# Patient Record
Sex: MALE | Race: WHITE | NOT HISPANIC OR LATINO | Employment: OTHER | URBAN - METROPOLITAN AREA
[De-identification: names, ages, dates, MRNs, and addresses within clinical notes are randomized per-mention and may not be internally consistent; named-entity substitution may affect disease eponyms.]

---

## 2017-02-16 ENCOUNTER — ALLSCRIPTS OFFICE VISIT (OUTPATIENT)
Dept: OTHER | Facility: OTHER | Age: 82
End: 2017-02-16

## 2017-03-01 ENCOUNTER — GENERIC CONVERSION - ENCOUNTER (OUTPATIENT)
Dept: OTHER | Facility: OTHER | Age: 82
End: 2017-03-01

## 2017-03-09 ENCOUNTER — GENERIC CONVERSION - ENCOUNTER (OUTPATIENT)
Dept: OTHER | Facility: OTHER | Age: 82
End: 2017-03-09

## 2017-03-13 ENCOUNTER — ALLSCRIPTS OFFICE VISIT (OUTPATIENT)
Dept: OTHER | Facility: OTHER | Age: 82
End: 2017-03-13

## 2017-03-29 ENCOUNTER — TRANSCRIBE ORDERS (OUTPATIENT)
Dept: LAB | Facility: OTHER | Age: 82
End: 2017-03-29

## 2017-03-29 ENCOUNTER — APPOINTMENT (OUTPATIENT)
Dept: LAB | Facility: OTHER | Age: 82
End: 2017-03-29
Payer: MEDICARE

## 2017-03-29 ENCOUNTER — ALLSCRIPTS OFFICE VISIT (OUTPATIENT)
Dept: OTHER | Facility: OTHER | Age: 82
End: 2017-03-29

## 2017-03-29 ENCOUNTER — GENERIC CONVERSION - ENCOUNTER (OUTPATIENT)
Dept: OTHER | Facility: OTHER | Age: 82
End: 2017-03-29

## 2017-03-29 DIAGNOSIS — M79.672 PAIN OF LEFT FOOT: ICD-10-CM

## 2017-03-29 DIAGNOSIS — R60.0 LOCALIZED EDEMA: ICD-10-CM

## 2017-03-29 DIAGNOSIS — I72.3 ANEURYSM OF ILIAC ARTERY (HCC): ICD-10-CM

## 2017-03-29 DIAGNOSIS — I77.9 DISORDER OF ARTERY OR ARTERIOLE (HCC): ICD-10-CM

## 2017-03-29 LAB — URATE SERPL-MCNC: 10.5 MG/DL (ref 4.2–8)

## 2017-03-29 PROCEDURE — 84550 ASSAY OF BLOOD/URIC ACID: CPT

## 2017-03-29 PROCEDURE — 36415 COLL VENOUS BLD VENIPUNCTURE: CPT

## 2017-04-12 ENCOUNTER — HOSPITAL ENCOUNTER (OUTPATIENT)
Dept: RADIOLOGY | Facility: MEDICAL CENTER | Age: 82
Discharge: HOME | End: 2017-04-12
Payer: MEDICARE

## 2017-04-12 DIAGNOSIS — R60.0 LOCALIZED EDEMA: ICD-10-CM

## 2017-04-12 PROCEDURE — 93970 EXTREMITY STUDY: CPT

## 2017-04-13 ENCOUNTER — GENERIC CONVERSION - ENCOUNTER (OUTPATIENT)
Dept: OTHER | Facility: OTHER | Age: 82
End: 2017-04-13

## 2017-04-18 ENCOUNTER — ALLSCRIPTS OFFICE VISIT (OUTPATIENT)
Dept: OTHER | Facility: OTHER | Age: 82
End: 2017-04-18

## 2017-04-24 ENCOUNTER — APPOINTMENT (EMERGENCY)
Dept: RADIOLOGY | Facility: HOSPITAL | Age: 82
DRG: 291 | End: 2017-04-24
Payer: MEDICARE

## 2017-04-24 ENCOUNTER — HOSPITAL ENCOUNTER (INPATIENT)
Facility: HOSPITAL | Age: 82
LOS: 8 days | Discharge: HOME | DRG: 291 | End: 2017-05-02
Attending: EMERGENCY MEDICINE | Admitting: INTERNAL MEDICINE
Payer: MEDICARE

## 2017-04-24 DIAGNOSIS — L03.116 CELLULITIS OF LEFT LOWER EXTREMITY: ICD-10-CM

## 2017-04-24 DIAGNOSIS — E87.70 VOLUME OVERLOAD: ICD-10-CM

## 2017-04-24 DIAGNOSIS — R06.00 DYSPNEA: ICD-10-CM

## 2017-04-24 DIAGNOSIS — N17.9 ACUTE KIDNEY INJURY (HCC): Primary | ICD-10-CM

## 2017-04-24 PROBLEM — R06.03 RESPIRATORY DISTRESS: Status: ACTIVE | Noted: 2017-04-24

## 2017-04-24 PROBLEM — R60.9 EDEMA: Status: ACTIVE | Noted: 2017-04-24

## 2017-04-24 PROBLEM — I50.9 CHF (CONGESTIVE HEART FAILURE) (HCC): Status: ACTIVE | Noted: 2017-04-24

## 2017-04-24 PROBLEM — N18.4 CKD (CHRONIC KIDNEY DISEASE), STAGE IV (HCC): Status: ACTIVE | Noted: 2017-04-24

## 2017-04-24 LAB
ALBUMIN SERPL BCP-MCNC: 2.6 G/DL (ref 3.5–5)
ALP SERPL-CCNC: 106 U/L (ref 46–116)
ALT SERPL W P-5'-P-CCNC: 18 U/L (ref 12–78)
ANION GAP SERPL CALCULATED.3IONS-SCNC: 14 MMOL/L (ref 4–13)
APTT PPP: 79 SECONDS (ref 24–36)
AST SERPL W P-5'-P-CCNC: 16 U/L (ref 5–45)
ATRIAL RATE: 111 BPM
BACTERIA UR QL AUTO: ABNORMAL /HPF
BASOPHILS # BLD AUTO: 0.05 THOUSANDS/ΜL (ref 0–0.1)
BASOPHILS NFR BLD AUTO: 1 % (ref 0–1)
BILIRUB SERPL-MCNC: 1.4 MG/DL (ref 0.2–1)
BILIRUB UR QL STRIP: NEGATIVE
BUN SERPL-MCNC: 62 MG/DL (ref 5–25)
CALCIUM SERPL-MCNC: 7.2 MG/DL (ref 8.3–10.1)
CHLORIDE SERPL-SCNC: 107 MMOL/L (ref 100–108)
CLARITY UR: CLEAR
CO2 SERPL-SCNC: 21 MMOL/L (ref 21–32)
COLOR UR: YELLOW
CREAT SERPL-MCNC: 3.87 MG/DL (ref 0.6–1.3)
EOSINOPHIL # BLD AUTO: 0.05 THOUSAND/ΜL (ref 0–0.61)
EOSINOPHIL NFR BLD AUTO: 1 % (ref 0–6)
ERYTHROCYTE [DISTWIDTH] IN BLOOD BY AUTOMATED COUNT: 18.7 % (ref 11.6–15.1)
FINE GRAN CASTS URNS QL MICRO: ABNORMAL /LPF
GFR SERPL CREATININE-BSD FRML MDRD: 15 ML/MIN/1.73SQ M
GLUCOSE SERPL-MCNC: 180 MG/DL (ref 65–140)
GLUCOSE UR STRIP-MCNC: NEGATIVE MG/DL
HCT VFR BLD AUTO: 27.5 % (ref 36.5–49.3)
HGB BLD-MCNC: 9 G/DL (ref 12–17)
HGB UR QL STRIP.AUTO: NEGATIVE
HYALINE CASTS #/AREA URNS LPF: ABNORMAL /LPF
INR PPP: 5.22 (ref 0.86–1.16)
KETONES UR STRIP-MCNC: NEGATIVE MG/DL
LEUKOCYTE ESTERASE UR QL STRIP: NEGATIVE
LIPASE SERPL-CCNC: 56 U/L (ref 73–393)
LYMPHOCYTES # BLD AUTO: 0.85 THOUSANDS/ΜL (ref 0.6–4.47)
LYMPHOCYTES NFR BLD AUTO: 8 % (ref 14–44)
MCH RBC QN AUTO: 28.7 PG (ref 26.8–34.3)
MCHC RBC AUTO-ENTMCNC: 32.7 G/DL (ref 31.4–37.4)
MCV RBC AUTO: 88 FL (ref 82–98)
MONOCYTES # BLD AUTO: 0.91 THOUSAND/ΜL (ref 0.17–1.22)
MONOCYTES NFR BLD AUTO: 9 % (ref 4–12)
NEUTROPHILS # BLD AUTO: 8.68 THOUSANDS/ΜL (ref 1.85–7.62)
NEUTS SEG NFR BLD AUTO: 81 % (ref 43–75)
NITRITE UR QL STRIP: NEGATIVE
NON-SQ EPI CELLS URNS QL MICRO: ABNORMAL /HPF
NRBC BLD AUTO-RTO: 0 /100 WBCS
NT-PROBNP SERPL-MCNC: ABNORMAL PG/ML
PH UR STRIP.AUTO: 5 [PH] (ref 4.5–8)
PLATELET # BLD AUTO: 105 THOUSANDS/UL (ref 149–390)
PLATELET # BLD AUTO: 118 THOUSANDS/UL (ref 149–390)
PMV BLD AUTO: 12.2 FL (ref 8.9–12.7)
PMV BLD AUTO: 12.5 FL (ref 8.9–12.7)
POTASSIUM SERPL-SCNC: 3.8 MMOL/L (ref 3.5–5.3)
PROT SERPL-MCNC: 6.4 G/DL (ref 6.4–8.2)
PROT UR STRIP-MCNC: ABNORMAL MG/DL
PROTHROMBIN TIME: 45.8 SECONDS (ref 12–14.3)
QRS AXIS: -68 DEGREES
QRSD INTERVAL: 136 MS
QT INTERVAL: 470 MS
QTC INTERVAL: 477 MS
RBC # BLD AUTO: 3.14 MILLION/UL (ref 3.88–5.62)
RBC #/AREA URNS AUTO: ABNORMAL /HPF
SODIUM SERPL-SCNC: 142 MMOL/L (ref 136–145)
SP GR UR STRIP.AUTO: 1.02 (ref 1–1.03)
T WAVE AXIS: 96 DEGREES
TROPONIN I SERPL-MCNC: 0.02 NG/ML
TROPONIN I SERPL-MCNC: 0.03 NG/ML
UROBILINOGEN UR QL STRIP.AUTO: 1 E.U./DL
VENTRICULAR RATE: 62 BPM
WBC # BLD AUTO: 10.7 THOUSAND/UL (ref 4.31–10.16)
WBC #/AREA URNS AUTO: ABNORMAL /HPF

## 2017-04-24 PROCEDURE — 80053 COMPREHEN METABOLIC PANEL: CPT | Performed by: EMERGENCY MEDICINE

## 2017-04-24 PROCEDURE — 84484 ASSAY OF TROPONIN QUANT: CPT | Performed by: INTERNAL MEDICINE

## 2017-04-24 PROCEDURE — 93005 ELECTROCARDIOGRAM TRACING: CPT | Performed by: EMERGENCY MEDICINE

## 2017-04-24 PROCEDURE — 83880 ASSAY OF NATRIURETIC PEPTIDE: CPT | Performed by: EMERGENCY MEDICINE

## 2017-04-24 PROCEDURE — 83690 ASSAY OF LIPASE: CPT | Performed by: EMERGENCY MEDICINE

## 2017-04-24 PROCEDURE — 71020 HB CHEST X-RAY 2VW FRONTAL&LATL: CPT

## 2017-04-24 PROCEDURE — 85730 THROMBOPLASTIN TIME PARTIAL: CPT | Performed by: EMERGENCY MEDICINE

## 2017-04-24 PROCEDURE — 85610 PROTHROMBIN TIME: CPT | Performed by: EMERGENCY MEDICINE

## 2017-04-24 PROCEDURE — 84484 ASSAY OF TROPONIN QUANT: CPT | Performed by: EMERGENCY MEDICINE

## 2017-04-24 PROCEDURE — 85025 COMPLETE CBC W/AUTO DIFF WBC: CPT | Performed by: EMERGENCY MEDICINE

## 2017-04-24 PROCEDURE — 81001 URINALYSIS AUTO W/SCOPE: CPT | Performed by: EMERGENCY MEDICINE

## 2017-04-24 PROCEDURE — 84145 PROCALCITONIN (PCT): CPT | Performed by: INTERNAL MEDICINE

## 2017-04-24 PROCEDURE — 36415 COLL VENOUS BLD VENIPUNCTURE: CPT | Performed by: EMERGENCY MEDICINE

## 2017-04-24 PROCEDURE — 85049 AUTOMATED PLATELET COUNT: CPT | Performed by: INTERNAL MEDICINE

## 2017-04-24 PROCEDURE — 99285 EMERGENCY DEPT VISIT HI MDM: CPT

## 2017-04-24 RX ORDER — FUROSEMIDE 10 MG/ML
80 INJECTION INTRAMUSCULAR; INTRAVENOUS ONCE
Status: COMPLETED | OUTPATIENT
Start: 2017-04-24 | End: 2017-04-24

## 2017-04-24 RX ORDER — AMLODIPINE BESYLATE 10 MG/1
5 TABLET ORAL DAILY
COMMUNITY
End: 2017-11-07 | Stop reason: HOSPADM

## 2017-04-24 RX ORDER — LEVALBUTEROL 1.25 MG/.5ML
1.25 SOLUTION, CONCENTRATE RESPIRATORY (INHALATION) EVERY 8 HOURS PRN
Status: DISCONTINUED | OUTPATIENT
Start: 2017-04-24 | End: 2017-04-25

## 2017-04-24 RX ORDER — LEVOFLOXACIN 5 MG/ML
250 INJECTION, SOLUTION INTRAVENOUS
Status: DISCONTINUED | OUTPATIENT
Start: 2017-04-26 | End: 2017-04-27

## 2017-04-24 RX ORDER — LEVOFLOXACIN 5 MG/ML
INJECTION, SOLUTION INTRAVENOUS
Status: COMPLETED
Start: 2017-04-24 | End: 2017-04-24

## 2017-04-24 RX ORDER — TERAZOSIN 1 MG/1
1 CAPSULE ORAL
Status: DISCONTINUED | OUTPATIENT
Start: 2017-04-24 | End: 2017-05-02 | Stop reason: HOSPADM

## 2017-04-24 RX ORDER — LEVOFLOXACIN 5 MG/ML
500 INJECTION, SOLUTION INTRAVENOUS EVERY 24 HOURS
Status: DISCONTINUED | OUTPATIENT
Start: 2017-04-24 | End: 2017-04-24

## 2017-04-24 RX ORDER — MELATONIN
2000 DAILY
COMMUNITY

## 2017-04-24 RX ORDER — HEPARIN SODIUM 5000 [USP'U]/ML
5000 INJECTION, SOLUTION INTRAVENOUS; SUBCUTANEOUS EVERY 8 HOURS SCHEDULED
Status: DISCONTINUED | OUTPATIENT
Start: 2017-04-24 | End: 2017-04-25

## 2017-04-24 RX ORDER — TORSEMIDE 20 MG/1
20 TABLET ORAL DAILY
COMMUNITY
End: 2017-05-02 | Stop reason: HOSPADM

## 2017-04-24 RX ORDER — OMEPRAZOLE 20 MG/1
20 CAPSULE, DELAYED RELEASE ORAL DAILY
COMMUNITY

## 2017-04-24 RX ORDER — ASPIRIN 81 MG/1
81 TABLET ORAL DAILY
Status: DISCONTINUED | OUTPATIENT
Start: 2017-04-25 | End: 2017-05-02 | Stop reason: HOSPADM

## 2017-04-24 RX ORDER — ASPIRIN 81 MG/1
81 TABLET ORAL DAILY
COMMUNITY

## 2017-04-24 RX ORDER — ATORVASTATIN CALCIUM 10 MG/1
10 TABLET, FILM COATED ORAL DAILY
Status: DISCONTINUED | OUTPATIENT
Start: 2017-04-25 | End: 2017-05-02 | Stop reason: HOSPADM

## 2017-04-24 RX ORDER — WARFARIN SODIUM 5 MG/1
5 TABLET ORAL DAILY
COMMUNITY
End: 2017-10-07 | Stop reason: HOSPADM

## 2017-04-24 RX ORDER — FUROSEMIDE 10 MG/ML
80 INJECTION INTRAMUSCULAR; INTRAVENOUS EVERY 12 HOURS
Status: DISCONTINUED | OUTPATIENT
Start: 2017-04-25 | End: 2017-04-26

## 2017-04-24 RX ORDER — WARFARIN SODIUM 2.5 MG/1
2.5 TABLET ORAL EVERY OTHER DAY
COMMUNITY
End: 2017-10-07 | Stop reason: HOSPADM

## 2017-04-24 RX ORDER — PANTOPRAZOLE SODIUM 20 MG/1
20 TABLET, DELAYED RELEASE ORAL
Status: DISCONTINUED | OUTPATIENT
Start: 2017-04-25 | End: 2017-05-02 | Stop reason: HOSPADM

## 2017-04-24 RX ORDER — ATORVASTATIN CALCIUM 10 MG/1
10 TABLET, FILM COATED ORAL DAILY
COMMUNITY

## 2017-04-24 RX ORDER — TERAZOSIN 1 MG/1
1 CAPSULE ORAL
COMMUNITY

## 2017-04-24 RX ADMIN — FUROSEMIDE 80 MG: 10 INJECTION, SOLUTION INTRAMUSCULAR; INTRAVENOUS at 17:51

## 2017-04-24 RX ADMIN — HEPARIN SODIUM 5000 UNITS: 5000 INJECTION, SOLUTION INTRAVENOUS; SUBCUTANEOUS at 17:51

## 2017-04-24 RX ADMIN — LEVOFLOXACIN 500 MG: 5 INJECTION, SOLUTION INTRAVENOUS at 18:40

## 2017-04-24 RX ADMIN — METOPROLOL TARTRATE 12.5 MG: 25 TABLET ORAL at 21:12

## 2017-04-24 RX ADMIN — HEPARIN SODIUM 5000 UNITS: 5000 INJECTION, SOLUTION INTRAVENOUS; SUBCUTANEOUS at 23:33

## 2017-04-24 RX ADMIN — TERAZOSIN HYDROCHLORIDE 1 MG: 1 CAPSULE ORAL at 23:33

## 2017-04-25 ENCOUNTER — APPOINTMENT (INPATIENT)
Dept: NON INVASIVE DIAGNOSTICS | Facility: HOSPITAL | Age: 82
DRG: 291 | End: 2017-04-25
Payer: MEDICARE

## 2017-04-25 LAB
ANION GAP SERPL CALCULATED.3IONS-SCNC: 13 MMOL/L (ref 4–13)
BUN SERPL-MCNC: 66 MG/DL (ref 5–25)
CALCIUM SERPL-MCNC: 7.2 MG/DL (ref 8.3–10.1)
CHLORIDE SERPL-SCNC: 108 MMOL/L (ref 100–108)
CO2 SERPL-SCNC: 21 MMOL/L (ref 21–32)
CREAT SERPL-MCNC: 3.68 MG/DL (ref 0.6–1.3)
ERYTHROCYTE [DISTWIDTH] IN BLOOD BY AUTOMATED COUNT: 18.3 % (ref 11.6–15.1)
FERRITIN SERPL-MCNC: 376 NG/ML (ref 8–388)
GFR SERPL CREATININE-BSD FRML MDRD: 15.9 ML/MIN/1.73SQ M
GLUCOSE SERPL-MCNC: 99 MG/DL (ref 65–140)
HCT VFR BLD AUTO: 24.5 % (ref 36.5–49.3)
HEMOCCULT STL QL: NEGATIVE
HGB BLD-MCNC: 8.1 G/DL (ref 12–17)
INR PPP: 5.3 (ref 0.86–1.16)
IRON SATN MFR SERPL: 25 %
IRON SERPL-MCNC: 35 UG/DL (ref 65–175)
MCH RBC QN AUTO: 28.7 PG (ref 26.8–34.3)
MCHC RBC AUTO-ENTMCNC: 33.1 G/DL (ref 31.4–37.4)
MCV RBC AUTO: 87 FL (ref 82–98)
PHOSPHATE SERPL-MCNC: 4.5 MG/DL (ref 2.3–4.1)
PLATELET # BLD AUTO: 104 THOUSANDS/UL (ref 149–390)
PMV BLD AUTO: 12.5 FL (ref 8.9–12.7)
POTASSIUM SERPL-SCNC: 3.7 MMOL/L (ref 3.5–5.3)
PROTHROMBIN TIME: 46.3 SECONDS (ref 12–14.3)
PTH-INTACT SERPL-MCNC: 83.2 PG/ML (ref 14–72)
RBC # BLD AUTO: 2.82 MILLION/UL (ref 3.88–5.62)
SODIUM SERPL-SCNC: 142 MMOL/L (ref 136–145)
TIBC SERPL-MCNC: 142 UG/DL (ref 250–450)
TROPONIN I SERPL-MCNC: 0.03 NG/ML
URATE SERPL-MCNC: 11.3 MG/DL (ref 4.2–8)
WBC # BLD AUTO: 6.63 THOUSAND/UL (ref 4.31–10.16)

## 2017-04-25 PROCEDURE — 84550 ASSAY OF BLOOD/URIC ACID: CPT | Performed by: INTERNAL MEDICINE

## 2017-04-25 PROCEDURE — 84100 ASSAY OF PHOSPHORUS: CPT | Performed by: INTERNAL MEDICINE

## 2017-04-25 PROCEDURE — 85610 PROTHROMBIN TIME: CPT | Performed by: INTERNAL MEDICINE

## 2017-04-25 PROCEDURE — 82272 OCCULT BLD FECES 1-3 TESTS: CPT | Performed by: INTERNAL MEDICINE

## 2017-04-25 PROCEDURE — 85027 COMPLETE CBC AUTOMATED: CPT | Performed by: INTERNAL MEDICINE

## 2017-04-25 PROCEDURE — 93306 TTE W/DOPPLER COMPLETE: CPT

## 2017-04-25 PROCEDURE — 83970 ASSAY OF PARATHORMONE: CPT | Performed by: INTERNAL MEDICINE

## 2017-04-25 PROCEDURE — 84484 ASSAY OF TROPONIN QUANT: CPT | Performed by: INTERNAL MEDICINE

## 2017-04-25 PROCEDURE — 82728 ASSAY OF FERRITIN: CPT | Performed by: INTERNAL MEDICINE

## 2017-04-25 PROCEDURE — 83540 ASSAY OF IRON: CPT | Performed by: INTERNAL MEDICINE

## 2017-04-25 PROCEDURE — 80048 BASIC METABOLIC PNL TOTAL CA: CPT | Performed by: INTERNAL MEDICINE

## 2017-04-25 PROCEDURE — 83550 IRON BINDING TEST: CPT | Performed by: INTERNAL MEDICINE

## 2017-04-25 RX ORDER — ALBUTEROL SULFATE 2.5 MG/3ML
2.5 SOLUTION RESPIRATORY (INHALATION) EVERY 6 HOURS PRN
Status: DISCONTINUED | OUTPATIENT
Start: 2017-04-25 | End: 2017-05-02 | Stop reason: HOSPADM

## 2017-04-25 RX ADMIN — IRON SUCROSE 200 MG: 20 INJECTION, SOLUTION INTRAVENOUS at 10:45

## 2017-04-25 RX ADMIN — FUROSEMIDE 80 MG: 10 INJECTION, SOLUTION INTRAMUSCULAR; INTRAVENOUS at 17:35

## 2017-04-25 RX ADMIN — PANTOPRAZOLE SODIUM 20 MG: 20 TABLET, DELAYED RELEASE ORAL at 05:12

## 2017-04-25 RX ADMIN — ATORVASTATIN CALCIUM 10 MG: 10 TABLET, FILM COATED ORAL at 08:10

## 2017-04-25 RX ADMIN — TERAZOSIN HYDROCHLORIDE 1 MG: 1 CAPSULE ORAL at 23:02

## 2017-04-25 RX ADMIN — FUROSEMIDE 80 MG: 10 INJECTION, SOLUTION INTRAMUSCULAR; INTRAVENOUS at 05:14

## 2017-04-25 RX ADMIN — METOPROLOL TARTRATE 12.5 MG: 25 TABLET ORAL at 08:08

## 2017-04-25 RX ADMIN — ASPIRIN 81 MG: 81 TABLET, COATED ORAL at 08:10

## 2017-04-25 RX ADMIN — EPOETIN ALFA 10000 UNITS: 10000 SOLUTION INTRAVENOUS; SUBCUTANEOUS at 14:02

## 2017-04-25 RX ADMIN — METOPROLOL TARTRATE 12.5 MG: 25 TABLET ORAL at 17:35

## 2017-04-26 ENCOUNTER — APPOINTMENT (INPATIENT)
Dept: ULTRASOUND IMAGING | Facility: HOSPITAL | Age: 82
DRG: 291 | End: 2017-04-26
Payer: MEDICARE

## 2017-04-26 LAB
ANION GAP SERPL CALCULATED.3IONS-SCNC: 12 MMOL/L (ref 4–13)
BUN SERPL-MCNC: 69 MG/DL (ref 5–25)
CALCIUM SERPL-MCNC: 7.6 MG/DL (ref 8.3–10.1)
CHLORIDE SERPL-SCNC: 109 MMOL/L (ref 100–108)
CO2 SERPL-SCNC: 23 MMOL/L (ref 21–32)
CREAT SERPL-MCNC: 3.89 MG/DL (ref 0.6–1.3)
ERYTHROCYTE [DISTWIDTH] IN BLOOD BY AUTOMATED COUNT: 18.3 % (ref 11.6–15.1)
GFR SERPL CREATININE-BSD FRML MDRD: 15 ML/MIN/1.73SQ M
GLUCOSE SERPL-MCNC: 107 MG/DL (ref 65–140)
HCT VFR BLD AUTO: 26.2 % (ref 36.5–49.3)
HEMOCCULT STL QL: NEGATIVE
HGB BLD-MCNC: 8.7 G/DL (ref 12–17)
INR PPP: 4.14 (ref 0.86–1.16)
MCH RBC QN AUTO: 28.5 PG (ref 26.8–34.3)
MCHC RBC AUTO-ENTMCNC: 33.2 G/DL (ref 31.4–37.4)
MCV RBC AUTO: 86 FL (ref 82–98)
PLATELET # BLD AUTO: 100 THOUSANDS/UL (ref 149–390)
PMV BLD AUTO: 11 FL (ref 8.9–12.7)
POTASSIUM SERPL-SCNC: 3.9 MMOL/L (ref 3.5–5.3)
PROCALCITONIN: 0.4 NG/ML (ref 0–0.08)
PROTHROMBIN TIME: 38.5 SECONDS (ref 12–14.3)
RBC # BLD AUTO: 3.05 MILLION/UL (ref 3.88–5.62)
SODIUM SERPL-SCNC: 144 MMOL/L (ref 136–145)
WBC # BLD AUTO: 5.99 THOUSAND/UL (ref 4.31–10.16)

## 2017-04-26 PROCEDURE — 80048 BASIC METABOLIC PNL TOTAL CA: CPT | Performed by: INTERNAL MEDICINE

## 2017-04-26 PROCEDURE — 85610 PROTHROMBIN TIME: CPT | Performed by: INTERNAL MEDICINE

## 2017-04-26 PROCEDURE — 85027 COMPLETE CBC AUTOMATED: CPT | Performed by: INTERNAL MEDICINE

## 2017-04-26 PROCEDURE — 82272 OCCULT BLD FECES 1-3 TESTS: CPT | Performed by: INTERNAL MEDICINE

## 2017-04-26 PROCEDURE — 93970 EXTREMITY STUDY: CPT

## 2017-04-26 RX ORDER — FUROSEMIDE 10 MG/ML
40 INJECTION INTRAMUSCULAR; INTRAVENOUS EVERY 12 HOURS
Status: DISCONTINUED | OUTPATIENT
Start: 2017-04-26 | End: 2017-04-27

## 2017-04-26 RX ADMIN — PANTOPRAZOLE SODIUM 20 MG: 20 TABLET, DELAYED RELEASE ORAL at 05:32

## 2017-04-26 RX ADMIN — ATORVASTATIN CALCIUM 10 MG: 10 TABLET, FILM COATED ORAL at 08:44

## 2017-04-26 RX ADMIN — METOPROLOL TARTRATE 12.5 MG: 25 TABLET ORAL at 08:44

## 2017-04-26 RX ADMIN — TERAZOSIN HYDROCHLORIDE 1 MG: 1 CAPSULE ORAL at 21:36

## 2017-04-26 RX ADMIN — METOPROLOL TARTRATE 12.5 MG: 25 TABLET ORAL at 18:59

## 2017-04-26 RX ADMIN — IRON SUCROSE 200 MG: 20 INJECTION, SOLUTION INTRAVENOUS at 12:40

## 2017-04-26 RX ADMIN — FUROSEMIDE 40 MG: 10 INJECTION, SOLUTION INTRAMUSCULAR; INTRAVENOUS at 18:58

## 2017-04-26 RX ADMIN — ASPIRIN 81 MG: 81 TABLET, COATED ORAL at 08:44

## 2017-04-26 RX ADMIN — FUROSEMIDE 80 MG: 10 INJECTION, SOLUTION INTRAMUSCULAR; INTRAVENOUS at 05:32

## 2017-04-26 RX ADMIN — LEVOFLOXACIN 250 MG: 5 INJECTION, SOLUTION INTRAVENOUS at 18:59

## 2017-04-27 LAB
ANION GAP SERPL CALCULATED.3IONS-SCNC: 14 MMOL/L (ref 4–13)
BUN SERPL-MCNC: 66 MG/DL (ref 5–25)
CALCIUM SERPL-MCNC: 8 MG/DL (ref 8.3–10.1)
CHLORIDE SERPL-SCNC: 109 MMOL/L (ref 100–108)
CO2 SERPL-SCNC: 23 MMOL/L (ref 21–32)
CREAT SERPL-MCNC: 3.83 MG/DL (ref 0.6–1.3)
GFR SERPL CREATININE-BSD FRML MDRD: 15.2 ML/MIN/1.73SQ M
GLUCOSE SERPL-MCNC: 150 MG/DL (ref 65–140)
INR PPP: 3.2 (ref 0.86–1.16)
POTASSIUM SERPL-SCNC: 3.4 MMOL/L (ref 3.5–5.3)
PROTHROMBIN TIME: 31.6 SECONDS (ref 12–14.3)
SODIUM SERPL-SCNC: 146 MMOL/L (ref 136–145)

## 2017-04-27 PROCEDURE — 80048 BASIC METABOLIC PNL TOTAL CA: CPT | Performed by: INTERNAL MEDICINE

## 2017-04-27 PROCEDURE — 85610 PROTHROMBIN TIME: CPT | Performed by: INTERNAL MEDICINE

## 2017-04-27 RX ORDER — LEVOFLOXACIN 5 MG/ML
250 INJECTION, SOLUTION INTRAVENOUS ONCE
Status: COMPLETED | OUTPATIENT
Start: 2017-04-28 | End: 2017-04-28

## 2017-04-27 RX ORDER — LEVOFLOXACIN 5 MG/ML
250 INJECTION, SOLUTION INTRAVENOUS EVERY 24 HOURS
Status: DISCONTINUED | OUTPATIENT
Start: 2017-04-28 | End: 2017-04-27

## 2017-04-27 RX ORDER — FUROSEMIDE 10 MG/ML
80 INJECTION INTRAMUSCULAR; INTRAVENOUS EVERY 12 HOURS
Status: DISCONTINUED | OUTPATIENT
Start: 2017-04-27 | End: 2017-04-28

## 2017-04-27 RX ADMIN — TERAZOSIN HYDROCHLORIDE 1 MG: 1 CAPSULE ORAL at 22:28

## 2017-04-27 RX ADMIN — METOPROLOL TARTRATE 12.5 MG: 25 TABLET ORAL at 08:08

## 2017-04-27 RX ADMIN — IRON SUCROSE 100 MG: 20 INJECTION, SOLUTION INTRAVENOUS at 14:09

## 2017-04-27 RX ADMIN — ASPIRIN 81 MG: 81 TABLET, COATED ORAL at 08:09

## 2017-04-27 RX ADMIN — FUROSEMIDE 40 MG: 10 INJECTION, SOLUTION INTRAMUSCULAR; INTRAVENOUS at 06:02

## 2017-04-27 RX ADMIN — PANTOPRAZOLE SODIUM 20 MG: 20 TABLET, DELAYED RELEASE ORAL at 06:02

## 2017-04-27 RX ADMIN — ATORVASTATIN CALCIUM 10 MG: 10 TABLET, FILM COATED ORAL at 08:08

## 2017-04-27 RX ADMIN — FUROSEMIDE 80 MG: 10 INJECTION, SOLUTION INTRAMUSCULAR; INTRAVENOUS at 19:07

## 2017-04-27 RX ADMIN — METOPROLOL TARTRATE 12.5 MG: 25 TABLET ORAL at 19:07

## 2017-04-28 PROBLEM — L03.119 CELLULITIS OF EXTREMITY: Status: ACTIVE | Noted: 2017-04-28

## 2017-04-28 PROBLEM — I48.0 PAROXYSMAL ATRIAL FIBRILLATION (HCC): Status: ACTIVE | Noted: 2017-04-28

## 2017-04-28 PROBLEM — D64.9 ANEMIA: Status: ACTIVE | Noted: 2017-04-28

## 2017-04-28 LAB
ANION GAP SERPL CALCULATED.3IONS-SCNC: 14 MMOL/L (ref 4–13)
BUN SERPL-MCNC: 65 MG/DL (ref 5–25)
CALCIUM SERPL-MCNC: 7.7 MG/DL (ref 8.3–10.1)
CHLORIDE SERPL-SCNC: 108 MMOL/L (ref 100–108)
CO2 SERPL-SCNC: 23 MMOL/L (ref 21–32)
CREAT SERPL-MCNC: 3.46 MG/DL (ref 0.6–1.3)
ERYTHROCYTE [DISTWIDTH] IN BLOOD BY AUTOMATED COUNT: 17.9 % (ref 11.6–15.1)
GFR SERPL CREATININE-BSD FRML MDRD: 17.1 ML/MIN/1.73SQ M
GLUCOSE SERPL-MCNC: 95 MG/DL (ref 65–140)
HCT VFR BLD AUTO: 27.4 % (ref 36.5–49.3)
HGB BLD-MCNC: 9 G/DL (ref 12–17)
INR PPP: 2.47 (ref 0.86–1.16)
MCH RBC QN AUTO: 28.1 PG (ref 26.8–34.3)
MCHC RBC AUTO-ENTMCNC: 32.8 G/DL (ref 31.4–37.4)
MCV RBC AUTO: 86 FL (ref 82–98)
PLATELET # BLD AUTO: 117 THOUSANDS/UL (ref 149–390)
PMV BLD AUTO: 12.5 FL (ref 8.9–12.7)
POTASSIUM SERPL-SCNC: 3.2 MMOL/L (ref 3.5–5.3)
PROTHROMBIN TIME: 27.5 SECONDS (ref 12.1–14.4)
RBC # BLD AUTO: 3.2 MILLION/UL (ref 3.88–5.62)
SODIUM SERPL-SCNC: 145 MMOL/L (ref 136–145)
WBC # BLD AUTO: 6.17 THOUSAND/UL (ref 4.31–10.16)

## 2017-04-28 PROCEDURE — 85027 COMPLETE CBC AUTOMATED: CPT | Performed by: INTERNAL MEDICINE

## 2017-04-28 PROCEDURE — G8988 SELF CARE GOAL STATUS: HCPCS

## 2017-04-28 PROCEDURE — G8978 MOBILITY CURRENT STATUS: HCPCS

## 2017-04-28 PROCEDURE — 97163 PT EVAL HIGH COMPLEX 45 MIN: CPT

## 2017-04-28 PROCEDURE — G8987 SELF CARE CURRENT STATUS: HCPCS

## 2017-04-28 PROCEDURE — 80048 BASIC METABOLIC PNL TOTAL CA: CPT | Performed by: INTERNAL MEDICINE

## 2017-04-28 PROCEDURE — 85610 PROTHROMBIN TIME: CPT | Performed by: INTERNAL MEDICINE

## 2017-04-28 PROCEDURE — G8979 MOBILITY GOAL STATUS: HCPCS

## 2017-04-28 PROCEDURE — 97110 THERAPEUTIC EXERCISES: CPT

## 2017-04-28 PROCEDURE — 97165 OT EVAL LOW COMPLEX 30 MIN: CPT

## 2017-04-28 RX ORDER — FUROSEMIDE 80 MG
80 TABLET ORAL
Status: DISCONTINUED | OUTPATIENT
Start: 2017-04-28 | End: 2017-05-02 | Stop reason: HOSPADM

## 2017-04-28 RX ORDER — WARFARIN SODIUM 3 MG/1
3 TABLET ORAL
Status: DISCONTINUED | OUTPATIENT
Start: 2017-04-28 | End: 2017-04-30

## 2017-04-28 RX ORDER — POTASSIUM CHLORIDE 20 MEQ/1
20 TABLET, EXTENDED RELEASE ORAL 2 TIMES DAILY
Status: DISCONTINUED | OUTPATIENT
Start: 2017-04-28 | End: 2017-05-02 | Stop reason: HOSPADM

## 2017-04-28 RX ADMIN — METOPROLOL TARTRATE 12.5 MG: 25 TABLET ORAL at 09:04

## 2017-04-28 RX ADMIN — TERAZOSIN HYDROCHLORIDE 1 MG: 1 CAPSULE ORAL at 21:30

## 2017-04-28 RX ADMIN — CEFAZOLIN SODIUM 1000 MG: 1 SOLUTION INTRAVENOUS at 21:30

## 2017-04-28 RX ADMIN — METOPROLOL TARTRATE 12.5 MG: 25 TABLET ORAL at 17:07

## 2017-04-28 RX ADMIN — ASPIRIN 81 MG: 81 TABLET, COATED ORAL at 09:04

## 2017-04-28 RX ADMIN — FUROSEMIDE 80 MG: 10 INJECTION, SOLUTION INTRAMUSCULAR; INTRAVENOUS at 06:06

## 2017-04-28 RX ADMIN — FUROSEMIDE 80 MG: 80 TABLET ORAL at 16:28

## 2017-04-28 RX ADMIN — ATORVASTATIN CALCIUM 10 MG: 10 TABLET, FILM COATED ORAL at 09:04

## 2017-04-28 RX ADMIN — PANTOPRAZOLE SODIUM 20 MG: 20 TABLET, DELAYED RELEASE ORAL at 06:06

## 2017-04-28 RX ADMIN — LEVOFLOXACIN 250 MG: 5 INJECTION, SOLUTION INTRAVENOUS at 09:04

## 2017-04-28 RX ADMIN — CEFAZOLIN SODIUM 1000 MG: 1 SOLUTION INTRAVENOUS at 10:38

## 2017-04-28 RX ADMIN — POTASSIUM CHLORIDE 20 MEQ: 20 TABLET, EXTENDED RELEASE ORAL at 20:27

## 2017-04-28 RX ADMIN — WARFARIN SODIUM 3 MG: 3 TABLET ORAL at 17:07

## 2017-04-29 LAB
ANION GAP SERPL CALCULATED.3IONS-SCNC: 13 MMOL/L (ref 4–13)
BUN SERPL-MCNC: 66 MG/DL (ref 5–25)
CALCIUM SERPL-MCNC: 7.7 MG/DL (ref 8.3–10.1)
CHLORIDE SERPL-SCNC: 107 MMOL/L (ref 100–108)
CO2 SERPL-SCNC: 23 MMOL/L (ref 21–32)
CREAT SERPL-MCNC: 3.43 MG/DL (ref 0.6–1.3)
ERYTHROCYTE [DISTWIDTH] IN BLOOD BY AUTOMATED COUNT: 18 % (ref 11.6–15.1)
GFR SERPL CREATININE-BSD FRML MDRD: 17.3 ML/MIN/1.73SQ M
GLUCOSE SERPL-MCNC: 101 MG/DL (ref 65–140)
HCT VFR BLD AUTO: 24.8 % (ref 36.5–49.3)
HGB BLD-MCNC: 8.3 G/DL (ref 12–17)
INR PPP: 2.49 (ref 0.86–1.16)
MCH RBC QN AUTO: 28.4 PG (ref 26.8–34.3)
MCHC RBC AUTO-ENTMCNC: 33.5 G/DL (ref 31.4–37.4)
MCV RBC AUTO: 85 FL (ref 82–98)
PLATELET # BLD AUTO: 89 THOUSANDS/UL (ref 149–390)
POTASSIUM SERPL-SCNC: 3.4 MMOL/L (ref 3.5–5.3)
PROTHROMBIN TIME: 27.6 SECONDS (ref 12.1–14.4)
RBC # BLD AUTO: 2.92 MILLION/UL (ref 3.88–5.62)
SODIUM SERPL-SCNC: 143 MMOL/L (ref 136–145)
WBC # BLD AUTO: 6.94 THOUSAND/UL (ref 4.31–10.16)

## 2017-04-29 PROCEDURE — 85610 PROTHROMBIN TIME: CPT | Performed by: INTERNAL MEDICINE

## 2017-04-29 PROCEDURE — 85027 COMPLETE CBC AUTOMATED: CPT | Performed by: INTERNAL MEDICINE

## 2017-04-29 PROCEDURE — 80048 BASIC METABOLIC PNL TOTAL CA: CPT | Performed by: INTERNAL MEDICINE

## 2017-04-29 RX ADMIN — WARFARIN SODIUM 3 MG: 3 TABLET ORAL at 17:34

## 2017-04-29 RX ADMIN — TERAZOSIN HYDROCHLORIDE 1 MG: 1 CAPSULE ORAL at 21:00

## 2017-04-29 RX ADMIN — POTASSIUM CHLORIDE 20 MEQ: 20 TABLET, EXTENDED RELEASE ORAL at 09:00

## 2017-04-29 RX ADMIN — CEFAZOLIN SODIUM 1000 MG: 1 SOLUTION INTRAVENOUS at 09:00

## 2017-04-29 RX ADMIN — FUROSEMIDE 80 MG: 80 TABLET ORAL at 15:40

## 2017-04-29 RX ADMIN — CEFAZOLIN SODIUM 1000 MG: 1 SOLUTION INTRAVENOUS at 21:00

## 2017-04-29 RX ADMIN — ASPIRIN 81 MG: 81 TABLET, COATED ORAL at 08:59

## 2017-04-29 RX ADMIN — FUROSEMIDE 80 MG: 80 TABLET ORAL at 08:59

## 2017-04-29 RX ADMIN — METOPROLOL TARTRATE 12.5 MG: 25 TABLET ORAL at 17:33

## 2017-04-29 RX ADMIN — METOPROLOL TARTRATE 12.5 MG: 25 TABLET ORAL at 08:59

## 2017-04-29 RX ADMIN — ATORVASTATIN CALCIUM 10 MG: 10 TABLET, FILM COATED ORAL at 08:59

## 2017-04-29 RX ADMIN — PANTOPRAZOLE SODIUM 20 MG: 20 TABLET, DELAYED RELEASE ORAL at 05:05

## 2017-04-29 RX ADMIN — POTASSIUM CHLORIDE 20 MEQ: 20 TABLET, EXTENDED RELEASE ORAL at 17:33

## 2017-04-30 PROBLEM — R06.03 RESPIRATORY DISTRESS: Status: RESOLVED | Noted: 2017-04-24 | Resolved: 2017-04-30

## 2017-04-30 LAB
INR PPP: 2.12 (ref 0.86–1.16)
PROTHROMBIN TIME: 24.3 SECONDS (ref 12.1–14.4)

## 2017-04-30 PROCEDURE — 85610 PROTHROMBIN TIME: CPT | Performed by: INTERNAL MEDICINE

## 2017-04-30 RX ORDER — WARFARIN SODIUM 5 MG/1
5 TABLET ORAL
Status: DISCONTINUED | OUTPATIENT
Start: 2017-04-30 | End: 2017-05-02 | Stop reason: HOSPADM

## 2017-04-30 RX ADMIN — ASPIRIN 81 MG: 81 TABLET, COATED ORAL at 08:33

## 2017-04-30 RX ADMIN — CEFAZOLIN SODIUM 1000 MG: 1 SOLUTION INTRAVENOUS at 22:35

## 2017-04-30 RX ADMIN — CEFAZOLIN SODIUM 1000 MG: 1 SOLUTION INTRAVENOUS at 09:24

## 2017-04-30 RX ADMIN — FUROSEMIDE 80 MG: 80 TABLET ORAL at 16:06

## 2017-04-30 RX ADMIN — POTASSIUM CHLORIDE 20 MEQ: 20 TABLET, EXTENDED RELEASE ORAL at 08:33

## 2017-04-30 RX ADMIN — FUROSEMIDE 80 MG: 80 TABLET ORAL at 08:33

## 2017-04-30 RX ADMIN — WARFARIN SODIUM 5 MG: 5 TABLET ORAL at 17:15

## 2017-04-30 RX ADMIN — TERAZOSIN HYDROCHLORIDE 1 MG: 1 CAPSULE ORAL at 22:35

## 2017-04-30 RX ADMIN — PANTOPRAZOLE SODIUM 20 MG: 20 TABLET, DELAYED RELEASE ORAL at 06:07

## 2017-04-30 RX ADMIN — POTASSIUM CHLORIDE 20 MEQ: 20 TABLET, EXTENDED RELEASE ORAL at 17:15

## 2017-04-30 RX ADMIN — METOPROLOL TARTRATE 12.5 MG: 25 TABLET ORAL at 08:33

## 2017-04-30 RX ADMIN — METOPROLOL TARTRATE 12.5 MG: 25 TABLET ORAL at 17:15

## 2017-04-30 RX ADMIN — ATORVASTATIN CALCIUM 10 MG: 10 TABLET, FILM COATED ORAL at 08:33

## 2017-05-01 ENCOUNTER — APPOINTMENT (INPATIENT)
Dept: MRI IMAGING | Facility: HOSPITAL | Age: 82
DRG: 291 | End: 2017-05-01
Payer: MEDICARE

## 2017-05-01 PROBLEM — I12.9 HYPERTENSIVE CKD (CHRONIC KIDNEY DISEASE): Status: ACTIVE | Noted: 2017-05-01

## 2017-05-01 PROBLEM — N17.9 AKI (ACUTE KIDNEY INJURY) (HCC): Status: ACTIVE | Noted: 2017-05-01

## 2017-05-01 PROBLEM — R79.89 AZOTEMIA: Status: ACTIVE | Noted: 2017-05-01

## 2017-05-01 LAB
ANION GAP SERPL CALCULATED.3IONS-SCNC: 11 MMOL/L (ref 4–13)
BUN SERPL-MCNC: 70 MG/DL (ref 5–25)
CALCIUM SERPL-MCNC: 7.6 MG/DL (ref 8.3–10.1)
CHLORIDE SERPL-SCNC: 108 MMOL/L (ref 100–108)
CO2 SERPL-SCNC: 25 MMOL/L (ref 21–32)
CREAT SERPL-MCNC: 3.5 MG/DL (ref 0.6–1.3)
GFR SERPL CREATININE-BSD FRML MDRD: 16.9 ML/MIN/1.73SQ M
GLUCOSE SERPL-MCNC: 101 MG/DL (ref 65–140)
INR PPP: 2.29 (ref 0.86–1.16)
POTASSIUM SERPL-SCNC: 3.8 MMOL/L (ref 3.5–5.3)
PROTHROMBIN TIME: 25.9 SECONDS (ref 12.1–14.4)
SODIUM SERPL-SCNC: 144 MMOL/L (ref 136–145)

## 2017-05-01 PROCEDURE — 85610 PROTHROMBIN TIME: CPT | Performed by: NURSE PRACTITIONER

## 2017-05-01 PROCEDURE — 97110 THERAPEUTIC EXERCISES: CPT

## 2017-05-01 PROCEDURE — 80048 BASIC METABOLIC PNL TOTAL CA: CPT | Performed by: INTERNAL MEDICINE

## 2017-05-01 PROCEDURE — 73718 MRI LOWER EXTREMITY W/O DYE: CPT

## 2017-05-01 RX ADMIN — POTASSIUM CHLORIDE 20 MEQ: 20 TABLET, EXTENDED RELEASE ORAL at 17:00

## 2017-05-01 RX ADMIN — FUROSEMIDE 80 MG: 80 TABLET ORAL at 09:37

## 2017-05-01 RX ADMIN — CEFAZOLIN SODIUM 1000 MG: 1 SOLUTION INTRAVENOUS at 22:50

## 2017-05-01 RX ADMIN — ATORVASTATIN CALCIUM 10 MG: 10 TABLET, FILM COATED ORAL at 09:38

## 2017-05-01 RX ADMIN — METOPROLOL TARTRATE 12.5 MG: 25 TABLET ORAL at 17:00

## 2017-05-01 RX ADMIN — PANTOPRAZOLE SODIUM 20 MG: 20 TABLET, DELAYED RELEASE ORAL at 06:17

## 2017-05-01 RX ADMIN — TERAZOSIN HYDROCHLORIDE 1 MG: 1 CAPSULE ORAL at 22:50

## 2017-05-01 RX ADMIN — ASPIRIN 81 MG: 81 TABLET, COATED ORAL at 09:38

## 2017-05-01 RX ADMIN — CEFAZOLIN SODIUM 1000 MG: 1 SOLUTION INTRAVENOUS at 09:38

## 2017-05-01 RX ADMIN — FUROSEMIDE 80 MG: 80 TABLET ORAL at 17:00

## 2017-05-01 RX ADMIN — WARFARIN SODIUM 5 MG: 5 TABLET ORAL at 17:00

## 2017-05-01 RX ADMIN — METOPROLOL TARTRATE 12.5 MG: 25 TABLET ORAL at 09:37

## 2017-05-01 RX ADMIN — POTASSIUM CHLORIDE 20 MEQ: 20 TABLET, EXTENDED RELEASE ORAL at 09:37

## 2017-05-02 VITALS
TEMPERATURE: 97.9 F | HEART RATE: 70 BPM | RESPIRATION RATE: 19 BRPM | HEIGHT: 72 IN | WEIGHT: 182.98 LBS | BODY MASS INDEX: 24.78 KG/M2 | OXYGEN SATURATION: 98 % | DIASTOLIC BLOOD PRESSURE: 59 MMHG | SYSTOLIC BLOOD PRESSURE: 134 MMHG

## 2017-05-02 LAB
ANION GAP SERPL CALCULATED.3IONS-SCNC: 13 MMOL/L (ref 4–13)
BUN SERPL-MCNC: 65 MG/DL (ref 5–25)
CALCIUM SERPL-MCNC: 7.9 MG/DL (ref 8.3–10.1)
CHLORIDE SERPL-SCNC: 105 MMOL/L (ref 100–108)
CO2 SERPL-SCNC: 25 MMOL/L (ref 21–32)
CREAT SERPL-MCNC: 3.58 MG/DL (ref 0.6–1.3)
ERYTHROCYTE [DISTWIDTH] IN BLOOD BY AUTOMATED COUNT: 18.2 % (ref 11.6–15.1)
GFR SERPL CREATININE-BSD FRML MDRD: 16.5 ML/MIN/1.73SQ M
GLUCOSE SERPL-MCNC: 104 MG/DL (ref 65–140)
HCT VFR BLD AUTO: 31.1 % (ref 36.5–49.3)
HGB BLD-MCNC: 9.9 G/DL (ref 12–17)
INR PPP: 2.14 (ref 0.86–1.16)
MCH RBC QN AUTO: 27.7 PG (ref 26.8–34.3)
MCHC RBC AUTO-ENTMCNC: 31.8 G/DL (ref 31.4–37.4)
MCV RBC AUTO: 87 FL (ref 82–98)
PLATELET # BLD AUTO: 127 THOUSANDS/UL (ref 149–390)
PMV BLD AUTO: 12.1 FL (ref 8.9–12.7)
POTASSIUM SERPL-SCNC: 3.9 MMOL/L (ref 3.5–5.3)
PROTHROMBIN TIME: 24.5 SECONDS (ref 12.1–14.4)
RBC # BLD AUTO: 3.57 MILLION/UL (ref 3.88–5.62)
SODIUM SERPL-SCNC: 143 MMOL/L (ref 136–145)
WBC # BLD AUTO: 7.41 THOUSAND/UL (ref 4.31–10.16)

## 2017-05-02 PROCEDURE — 85027 COMPLETE CBC AUTOMATED: CPT | Performed by: PHYSICIAN ASSISTANT

## 2017-05-02 PROCEDURE — 85610 PROTHROMBIN TIME: CPT | Performed by: NURSE PRACTITIONER

## 2017-05-02 PROCEDURE — 80048 BASIC METABOLIC PNL TOTAL CA: CPT | Performed by: INTERNAL MEDICINE

## 2017-05-02 RX ORDER — CEPHALEXIN 250 MG/1
250 CAPSULE ORAL 4 TIMES DAILY
Qty: 16 CAPSULE | Refills: 0 | Status: SHIPPED | OUTPATIENT
Start: 2017-05-02 | End: 2017-05-06

## 2017-05-02 RX ORDER — POTASSIUM CHLORIDE 20 MEQ/1
20 TABLET, EXTENDED RELEASE ORAL 2 TIMES DAILY
Qty: 60 TABLET | Refills: 0 | Status: SHIPPED | OUTPATIENT
Start: 2017-05-02 | End: 2017-07-06 | Stop reason: HOSPADM

## 2017-05-02 RX ORDER — FUROSEMIDE 80 MG
80 TABLET ORAL
Qty: 60 TABLET | Refills: 0 | Status: SHIPPED | OUTPATIENT
Start: 2017-05-02 | End: 2017-07-06 | Stop reason: HOSPADM

## 2017-05-02 RX ADMIN — ASPIRIN 81 MG: 81 TABLET, COATED ORAL at 08:30

## 2017-05-02 RX ADMIN — CEFAZOLIN SODIUM 1000 MG: 1 SOLUTION INTRAVENOUS at 10:54

## 2017-05-02 RX ADMIN — FUROSEMIDE 80 MG: 80 TABLET ORAL at 08:30

## 2017-05-02 RX ADMIN — IRON SUCROSE 300 MG: 20 INJECTION, SOLUTION INTRAVENOUS at 10:54

## 2017-05-02 RX ADMIN — POTASSIUM CHLORIDE 20 MEQ: 20 TABLET, EXTENDED RELEASE ORAL at 08:29

## 2017-05-02 RX ADMIN — PANTOPRAZOLE SODIUM 20 MG: 20 TABLET, DELAYED RELEASE ORAL at 06:14

## 2017-05-02 RX ADMIN — METOPROLOL TARTRATE 12.5 MG: 25 TABLET ORAL at 08:29

## 2017-05-02 RX ADMIN — ATORVASTATIN CALCIUM 10 MG: 10 TABLET, FILM COATED ORAL at 08:29

## 2017-05-08 ENCOUNTER — ALLSCRIPTS OFFICE VISIT (OUTPATIENT)
Dept: OTHER | Facility: OTHER | Age: 82
End: 2017-05-08

## 2017-05-10 ENCOUNTER — HOSPITAL ENCOUNTER (OUTPATIENT)
Dept: RADIOLOGY | Facility: MEDICAL CENTER | Age: 82
Discharge: HOME | End: 2017-05-10
Payer: MEDICARE

## 2017-05-10 DIAGNOSIS — I72.3 ANEURYSM OF ILIAC ARTERY (HCC): ICD-10-CM

## 2017-05-10 PROCEDURE — 93978 VASCULAR STUDY: CPT

## 2017-05-11 ENCOUNTER — GENERIC CONVERSION - ENCOUNTER (OUTPATIENT)
Dept: OTHER | Facility: OTHER | Age: 82
End: 2017-05-11

## 2017-05-11 ENCOUNTER — ALLSCRIPTS OFFICE VISIT (OUTPATIENT)
Dept: OTHER | Facility: OTHER | Age: 82
End: 2017-05-11

## 2017-05-25 ENCOUNTER — ALLSCRIPTS OFFICE VISIT (OUTPATIENT)
Dept: OTHER | Facility: OTHER | Age: 82
End: 2017-05-25

## 2017-05-25 DIAGNOSIS — E83.51 HYPOCALCEMIA: ICD-10-CM

## 2017-05-25 DIAGNOSIS — N18.4 CHRONIC KIDNEY DISEASE, STAGE IV (SEVERE) (HCC): ICD-10-CM

## 2017-05-25 DIAGNOSIS — R00.1 BRADYCARDIA: ICD-10-CM

## 2017-05-25 DIAGNOSIS — I48.0 PAROXYSMAL ATRIAL FIBRILLATION (HCC): ICD-10-CM

## 2017-06-12 ENCOUNTER — ALLSCRIPTS OFFICE VISIT (OUTPATIENT)
Dept: OTHER | Facility: OTHER | Age: 82
End: 2017-06-12

## 2017-06-16 ENCOUNTER — HOSPITAL ENCOUNTER (OUTPATIENT)
Dept: NON INVASIVE DIAGNOSTICS | Facility: CLINIC | Age: 82
Discharge: HOME | End: 2017-06-16
Payer: MEDICARE

## 2017-06-16 DIAGNOSIS — I48.0 PAROXYSMAL ATRIAL FIBRILLATION (HCC): ICD-10-CM

## 2017-06-16 DIAGNOSIS — R00.1 BRADYCARDIA: ICD-10-CM

## 2017-06-22 ENCOUNTER — GENERIC CONVERSION - ENCOUNTER (OUTPATIENT)
Dept: OTHER | Facility: OTHER | Age: 82
End: 2017-06-22

## 2017-06-26 ENCOUNTER — GENERIC CONVERSION - ENCOUNTER (OUTPATIENT)
Dept: OTHER | Facility: OTHER | Age: 82
End: 2017-06-26

## 2017-07-02 ENCOUNTER — HOSPITAL ENCOUNTER (INPATIENT)
Facility: HOSPITAL | Age: 82
LOS: 4 days | Discharge: HOME | DRG: 683 | End: 2017-07-06
Attending: GENERAL PRACTICE | Admitting: GENERAL PRACTICE
Payer: MEDICARE

## 2017-07-02 ENCOUNTER — APPOINTMENT (EMERGENCY)
Dept: RADIOLOGY | Facility: HOSPITAL | Age: 82
DRG: 683 | End: 2017-07-02
Payer: MEDICARE

## 2017-07-02 DIAGNOSIS — N18.4 CKD (CHRONIC KIDNEY DISEASE), STAGE IV (HCC): Primary | ICD-10-CM

## 2017-07-02 DIAGNOSIS — I50.9 CHF (CONGESTIVE HEART FAILURE) (HCC): ICD-10-CM

## 2017-07-02 DIAGNOSIS — N18.9 ACUTE ON CHRONIC RENAL FAILURE (HCC): ICD-10-CM

## 2017-07-02 DIAGNOSIS — N17.9 ACUTE ON CHRONIC RENAL FAILURE (HCC): ICD-10-CM

## 2017-07-02 DIAGNOSIS — M10.9 GOUT: ICD-10-CM

## 2017-07-02 PROBLEM — R53.1 WEAKNESS GENERALIZED: Status: ACTIVE | Noted: 2017-07-02

## 2017-07-02 PROBLEM — Z79.01 WARFARIN ANTICOAGULATION: Status: ACTIVE | Noted: 2017-07-02

## 2017-07-02 PROBLEM — E83.51 HYPOCALCEMIA: Status: ACTIVE | Noted: 2017-07-02

## 2017-07-02 PROBLEM — I25.10 CAD (CORONARY ARTERY DISEASE): Status: ACTIVE | Noted: 2017-07-02

## 2017-07-02 LAB
25(OH)D3 SERPL-MCNC: 38.3 NG/ML (ref 30–100)
ACETONE SERPL-MCNC: NEGATIVE MG/DL
ALBUMIN SERPL BCP-MCNC: 3.1 G/DL (ref 3.5–5)
ALP SERPL-CCNC: 80 U/L (ref 46–116)
ALT SERPL W P-5'-P-CCNC: 33 U/L (ref 12–78)
ANION GAP SERPL CALCULATED.3IONS-SCNC: 18 MMOL/L (ref 4–13)
ANION GAP SERPL CALCULATED.3IONS-SCNC: 19 MMOL/L (ref 4–13)
APTT PPP: 90 SECONDS (ref 23–35)
AST SERPL W P-5'-P-CCNC: 29 U/L (ref 5–45)
BACTERIA UR QL AUTO: ABNORMAL /HPF
BACTERIA UR QL AUTO: ABNORMAL /HPF
BASOPHILS # BLD AUTO: 0.07 THOUSANDS/ΜL (ref 0–0.1)
BASOPHILS NFR BLD AUTO: 1 % (ref 0–1)
BILIRUB SERPL-MCNC: 0.8 MG/DL (ref 0.2–1)
BILIRUB UR QL STRIP: NEGATIVE
BILIRUB UR QL STRIP: NEGATIVE
BUN SERPL-MCNC: 113 MG/DL (ref 5–25)
BUN SERPL-MCNC: 113 MG/DL (ref 5–25)
CA-I BLD-SCNC: 0.78 MMOL/L (ref 1.12–1.32)
CALCIUM PRE 500 MG CA PO UR-SCNC: <5 MG/DL
CALCIUM SERPL-MCNC: 5.7 MG/DL (ref 8.3–10.1)
CALCIUM SERPL-MCNC: 6.2 MG/DL (ref 8.3–10.1)
CHLORIDE SERPL-SCNC: 93 MMOL/L (ref 100–108)
CHLORIDE SERPL-SCNC: 95 MMOL/L (ref 100–108)
CLARITY UR: CLEAR
CLARITY UR: CLEAR
CO2 SERPL-SCNC: 22 MMOL/L (ref 21–32)
CO2 SERPL-SCNC: 23 MMOL/L (ref 21–32)
COLOR UR: YELLOW
COLOR UR: YELLOW
CREAT SERPL-MCNC: 4.89 MG/DL (ref 0.6–1.3)
CREAT SERPL-MCNC: 5.14 MG/DL (ref 0.6–1.3)
EOSINOPHIL # BLD AUTO: 0.08 THOUSAND/ΜL (ref 0–0.61)
EOSINOPHIL NFR BLD AUTO: 1 % (ref 0–6)
ERYTHROCYTE [DISTWIDTH] IN BLOOD BY AUTOMATED COUNT: 17.7 % (ref 11.6–15.1)
GFR SERPL CREATININE-BSD FRML MDRD: 10.8 ML/MIN/1.73SQ M
GFR SERPL CREATININE-BSD FRML MDRD: 11.5 ML/MIN/1.73SQ M
GLUCOSE SERPL-MCNC: 102 MG/DL (ref 65–140)
GLUCOSE SERPL-MCNC: 107 MG/DL (ref 65–140)
GLUCOSE UR STRIP-MCNC: NEGATIVE MG/DL
GLUCOSE UR STRIP-MCNC: NEGATIVE MG/DL
HCT VFR BLD AUTO: 28.1 % (ref 36.5–49.3)
HGB BLD-MCNC: 9.7 G/DL (ref 12–17)
HGB UR QL STRIP.AUTO: ABNORMAL
HGB UR QL STRIP.AUTO: ABNORMAL
INR PPP: 3.84 (ref 0.86–1.16)
KETONES UR STRIP-MCNC: NEGATIVE MG/DL
KETONES UR STRIP-MCNC: NEGATIVE MG/DL
LACTATE SERPL-SCNC: 1 MMOL/L (ref 0.5–2)
LEUKOCYTE ESTERASE UR QL STRIP: NEGATIVE
LEUKOCYTE ESTERASE UR QL STRIP: NEGATIVE
LYMPHOCYTES # BLD AUTO: 0.93 THOUSANDS/ΜL (ref 0.6–4.47)
LYMPHOCYTES NFR BLD AUTO: 10 % (ref 14–44)
MAGNESIUM SERPL-MCNC: 0.5 MG/DL (ref 1.6–2.6)
MCH RBC QN AUTO: 28.1 PG (ref 26.8–34.3)
MCHC RBC AUTO-ENTMCNC: 34.5 G/DL (ref 31.4–37.4)
MCV RBC AUTO: 81 FL (ref 82–98)
MONOCYTES # BLD AUTO: 0.75 THOUSAND/ΜL (ref 0.17–1.22)
MONOCYTES NFR BLD AUTO: 8 % (ref 4–12)
NEUTROPHILS # BLD AUTO: 6.96 THOUSANDS/ΜL (ref 1.85–7.62)
NEUTS SEG NFR BLD AUTO: 78 % (ref 43–75)
NITRITE UR QL STRIP: NEGATIVE
NITRITE UR QL STRIP: NEGATIVE
NON-SQ EPI CELLS URNS QL MICRO: ABNORMAL /HPF
NON-SQ EPI CELLS URNS QL MICRO: ABNORMAL /HPF
NRBC BLD AUTO-RTO: 0 /100 WBCS
NT-PROBNP SERPL-MCNC: ABNORMAL PG/ML
PH UR STRIP.AUTO: 5 [PH] (ref 4.5–8)
PH UR STRIP.AUTO: 5 [PH] (ref 4.5–8)
PHOSPHATE UR-MCNC: 14.5 MG/DL
PLATELET # BLD AUTO: 131 THOUSANDS/UL (ref 149–390)
PLATELET # BLD AUTO: 137 THOUSANDS/UL (ref 149–390)
PMV BLD AUTO: 10.6 FL (ref 8.9–12.7)
PMV BLD AUTO: 10.7 FL (ref 8.9–12.7)
POTASSIUM SERPL-SCNC: 3.3 MMOL/L (ref 3.5–5.3)
POTASSIUM SERPL-SCNC: 3.4 MMOL/L (ref 3.5–5.3)
PROT SERPL-MCNC: 7.3 G/DL (ref 6.4–8.2)
PROT UR STRIP-MCNC: ABNORMAL MG/DL
PROT UR STRIP-MCNC: NEGATIVE MG/DL
PROTHROMBIN TIME: 39 SECONDS (ref 12.1–14.4)
PTH-INTACT SERPL-MCNC: 57.8 PG/ML (ref 14–72)
RBC # BLD AUTO: 3.45 MILLION/UL (ref 3.88–5.62)
RBC #/AREA URNS AUTO: ABNORMAL /HPF
RBC #/AREA URNS AUTO: ABNORMAL /HPF
SODIUM SERPL-SCNC: 134 MMOL/L (ref 136–145)
SODIUM SERPL-SCNC: 136 MMOL/L (ref 136–145)
SP GR UR STRIP.AUTO: 1.01 (ref 1–1.03)
SP GR UR STRIP.AUTO: 1.01 (ref 1–1.03)
TROPONIN I SERPL-MCNC: 0.03 NG/ML
TSH SERPL DL<=0.05 MIU/L-ACNC: 2.32 UIU/ML (ref 0.36–3.74)
UROBILINOGEN UR QL STRIP.AUTO: 0.2 E.U./DL
UROBILINOGEN UR QL STRIP.AUTO: 0.2 E.U./DL
WBC # BLD AUTO: 8.95 THOUSAND/UL (ref 4.31–10.16)
WBC #/AREA URNS AUTO: ABNORMAL /HPF
WBC #/AREA URNS AUTO: ABNORMAL /HPF

## 2017-07-02 PROCEDURE — 36415 COLL VENOUS BLD VENIPUNCTURE: CPT | Performed by: NURSE PRACTITIONER

## 2017-07-02 PROCEDURE — 71020 HB CHEST X-RAY 2VW FRONTAL&LATL: CPT

## 2017-07-02 PROCEDURE — 80048 BASIC METABOLIC PNL TOTAL CA: CPT | Performed by: GENERAL PRACTICE

## 2017-07-02 PROCEDURE — 83735 ASSAY OF MAGNESIUM: CPT | Performed by: GENERAL PRACTICE

## 2017-07-02 PROCEDURE — 82340 ASSAY OF CALCIUM IN URINE: CPT | Performed by: NURSE PRACTITIONER

## 2017-07-02 PROCEDURE — 85610 PROTHROMBIN TIME: CPT | Performed by: NURSE PRACTITIONER

## 2017-07-02 PROCEDURE — 82330 ASSAY OF CALCIUM: CPT | Performed by: NURSE PRACTITIONER

## 2017-07-02 PROCEDURE — 82009 KETONE BODYS QUAL: CPT | Performed by: NURSE PRACTITIONER

## 2017-07-02 PROCEDURE — 84443 ASSAY THYROID STIM HORMONE: CPT | Performed by: NURSE PRACTITIONER

## 2017-07-02 PROCEDURE — 85049 AUTOMATED PLATELET COUNT: CPT | Performed by: NURSE PRACTITIONER

## 2017-07-02 PROCEDURE — 96360 HYDRATION IV INFUSION INIT: CPT

## 2017-07-02 PROCEDURE — 84105 ASSAY OF URINE PHOSPHORUS: CPT | Performed by: NURSE PRACTITIONER

## 2017-07-02 PROCEDURE — 82306 VITAMIN D 25 HYDROXY: CPT | Performed by: NURSE PRACTITIONER

## 2017-07-02 PROCEDURE — 83605 ASSAY OF LACTIC ACID: CPT | Performed by: NURSE PRACTITIONER

## 2017-07-02 PROCEDURE — 81001 URINALYSIS AUTO W/SCOPE: CPT | Performed by: NURSE PRACTITIONER

## 2017-07-02 PROCEDURE — 85025 COMPLETE CBC W/AUTO DIFF WBC: CPT | Performed by: NURSE PRACTITIONER

## 2017-07-02 PROCEDURE — 83970 ASSAY OF PARATHORMONE: CPT | Performed by: NURSE PRACTITIONER

## 2017-07-02 PROCEDURE — 84484 ASSAY OF TROPONIN QUANT: CPT | Performed by: NURSE PRACTITIONER

## 2017-07-02 PROCEDURE — 85730 THROMBOPLASTIN TIME PARTIAL: CPT | Performed by: NURSE PRACTITIONER

## 2017-07-02 PROCEDURE — 93005 ELECTROCARDIOGRAM TRACING: CPT | Performed by: NURSE PRACTITIONER

## 2017-07-02 PROCEDURE — 99285 EMERGENCY DEPT VISIT HI MDM: CPT

## 2017-07-02 PROCEDURE — 80053 COMPREHEN METABOLIC PANEL: CPT | Performed by: NURSE PRACTITIONER

## 2017-07-02 PROCEDURE — 83880 ASSAY OF NATRIURETIC PEPTIDE: CPT | Performed by: NURSE PRACTITIONER

## 2017-07-02 RX ORDER — WARFARIN SODIUM 5 MG/1
5 TABLET ORAL EVERY OTHER DAY
Status: DISCONTINUED | OUTPATIENT
Start: 2017-07-02 | End: 2017-07-02

## 2017-07-02 RX ORDER — MELATONIN
2000 DAILY
Status: DISCONTINUED | OUTPATIENT
Start: 2017-07-03 | End: 2017-07-06 | Stop reason: HOSPADM

## 2017-07-02 RX ORDER — WARFARIN SODIUM 2.5 MG/1
2.5 TABLET ORAL EVERY OTHER DAY
Status: DISCONTINUED | OUTPATIENT
Start: 2017-07-02 | End: 2017-07-02

## 2017-07-02 RX ORDER — MULTIVITAMIN
1 CAPSULE ORAL DAILY
COMMUNITY

## 2017-07-02 RX ORDER — TERAZOSIN 1 MG/1
1 CAPSULE ORAL
Status: DISCONTINUED | OUTPATIENT
Start: 2017-07-02 | End: 2017-07-06 | Stop reason: HOSPADM

## 2017-07-02 RX ORDER — TORSEMIDE 20 MG/1
20 TABLET ORAL DAILY
COMMUNITY

## 2017-07-02 RX ORDER — CALCIUM CARBONATE 200(500)MG
1000 TABLET,CHEWABLE ORAL DAILY PRN
Status: DISCONTINUED | OUTPATIENT
Start: 2017-07-02 | End: 2017-07-06 | Stop reason: HOSPADM

## 2017-07-02 RX ORDER — SODIUM CHLORIDE 9 MG/ML
125 INJECTION, SOLUTION INTRAVENOUS CONTINUOUS
Status: DISCONTINUED | OUTPATIENT
Start: 2017-07-02 | End: 2017-07-02

## 2017-07-02 RX ORDER — TORSEMIDE 20 MG/1
20 TABLET ORAL DAILY
Status: DISCONTINUED | OUTPATIENT
Start: 2017-07-03 | End: 2017-07-04

## 2017-07-02 RX ORDER — FERROUS GLUCONATE 256(28)MG
65 TABLET ORAL DAILY
Status: DISCONTINUED | OUTPATIENT
Start: 2017-07-03 | End: 2017-07-02

## 2017-07-02 RX ORDER — ATORVASTATIN CALCIUM 10 MG/1
10 TABLET, FILM COATED ORAL DAILY
Status: DISCONTINUED | OUTPATIENT
Start: 2017-07-03 | End: 2017-07-06 | Stop reason: HOSPADM

## 2017-07-02 RX ORDER — FERROUS SULFATE 325(65) MG
325 TABLET ORAL
Status: DISCONTINUED | OUTPATIENT
Start: 2017-07-03 | End: 2017-07-06 | Stop reason: HOSPADM

## 2017-07-02 RX ORDER — AMLODIPINE BESYLATE 5 MG/1
5 TABLET ORAL DAILY
Status: DISCONTINUED | OUTPATIENT
Start: 2017-07-03 | End: 2017-07-06 | Stop reason: HOSPADM

## 2017-07-02 RX ORDER — ACETAMINOPHEN 325 MG/1
650 TABLET ORAL EVERY 6 HOURS PRN
Status: DISCONTINUED | OUTPATIENT
Start: 2017-07-02 | End: 2017-07-06 | Stop reason: HOSPADM

## 2017-07-02 RX ORDER — CALCITRIOL 0.25 UG/1
0.25 CAPSULE, LIQUID FILLED ORAL DAILY
Status: DISCONTINUED | OUTPATIENT
Start: 2017-07-03 | End: 2017-07-06 | Stop reason: HOSPADM

## 2017-07-02 RX ORDER — ASPIRIN 81 MG/1
81 TABLET ORAL DAILY
Status: DISCONTINUED | OUTPATIENT
Start: 2017-07-03 | End: 2017-07-06 | Stop reason: HOSPADM

## 2017-07-02 RX ORDER — CALCITRIOL 0.25 UG/1
0.25 CAPSULE, LIQUID FILLED ORAL DAILY
COMMUNITY
End: 2017-10-25 | Stop reason: ALTCHOICE

## 2017-07-02 RX ORDER — PANTOPRAZOLE SODIUM 40 MG/1
40 TABLET, DELAYED RELEASE ORAL
Status: DISCONTINUED | OUTPATIENT
Start: 2017-07-03 | End: 2017-07-06 | Stop reason: HOSPADM

## 2017-07-02 RX ORDER — POTASSIUM CHLORIDE 20 MEQ/1
40 TABLET, EXTENDED RELEASE ORAL ONCE
Status: COMPLETED | OUTPATIENT
Start: 2017-07-02 | End: 2017-07-02

## 2017-07-02 RX ORDER — ONDANSETRON 2 MG/ML
4 INJECTION INTRAMUSCULAR; INTRAVENOUS EVERY 6 HOURS PRN
Status: DISCONTINUED | OUTPATIENT
Start: 2017-07-02 | End: 2017-07-06 | Stop reason: HOSPADM

## 2017-07-02 RX ORDER — HEPARIN SODIUM 5000 [USP'U]/ML
5000 INJECTION, SOLUTION INTRAVENOUS; SUBCUTANEOUS EVERY 8 HOURS SCHEDULED
Status: DISCONTINUED | OUTPATIENT
Start: 2017-07-02 | End: 2017-07-06 | Stop reason: HOSPADM

## 2017-07-02 RX ORDER — METOLAZONE 5 MG/1
5 TABLET ORAL DAILY
COMMUNITY

## 2017-07-02 RX ADMIN — SODIUM CHLORIDE 500 ML: 0.9 INJECTION, SOLUTION INTRAVENOUS at 15:19

## 2017-07-02 RX ADMIN — Medication 400 MG: at 21:01

## 2017-07-02 RX ADMIN — TERAZOSIN HYDROCHLORIDE 1 MG: 1 CAPSULE ORAL at 21:02

## 2017-07-02 RX ADMIN — POTASSIUM CHLORIDE 40 MEQ: 1500 TABLET, EXTENDED RELEASE ORAL at 19:28

## 2017-07-02 RX ADMIN — CALCIUM GLUCONATE 3 G: 94 INJECTION, SOLUTION INTRAVENOUS at 17:28

## 2017-07-02 RX ADMIN — SODIUM CHLORIDE 125 ML/HR: 0.9 INJECTION, SOLUTION INTRAVENOUS at 17:27

## 2017-07-02 RX ADMIN — HEPARIN SODIUM 5000 UNITS: 5000 INJECTION, SOLUTION INTRAVENOUS; SUBCUTANEOUS at 21:03

## 2017-07-03 LAB
ANION GAP SERPL CALCULATED.3IONS-SCNC: 18 MMOL/L (ref 4–13)
ATRIAL RATE: 125 BPM
BACTERIA UR QL AUTO: ABNORMAL /HPF
BASOPHILS # BLD AUTO: 0.05 THOUSANDS/ΜL (ref 0–0.1)
BASOPHILS NFR BLD AUTO: 1 % (ref 0–1)
BILIRUB UR QL STRIP: NEGATIVE
BUN SERPL-MCNC: 115 MG/DL (ref 5–25)
CALCIUM SERPL-MCNC: 5.8 MG/DL (ref 8.3–10.1)
CHLORIDE SERPL-SCNC: 98 MMOL/L (ref 100–108)
CHLORIDE UR-SCNC: 78 MMOL/L (ref 10–330)
CLARITY UR: CLEAR
CO2 SERPL-SCNC: 22 MMOL/L (ref 21–32)
COLOR UR: YELLOW
CREAT SERPL-MCNC: 4.85 MG/DL (ref 0.6–1.3)
CREAT UR-MCNC: 48.9 MG/DL
CREAT UR-MCNC: 49.2 MG/DL
EOSINOPHIL # BLD AUTO: 0.06 THOUSAND/ΜL (ref 0–0.61)
EOSINOPHIL NFR BLD AUTO: 1 % (ref 0–6)
ERYTHROCYTE [DISTWIDTH] IN BLOOD BY AUTOMATED COUNT: 17.8 % (ref 11.6–15.1)
EST. AVERAGE GLUCOSE BLD GHB EST-MCNC: 91 MG/DL
GFR SERPL CREATININE-BSD FRML MDRD: 11.6 ML/MIN/1.73SQ M
GLUCOSE SERPL-MCNC: 97 MG/DL (ref 65–140)
GLUCOSE UR STRIP-MCNC: NEGATIVE MG/DL
HBA1C MFR BLD: 4.8 % (ref 4.2–6.3)
HCT VFR BLD AUTO: 25.7 % (ref 36.5–49.3)
HGB BLD-MCNC: 8.8 G/DL (ref 12–17)
HGB UR QL STRIP.AUTO: ABNORMAL
INR PPP: 3.46 (ref 0.86–1.16)
KETONES UR STRIP-MCNC: NEGATIVE MG/DL
LEUKOCYTE ESTERASE UR QL STRIP: NEGATIVE
LYMPHOCYTES # BLD AUTO: 1 THOUSANDS/ΜL (ref 0.6–4.47)
LYMPHOCYTES NFR BLD AUTO: 14 % (ref 14–44)
MAGNESIUM SERPL-MCNC: 0.5 MG/DL (ref 1.6–2.6)
MAGNESIUM SERPL-MCNC: 1.1 MG/DL (ref 1.6–2.6)
MCH RBC QN AUTO: 27.8 PG (ref 26.8–34.3)
MCHC RBC AUTO-ENTMCNC: 34.2 G/DL (ref 31.4–37.4)
MCV RBC AUTO: 81 FL (ref 82–98)
MICROALBUMIN UR-MCNC: 123 MG/L (ref 0–20)
MICROALBUMIN/CREAT 24H UR: 250 MG/G CREATININE (ref 0–30)
MONOCYTES # BLD AUTO: 0.74 THOUSAND/ΜL (ref 0.17–1.22)
MONOCYTES NFR BLD AUTO: 10 % (ref 4–12)
NEUTROPHILS # BLD AUTO: 5.33 THOUSANDS/ΜL (ref 1.85–7.62)
NEUTS SEG NFR BLD AUTO: 73 % (ref 43–75)
NITRITE UR QL STRIP: NEGATIVE
NON-SQ EPI CELLS URNS QL MICRO: ABNORMAL /HPF
NRBC BLD AUTO-RTO: 0 /100 WBCS
OSMOLALITY UR: 359 MMOL/KG
PH UR STRIP.AUTO: 5.5 [PH] (ref 4.5–8)
PLATELET # BLD AUTO: 124 THOUSANDS/UL (ref 149–390)
POTASSIUM SERPL-SCNC: 3.7 MMOL/L (ref 3.5–5.3)
POTASSIUM UR-SCNC: 32.2 MMOL/L (ref 1–300)
PROT UR STRIP-MCNC: NEGATIVE MG/DL
PROTHROMBIN TIME: 35.9 SECONDS (ref 12.1–14.4)
QRS AXIS: -61 DEGREES
QRSD INTERVAL: 142 MS
QT INTERVAL: 468 MS
QTC INTERVAL: 529 MS
RBC # BLD AUTO: 3.16 MILLION/UL (ref 3.88–5.62)
RBC #/AREA URNS AUTO: ABNORMAL /HPF
SODIUM 24H UR-SCNC: 59 MOL/L
SODIUM SERPL-SCNC: 138 MMOL/L (ref 136–145)
SP GR UR STRIP.AUTO: 1.01 (ref 1–1.03)
T WAVE AXIS: 97 DEGREES
UROBILINOGEN UR QL STRIP.AUTO: 0.2 E.U./DL
VENTRICULAR RATE: 77 BPM
WBC # BLD AUTO: 7.3 THOUSAND/UL (ref 4.31–10.16)
WBC #/AREA URNS AUTO: ABNORMAL /HPF

## 2017-07-03 PROCEDURE — 82570 ASSAY OF URINE CREATININE: CPT | Performed by: INTERNAL MEDICINE

## 2017-07-03 PROCEDURE — 84133 ASSAY OF URINE POTASSIUM: CPT | Performed by: INTERNAL MEDICINE

## 2017-07-03 PROCEDURE — 83935 ASSAY OF URINE OSMOLALITY: CPT | Performed by: INTERNAL MEDICINE

## 2017-07-03 PROCEDURE — 85610 PROTHROMBIN TIME: CPT | Performed by: GENERAL PRACTICE

## 2017-07-03 PROCEDURE — 83036 HEMOGLOBIN GLYCOSYLATED A1C: CPT | Performed by: NURSE PRACTITIONER

## 2017-07-03 PROCEDURE — 81001 URINALYSIS AUTO W/SCOPE: CPT | Performed by: INTERNAL MEDICINE

## 2017-07-03 PROCEDURE — 85025 COMPLETE CBC W/AUTO DIFF WBC: CPT | Performed by: GENERAL PRACTICE

## 2017-07-03 PROCEDURE — 82043 UR ALBUMIN QUANTITATIVE: CPT | Performed by: INTERNAL MEDICINE

## 2017-07-03 PROCEDURE — 80048 BASIC METABOLIC PNL TOTAL CA: CPT | Performed by: GENERAL PRACTICE

## 2017-07-03 PROCEDURE — 82436 ASSAY OF URINE CHLORIDE: CPT | Performed by: INTERNAL MEDICINE

## 2017-07-03 PROCEDURE — 84300 ASSAY OF URINE SODIUM: CPT | Performed by: INTERNAL MEDICINE

## 2017-07-03 PROCEDURE — 83735 ASSAY OF MAGNESIUM: CPT | Performed by: PHYSICIAN ASSISTANT

## 2017-07-03 PROCEDURE — 83735 ASSAY OF MAGNESIUM: CPT | Performed by: NURSE PRACTITIONER

## 2017-07-03 RX ORDER — SODIUM CHLORIDE 9 MG/ML
75 INJECTION, SOLUTION INTRAVENOUS CONTINUOUS
Status: DISCONTINUED | OUTPATIENT
Start: 2017-07-03 | End: 2017-07-06 | Stop reason: HOSPADM

## 2017-07-03 RX ORDER — CALCIUM CARBONATE 200(500)MG
1000 TABLET,CHEWABLE ORAL 3 TIMES DAILY
Status: DISCONTINUED | OUTPATIENT
Start: 2017-07-03 | End: 2017-07-06 | Stop reason: HOSPADM

## 2017-07-03 RX ORDER — MAGNESIUM SULFATE HEPTAHYDRATE 40 MG/ML
4 INJECTION, SOLUTION INTRAVENOUS ONCE
Status: DISCONTINUED | OUTPATIENT
Start: 2017-07-03 | End: 2017-07-03

## 2017-07-03 RX ORDER — NYSTATIN 100000 [USP'U]/G
1 POWDER TOPICAL 3 TIMES DAILY
Status: DISCONTINUED | OUTPATIENT
Start: 2017-07-03 | End: 2017-07-06 | Stop reason: HOSPADM

## 2017-07-03 RX ORDER — NYSTATIN 100000 [USP'U]/G
1 POWDER TOPICAL 3 TIMES DAILY
Status: DISCONTINUED | OUTPATIENT
Start: 2017-07-03 | End: 2017-07-03

## 2017-07-03 RX ORDER — MAGNESIUM SULFATE HEPTAHYDRATE 40 MG/ML
2 INJECTION, SOLUTION INTRAVENOUS ONCE
Status: COMPLETED | OUTPATIENT
Start: 2017-07-03 | End: 2017-07-04

## 2017-07-03 RX ADMIN — ANTACID TABLETS 1000 MG: 500 TABLET, CHEWABLE ORAL at 22:04

## 2017-07-03 RX ADMIN — SODIUM CHLORIDE 75 ML/HR: 0.9 INJECTION, SOLUTION INTRAVENOUS at 14:39

## 2017-07-03 RX ADMIN — NYSTATIN 1 APPLICATION: 100000 POWDER TOPICAL at 22:03

## 2017-07-03 RX ADMIN — NYSTATIN 1 APPLICATION: 100000 POWDER TOPICAL at 15:46

## 2017-07-03 RX ADMIN — VITAMIN D, TAB 1000IU (100/BT) 2000 UNITS: 25 TAB at 08:53

## 2017-07-03 RX ADMIN — CALCITRIOL 0.25 MCG: 0.25 CAPSULE ORAL at 08:53

## 2017-07-03 RX ADMIN — TORSEMIDE 20 MG: 20 TABLET ORAL at 08:53

## 2017-07-03 RX ADMIN — HEPARIN SODIUM 5000 UNITS: 5000 INJECTION, SOLUTION INTRAVENOUS; SUBCUTANEOUS at 22:04

## 2017-07-03 RX ADMIN — ATORVASTATIN CALCIUM 10 MG: 10 TABLET, FILM COATED ORAL at 08:53

## 2017-07-03 RX ADMIN — Medication 325 MG: at 07:12

## 2017-07-03 RX ADMIN — Medication 1 TABLET: at 08:53

## 2017-07-03 RX ADMIN — PANTOPRAZOLE SODIUM 40 MG: 40 TABLET, DELAYED RELEASE ORAL at 06:25

## 2017-07-03 RX ADMIN — MAGNESIUM SULFATE HEPTAHYDRATE 2 G: 40 INJECTION, SOLUTION INTRAVENOUS at 14:32

## 2017-07-03 RX ADMIN — HEPARIN SODIUM 5000 UNITS: 5000 INJECTION, SOLUTION INTRAVENOUS; SUBCUTANEOUS at 06:25

## 2017-07-03 RX ADMIN — AMLODIPINE BESYLATE 5 MG: 5 TABLET ORAL at 08:53

## 2017-07-03 RX ADMIN — ASPIRIN 81 MG: 81 TABLET, COATED ORAL at 08:53

## 2017-07-03 RX ADMIN — HEPARIN SODIUM 5000 UNITS: 5000 INJECTION, SOLUTION INTRAVENOUS; SUBCUTANEOUS at 13:46

## 2017-07-03 RX ADMIN — ANTACID TABLETS 1000 MG: 500 TABLET, CHEWABLE ORAL at 15:50

## 2017-07-03 RX ADMIN — TERAZOSIN HYDROCHLORIDE 1 MG: 1 CAPSULE ORAL at 22:04

## 2017-07-04 LAB
ANION GAP SERPL CALCULATED.3IONS-SCNC: 16 MMOL/L (ref 4–13)
BASOPHILS # BLD AUTO: 0.06 THOUSANDS/ΜL (ref 0–0.1)
BASOPHILS NFR BLD AUTO: 1 % (ref 0–1)
BUN SERPL-MCNC: 104 MG/DL (ref 5–25)
CALCIUM SERPL-MCNC: 6.1 MG/DL (ref 8.3–10.1)
CHLORIDE SERPL-SCNC: 99 MMOL/L (ref 100–108)
CO2 SERPL-SCNC: 22 MMOL/L (ref 21–32)
CREAT SERPL-MCNC: 4.46 MG/DL (ref 0.6–1.3)
EOSINOPHIL # BLD AUTO: 0.07 THOUSAND/ΜL (ref 0–0.61)
EOSINOPHIL NFR BLD AUTO: 1 % (ref 0–6)
ERYTHROCYTE [DISTWIDTH] IN BLOOD BY AUTOMATED COUNT: 17.8 % (ref 11.6–15.1)
GFR SERPL CREATININE-BSD FRML MDRD: 12.8 ML/MIN/1.73SQ M
GLUCOSE SERPL-MCNC: 110 MG/DL (ref 65–140)
HCT VFR BLD AUTO: 25 % (ref 36.5–49.3)
HGB BLD-MCNC: 8.5 G/DL (ref 12–17)
INR PPP: 2.95 (ref 0.86–1.16)
LYMPHOCYTES # BLD AUTO: 0.9 THOUSANDS/ΜL (ref 0.6–4.47)
LYMPHOCYTES NFR BLD AUTO: 9 % (ref 14–44)
MAGNESIUM SERPL-MCNC: 0.9 MG/DL (ref 1.6–2.6)
MCH RBC QN AUTO: 28.1 PG (ref 26.8–34.3)
MCHC RBC AUTO-ENTMCNC: 34 G/DL (ref 31.4–37.4)
MCV RBC AUTO: 83 FL (ref 82–98)
MONOCYTES # BLD AUTO: 0.82 THOUSAND/ΜL (ref 0.17–1.22)
MONOCYTES NFR BLD AUTO: 8 % (ref 4–12)
NEUTROPHILS # BLD AUTO: 8.09 THOUSANDS/ΜL (ref 1.85–7.62)
NEUTS SEG NFR BLD AUTO: 80 % (ref 43–75)
NRBC BLD AUTO-RTO: 0 /100 WBCS
PHOSPHATE SERPL-MCNC: 4.1 MG/DL (ref 2.3–4.1)
PLATELET # BLD AUTO: 142 THOUSANDS/UL (ref 149–390)
PMV BLD AUTO: 12.2 FL (ref 8.9–12.7)
POTASSIUM SERPL-SCNC: 3.6 MMOL/L (ref 3.5–5.3)
PROTHROMBIN TIME: 31.7 SECONDS (ref 12.1–14.4)
RBC # BLD AUTO: 3.03 MILLION/UL (ref 3.88–5.62)
SODIUM SERPL-SCNC: 137 MMOL/L (ref 136–145)
URATE SERPL-MCNC: 9.9 MG/DL (ref 4.2–8)
WBC # BLD AUTO: 10.17 THOUSAND/UL (ref 4.31–10.16)

## 2017-07-04 PROCEDURE — 85610 PROTHROMBIN TIME: CPT | Performed by: NURSE PRACTITIONER

## 2017-07-04 PROCEDURE — 83735 ASSAY OF MAGNESIUM: CPT | Performed by: NURSE PRACTITIONER

## 2017-07-04 PROCEDURE — 84550 ASSAY OF BLOOD/URIC ACID: CPT | Performed by: INTERNAL MEDICINE

## 2017-07-04 PROCEDURE — 80048 BASIC METABOLIC PNL TOTAL CA: CPT | Performed by: INTERNAL MEDICINE

## 2017-07-04 PROCEDURE — 84100 ASSAY OF PHOSPHORUS: CPT | Performed by: INTERNAL MEDICINE

## 2017-07-04 PROCEDURE — 85025 COMPLETE CBC W/AUTO DIFF WBC: CPT | Performed by: INTERNAL MEDICINE

## 2017-07-04 RX ORDER — MAGNESIUM SULFATE HEPTAHYDRATE 40 MG/ML
2 INJECTION, SOLUTION INTRAVENOUS ONCE
Status: COMPLETED | OUTPATIENT
Start: 2017-07-04 | End: 2017-07-05

## 2017-07-04 RX ADMIN — AMLODIPINE BESYLATE 5 MG: 5 TABLET ORAL at 08:36

## 2017-07-04 RX ADMIN — EPOETIN ALFA 10000 UNITS: 10000 SOLUTION INTRAVENOUS; SUBCUTANEOUS at 11:47

## 2017-07-04 RX ADMIN — ANTACID TABLETS 1000 MG: 500 TABLET, CHEWABLE ORAL at 20:28

## 2017-07-04 RX ADMIN — HEPARIN SODIUM 5000 UNITS: 5000 INJECTION, SOLUTION INTRAVENOUS; SUBCUTANEOUS at 06:52

## 2017-07-04 RX ADMIN — VITAMIN D, TAB 1000IU (100/BT) 2000 UNITS: 25 TAB at 09:54

## 2017-07-04 RX ADMIN — Medication 325 MG: at 06:52

## 2017-07-04 RX ADMIN — NYSTATIN 1 APPLICATION: 100000 POWDER TOPICAL at 20:28

## 2017-07-04 RX ADMIN — TERAZOSIN HYDROCHLORIDE 1 MG: 1 CAPSULE ORAL at 22:29

## 2017-07-04 RX ADMIN — CALCITRIOL 0.25 MCG: 0.25 CAPSULE ORAL at 09:50

## 2017-07-04 RX ADMIN — ANTACID TABLETS 1000 MG: 500 TABLET, CHEWABLE ORAL at 08:35

## 2017-07-04 RX ADMIN — TORSEMIDE 20 MG: 20 TABLET ORAL at 08:35

## 2017-07-04 RX ADMIN — ASPIRIN 81 MG: 81 TABLET, COATED ORAL at 08:36

## 2017-07-04 RX ADMIN — ATORVASTATIN CALCIUM 10 MG: 10 TABLET, FILM COATED ORAL at 08:35

## 2017-07-04 RX ADMIN — HEPARIN SODIUM 5000 UNITS: 5000 INJECTION, SOLUTION INTRAVENOUS; SUBCUTANEOUS at 22:29

## 2017-07-04 RX ADMIN — SODIUM CHLORIDE 75 ML/HR: 0.9 INJECTION, SOLUTION INTRAVENOUS at 03:55

## 2017-07-04 RX ADMIN — Medication 1 TABLET: at 08:36

## 2017-07-04 RX ADMIN — ANTACID TABLETS 1000 MG: 500 TABLET, CHEWABLE ORAL at 15:46

## 2017-07-04 RX ADMIN — NYSTATIN 1 APPLICATION: 100000 POWDER TOPICAL at 08:43

## 2017-07-04 RX ADMIN — MAGNESIUM SULFATE HEPTAHYDRATE 2 G: 40 INJECTION, SOLUTION INTRAVENOUS at 14:22

## 2017-07-04 RX ADMIN — PANTOPRAZOLE SODIUM 40 MG: 40 TABLET, DELAYED RELEASE ORAL at 06:51

## 2017-07-05 LAB
ANION GAP SERPL CALCULATED.3IONS-SCNC: 13 MMOL/L (ref 4–13)
BASOPHILS # BLD AUTO: 0.05 THOUSANDS/ΜL (ref 0–0.1)
BASOPHILS NFR BLD AUTO: 1 % (ref 0–1)
BUN SERPL-MCNC: 101 MG/DL (ref 5–25)
CALCIUM SERPL-MCNC: 6.9 MG/DL (ref 8.3–10.1)
CHLORIDE SERPL-SCNC: 101 MMOL/L (ref 100–108)
CO2 SERPL-SCNC: 24 MMOL/L (ref 21–32)
CREAT SERPL-MCNC: 4.03 MG/DL (ref 0.6–1.3)
EOSINOPHIL # BLD AUTO: 0.07 THOUSAND/ΜL (ref 0–0.61)
EOSINOPHIL NFR BLD AUTO: 1 % (ref 0–6)
ERYTHROCYTE [DISTWIDTH] IN BLOOD BY AUTOMATED COUNT: 17.9 % (ref 11.6–15.1)
GFR SERPL CREATININE-BSD FRML MDRD: 14.4 ML/MIN/1.73SQ M
GLUCOSE SERPL-MCNC: 103 MG/DL (ref 65–140)
HCT VFR BLD AUTO: 29.9 % (ref 36.5–49.3)
HGB BLD-MCNC: 10.1 G/DL (ref 12–17)
INR PPP: 2.41 (ref 0.86–1.16)
LYMPHOCYTES # BLD AUTO: 0.83 THOUSANDS/ΜL (ref 0.6–4.47)
LYMPHOCYTES NFR BLD AUTO: 9 % (ref 14–44)
MAGNESIUM SERPL-MCNC: 1.2 MG/DL (ref 1.6–2.6)
MCH RBC QN AUTO: 28 PG (ref 26.8–34.3)
MCHC RBC AUTO-ENTMCNC: 33.8 G/DL (ref 31.4–37.4)
MCV RBC AUTO: 83 FL (ref 82–98)
MONOCYTES # BLD AUTO: 0.63 THOUSAND/ΜL (ref 0.17–1.22)
MONOCYTES NFR BLD AUTO: 7 % (ref 4–12)
NEUTROPHILS # BLD AUTO: 7.34 THOUSANDS/ΜL (ref 1.85–7.62)
NEUTS SEG NFR BLD AUTO: 81 % (ref 43–75)
NRBC BLD AUTO-RTO: 0 /100 WBCS
PLATELET # BLD AUTO: 133 THOUSANDS/UL (ref 149–390)
PMV BLD AUTO: 11.5 FL (ref 8.9–12.7)
POTASSIUM SERPL-SCNC: 3.9 MMOL/L (ref 3.5–5.3)
PROTHROMBIN TIME: 26.9 SECONDS (ref 12.1–14.4)
RBC # BLD AUTO: 3.61 MILLION/UL (ref 3.88–5.62)
SODIUM SERPL-SCNC: 138 MMOL/L (ref 136–145)
WBC # BLD AUTO: 9.04 THOUSAND/UL (ref 4.31–10.16)

## 2017-07-05 PROCEDURE — G8979 MOBILITY GOAL STATUS: HCPCS

## 2017-07-05 PROCEDURE — G8978 MOBILITY CURRENT STATUS: HCPCS

## 2017-07-05 PROCEDURE — 97166 OT EVAL MOD COMPLEX 45 MIN: CPT

## 2017-07-05 PROCEDURE — 80048 BASIC METABOLIC PNL TOTAL CA: CPT | Performed by: INTERNAL MEDICINE

## 2017-07-05 PROCEDURE — 97162 PT EVAL MOD COMPLEX 30 MIN: CPT

## 2017-07-05 PROCEDURE — G8987 SELF CARE CURRENT STATUS: HCPCS

## 2017-07-05 PROCEDURE — 85025 COMPLETE CBC W/AUTO DIFF WBC: CPT | Performed by: INTERNAL MEDICINE

## 2017-07-05 PROCEDURE — G8988 SELF CARE GOAL STATUS: HCPCS

## 2017-07-05 PROCEDURE — 83735 ASSAY OF MAGNESIUM: CPT | Performed by: NURSE PRACTITIONER

## 2017-07-05 PROCEDURE — 85610 PROTHROMBIN TIME: CPT | Performed by: NURSE PRACTITIONER

## 2017-07-05 RX ORDER — MAGNESIUM SULFATE HEPTAHYDRATE 40 MG/ML
2 INJECTION, SOLUTION INTRAVENOUS ONCE
Status: COMPLETED | OUTPATIENT
Start: 2017-07-05 | End: 2017-07-05

## 2017-07-05 RX ORDER — WARFARIN SODIUM 5 MG/1
5 TABLET ORAL
Status: DISCONTINUED | OUTPATIENT
Start: 2017-07-05 | End: 2017-07-05

## 2017-07-05 RX ORDER — WARFARIN SODIUM 2.5 MG/1
2.5 TABLET ORAL
Status: DISCONTINUED | OUTPATIENT
Start: 2017-07-05 | End: 2017-07-06 | Stop reason: HOSPADM

## 2017-07-05 RX ADMIN — PANTOPRAZOLE SODIUM 40 MG: 40 TABLET, DELAYED RELEASE ORAL at 05:55

## 2017-07-05 RX ADMIN — ANTACID TABLETS 1000 MG: 500 TABLET, CHEWABLE ORAL at 16:57

## 2017-07-05 RX ADMIN — ANTACID TABLETS 1000 MG: 500 TABLET, CHEWABLE ORAL at 09:13

## 2017-07-05 RX ADMIN — HEPARIN SODIUM 5000 UNITS: 5000 INJECTION, SOLUTION INTRAVENOUS; SUBCUTANEOUS at 05:55

## 2017-07-05 RX ADMIN — ANTACID TABLETS 1000 MG: 500 TABLET, CHEWABLE ORAL at 21:03

## 2017-07-05 RX ADMIN — AMLODIPINE BESYLATE 5 MG: 5 TABLET ORAL at 09:13

## 2017-07-05 RX ADMIN — SODIUM CHLORIDE 75 ML/HR: 0.9 INJECTION, SOLUTION INTRAVENOUS at 09:23

## 2017-07-05 RX ADMIN — SODIUM CHLORIDE 75 ML/HR: 0.9 INJECTION, SOLUTION INTRAVENOUS at 22:26

## 2017-07-05 RX ADMIN — MAGNESIUM SULFATE HEPTAHYDRATE 2 G: 40 INJECTION, SOLUTION INTRAVENOUS at 16:57

## 2017-07-05 RX ADMIN — WARFARIN SODIUM 2.5 MG: 2.5 TABLET ORAL at 18:15

## 2017-07-05 RX ADMIN — CALCITRIOL 0.25 MCG: 0.25 CAPSULE ORAL at 09:14

## 2017-07-05 RX ADMIN — Medication 325 MG: at 09:13

## 2017-07-05 RX ADMIN — ASPIRIN 81 MG: 81 TABLET, COATED ORAL at 09:13

## 2017-07-05 RX ADMIN — ATORVASTATIN CALCIUM 10 MG: 10 TABLET, FILM COATED ORAL at 09:13

## 2017-07-05 RX ADMIN — HEPARIN SODIUM 5000 UNITS: 5000 INJECTION, SOLUTION INTRAVENOUS; SUBCUTANEOUS at 21:03

## 2017-07-05 RX ADMIN — MAGNESIUM SULFATE HEPTAHYDRATE 2 G: 40 INJECTION, SOLUTION INTRAVENOUS at 13:15

## 2017-07-05 RX ADMIN — HEPARIN SODIUM 5000 UNITS: 5000 INJECTION, SOLUTION INTRAVENOUS; SUBCUTANEOUS at 13:15

## 2017-07-05 RX ADMIN — TERAZOSIN HYDROCHLORIDE 1 MG: 1 CAPSULE ORAL at 21:03

## 2017-07-05 RX ADMIN — Medication 1 TABLET: at 09:13

## 2017-07-05 RX ADMIN — VITAMIN D, TAB 1000IU (100/BT) 2000 UNITS: 25 TAB at 09:13

## 2017-07-06 VITALS
BODY MASS INDEX: 22.69 KG/M2 | SYSTOLIC BLOOD PRESSURE: 163 MMHG | TEMPERATURE: 97.5 F | WEIGHT: 167.55 LBS | DIASTOLIC BLOOD PRESSURE: 70 MMHG | OXYGEN SATURATION: 95 % | HEIGHT: 72 IN | HEART RATE: 70 BPM | RESPIRATION RATE: 19 BRPM

## 2017-07-06 LAB
ANION GAP SERPL CALCULATED.3IONS-SCNC: 14 MMOL/L (ref 4–13)
BASOPHILS # BLD AUTO: 0.06 THOUSANDS/ΜL (ref 0–0.1)
BASOPHILS NFR BLD AUTO: 1 % (ref 0–1)
BUN SERPL-MCNC: 88 MG/DL (ref 5–25)
CALCIUM SERPL-MCNC: 7.4 MG/DL (ref 8.3–10.1)
CHLORIDE SERPL-SCNC: 103 MMOL/L (ref 100–108)
CO2 SERPL-SCNC: 24 MMOL/L (ref 21–32)
CREAT SERPL-MCNC: 3.78 MG/DL (ref 0.6–1.3)
EOSINOPHIL # BLD AUTO: 0.06 THOUSAND/ΜL (ref 0–0.61)
EOSINOPHIL NFR BLD AUTO: 1 % (ref 0–6)
ERYTHROCYTE [DISTWIDTH] IN BLOOD BY AUTOMATED COUNT: 17.8 % (ref 11.6–15.1)
GFR SERPL CREATININE-BSD FRML MDRD: 15.5 ML/MIN/1.73SQ M
GLUCOSE SERPL-MCNC: 172 MG/DL (ref 65–140)
HCT VFR BLD AUTO: 24.5 % (ref 36.5–49.3)
HGB BLD-MCNC: 8 G/DL (ref 12–17)
INR PPP: 2.22 (ref 0.86–1.16)
LYMPHOCYTES # BLD AUTO: 0.74 THOUSANDS/ΜL (ref 0.6–4.47)
LYMPHOCYTES NFR BLD AUTO: 13 % (ref 14–44)
MAGNESIUM SERPL-MCNC: 1.3 MG/DL (ref 1.6–2.6)
MCH RBC QN AUTO: 27.7 PG (ref 26.8–34.3)
MCHC RBC AUTO-ENTMCNC: 32.7 G/DL (ref 31.4–37.4)
MCV RBC AUTO: 85 FL (ref 82–98)
MONOCYTES # BLD AUTO: 0.4 THOUSAND/ΜL (ref 0.17–1.22)
MONOCYTES NFR BLD AUTO: 7 % (ref 4–12)
NEUTROPHILS # BLD AUTO: 4.51 THOUSANDS/ΜL (ref 1.85–7.62)
NEUTS SEG NFR BLD AUTO: 78 % (ref 43–75)
NRBC BLD AUTO-RTO: 0 /100 WBCS
PLATELET # BLD AUTO: 111 THOUSANDS/UL (ref 149–390)
PMV BLD AUTO: 11.5 FL (ref 8.9–12.7)
POTASSIUM SERPL-SCNC: 3.4 MMOL/L (ref 3.5–5.3)
PROTHROMBIN TIME: 25.2 SECONDS (ref 12.1–14.4)
RBC # BLD AUTO: 2.89 MILLION/UL (ref 3.88–5.62)
SODIUM SERPL-SCNC: 141 MMOL/L (ref 136–145)
WBC # BLD AUTO: 5.82 THOUSAND/UL (ref 4.31–10.16)

## 2017-07-06 PROCEDURE — 85610 PROTHROMBIN TIME: CPT | Performed by: NURSE PRACTITIONER

## 2017-07-06 PROCEDURE — 80048 BASIC METABOLIC PNL TOTAL CA: CPT | Performed by: INTERNAL MEDICINE

## 2017-07-06 PROCEDURE — 85025 COMPLETE CBC W/AUTO DIFF WBC: CPT | Performed by: INTERNAL MEDICINE

## 2017-07-06 PROCEDURE — 83735 ASSAY OF MAGNESIUM: CPT | Performed by: NURSE PRACTITIONER

## 2017-07-06 RX ORDER — MAGNESIUM SULFATE HEPTAHYDRATE 40 MG/ML
4 INJECTION, SOLUTION INTRAVENOUS ONCE
Status: COMPLETED | OUTPATIENT
Start: 2017-07-06 | End: 2017-07-06

## 2017-07-06 RX ORDER — POTASSIUM CHLORIDE 20 MEQ/1
40 TABLET, EXTENDED RELEASE ORAL ONCE
Status: COMPLETED | OUTPATIENT
Start: 2017-07-06 | End: 2017-07-06

## 2017-07-06 RX ADMIN — HEPARIN SODIUM 5000 UNITS: 5000 INJECTION, SOLUTION INTRAVENOUS; SUBCUTANEOUS at 14:52

## 2017-07-06 RX ADMIN — AMLODIPINE BESYLATE 5 MG: 5 TABLET ORAL at 08:21

## 2017-07-06 RX ADMIN — ASPIRIN 81 MG: 81 TABLET, COATED ORAL at 08:21

## 2017-07-06 RX ADMIN — POTASSIUM CHLORIDE 40 MEQ: 1500 TABLET, EXTENDED RELEASE ORAL at 11:08

## 2017-07-06 RX ADMIN — NYSTATIN 1 APPLICATION: 100000 POWDER TOPICAL at 08:23

## 2017-07-06 RX ADMIN — Medication 1 TABLET: at 08:20

## 2017-07-06 RX ADMIN — CALCITRIOL 0.25 MCG: 0.25 CAPSULE ORAL at 08:22

## 2017-07-06 RX ADMIN — Medication 325 MG: at 08:21

## 2017-07-06 RX ADMIN — VITAMIN D, TAB 1000IU (100/BT) 2000 UNITS: 25 TAB at 08:20

## 2017-07-06 RX ADMIN — ANTACID TABLETS 1000 MG: 500 TABLET, CHEWABLE ORAL at 08:21

## 2017-07-06 RX ADMIN — PANTOPRAZOLE SODIUM 40 MG: 40 TABLET, DELAYED RELEASE ORAL at 05:19

## 2017-07-06 RX ADMIN — CALCIUM GLUCONATE 2 G: 94 INJECTION, SOLUTION INTRAVENOUS at 11:01

## 2017-07-06 RX ADMIN — HEPARIN SODIUM 5000 UNITS: 5000 INJECTION, SOLUTION INTRAVENOUS; SUBCUTANEOUS at 05:19

## 2017-07-06 RX ADMIN — SODIUM CHLORIDE 75 ML/HR: 0.9 INJECTION, SOLUTION INTRAVENOUS at 11:05

## 2017-07-06 RX ADMIN — ATORVASTATIN CALCIUM 10 MG: 10 TABLET, FILM COATED ORAL at 08:21

## 2017-07-06 RX ADMIN — MAGNESIUM SULFATE HEPTAHYDRATE 4 G: 40 INJECTION, SOLUTION INTRAVENOUS at 12:37

## 2017-07-12 ENCOUNTER — TRANSCRIBE ORDERS (OUTPATIENT)
Dept: ADMINISTRATIVE | Facility: HOSPITAL | Age: 82
End: 2017-07-12

## 2017-07-12 DIAGNOSIS — N18.4 CHRONIC KIDNEY DISEASE (CKD), ACTIVE MEDICAL MANAGEMENT WITHOUT DIALYSIS, STAGE 4 (SEVERE) (HCC): Primary | ICD-10-CM

## 2017-07-14 ENCOUNTER — APPOINTMENT (OUTPATIENT)
Dept: LAB | Facility: OTHER | Age: 82
End: 2017-07-14
Payer: MEDICARE

## 2017-07-14 ENCOUNTER — TRANSCRIBE ORDERS (OUTPATIENT)
Dept: LAB | Facility: OTHER | Age: 82
End: 2017-07-14

## 2017-07-14 ENCOUNTER — GENERIC CONVERSION - ENCOUNTER (OUTPATIENT)
Dept: OTHER | Facility: OTHER | Age: 82
End: 2017-07-14

## 2017-07-14 DIAGNOSIS — Z79.01 LONG TERM (CURRENT) USE OF ANTICOAGULANTS: Primary | ICD-10-CM

## 2017-07-14 LAB
INR PPP: 2.72 (ref 0.86–1.16)
PROTHROMBIN TIME: 29.2 SECONDS (ref 12.1–14.4)

## 2017-07-14 PROCEDURE — 36415 COLL VENOUS BLD VENIPUNCTURE: CPT

## 2017-07-14 PROCEDURE — 85610 PROTHROMBIN TIME: CPT

## 2017-07-17 ENCOUNTER — LAB CONVERSION - ENCOUNTER (OUTPATIENT)
Dept: OTHER | Facility: OTHER | Age: 82
End: 2017-07-17

## 2017-07-18 DIAGNOSIS — N18.4 CHRONIC KIDNEY DISEASE, STAGE IV (SEVERE) (HCC): ICD-10-CM

## 2017-07-18 DIAGNOSIS — Z79.01 LONG TERM CURRENT USE OF ANTICOAGULANT: ICD-10-CM

## 2017-07-24 ENCOUNTER — ALLSCRIPTS OFFICE VISIT (OUTPATIENT)
Dept: OTHER | Facility: OTHER | Age: 82
End: 2017-07-24

## 2017-07-31 ENCOUNTER — ALLSCRIPTS OFFICE VISIT (OUTPATIENT)
Dept: OTHER | Facility: OTHER | Age: 82
End: 2017-07-31

## 2017-08-10 ENCOUNTER — TRANSCRIBE ORDERS (OUTPATIENT)
Dept: LAB | Facility: OTHER | Age: 82
End: 2017-08-10

## 2017-08-10 ENCOUNTER — APPOINTMENT (OUTPATIENT)
Dept: LAB | Facility: OTHER | Age: 82
End: 2017-08-10
Payer: MEDICARE

## 2017-08-10 DIAGNOSIS — Z79.01 LONG TERM (CURRENT) USE OF ANTICOAGULANTS: Primary | ICD-10-CM

## 2017-08-10 DIAGNOSIS — I48.0 PAROXYSMAL ATRIAL FIBRILLATION (HCC): ICD-10-CM

## 2017-08-10 LAB
INR PPP: 1.75 (ref 0.86–1.16)
PROTHROMBIN TIME: 20.6 SECONDS (ref 12.1–14.4)

## 2017-08-10 PROCEDURE — 85610 PROTHROMBIN TIME: CPT

## 2017-08-10 PROCEDURE — 36415 COLL VENOUS BLD VENIPUNCTURE: CPT

## 2017-08-17 ENCOUNTER — HOSPITAL ENCOUNTER (OUTPATIENT)
Dept: NON INVASIVE DIAGNOSTICS | Facility: CLINIC | Age: 82
Discharge: HOME | End: 2017-08-17
Payer: MEDICARE

## 2017-08-17 DIAGNOSIS — N18.4 CHRONIC KIDNEY DISEASE, STAGE IV (SEVERE) (HCC): ICD-10-CM

## 2017-08-17 PROCEDURE — G0365 VESSEL MAPPING HEMO ACCESS: HCPCS

## 2017-08-21 ENCOUNTER — TRANSCRIBE ORDERS (OUTPATIENT)
Dept: LAB | Facility: OTHER | Age: 82
End: 2017-08-21

## 2017-08-21 ENCOUNTER — APPOINTMENT (OUTPATIENT)
Dept: LAB | Facility: OTHER | Age: 82
End: 2017-08-21
Payer: MEDICARE

## 2017-08-21 DIAGNOSIS — Z79.01 LONG TERM CURRENT USE OF ANTICOAGULANT: ICD-10-CM

## 2017-08-21 LAB
INR PPP: 2.29 (ref 0.86–1.16)
PROTHROMBIN TIME: 25.5 SECONDS (ref 12.1–14.4)

## 2017-08-21 PROCEDURE — 85610 PROTHROMBIN TIME: CPT

## 2017-08-21 PROCEDURE — 36415 COLL VENOUS BLD VENIPUNCTURE: CPT

## 2017-08-28 ENCOUNTER — TRANSCRIBE ORDERS (OUTPATIENT)
Dept: LAB | Facility: OTHER | Age: 82
End: 2017-08-28

## 2017-08-28 ENCOUNTER — APPOINTMENT (OUTPATIENT)
Dept: LAB | Facility: OTHER | Age: 82
End: 2017-08-28
Payer: MEDICARE

## 2017-08-28 DIAGNOSIS — I71.4 ABDOMINAL AORTIC ANEURYSM WITHOUT RUPTURE (HCC): ICD-10-CM

## 2017-08-28 DIAGNOSIS — I48.0 PAROXYSMAL ATRIAL FIBRILLATION (HCC): ICD-10-CM

## 2017-08-28 DIAGNOSIS — N18.4 CHRONIC KIDNEY DISEASE, STAGE IV (SEVERE) (HCC): ICD-10-CM

## 2017-08-28 DIAGNOSIS — N18.5 CHRONIC KIDNEY DISEASE, STAGE V (HCC): ICD-10-CM

## 2017-08-28 DIAGNOSIS — Z79.01 LONG TERM CURRENT USE OF ANTICOAGULANT: ICD-10-CM

## 2017-08-28 LAB
INR PPP: 2.94 (ref 0.86–1.16)
PROTHROMBIN TIME: 31.1 SECONDS (ref 12.1–14.4)

## 2017-08-28 PROCEDURE — 36415 COLL VENOUS BLD VENIPUNCTURE: CPT

## 2017-08-28 PROCEDURE — 85610 PROTHROMBIN TIME: CPT

## 2017-08-30 ENCOUNTER — HOSPITAL ENCOUNTER (EMERGENCY)
Facility: HOSPITAL | Age: 82
Discharge: HOME | End: 2017-08-30
Payer: MEDICARE

## 2017-08-30 VITALS
SYSTOLIC BLOOD PRESSURE: 139 MMHG | BODY MASS INDEX: 24.3 KG/M2 | HEART RATE: 71 BPM | RESPIRATION RATE: 18 BRPM | WEIGHT: 179.4 LBS | TEMPERATURE: 97.7 F | HEIGHT: 72 IN | DIASTOLIC BLOOD PRESSURE: 62 MMHG

## 2017-08-30 DIAGNOSIS — Z48.02 ATTENTION TO DRESSINGS AND SUTURES: Primary | ICD-10-CM

## 2017-08-30 DIAGNOSIS — Z48.00 ATTENTION TO DRESSINGS AND SUTURES: Primary | ICD-10-CM

## 2017-08-30 PROCEDURE — 99282 EMERGENCY DEPT VISIT SF MDM: CPT

## 2017-09-05 ENCOUNTER — ALLSCRIPTS OFFICE VISIT (OUTPATIENT)
Dept: OTHER | Facility: OTHER | Age: 82
End: 2017-09-05

## 2017-09-11 ENCOUNTER — GENERIC CONVERSION - ENCOUNTER (OUTPATIENT)
Dept: OTHER | Facility: OTHER | Age: 82
End: 2017-09-11

## 2017-09-11 ENCOUNTER — TRANSCRIBE ORDERS (OUTPATIENT)
Dept: LAB | Facility: OTHER | Age: 82
End: 2017-09-11

## 2017-09-11 ENCOUNTER — APPOINTMENT (OUTPATIENT)
Dept: LAB | Facility: OTHER | Age: 82
End: 2017-09-11
Payer: MEDICARE

## 2017-09-11 DIAGNOSIS — N18.4 CHRONIC KIDNEY DISEASE, STAGE IV (SEVERE) (HCC): ICD-10-CM

## 2017-09-11 LAB
ANION GAP SERPL CALCULATED.3IONS-SCNC: 12 MMOL/L (ref 4–13)
BACTERIA UR QL AUTO: ABNORMAL /HPF
BILIRUB UR QL STRIP: NEGATIVE
BUN SERPL-MCNC: 92 MG/DL (ref 5–25)
CALCIUM SERPL-MCNC: 6.3 MG/DL (ref 8.3–10.1)
CHLORIDE SERPL-SCNC: 105 MMOL/L (ref 100–108)
CLARITY UR: CLEAR
CO2 SERPL-SCNC: 22 MMOL/L (ref 21–32)
COLOR UR: YELLOW
CREAT SERPL-MCNC: 4.54 MG/DL (ref 0.6–1.3)
CREAT UR-MCNC: 70.9 MG/DL
ERYTHROCYTE [DISTWIDTH] IN BLOOD BY AUTOMATED COUNT: 18.8 % (ref 11.6–15.1)
GFR SERPL CREATININE-BSD FRML MDRD: 11 ML/MIN/1.73SQ M
GLUCOSE P FAST SERPL-MCNC: 80 MG/DL (ref 65–99)
GLUCOSE UR STRIP-MCNC: NEGATIVE MG/DL
HCT VFR BLD AUTO: 28.7 % (ref 36.5–49.3)
HGB BLD-MCNC: 9.5 G/DL (ref 12–17)
HGB UR QL STRIP.AUTO: NEGATIVE
HYALINE CASTS #/AREA URNS LPF: ABNORMAL /LPF
INR PPP: 3.22 (ref 0.86–1.16)
KETONES UR STRIP-MCNC: NEGATIVE MG/DL
LEUKOCYTE ESTERASE UR QL STRIP: NEGATIVE
MCH RBC QN AUTO: 28.9 PG (ref 26.8–34.3)
MCHC RBC AUTO-ENTMCNC: 33.1 G/DL (ref 31.4–37.4)
MCV RBC AUTO: 87 FL (ref 82–98)
NITRITE UR QL STRIP: NEGATIVE
NON-SQ EPI CELLS URNS QL MICRO: ABNORMAL /HPF
PH UR STRIP.AUTO: 5.5 [PH] (ref 4.5–8)
PHOSPHATE SERPL-MCNC: 3.9 MG/DL (ref 2.3–4.1)
PLATELET # BLD AUTO: 117 THOUSANDS/UL (ref 149–390)
PMV BLD AUTO: 11.9 FL (ref 8.9–12.7)
POTASSIUM SERPL-SCNC: 4.1 MMOL/L (ref 3.5–5.3)
PROT UR STRIP-MCNC: ABNORMAL MG/DL
PROT UR-MCNC: 32 MG/DL
PROT/CREAT UR: 0.45 MG/G{CREAT} (ref 0–0.1)
PROTHROMBIN TIME: 33.4 SECONDS (ref 12.1–14.4)
PTH-INTACT SERPL-MCNC: 47.2 PG/ML (ref 14–72)
RBC # BLD AUTO: 3.29 MILLION/UL (ref 3.88–5.62)
RBC #/AREA URNS AUTO: ABNORMAL /HPF
SODIUM SERPL-SCNC: 139 MMOL/L (ref 136–145)
SP GR UR STRIP.AUTO: 1.01 (ref 1–1.03)
UROBILINOGEN UR QL STRIP.AUTO: 0.2 E.U./DL
WBC # BLD AUTO: 6.66 THOUSAND/UL (ref 4.31–10.16)
WBC #/AREA URNS AUTO: ABNORMAL /HPF

## 2017-09-11 PROCEDURE — 85027 COMPLETE CBC AUTOMATED: CPT

## 2017-09-11 PROCEDURE — 36415 COLL VENOUS BLD VENIPUNCTURE: CPT

## 2017-09-11 PROCEDURE — 80048 BASIC METABOLIC PNL TOTAL CA: CPT

## 2017-09-11 PROCEDURE — 82570 ASSAY OF URINE CREATININE: CPT

## 2017-09-11 PROCEDURE — 84156 ASSAY OF PROTEIN URINE: CPT

## 2017-09-11 PROCEDURE — 84100 ASSAY OF PHOSPHORUS: CPT

## 2017-09-11 PROCEDURE — 83970 ASSAY OF PARATHORMONE: CPT

## 2017-09-11 PROCEDURE — 85610 PROTHROMBIN TIME: CPT

## 2017-09-11 PROCEDURE — 81001 URINALYSIS AUTO W/SCOPE: CPT

## 2017-09-18 ENCOUNTER — ALLSCRIPTS OFFICE VISIT (OUTPATIENT)
Dept: OTHER | Facility: OTHER | Age: 82
End: 2017-09-18

## 2017-09-21 ENCOUNTER — GENERIC CONVERSION - ENCOUNTER (OUTPATIENT)
Dept: OTHER | Facility: OTHER | Age: 82
End: 2017-09-21

## 2017-09-25 ENCOUNTER — APPOINTMENT (OUTPATIENT)
Dept: LAB | Facility: OTHER | Age: 82
End: 2017-09-25
Payer: MEDICARE

## 2017-09-25 ENCOUNTER — TRANSCRIBE ORDERS (OUTPATIENT)
Dept: LAB | Facility: OTHER | Age: 82
End: 2017-09-25

## 2017-09-25 DIAGNOSIS — I48.0 PAROXYSMAL ATRIAL FIBRILLATION (HCC): ICD-10-CM

## 2017-09-25 LAB
INR PPP: 5.85 (ref 0.86–1.16)
PROTHROMBIN TIME: 53.6 SECONDS (ref 12.1–14.4)

## 2017-09-25 PROCEDURE — 85610 PROTHROMBIN TIME: CPT

## 2017-09-26 ENCOUNTER — APPOINTMENT (OUTPATIENT)
Dept: LAB | Facility: HOSPITAL | Age: 82
End: 2017-09-26
Attending: SURGERY
Payer: MEDICARE

## 2017-09-26 ENCOUNTER — TRANSCRIBE ORDERS (OUTPATIENT)
Dept: ADMINISTRATIVE | Facility: HOSPITAL | Age: 82
End: 2017-09-26

## 2017-09-26 ENCOUNTER — OFFICE VISIT (OUTPATIENT)
Dept: LAB | Facility: HOSPITAL | Age: 82
End: 2017-09-26
Attending: SURGERY
Payer: MEDICARE

## 2017-09-26 ENCOUNTER — HOSPITAL ENCOUNTER (OUTPATIENT)
Dept: RADIOLOGY | Facility: HOSPITAL | Age: 82
Discharge: HOME | End: 2017-09-26
Attending: SURGERY
Payer: MEDICARE

## 2017-09-26 DIAGNOSIS — N18.4 CHRONIC KIDNEY DISEASE, STAGE IV (SEVERE) (HCC): ICD-10-CM

## 2017-09-26 DIAGNOSIS — N18.4 CHRONIC KIDNEY DISEASE, STAGE IV (SEVERE) (HCC): Primary | ICD-10-CM

## 2017-09-26 LAB
ANION GAP SERPL CALCULATED.3IONS-SCNC: 18 MMOL/L (ref 4–13)
ATRIAL RATE: 41 BPM
ATRIAL RATE: 65 BPM
BUN SERPL-MCNC: 79 MG/DL (ref 5–25)
CALCIUM SERPL-MCNC: 6 MG/DL (ref 8.3–10.1)
CHLORIDE SERPL-SCNC: 102 MMOL/L (ref 100–108)
CO2 SERPL-SCNC: 20 MMOL/L (ref 21–32)
CREAT SERPL-MCNC: 5.17 MG/DL (ref 0.6–1.3)
ERYTHROCYTE [DISTWIDTH] IN BLOOD BY AUTOMATED COUNT: 18.6 % (ref 11.6–15.1)
GFR SERPL CREATININE-BSD FRML MDRD: 10 ML/MIN/1.73SQ M
GLUCOSE P FAST SERPL-MCNC: 145 MG/DL (ref 65–99)
HCT VFR BLD AUTO: 29.6 % (ref 36.5–49.3)
HGB BLD-MCNC: 10 G/DL (ref 12–17)
MCH RBC QN AUTO: 28.9 PG (ref 26.8–34.3)
MCHC RBC AUTO-ENTMCNC: 33.8 G/DL (ref 31.4–37.4)
MCV RBC AUTO: 86 FL (ref 82–98)
PLATELET # BLD AUTO: 117 THOUSANDS/UL (ref 149–390)
PMV BLD AUTO: 10.6 FL (ref 8.9–12.7)
POTASSIUM SERPL-SCNC: 3.1 MMOL/L (ref 3.5–5.3)
QRS AXIS: -51 DEGREES
QRS AXIS: -52 DEGREES
QRSD INTERVAL: 140 MS
QRSD INTERVAL: 146 MS
QT INTERVAL: 456 MS
QT INTERVAL: 476 MS
QTC INTERVAL: 495 MS
QTC INTERVAL: 495 MS
RBC # BLD AUTO: 3.46 MILLION/UL (ref 3.88–5.62)
SODIUM SERPL-SCNC: 140 MMOL/L (ref 136–145)
T WAVE AXIS: 106 DEGREES
T WAVE AXIS: 108 DEGREES
VENTRICULAR RATE: 65 BPM
VENTRICULAR RATE: 71 BPM
WBC # BLD AUTO: 6.68 THOUSAND/UL (ref 4.31–10.16)

## 2017-09-26 PROCEDURE — 80048 BASIC METABOLIC PNL TOTAL CA: CPT

## 2017-09-26 PROCEDURE — 36415 COLL VENOUS BLD VENIPUNCTURE: CPT

## 2017-09-26 PROCEDURE — 93005 ELECTROCARDIOGRAM TRACING: CPT

## 2017-09-26 PROCEDURE — 71020 HB CHEST X-RAY 2VW FRONTAL&LATL: CPT

## 2017-09-26 PROCEDURE — 85027 COMPLETE CBC AUTOMATED: CPT

## 2017-09-26 NOTE — PRE-PROCEDURE INSTRUCTIONS
2Pre-Surgery Instructions:   Medication Instructions    amLODIPine (NORVASC) 10 mg tablet Patient was instructed by Physician and understands   aspirin (ECOTRIN LOW STRENGTH) 81 mg EC tablet Patient was instructed by Physician and understands   atorvastatin (LIPITOR) 10 mg tablet Patient was instructed by Physician and understands   cholecalciferol (VITAMIN D3) 1,000 units tablet Patient was instructed by Physician and understands   IRON, FERROUS GLUCONATE, PO Patient was instructed by Physician and understands   metolazone (ZAROXOLYN) 5 mg tablet Patient was instructed by Physician and understands   omeprazole (PriLOSEC) 20 mg delayed release capsule Patient was instructed by Physician and understands   terazosin (HYTRIN) 1 mg capsule Patient was instructed by Physician and understands   torsemide (DEMADEX) 20 mg tablet Patient was instructed by Physician and understands   warfarin (COUMADIN) 5 mg tablet Patient was instructed by Physician and understands

## 2017-09-27 ENCOUNTER — LAB CONVERSION - ENCOUNTER (OUTPATIENT)
Dept: OTHER | Facility: OTHER | Age: 82
End: 2017-09-27

## 2017-09-27 ENCOUNTER — ALLSCRIPTS OFFICE VISIT (OUTPATIENT)
Dept: OTHER | Facility: OTHER | Age: 82
End: 2017-09-27

## 2017-09-29 ENCOUNTER — APPOINTMENT (OUTPATIENT)
Dept: LAB | Facility: OTHER | Age: 82
End: 2017-09-29
Payer: MEDICARE

## 2017-09-29 ENCOUNTER — HOSPITAL ENCOUNTER (EMERGENCY)
Facility: HOSPITAL | Age: 82
Discharge: HOME | End: 2017-09-29
Admitting: EMERGENCY MEDICINE
Payer: MEDICARE

## 2017-09-29 ENCOUNTER — TRANSCRIBE ORDERS (OUTPATIENT)
Dept: LAB | Facility: OTHER | Age: 82
End: 2017-09-29

## 2017-09-29 ENCOUNTER — GENERIC CONVERSION - ENCOUNTER (OUTPATIENT)
Dept: OTHER | Facility: OTHER | Age: 82
End: 2017-09-29

## 2017-09-29 ENCOUNTER — ANESTHESIA EVENT (OUTPATIENT)
Dept: PERIOP | Facility: HOSPITAL | Age: 82
End: 2017-09-29
Payer: MEDICARE

## 2017-09-29 VITALS
HEIGHT: 72 IN | OXYGEN SATURATION: 100 % | WEIGHT: 176.37 LBS | TEMPERATURE: 98.2 F | RESPIRATION RATE: 16 BRPM | HEART RATE: 75 BPM | SYSTOLIC BLOOD PRESSURE: 137 MMHG | BODY MASS INDEX: 23.89 KG/M2 | DIASTOLIC BLOOD PRESSURE: 62 MMHG

## 2017-09-29 DIAGNOSIS — N18.5 CHRONIC KIDNEY DISEASE, STAGE V (HCC): ICD-10-CM

## 2017-09-29 DIAGNOSIS — T14.8XXA PUNCTURE WOUND: Primary | ICD-10-CM

## 2017-09-29 LAB
ANION GAP SERPL CALCULATED.3IONS-SCNC: 13 MMOL/L (ref 4–13)
BUN SERPL-MCNC: 81 MG/DL (ref 5–25)
CALCIUM SERPL-MCNC: 6.2 MG/DL (ref 8.3–10.1)
CHLORIDE SERPL-SCNC: 103 MMOL/L (ref 100–108)
CO2 SERPL-SCNC: 20 MMOL/L (ref 21–32)
CREAT SERPL-MCNC: 4.7 MG/DL (ref 0.6–1.3)
GFR SERPL CREATININE-BSD FRML MDRD: 11 ML/MIN/1.73SQ M
GLUCOSE P FAST SERPL-MCNC: 87 MG/DL (ref 65–99)
POTASSIUM SERPL-SCNC: 3.5 MMOL/L (ref 3.5–5.3)
SODIUM SERPL-SCNC: 136 MMOL/L (ref 136–145)

## 2017-09-29 PROCEDURE — 80048 BASIC METABOLIC PNL TOTAL CA: CPT

## 2017-09-29 PROCEDURE — 99282 EMERGENCY DEPT VISIT SF MDM: CPT

## 2017-09-29 PROCEDURE — 36415 COLL VENOUS BLD VENIPUNCTURE: CPT

## 2017-09-29 NOTE — ED NOTES
Patient ambulated out of department, steady gait, vss, no new complaints or concerns at discharge  Patient accompanied out of department by his wife        Aga Robledo RN  09/29/17 5170

## 2017-09-29 NOTE — ED PROVIDER NOTES
History  Chief Complaint   Patient presents with    Puncture Wound     Pt was doing work outside when he got his arm hit by a nail on a 4x4  Pt states that bleeding is now controlled, but he is currently taking coumadin; although copumadin has been on hold since 9/27 due to scheduled sx on Monday  72-year-old male who presents with complaints of puncture wound to his right forearm yesterday  He states that he is on warfarin therapy and that the wound is continuing to bleed  It sounds like his family doctor tried use topical silver nitrate to cauterize the wound  He states that worked with any bumped to this morning and began to bleed again  He has a pinpoint using area of the right forearm will cover this with surgeon foam and wrap  Prior to Admission Medications   Prescriptions Last Dose Informant Patient Reported? Taking?    IRON, FERROUS GLUCONATE, PO   Yes No   Sig: Take 65 mg by mouth daily   Multiple Vitamin (MULTIVITAMIN) capsule   Yes No   Sig: Take 1 capsule by mouth daily   amLODIPine (NORVASC) 10 mg tablet   Yes No   Sig: Take 5 mg by mouth daily     aspirin (ECOTRIN LOW STRENGTH) 81 mg EC tablet   Yes No   Sig: Take 81 mg by mouth daily   atorvastatin (LIPITOR) 10 mg tablet   Yes No   Sig: Take 10 mg by mouth daily   calcitriol (ROCALTROL) 0 25 mcg capsule   Yes No   Sig: Take 0 25 mcg by mouth daily     cholecalciferol (VITAMIN D3) 1,000 units tablet   Yes No   Sig: Take 2,000 Units by mouth daily   metolazone (ZAROXOLYN) 5 mg tablet   Yes No   Sig: Take 5 mg by mouth 2 (two) times a day   omeprazole (PriLOSEC) 20 mg delayed release capsule   Yes No   Sig: Take 20 mg by mouth daily   terazosin (HYTRIN) 1 mg capsule   Yes No   Sig: Take 1 mg by mouth daily at bedtime   torsemide (DEMADEX) 20 mg tablet   Yes No   Sig: Take 20 mg by mouth daily   warfarin (COUMADIN) 2 5 mg tablet   Yes No   Sig: Take 2 5 mg by mouth every other day   warfarin (COUMADIN) 5 mg tablet   Yes No   Sig: Take 5 mg by mouth daily        Facility-Administered Medications: None       Past Medical History:   Diagnosis Date    Aortic aneurysm     GERD (gastroesophageal reflux disease)     Hyperlipidemia     Hypertension     Renal disorder        Past Surgical History:   Procedure Laterality Date    ABDOMINAL AORTIC ANEURYSM REPAIR, OPEN      CARDIAC SURGERY      HERNIA REPAIR      VALVE REPLACEMENT         Family History   Problem Relation Age of Onset    Cancer Mother     Anuerysm Father      I have reviewed and agree with the history as documented  Social History   Substance Use Topics    Smoking status: Former Smoker     Quit date: 2011    Smokeless tobacco: Never Used    Alcohol use Yes      Comment: SOCIALLY        Review of Systems   Constitutional: Negative for diaphoresis, fatigue and fever  HENT: Negative for congestion, ear pain, nosebleeds and sore throat  Eyes: Negative for photophobia, pain, discharge and visual disturbance  Respiratory: Negative for cough, choking, chest tightness, shortness of breath and wheezing  Cardiovascular: Negative for chest pain and palpitations  Gastrointestinal: Negative for abdominal distention, abdominal pain, diarrhea and vomiting  Genitourinary: Negative for dysuria, flank pain and frequency  Musculoskeletal: Negative for back pain, gait problem and joint swelling  Skin: Positive for wound  Negative for color change and rash  Neurological: Negative for dizziness, syncope and headaches  Psychiatric/Behavioral: Negative for behavioral problems and confusion  The patient is not nervous/anxious  All other systems reviewed and are negative        Physical Exam  ED Triage Vitals [09/29/17 1027]   Temperature Pulse Respirations Blood Pressure SpO2   98 2 °F (36 8 °C) 75 16 137/62 100 %      Temp Source Heart Rate Source Patient Position - Orthostatic VS BP Location FiO2 (%)   Oral Monitor Lying Right arm --      Pain Score       3 Physical Exam   Constitutional: He is oriented to person, place, and time  He appears well-developed and well-nourished  HENT:   Head: Normocephalic and atraumatic  Eyes: Pupils are equal, round, and reactive to light  Neck: Normal range of motion  Neck supple  Cardiovascular: Normal rate, regular rhythm, normal heart sounds and normal pulses  PMI is not displaced  Pulmonary/Chest: Effort normal and breath sounds normal  No respiratory distress  Abdominal: Soft  He exhibits no distension  There is no guarding  Musculoskeletal: Normal range of motion  Lymphadenopathy:     He has no cervical adenopathy  Neurological: He is alert and oriented to person, place, and time  Skin: Skin is warm and dry  No rash noted  He is not diaphoretic  No pallor  Psychiatric: He has a normal mood and affect  Vitals reviewed  ED Medications  Medications - No data to display    Diagnostic Studies  Labs Reviewed - No data to display    No orders to display       Procedures  Procedures      Phone Contacts  ED Phone Contact    ED Course  ED Course                                MDM  Number of Diagnoses or Management Options  Puncture wound: new and requires workup  Diagnosis management comments: The pinpoint wound was dressed with surgeon foam, then Telfa, then gauze wrap, and cotton wrap  And an Ace wrap  Patient Progress  Patient progress: stable    CritCare Time    Disposition  Final diagnoses:   Puncture wound     ED Disposition     ED Disposition Condition Comment    Discharge  Arta Hatchet Possinger discharge to home/self care  Condition at discharge: Good        Follow-up Information     Follow up With Specialties Details Why Mag Grande MD [facility]  As needed [address] [place] [address] [address] [phone]          Patient's Medications   Discharge Prescriptions    No medications on file     No discharge procedures on file      ED Provider  Electronically Signed by       JUVENCIO Briggs  09/29/17 1138

## 2017-09-29 NOTE — ANESTHESIA PREPROCEDURE EVALUATION
Review of Systems/Medical History  Patient summary reviewed  Chart reviewed  No history of anesthetic complications (open AAA repair 6 years ago without issues)     Cardiovascular  EKG reviewed, Exercise tolerance: good, Exercise comment: Splits wood, very active Hyperlipidemia, Hypertension , Valve replacement (bioprosthetic AVR), CAD, , History of CABG, Dysrhythmias, atrial fibrillation, CHF ,   Comment: S/p AAA repair  Echo 04/25/2017- EF 45%, mild diffuse hypokinesis, moderate concentric hypertrophy, moderately dilated LA, mildly dilated RA, moderate-severe MR, normally functioning bioprosthetic aortic valve, mild TR Cardiomyopathy     Left bundle branch block  Premature ventricular complexes  Nonspecific ST and T wave abnormality    Confirmed by MD Loretta, Lei Sutherland ([id number]) on 9/26/2017 11:29:32   ,  Pulmonary  Smoker (quit 2011) ex-smoker , No recent URI , ,        GI/Hepatic    GERD well controlled,   Comment: Confirmed NPO appropriate     Chronic kidney disease stage 4,        Endo/Other  Negative endo/other ROS      GYN       Hematology  Anemia anemia of chronic disease,  Coagulation disorder (last dose of coumadin 9/27) currently taking oral anticoagulants,    Musculoskeletal  Negative musculoskeletal ROS        Neurology  Negative neurology ROS      Psychology   Negative psychology ROS            Physical Exam    Airway    Mallampati score: II  TM Distance: >3 FB  Neck ROM: full     Dental   Comment: Upper partial,     Cardiovascular  Rhythm: irregular, Rate: normal,     Pulmonary  Breath sounds clear to auscultation,     Other Findings        Anesthesia Plan  ASA Score- 4       Anesthesia Type- general  Comment: LMA      Induction- intravenous      Informed Consent  Anesthetic plan and risks discussed with patient    Lab Results   Component Value Date    WBC 6 68 09/26/2017    HGB 10 0 (L) 09/26/2017    HCT 29 6 (L) 09/26/2017    MCV 86 09/26/2017     (L) 09/26/2017     Lab Results   Component Value Date    GLUCOSE 172 (H) 07/06/2017    CALCIUM 6 2 (L) 09/29/2017     09/29/2017    K 3 5 09/29/2017    CO2 20 (L) 09/29/2017     09/29/2017    BUN 81 (H) 09/29/2017    CREATININE 4 70 (H) 09/29/2017     Lab Results   Component Value Date    INR 5 85 (HH) 09/25/2017    INR 3 22 (H) 09/11/2017    INR 2 94 (H) 08/28/2017    PROTIME 53 6 (H) 09/25/2017    PROTIME 33 4 (H) 09/11/2017    PROTIME 31 1 (H) 08/28/2017     Lab Results   Component Value Date    HGBA1C 4 8 07/03/2017       I, José Manuel Antonio MD, have personally seen and examined the patient  I have reviewed the patient's history, medications, and recent vital signs and obtained informed consent, specifically offering to answer patient and family questions about the plan for anesthesia

## 2017-09-29 NOTE — DISCHARGE INSTRUCTIONS
Acute Wound Care   AMBULATORY CARE:   An acute wound  is an injury that causes a break in the skin  An acute wound can happen suddenly, last a short time, and may heal on its own  Common signs and symptoms of an acute wound:   · A cut, tear, or gash in your skin    · Bleeding, swelling, pain, or trouble moving the affected area    · Dirt or foreign objects inside the wound     · Milky, yellow, green, or brown pus in the wound     · Red, tender, or warm area around the pus    · Fever  Seek care immediately if:   · You have pus or a foul odor coming from the wound  · You have sudden trouble breathing or chest pain  · Blood soaks through your bandage  Contact your healthcare provider if:   · You have muscle, joint, or body aches, sweating, or a fever  · You have more swelling, redness, or bleeding in your wound  · Your skin is itchy, swollen, or you have a rash  · You have questions or concerns about your condition or care  Treatment for an acute wound  may include any of the following:  · Cleansing  is done with soap and water to wash away germs and decrease the risk of infection  Sterile water further cleans the wound  The cleaning is done under high pressure with a catheter tip and large syringe  A solution that kills germs may also be used  · Debridement  is done to clean and remove objects, dirt, or dead tissues from the open wound  Healthcare providers may also drain the wound to clean out pus  · Closure of the wound  is done with stitches, staples, skin adhesive, or other treatments  This may be done if the wound is wide or deep  Stitches may be needed if the wound is in an area that moves a lot, such as the hands, feet, and joints  Stitches may help to keep the wound from getting infected  They may also decrease the amount of scarring you have  Some wounds may heal better without stitches    Wound care:   · If your wound was closed with thin strips of medical tape, keep them clean and dry  The strips of medical tape will fall off on their own  Do not pull them off  · Keep the bandage clean and dry  Do not remove the bandage over your wound unless your healthcare provider says it is okay  · Wash your hands before and after you take care of your wound to prevent infection  · Clean the wound as directed  If you cannot reach the wound, have someone help you  · If you have packing, make sure all the gauze used to pack the wound is taken out and replaced as directed  Keep track of how many gauze dressings are placed inside the wound  Follow up with your healthcare provider as directed:  Write down your questions so you remember to ask them during your visits  © 2016 [address] is for End User's use only and may not be sold, redistributed or otherwise used for commercial purposes  All illustrations and images included in CareNotes® are the copyrighted property of [employer]  or [employer]  The above information is an  only  It is not intended as medical advice for individual conditions or treatments  Talk to your doctor, nurse or pharmacist before following any medical regimen to see if it is safe and effective for you  Acute Wounds   WHAT YOU NEED TO KNOW:   An acute wound is an injury that causes a break in the skin  As your wound begins to heal, it is normal to have some swelling, pain, and redness  Your body's immune system is working to keep your wound from getting infected  Your wound may develop a scab  The scab protects your wound as it heals  DISCHARGE INSTRUCTIONS:   Call 911 for the following:   · You suddenly have trouble breathing or have chest pain  Return to the emergency department if:   · Blood soaks through your bandage  · You have pus or a foul odor coming from the wound  · Your stitches come apart or your wound reopens    Contact your healthcare provider if:   · You continue to have pain even after you have taken pain medicine  · You have muscle, joint, or body aches, sweating, or a fever  · You have increased swelling, redness, or bleeding in your wound  · Your skin is itchy, swollen, or you have a rash  · You have questions or concerns about your condition or care  Medicines: You may need any of the following:  · Antibiotics  may be given to prevent or treat an infection  · Prescription pain medicine  may be given  Ask your how to take this medicine safely  · NSAIDs , such as ibuprofen, help decrease swelling, pain, and fever  NSAIDs can cause stomach bleeding or kidney problems in certain people  If you take blood thinner medicine, always ask if NSAIDs are safe for you  Always read the medicine label and follow directions  Do not give these medicines to children under 10months of age without direction from your child's healthcare provider  · Take your medicine as directed  Contact your healthcare provider if you think your medicine is not helping or if you have side effects  Tell him or her if you are allergic to any medicine  Keep a list of the medicines, vitamins, and herbs you take  Include the amounts, and when and why you take them  Bring the list or the pill bottles to follow-up visits  Carry your medicine list with you in case of an emergency  Care for your wound as directed:  Acute wounds can be in different locations and caused by different injuries  Follow your healthcare provider's instructions on caring for your type of wound  The following care items are for most wounds:  · Keep your wound covered with a clean and dry bandage  Change your bandage if it becomes wet or dirty  This will decrease the risk for infection in your wound  Follow your healthcare provider's instructions for changing your dressing  · Do not soak in a tub or swim  until your healthcare provider says it is okay  Your wound may open if you get it too wet   Dirt from the water can also get into your wound and cause an infection  · Keep pets away from your wound  Pets carry germs that can cause a wound infection  · Do not pick or scratch scabs  Let scabs fall off on their own  You may damage new skin that is forming under the scab  You may have a worse scar after the damage  · Eat healthy foods and drink liquids as directed  Healthy foods give your body the nutrients it needs to heal your wound  Liquids prevent dehydration that can decrease the blood supply to your wound  Healthy foods include fruits, vegetables, grains (breads and cereals), dairy, and protein foods  Protein foods include meat, fish, nuts, and soy products  Protein, calories, vitamin C, and zinc help wounds heal  Ask your healthcare provider for more information about the foods you should eat to improve healing  Follow up with your healthcare provider as directed:  Write down your questions so you remember to ask them during your visits  © 2017 [employer] Information is for End User's use only and may not be sold, redistributed or otherwise used for commercial purposes  All illustrations and images included in CareNotes® are the copyrighted property of [employer]  or [employer]  The above information is an  only  It is not intended as medical advice for individual conditions or treatments  Talk to your doctor, nurse or pharmacist before following any medical regimen to see if it is safe and effective for you

## 2017-10-02 ENCOUNTER — HOSPITAL ENCOUNTER (OUTPATIENT)
Facility: HOSPITAL | Age: 82
Setting detail: OUTPATIENT SURGERY
Discharge: HOME | End: 2017-10-02
Attending: SURGERY | Admitting: SURGERY
Payer: MEDICARE

## 2017-10-02 ENCOUNTER — GENERIC CONVERSION - ENCOUNTER (OUTPATIENT)
Dept: OTHER | Facility: OTHER | Age: 82
End: 2017-10-02

## 2017-10-02 ENCOUNTER — ANESTHESIA (OUTPATIENT)
Dept: PERIOP | Facility: HOSPITAL | Age: 82
End: 2017-10-02
Payer: MEDICARE

## 2017-10-02 VITALS
WEIGHT: 172.62 LBS | OXYGEN SATURATION: 98 % | HEIGHT: 72 IN | DIASTOLIC BLOOD PRESSURE: 58 MMHG | RESPIRATION RATE: 18 BRPM | SYSTOLIC BLOOD PRESSURE: 133 MMHG | TEMPERATURE: 97.9 F | HEART RATE: 81 BPM | BODY MASS INDEX: 23.38 KG/M2

## 2017-10-02 LAB
ANION GAP SERPL CALCULATED.3IONS-SCNC: 16 MMOL/L (ref 4–13)
BUN SERPL-MCNC: 87 MG/DL (ref 5–25)
CALCIUM SERPL-MCNC: 5.5 MG/DL (ref 8.3–10.1)
CHLORIDE SERPL-SCNC: 103 MMOL/L (ref 100–108)
CO2 SERPL-SCNC: 22 MMOL/L (ref 21–32)
CREAT SERPL-MCNC: 5.04 MG/DL (ref 0.6–1.3)
GFR SERPL CREATININE-BSD FRML MDRD: 10 ML/MIN/1.73SQ M
GLUCOSE P FAST SERPL-MCNC: 90 MG/DL (ref 65–99)
GLUCOSE SERPL-MCNC: 90 MG/DL (ref 65–140)
POTASSIUM SERPL-SCNC: 3.7 MMOL/L (ref 3.5–5.3)
SODIUM SERPL-SCNC: 141 MMOL/L (ref 136–145)

## 2017-10-02 PROCEDURE — 80048 BASIC METABOLIC PNL TOTAL CA: CPT | Performed by: STUDENT IN AN ORGANIZED HEALTH CARE EDUCATION/TRAINING PROGRAM

## 2017-10-02 RX ORDER — ONDANSETRON 2 MG/ML
INJECTION INTRAMUSCULAR; INTRAVENOUS AS NEEDED
Status: DISCONTINUED | OUTPATIENT
Start: 2017-10-02 | End: 2017-10-02 | Stop reason: SURG

## 2017-10-02 RX ORDER — CLINDAMYCIN PHOSPHATE 900 MG/50ML
900 INJECTION INTRAVENOUS ONCE
Status: COMPLETED | OUTPATIENT
Start: 2017-10-02 | End: 2017-10-02

## 2017-10-02 RX ORDER — ONDANSETRON 2 MG/ML
4 INJECTION INTRAMUSCULAR; INTRAVENOUS ONCE AS NEEDED
Status: DISCONTINUED | OUTPATIENT
Start: 2017-10-02 | End: 2017-10-02 | Stop reason: HOSPADM

## 2017-10-02 RX ORDER — MAGNESIUM HYDROXIDE 1200 MG/15ML
LIQUID ORAL AS NEEDED
Status: DISCONTINUED | OUTPATIENT
Start: 2017-10-02 | End: 2017-10-02 | Stop reason: HOSPADM

## 2017-10-02 RX ORDER — BUPIVACAINE HYDROCHLORIDE 5 MG/ML
INJECTION, SOLUTION PERINEURAL AS NEEDED
Status: DISCONTINUED | OUTPATIENT
Start: 2017-10-02 | End: 2017-10-02 | Stop reason: HOSPADM

## 2017-10-02 RX ORDER — LIDOCAINE HYDROCHLORIDE 10 MG/ML
INJECTION, SOLUTION EPIDURAL; INFILTRATION; INTRACAUDAL; PERINEURAL AS NEEDED
Status: DISCONTINUED | OUTPATIENT
Start: 2017-10-02 | End: 2017-10-02 | Stop reason: HOSPADM

## 2017-10-02 RX ORDER — HYDROCODONE BITARTRATE AND ACETAMINOPHEN 5; 325 MG/1; MG/1
1 TABLET ORAL EVERY 6 HOURS PRN
Status: DISCONTINUED | OUTPATIENT
Start: 2017-10-02 | End: 2017-10-02 | Stop reason: HOSPADM

## 2017-10-02 RX ORDER — FENTANYL CITRATE 50 UG/ML
INJECTION, SOLUTION INTRAMUSCULAR; INTRAVENOUS AS NEEDED
Status: DISCONTINUED | OUTPATIENT
Start: 2017-10-02 | End: 2017-10-02 | Stop reason: SURG

## 2017-10-02 RX ORDER — PROTAMINE SULFATE 10 MG/ML
INJECTION, SOLUTION INTRAVENOUS AS NEEDED
Status: DISCONTINUED | OUTPATIENT
Start: 2017-10-02 | End: 2017-10-02 | Stop reason: SURG

## 2017-10-02 RX ORDER — FENTANYL CITRATE/PF 50 MCG/ML
25 SYRINGE (ML) INJECTION AS NEEDED
Status: DISCONTINUED | OUTPATIENT
Start: 2017-10-02 | End: 2017-10-02 | Stop reason: HOSPADM

## 2017-10-02 RX ORDER — LIDOCAINE HYDROCHLORIDE 10 MG/ML
INJECTION, SOLUTION INFILTRATION; PERINEURAL AS NEEDED
Status: DISCONTINUED | OUTPATIENT
Start: 2017-10-02 | End: 2017-10-02 | Stop reason: SURG

## 2017-10-02 RX ORDER — HEPARIN SODIUM 1000 [USP'U]/ML
INJECTION, SOLUTION INTRAVENOUS; SUBCUTANEOUS AS NEEDED
Status: DISCONTINUED | OUTPATIENT
Start: 2017-10-02 | End: 2017-10-02 | Stop reason: SURG

## 2017-10-02 RX ORDER — SODIUM CHLORIDE 9 MG/ML
INJECTION, SOLUTION INTRAVENOUS CONTINUOUS PRN
Status: DISCONTINUED | OUTPATIENT
Start: 2017-10-02 | End: 2017-10-02 | Stop reason: SURG

## 2017-10-02 RX ORDER — PROPOFOL 10 MG/ML
INJECTION, EMULSION INTRAVENOUS AS NEEDED
Status: DISCONTINUED | OUTPATIENT
Start: 2017-10-02 | End: 2017-10-02 | Stop reason: SURG

## 2017-10-02 RX ORDER — SODIUM CHLORIDE 9 MG/ML
50 INJECTION, SOLUTION INTRAVENOUS CONTINUOUS
Status: DISCONTINUED | OUTPATIENT
Start: 2017-10-02 | End: 2017-10-02 | Stop reason: HOSPADM

## 2017-10-02 RX ORDER — HYDROCODONE BITARTRATE AND ACETAMINOPHEN 5; 325 MG/1; MG/1
1 TABLET ORAL EVERY 6 HOURS PRN
Qty: 10 TABLET | Refills: 0 | Status: SHIPPED | OUTPATIENT
Start: 2017-10-02 | End: 2017-10-12

## 2017-10-02 RX ORDER — EPHEDRINE SULFATE 50 MG/ML
INJECTION, SOLUTION INTRAVENOUS AS NEEDED
Status: DISCONTINUED | OUTPATIENT
Start: 2017-10-02 | End: 2017-10-02 | Stop reason: SURG

## 2017-10-02 RX ADMIN — PHENYLEPHRINE HYDROCHLORIDE 30 MCG/MIN: 10 INJECTION, SOLUTION INTRAMUSCULAR; INTRAVENOUS; SUBCUTANEOUS at 10:48

## 2017-10-02 RX ADMIN — PROTAMINE SULFATE 25 MG: 10 INJECTION, SOLUTION INTRAVENOUS at 11:49

## 2017-10-02 RX ADMIN — FENTANYL CITRATE 25 MCG: 50 INJECTION, SOLUTION INTRAMUSCULAR; INTRAVENOUS at 10:35

## 2017-10-02 RX ADMIN — ONDANSETRON 4 MG: 2 INJECTION INTRAMUSCULAR; INTRAVENOUS at 10:32

## 2017-10-02 RX ADMIN — FENTANYL CITRATE 25 MCG: 50 INJECTION, SOLUTION INTRAMUSCULAR; INTRAVENOUS at 10:21

## 2017-10-02 RX ADMIN — EPHEDRINE SULFATE 10 MG: 50 INJECTION, SOLUTION INTRAMUSCULAR; INTRAVENOUS; SUBCUTANEOUS at 10:45

## 2017-10-02 RX ADMIN — PROPOFOL 140 MG: 10 INJECTION, EMULSION INTRAVENOUS at 10:28

## 2017-10-02 RX ADMIN — HEPARIN SODIUM 3000 UNITS: 1000 INJECTION, SOLUTION INTRAVENOUS; SUBCUTANEOUS at 10:59

## 2017-10-02 RX ADMIN — SODIUM CHLORIDE: 0.9 INJECTION, SOLUTION INTRAVENOUS at 10:21

## 2017-10-02 RX ADMIN — LIDOCAINE HYDROCHLORIDE 50 MG: 10 INJECTION, SOLUTION INFILTRATION; PERINEURAL at 10:28

## 2017-10-02 RX ADMIN — FENTANYL CITRATE 25 MCG: 50 INJECTION, SOLUTION INTRAMUSCULAR; INTRAVENOUS at 10:55

## 2017-10-02 RX ADMIN — FENTANYL CITRATE 25 MCG: 50 INJECTION, SOLUTION INTRAMUSCULAR; INTRAVENOUS at 10:30

## 2017-10-02 RX ADMIN — CLINDAMYCIN PHOSPHATE 900 MG: 18 INJECTION, SOLUTION INTRAMUSCULAR; INTRAVENOUS at 10:32

## 2017-10-02 NOTE — PROGRESS NOTES
Assessment  1  Preop examination (V72 84) (Z01 818)   2  CKD (chronic kidney disease), stage V (585 5) (N18 5)   3  Anticoagulant long-term use (V58 61) (Z79 01)   4  Hypertension (401 9) (I10)   5  Iron deficiency anemia (280 9) (D50 9)   6  Peripheral vascular disease (443 9) (I73 9)   7  Bilateral edema of lower extremity (782 3) (R60 0)    Discussion/Summary  Surgical Clearance: He is at a MODERATE risk from a cardiovascular standpoint at this time without any additional cardiac testing  Reevaluation needed, if he should present with symptoms prior to surgery/procedure  Cleared for AV Fistula plcmnt  advised to take ONE potassium pill that he has at home and I will call Renal to reprot labs and have them update patient with further instructionschronicplts >100, stable for surgeryINR is 5 8  Pt last Coumadin dose was yesterday  Surgery is Oct 3rd  No further coumadin until after surgery  Managed by Dr Naomie Moore Renaldef anemia- monitor labs  The patient was counseled regarding instructions for management,-risks and benefits of treatment options  Self Referrals: No      Chief Complaint  Patient seen in office today for a check up - patient is having surgery on Oct 2nd with vascular to have a fistula placed  History of Present Illness  Pre-Op Visit (Brief): The patient is being seen for a preoperative visit-and-Having AV Fistula placed next week  Chronic anticoagulation on coumadin, f/b Cardiology  Surgical Risk Assessment:   Prior Anesthesia: He had prior anesthesia,-no prior adverse reaction to edidural anesthesia,-no prior adverse reaction to spinal anesthesia-and-no prior adverse reaction to general anesthesia  Pertinent Past Medical History: CKD,ASCVD  Exercise Capacity: able to walk four blocks without symptoms-and-able to walk two flights of stairs without symptoms  Lifestyle Factors: denies alcohol use, denies tobacco use and denies illegal drug use   Symptoms: easy bleeding,-easy bruising,-no frequent nosebleeds,-no chest pain,-no cough,-dyspnea,-edema,-no palpitations-and-no wheezing  Pertinent Family History: DM    Living Situation: home is secure and supportive and no post-op concerns with his living situation  HPI: Pt is having an AV fistula placed in the left arm next week Oct 3rd  His labs show he is coagulopathic  His coumadin was stopped yesterday by Cardiology  Brook imbalance-will notify Renal  stableshows sinus rhythm with PVC and BBB      Review of Systems    Constitutional: No fever or chills, feels well, no tiredness, no recent weight gain or weight loss  Eyes: No complaints of eye pain, no red eyes, no discharge from eyes, no itchy eyes  ENT: no complaints of earache, no hearing loss, no nosebleeds, no nasal discharge, no sore throat, no hoarseness  Cardiovascular: as noted in HPI  Respiratory: No complaints of shortness of breath, no wheezing, no cough, no SOB on exertion, no orthopnea or PND  Gastrointestinal: No complaints of abdominal pain, no constipation, no nausea or vomiting, no diarrhea or bloody stools  Genitourinary: No complaints of dysuria, no incontinence, no hesitancy, no nocturia, no genital lesion, no testicular pain  Musculoskeletal: No complaints of arthralgia, no myalgias, no joint swelling or stiffness, no limb pain or swelling  Integumentary: No complaints of skin rash or skin lesions, no itching, no skin wound, no dry skin  Neurological: No compliants of headache, no confusion, no convulsions, no numbness or tingling, no dizziness or fainting, no limb weakness, no difficulty walking  Psychiatric: Is not suicidal, no sleep disturbances, no anxiety or depression, no change in personality, no emotional problems  Endocrine: No complaints of proptosis, no hot flashes, no muscle weakness, no erectile dysfunction, no deepening of the voice, no feelings of weakness  Active Problems  1  Abdominal aortic aneurysm (441 4) (I71 4)   2  Acute foot pain, left (729 5) (M79 672)   3  Anemia (285 9) (D64 9)   4  Aneurysm, 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)   5  Anticoagulant long-term use (V58 61) (Z79 01)   6  Arterial ectasia (447 8) (I77 9)   7  Ascending aortic aneurysm (441 2) (I71 2)   8  ASCVD (arteriosclerotic cardiovascular disease) (429 2,440 9) (I25 10)   9  Bilateral edema of lower extremity (782 3) (R60 0)   10  Bilateral inguinal hernia without obstruction or gangrene, recurrence not specified    (550 92) (K40 20)   11  Blood in urine (599 70) (R31 9)   12  Bradycardia (427 89) (R00 1)   13  Cardiomyopathy (425 4) (I42 9)   14  Chronic renal insufficiency, stage IV (severe) (585 4) (N18 4)   15  Chronic systolic CHF (congestive heart failure) (428 22,428 0) (I50 22)   16  CKD (chronic kidney disease), stage V (585 5) (N18 5)   17  Constipation (564 00) (K59 00)   18  Dysphagia (787 20) (R13 10)   19  Fatigue (780 79) (R53 83)   20  Fecal occult blood test positive (792 1) (R19 5)   21  Gout of both feet (274 9) (M10 9)   22  Hematuria (599 70) (R31 9)   23  Hyperlipidemia (272 4) (E78 5)   24  Hypertension (401 9) (I10)   25  Hypocalcemia (275 41) (E83 51)   26  Hypomagnesemia (275 2) (E83 42)   27  Iliac artery aneurysm, 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)   28  Incisional hernia, without obstruction or gangrene (553 21) (K43 2)   29  Iron deficiency anemia (280 9) (D50 9)   30  Mitral valve regurgitation (424 0) (I34 0)   31  Monilial rash (112 3) (B37 2)   32  Need for diphtheria-tetanus-pertussis (Tdap) vaccine, adult/adolescent (V06 1) (Z23)   33  Need for influenza vaccination (V04 81) (Z23)   34  Need for pneumococcal vaccination (V03 82) (Z23)   35  Need for shingles vaccine (V04 89) (Z23)   36  Paroxysmal atrial fibrillation (427 31) (I48 0)   37  Peripheral vascular disease (443 9) (I73 9)   38  S/P aortic valve replacement with bioprosthetic valve (V42 2) (Z95 3)   39  Sinus bradycardia (427 89) (R00 1)   40   Skin rash (782 1) (R21)   41  Thrombocytopenia (287 5) (D69 6)   42  Visit for pre-operative examination (V72 84) (N52 607)    Past Medical History   · Anemia (285 9) (D64 9)   · 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)   · 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)   · ASCVD (arteriosclerotic cardiovascular disease) (429 2,440 9) (I25 10)   · Chronic renal insufficiency, stage IV (severe) (585 4) (N18 4)   · Dysphagia (787 20) (R13 10)   · Hyperlipidemia (272 4) (E78 5)   · History of Need for pneumococcal vaccination (V03 82) (Z23)   · Negative depression screening   · Visit for pre-operative examination (V72 84) (J65 136)    The active problems and past medical history were reviewed and updated today  Surgical History   · History of Aortic Aneurysm Repair   · History of Asc Aortic Aneurysm Repair W/ Graft, Aortic Root Replacement   · History of CABG   · History of Colonoscopy   · History of Endovascular Repair Of Aortic Aneurysm   · History of Thoracic Aortic Aneurysm Repair Endovasc Francie Butler    The surgical history was reviewed and updated today  Family History  Father    · Family history of Aneurysm Of Abdominal Aorta  Son    · Family history of Aneurysm Of Abdominal Aorta  Sister    · Family history of diabetes mellitus (V18 0) (Z83 3)    The family history was reviewed and updated today  Social History   · Denied: Drug use (305 90) (F19 90)   · Employed   · 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, semi retired- [employer]   · Exercises occasionally (V49 89) (Z78 9)   · Five children   · Former smoker (V15 82) (J08 995)   ·    · No advance directives (V49 89) (Z78 9)   · Occasional alcohol use   · Occasional caffeine consumption  The social history was reviewed and updated today  The social history was reviewed and is unchanged  Current Meds   1  AmLODIPine Besylate 10 MG Oral Tablet; TAKE 1 TABLET DAILY;    Therapy: 43QAS1659 to (Traci Puentes)  Requested for: 95PPC3191; Last   Rx:13Mar2017 Ordered   2  Aspirin 81 MG TABS; TAKE 1 TABLET DAILY; Therapy: (Recorded:27Uld8393) to Recorded   3  Atorvastatin Calcium 10 MG Oral Tablet; take 1 tablet every day; Therapy: 17Hdw5073 to (Evaluate:19Mar2018)  Requested for: 26OUP0244; Last   Rx:20Sep2017 Ordered   4  Calcitriol 0 25 MCG Oral Capsule; take 1 capsule daily; Therapy: 48OLK3304 to (Last Rx:08Jun2017)  Requested for: 04XDE4420 Ordered   5  [employer] Omeprazole 20 6 (20 Base) MG Oral Capsule Delayed Release; take 1 capsule   daily; Therapy: (Recorded:05May2016) to Recorded   6  Ferrous Sulfate 325 (65 Fe) MG Oral Tablet; take one tablet by mouth every day; Therapy: (Recorded:05May2016) to Recorded   7  MetOLazone 5 MG Oral Tablet; TAKE 1 TABLET BY MOUTH DAILY  TAKE 30 MINUTES   BEFORE LASIX(FUROSEMIDE); Therapy: 83UKM6072 to (Evaluate:09Sep2018)  Requested for: 51Sje7110; Last   Rx:14Sep2017 Ordered   8  Multi-Vitamin Oral Tablet; take 1 tablet daily Recorded   9  RA Vitamin D-3 2000 UNIT Oral Capsule; TAKE 1 CAPSULE BY MOUTH ONCE DAILY   Recorded   10  Terazosin HCl - 1 MG Oral Capsule; TAKE 1 CAPSULE EVERY DAY; Therapy: 69Mut5776 to (Evaluate:63Uig2828)  Requested for: 32POF0173; Last    Rx:23Mar2017 Ordered   11  Torsemide 20 MG Oral Tablet; Take 1 tablet daily Recorded   12  Tums 500 MG Oral Tablet Chewable; CHEW 2 TABLET Twice daily; Therapy: 21MSM2656 to (Evaluate:17Nov2017)  Requested for: 18Sep2017 Recorded   13  Warfarin Sodium 5 MG Oral Tablet; take 1 tablet every day; Therapy: 44GHV9138 to (Evaluate:17Jan2018)  Requested for: 68Kex9951; Last    Rx:89Eqi4079 Ordered    The medication list was reviewed and updated today  Allergies  1  Penicillins   2   Ativan TABS    Vitals   Recorded: 16OJD5616 01:31PM   Temperature 98 6 F   Heart Rate 68   Systolic 524   Diastolic 60   Height 5 ft 11 in   Weight 173 lb 2 08 oz   BMI Calculated 24 15   BSA Calculated 1 98   O2 Saturation 98     Physical Exam    Constitutional   General appearance: No acute distress, well appearing and well nourished  Head and Face   Head and face: Normal     Eyes   Conjunctiva and lids: No erythema, swelling or discharge  Pupils and irises: Equal, round, reactive to light  Ophthalmoscopic examination: Normal fundi and optic discs  Ears, Nose, Mouth, and Throat   External inspection of ears and nose: Normal     Otoscopic examination: Tympanic membranes translucent with normal light reflex  Canals patent without erythema  Hearing: Abnormal  -hard of hearing  Nasal mucosa, septum, and turbinates: Normal without edema or erythema  Lips, teeth, and gums: Normal, good dentition  Oropharynx: Normal with no erythema, edema, exudate or lesions  Neck   Neck: Supple, symmetric, trachea midline, no masses  Thyroid: Normal, no thyromegaly  Pulmonary   Respiratory effort: No increased work of breathing or signs of respiratory distress  Palpation of chest: Normal     Auscultation of lungs: Clear to auscultation  Cardiovascular   Palpation of heart: Normal PMI, no thrills  Auscultation of heart: Normal rate and rhythm, normal S1 and S2, no murmurs  -+ loud heart tones (valve is tissue, not metal, rhythm irregular  Abdominal aorta: Normal     Femoral pulses: 2+ bilaterally  Pedal pulses: 2+ bilaterally  Peripheral vascular exam: Normal     Examination of extremities for edema and/or varicosities: Abnormal  -ble edema  Abdomen   Abdomen: Non-tender, no masses  Liver and spleen: No hepatomegaly or splenomegaly  Examination for hernias: Abnormal  -+ large hernia, nontender  Lymphatic   Palpation of lymph nodes in neck: No lymphadenopathy  Palpation of lymph nodes in axillae: No lymphadenopathy  Musculoskeletal   Gait and station: Normal     Inspection/palpation of digits and nails: Normal without clubbing or cyanosis      Inspection/palpation of joints, bones, and muscles: Normal  Range of motion: Normal     Stability: Normal     Muscle strength/tone: Normal     Skin   Skin and subcutaneous tissue: Abnormal  -multiple areas of ecchymosis  Current skin tear with active bleeding  Silver nitrate sticks used to achieve hemostais  Palpation of skin and subcutaneous tissue: Normal turgor  Neurologic   Cranial nerves: Cranial nerves 2-12 intact  Sensation: No sensory loss  Coordination: Normal finger to nose and heel to shin  Psychiatric   Judgment and insight: Normal     Orientation to person, place and time: Normal     Recent and remote memory: Intact  Mood and affect: Normal        Results/Data  (1) BASIC METABOLIC PROFILE 86PUC7918 10:11AM [employer] Order Number: [id number]     Test Name Result Flag Reference   SODIUM 140 mmol/L  136-145   POTASSIUM 3 1 mmol/L L 3 5-5 3   CHLORIDE 102 mmol/L  100-108   CARBON DIOXIDE 20 mmol/L L 21-32   ANION GAP (CALC) 18 mmol/L H 4-13   BLOOD UREA NITROGEN 79 mg/dL H 5-25   CREATININE 5 17 mg/dL H 0 60-1 30   Standardized to IDMS reference method   CALCIUM 6 0 mg/dL L 8 3-10 1   eGFR 10 ml/min/1 73sq m     National Kidney Disease Education Program recommendations are as follows:  GFR calculation is accurate only with a steady state creatinine  Chronic Kidney disease less than 60 ml/min/1 73 sq  meters  Kidney failure less than 15 ml/min/1 73 sq  meters  GLUCOSE FASTING 145 mg/dL H 65-99   Specimen collection should occur prior to Sulfasalazine administration due to the potential for falsely depressed results  Specimen collection should occur prior to Sulfapyridine administration due to the potential for falsely elevated results       (1) CBC/ PLT (NO DIFF) 43Xph9453 10:11AM [employer] Order Number: [id number]     Test Name Result Flag Reference   HEMATOCRIT 29 6 % L 36 5-49 3   HEMOGLOBIN 10 0 g/dL L 12 0-17 0   MCHC 33 8 g/dL  31 4-37 4   MCH 28 9 pg  26 8-34 3   MCV 86 fL  82-98   PLATELET COUNT 854 Thousands/uL L 149-390   RBC COUNT 3 46 Million/uL L 3 88-5 62   RDW 18 6 % H 11 6-15 1   WBC COUNT 6 68 Thousand/uL  4 31-10 16   MPV 10 6 fL  8 9-12 7     ECG 12-LEAD 92Qqn8569 10:08AM Adele Kayser     Test Name Result Flag Reference   ECG 12-LEAD      Atrial fibrillation   Left bundle branch block   Premature ventricular complexes   Nonspecific ST and T wave abnormality     Confirmed by MD Loretta, Kristel Kapadia ([id number]) on 9/26/2017 11:29:32 AM     ECG 12-LEAD 28Ohd9112 09:20AM EPIC, Provider   Test ordered by:    Ordered by an unspecified provider     Test Name Result Flag Reference   ECG 12-LEAD (Report)     *** Poor data quality, interpretation may be adversely affected   Atrial fibrillation with premature ventricular or aberrantly conducted complexes   Left axis deviation   Intraventricular conduction delay   Nonspecific ST and T wave abnormality   Confirmed by Sandra Espinoza MD, Sharlene ([id number]) on 9/26/2017 11:30:07 AM     (1) PT WITH INR 06Dht4248 07:09AM [facility]: [id number]     Test Name Result Flag Reference   INR 5 85 HH 0 86-1 16   PT 53 6 seconds H 12 1-14 4     End of Encounter Meds  1  Warfarin Sodium 5 MG Oral Tablet (Coumadin); take 1 tablet every day; Therapy: 00GCS5599 to (Evaluate:17Jan2018)  Requested for: 17Fds6052; Last   Rx:68Lut2084 Ordered  2  MetOLazone 5 MG Oral Tablet; TAKE 1 TABLET BY MOUTH DAILY  TAKE 30 MINUTES   BEFORE LASIX(FUROSEMIDE); Therapy: 83GZK2466 to (Evaluate:74Zgz9696)  Requested for: 18Nvj4607; Last   Rx:07Keg2257 Ordered  3  Atorvastatin Calcium 10 MG Oral Tablet; take 1 tablet every day; Therapy: 59Tjh2520 to (Evaluate:19Mar2018)  Requested for: 83KUK8222; Last   Rx:73Foo0553 Ordered  4  Terazosin HCl - 1 MG Oral Capsule; TAKE 1 CAPSULE EVERY DAY; Therapy: 69Zlx9928 to (Evaluate:32Hza9354)  Requested for: 39BAJ3302; Last   Rx:23Mar2017 Ordered  5  Tums 500 MG Oral Tablet Chewable; CHEW 2 TABLET Twice daily;    Therapy: 26DYW0202 to (Evaluate:58Lwm6697)  Requested for: 41Lfl8479 Recorded  6  AmLODIPine Besylate 10 MG Oral Tablet; TAKE 1 TABLET DAILY; Therapy: 67FFC1373 to (Alciia Lopez)  Requested for: 82ZHN0548; Last   Rx:13Mar2017 Ordered  7  Aspirin 81 MG TABS; TAKE 1 TABLET DAILY; Therapy: (Recorded:70Gnk6182) to Recorded   8  CVS Omeprazole 20 6 (20 Base) MG Oral Capsule Delayed Release; take 1 capsule   daily; Therapy: (Recorded:05May2016) to Recorded   9  Ferrous Sulfate 325 (65 Fe) MG Oral Tablet; take one tablet by mouth every day; Therapy: (Recorded:05May2016) to Recorded   10  Multi-Vitamin Oral Tablet; take 1 tablet daily Recorded   11  RA Vitamin D-3 2000 UNIT Oral Capsule; TAKE 1 CAPSULE BY MOUTH ONCE DAILY    Recorded   12  Torsemide 20 MG Oral Tablet; Take 1 tablet daily Recorded    Future Appointments    Date/Time Provider Specialty Site   10/30/2017 09:45 AM LACEY Campbell  Nephrology [address]   10/09/2017 09:40 AM LACEY Reynoso   Cardiology [facility] CARDIOLOGY [facility]   10/17/2017 11:45 AM Marion Pickett MD Vascular Surgery [facility]  [facility]     Signatures   Electronically signed by : Samreen Helms NP; Sep 27 2017  2:25PM EST                       (Author)    Electronically signed by : Gisela Livingston MD; Oct  1 2017  6:35PM EST                       (Co-author)

## 2017-10-02 NOTE — DISCHARGE INSTRUCTIONS
Arteriovenous Fistula Creation for Hemodialysis   WHAT YOU SHOULD KNOW:   An arteriovenous fistula (AVF) is a surgical connection of an artery to a vein  This is a common procedure for hemodialysis  The fistula is usually done on the nondominant arm  For example, if you are right-handed, the AVF will be created on your left arm  Blood will go out from and come back to the AVF after it is cleaned by the hemodialysis machine  AFTER YOU LEAVE:   Medicines:   · You may be given prescription pain medicine or antibiotics  Do not wait until the pain is severe before you take your pain medicine  Antibiotics fight or prevent an infection caused by bacteria  · Take your medicine as directed  Contact your primary healthcare provider (PHP) if you think your medicine is not helping or you have side effects  Tell him if you are allergic to any medicine  Keep a list of the medicines, vitamins, and herbs you take  Include the amounts, and when and why you take them  Bring the list or the pill bottles to follow-up visits  Carry your medicine list with you in case of an emergency  Follow up with your PHP as directed:  Ask your PHP when you need to return to have your AVF checked  Write down your questions so you remember to ask them during your visits  Care for your AVF:  Your incision will be closed with either stitches or thin strips of tape  Ask when you can bathe  If you have stitches, carefully wash your stitches with soap and water  Pat them dry with a clean towel  If your incision was closed with thin strips of tape, keep it clean and dry  As the strips of tape start to peel off, let them fall off by themselves  Do not pull them off  · You may remove the bandage that covers your AVF 4 to 6 hours after dialysis  · Check your AVF every day for good blood flow by touching it with your fingertips  The buzzing sensation means that it is working  · Check for bleeding, pain, redness, or swelling   These may be signs of infection or a clogged AVF  · To prevent damage to the AVF, no one should take your blood pressure or draw blood from the arm with the AVF  Do not wear tight clothes or jewelry  Do not sleep on that arm  Contact your PHP if:   · You have a fever  · Your skin is itchy, swollen, or has a rash  · You have questions or concerns about your condition or care  Seek care immediately or call 911 if:   · You feel lightheaded, short of breath, and have chest pain  · You cough up blood  · Your AVF site has blood, pus, or a foul-smelling odor  · You have increased pain in the area where the AVF was made  © 2014 [address] is for End User's use only and may not be sold, redistributed or otherwise used for commercial purposes  All illustrations and images included in CareNotes® are the copyrighted property of A D A M , Inc  or Kavon Akins  The above information is an  only  It is not intended as medical advice for individual conditions or treatments  Talk to your doctor, nurse or pharmacist before following any medical regimen to see if it is safe and effective for you

## 2017-10-02 NOTE — ANESTHESIA POSTPROCEDURE EVALUATION
Post-Op Assessment Note      CV Status:  Stable    Mental Status:  Alert and awake    Hydration Status:  Euvolemic    PONV Controlled:  Controlled    Airway Patency:  Patent    Post Op Vitals Reviewed: Yes          Staff: CRNA           /58 (10/02/17 1230)    Temp 97 5 °F (36 4 °C) (10/02/17 1230)    Pulse 84 (10/02/17 1230)   Resp 16 (10/02/17 1230)    SpO2 100 % (10/02/17 1230)

## 2017-10-02 NOTE — OP NOTE
OPERATIVE REPORT  PATIENT NAME: Ilan Armenta    :  1935  MRN: [id number]  Pt Location: [facility] OR ROOM 04    SURGERY DATE: 10/2/2017    Surgeon(s) and Role:     * Florencio Collado MD - Primary     * Dylan Sanders PA-C - Assisting    Preop Diagnosis:  Chronic kidney disease, stage IV (severe) [N18 4]    Post-Op Diagnosis Codes:     * Chronic kidney disease, stage IV (severe) [N18 4]    Procedure(s) (LRB):  UPPER EXTREMITY AV FISTULA CREATION (Left)   Left brachio cephalic AVF    Specimen(s):  * No specimens in log *    Estimated Blood Loss:   150 mL    Drains:   none    Anesthesia Type:   LMA    Operative Indications:  Chronic kidney disease, stage IV (severe) [N18 4]      Operative Findings:  Cephalic vein 1 1YM  Brachial artery 4mm   There was considerable scar around the antecubital brachial artery, likely due to prior arterial puncture  The brachial artery was patent    Complications:   None    Procedure and Technique:  Procedure and Technique:  The patient was brought to the operating room and placed in the supine position  Full timeout was carried out with all present  Also prophylactic antibiotics were administered  Following induction of  general anesthesia,  A duplex was performed that the cephalic vein in the upper arm was of adequate caliber and easily compressible  the patients left arm was prepped and draped in the usual sterile fashion  A curvilinear  incision was made in the region of the distal brachial artery and cephalic vein in the proximal forearm centered on AC fossa crease  The cephalic vein was identified  This measured approximately 3 5mm  in diameter  It was dissected distally and the median cubital vein was dissected further down  The lateral branch of cephalic vein was ligated and divided  The vein was mobilized  and secured with silastic loops  Next, the brachial artery was identified  and dissected   The artery measured approximately 4 mm in  diameter and was without significant atherosclerotic disease but had significant chronic scar around it due to prior arterial puncture  The densely adherent brachial vein was  artery was mobilized proximally and distally for a 2-cm segment  Then loops were encircled around the brachial artery    The cephalic vein was brought adjacent to the artery and appeared to lie  comfortably without tension or kinking  Patient was given 3000 U of iv heparin  The artery was controlled proximally and distally with bulldogs   and a 7 mm arteriotomy was made using a #11 blade and Pott's scissors  The distal vein was ligated and the vein divided with proximal control  The end of the vein was then opened and spatulated   The arteriovenous anastomosis was then performed using a  running 6-0 prolene suture  Following completion of the anastomosis,  vascular control appeared excellent  Excellent flow was established through the fistula, and a triphasic radial signal was dopplered  Hemostasis was achieved with electrocautery and fibrillar  The wound was irrigated and the subcutaneous tissues were reapproximated using interrupted 3-0 Moncryl sutures  Skin was closed with 4-0 monocryl  Histoacryl was applied  At the end of the case the instrument, sponge and needle counts were correct  The patient tolerated the procedure well and was taken to the  postanesthesia care unit in stable condition     I was present for the entire procedure     A qualified resident physician was not available and A physician assistant was required during the procedure for retraction tissue handling,dissection and suturing    Patient Disposition:  PACU     SIGNATURE: Tita Zheng MD  DATE: October 2, 2017  TIME: 12:27 PM

## 2017-10-05 ENCOUNTER — HOSPITAL ENCOUNTER (INPATIENT)
Facility: HOSPITAL | Age: 82
LOS: 1 days | Discharge: HOME | DRG: 948 | End: 2017-10-07
Attending: EMERGENCY MEDICINE | Admitting: FAMILY MEDICINE
Payer: MEDICARE

## 2017-10-05 DIAGNOSIS — D72.829 LEUKOCYTOSIS: ICD-10-CM

## 2017-10-05 DIAGNOSIS — D64.9 ANEMIA: Primary | ICD-10-CM

## 2017-10-05 DIAGNOSIS — S40.022A HEMATOMA OF ARM, LEFT, INITIAL ENCOUNTER: ICD-10-CM

## 2017-10-05 DIAGNOSIS — R79.1 ELEVATED INR: ICD-10-CM

## 2017-10-05 DIAGNOSIS — M79.89 HAND SWELLING: ICD-10-CM

## 2017-10-05 DIAGNOSIS — N18.4 CKD (CHRONIC KIDNEY DISEASE), STAGE IV (HCC): ICD-10-CM

## 2017-10-05 DIAGNOSIS — Z79.01 WARFARIN ANTICOAGULATION: ICD-10-CM

## 2017-10-05 PROBLEM — N17.9 AKI (ACUTE KIDNEY INJURY) (HCC): Status: ACTIVE | Noted: 2017-10-05

## 2017-10-05 PROBLEM — N25.81 SECONDARY HYPERPARATHYROIDISM OF RENAL ORIGIN (HCC): Status: ACTIVE | Noted: 2017-10-05

## 2017-10-05 LAB
ALBUMIN SERPL BCP-MCNC: 2.6 G/DL (ref 3.5–5)
ALP SERPL-CCNC: 71 U/L (ref 46–116)
ALT SERPL W P-5'-P-CCNC: 17 U/L (ref 12–78)
ANION GAP SERPL CALCULATED.3IONS-SCNC: 16 MMOL/L (ref 4–13)
APTT PPP: 100 SECONDS (ref 23–35)
AST SERPL W P-5'-P-CCNC: 17 U/L (ref 5–45)
ATRIAL RATE: 51 BPM
BASOPHILS # BLD AUTO: 0.07 THOUSANDS/ΜL (ref 0–0.1)
BASOPHILS NFR BLD AUTO: 1 % (ref 0–1)
BILIRUB SERPL-MCNC: 0.5 MG/DL (ref 0.2–1)
BILIRUB UR QL STRIP: NEGATIVE
BUN SERPL-MCNC: 102 MG/DL (ref 5–25)
CALCIUM SERPL-MCNC: <5 MG/DL (ref 8.3–10.1)
CHLORIDE SERPL-SCNC: 102 MMOL/L (ref 100–108)
CLARITY UR: CLEAR
CO2 SERPL-SCNC: 18 MMOL/L (ref 21–32)
COLOR UR: YELLOW
CREAT SERPL-MCNC: 6.02 MG/DL (ref 0.6–1.3)
CREAT UR-MCNC: 97.6 MG/DL
EOSINOPHIL # BLD AUTO: 0.04 THOUSAND/ΜL (ref 0–0.61)
EOSINOPHIL NFR BLD AUTO: 0 % (ref 0–6)
ERYTHROCYTE [DISTWIDTH] IN BLOOD BY AUTOMATED COUNT: 18.7 % (ref 11.6–15.1)
GFR SERPL CREATININE-BSD FRML MDRD: 8 ML/MIN/1.73SQ M
GLUCOSE SERPL-MCNC: 122 MG/DL (ref 65–140)
GLUCOSE UR STRIP-MCNC: NEGATIVE MG/DL
HCT VFR BLD AUTO: 23.3 % (ref 36.5–49.3)
HGB BLD-MCNC: 7.7 G/DL (ref 12–17)
HGB UR QL STRIP.AUTO: NEGATIVE
INR PPP: 4.86 (ref 0.86–1.16)
KETONES UR STRIP-MCNC: NEGATIVE MG/DL
LEUKOCYTE ESTERASE UR QL STRIP: NEGATIVE
LYMPHOCYTES # BLD AUTO: 0.98 THOUSANDS/ΜL (ref 0.6–4.47)
LYMPHOCYTES NFR BLD AUTO: 7 % (ref 14–44)
MCH RBC QN AUTO: 28.5 PG (ref 26.8–34.3)
MCHC RBC AUTO-ENTMCNC: 33 G/DL (ref 31.4–37.4)
MCV RBC AUTO: 86 FL (ref 82–98)
MICROALBUMIN UR-MCNC: 49.8 MG/L (ref 0–20)
MICROALBUMIN/CREAT 24H UR: 51 MG/G CREATININE (ref 0–30)
MONOCYTES # BLD AUTO: 0.96 THOUSAND/ΜL (ref 0.17–1.22)
MONOCYTES NFR BLD AUTO: 7 % (ref 4–12)
NEUTROPHILS # BLD AUTO: 12.03 THOUSANDS/ΜL (ref 1.85–7.62)
NEUTS SEG NFR BLD AUTO: 83 % (ref 43–75)
NITRITE UR QL STRIP: NEGATIVE
NRBC BLD AUTO-RTO: 0 /100 WBCS
PH UR STRIP.AUTO: 5 [PH] (ref 4.5–8)
PLATELET # BLD AUTO: 143 THOUSANDS/UL (ref 149–390)
PMV BLD AUTO: 12.7 FL (ref 8.9–12.7)
POTASSIUM SERPL-SCNC: 4.1 MMOL/L (ref 3.5–5.3)
PROT SERPL-MCNC: 6.8 G/DL (ref 6.4–8.2)
PROT UR STRIP-MCNC: NEGATIVE MG/DL
PROTHROMBIN TIME: 47 SECONDS (ref 12.1–14.4)
QRS AXIS: -51 DEGREES
QRSD INTERVAL: 132 MS
QT INTERVAL: 492 MS
QTC INTERVAL: 511 MS
RBC # BLD AUTO: 2.7 MILLION/UL (ref 3.88–5.62)
SODIUM 24H UR-SCNC: 36 MOL/L
SODIUM SERPL-SCNC: 136 MMOL/L (ref 136–145)
SP GR UR STRIP.AUTO: 1.01 (ref 1–1.03)
T WAVE AXIS: 97 DEGREES
UROBILINOGEN UR QL STRIP.AUTO: 0.2 E.U./DL
UUN 24H UR-MCNC: 501 MG/DL
VENTRICULAR RATE: 65 BPM
WBC # BLD AUTO: 14.53 THOUSAND/UL (ref 4.31–10.16)

## 2017-10-05 PROCEDURE — 96372 THER/PROPH/DIAG INJ SC/IM: CPT

## 2017-10-05 PROCEDURE — 82043 UR ALBUMIN QUANTITATIVE: CPT | Performed by: INTERNAL MEDICINE

## 2017-10-05 PROCEDURE — 36415 COLL VENOUS BLD VENIPUNCTURE: CPT

## 2017-10-05 PROCEDURE — 82570 ASSAY OF URINE CREATININE: CPT | Performed by: INTERNAL MEDICINE

## 2017-10-05 PROCEDURE — 80053 COMPREHEN METABOLIC PANEL: CPT | Performed by: EMERGENCY MEDICINE

## 2017-10-05 PROCEDURE — 81003 URINALYSIS AUTO W/O SCOPE: CPT | Performed by: INTERNAL MEDICINE

## 2017-10-05 PROCEDURE — 85025 COMPLETE CBC W/AUTO DIFF WBC: CPT | Performed by: EMERGENCY MEDICINE

## 2017-10-05 PROCEDURE — 93005 ELECTROCARDIOGRAM TRACING: CPT

## 2017-10-05 PROCEDURE — 85610 PROTHROMBIN TIME: CPT | Performed by: EMERGENCY MEDICINE

## 2017-10-05 PROCEDURE — 85730 THROMBOPLASTIN TIME PARTIAL: CPT | Performed by: EMERGENCY MEDICINE

## 2017-10-05 PROCEDURE — 84540 ASSAY OF URINE/UREA-N: CPT | Performed by: INTERNAL MEDICINE

## 2017-10-05 PROCEDURE — 84300 ASSAY OF URINE SODIUM: CPT | Performed by: INTERNAL MEDICINE

## 2017-10-05 PROCEDURE — 99284 EMERGENCY DEPT VISIT MOD MDM: CPT

## 2017-10-05 RX ORDER — TORSEMIDE 20 MG/1
20 TABLET ORAL DAILY
Status: DISCONTINUED | OUTPATIENT
Start: 2017-10-06 | End: 2017-10-07 | Stop reason: HOSPADM

## 2017-10-05 RX ORDER — AMLODIPINE BESYLATE 5 MG/1
5 TABLET ORAL DAILY
Status: DISCONTINUED | OUTPATIENT
Start: 2017-10-06 | End: 2017-10-07 | Stop reason: HOSPADM

## 2017-10-05 RX ORDER — ATORVASTATIN CALCIUM 10 MG/1
10 TABLET, FILM COATED ORAL DAILY
Status: DISCONTINUED | OUTPATIENT
Start: 2017-10-06 | End: 2017-10-07 | Stop reason: HOSPADM

## 2017-10-05 RX ORDER — PANTOPRAZOLE SODIUM 40 MG/1
40 TABLET, DELAYED RELEASE ORAL
Status: DISCONTINUED | OUTPATIENT
Start: 2017-10-06 | End: 2017-10-07 | Stop reason: HOSPADM

## 2017-10-05 RX ORDER — METOLAZONE 5 MG/1
5 TABLET ORAL 2 TIMES DAILY
Status: DISCONTINUED | OUTPATIENT
Start: 2017-10-05 | End: 2017-10-07 | Stop reason: HOSPADM

## 2017-10-05 RX ORDER — ASPIRIN 81 MG/1
81 TABLET ORAL DAILY
Status: DISCONTINUED | OUTPATIENT
Start: 2017-10-06 | End: 2017-10-07 | Stop reason: HOSPADM

## 2017-10-05 RX ORDER — MELATONIN
2000 DAILY
Status: DISCONTINUED | OUTPATIENT
Start: 2017-10-06 | End: 2017-10-07 | Stop reason: HOSPADM

## 2017-10-05 RX ORDER — TERAZOSIN 1 MG/1
1 CAPSULE ORAL
Status: DISCONTINUED | OUTPATIENT
Start: 2017-10-05 | End: 2017-10-07 | Stop reason: HOSPADM

## 2017-10-05 RX ORDER — ACETAMINOPHEN 325 MG/1
650 TABLET ORAL EVERY 6 HOURS PRN
Status: DISCONTINUED | OUTPATIENT
Start: 2017-10-05 | End: 2017-10-07 | Stop reason: HOSPADM

## 2017-10-05 RX ORDER — FERROUS GLUCONATE 256(28)MG
65 TABLET ORAL DAILY
Status: DISCONTINUED | OUTPATIENT
Start: 2017-10-06 | End: 2017-10-05

## 2017-10-05 RX ORDER — CALCITRIOL 0.25 UG/1
0.25 CAPSULE, LIQUID FILLED ORAL DAILY
Status: DISCONTINUED | OUTPATIENT
Start: 2017-10-06 | End: 2017-10-05

## 2017-10-05 RX ORDER — FERROUS SULFATE 325(65) MG
325 TABLET ORAL
Status: DISCONTINUED | OUTPATIENT
Start: 2017-10-06 | End: 2017-10-07 | Stop reason: HOSPADM

## 2017-10-05 RX ORDER — ONDANSETRON 2 MG/ML
4 INJECTION INTRAMUSCULAR; INTRAVENOUS EVERY 6 HOURS PRN
Status: DISCONTINUED | OUTPATIENT
Start: 2017-10-05 | End: 2017-10-07 | Stop reason: HOSPADM

## 2017-10-05 RX ORDER — PHYTONADIONE 10 MG/ML
10 INJECTION, EMULSION INTRAMUSCULAR; INTRAVENOUS; SUBCUTANEOUS ONCE
Status: COMPLETED | OUTPATIENT
Start: 2017-10-05 | End: 2017-10-05

## 2017-10-05 RX ORDER — HYDROCODONE BITARTRATE AND ACETAMINOPHEN 5; 325 MG/1; MG/1
1 TABLET ORAL EVERY 6 HOURS PRN
Status: DISCONTINUED | OUTPATIENT
Start: 2017-10-05 | End: 2017-10-07 | Stop reason: HOSPADM

## 2017-10-05 RX ADMIN — EPOETIN ALFA 10000 UNITS: 10000 SOLUTION INTRAVENOUS; SUBCUTANEOUS at 18:54

## 2017-10-05 RX ADMIN — METOLAZONE 5 MG: 5 TABLET ORAL at 18:53

## 2017-10-05 RX ADMIN — TERAZOSIN HYDROCHLORIDE 1 MG: 1 CAPSULE ORAL at 21:39

## 2017-10-05 RX ADMIN — CALCIUM GLUCONATE 4 G: 94 INJECTION, SOLUTION INTRAVENOUS at 18:53

## 2017-10-05 RX ADMIN — PHYTONADIONE 10 MG: 10 INJECTION, EMULSION INTRAMUSCULAR; INTRAVENOUS; SUBCUTANEOUS at 16:01

## 2017-10-05 NOTE — CONSULTS
REFERRING PHYSICIAN: JUVENCIO Cole      REASON FOR THE REFERRAL / CONSULTATION:  Further management of worsening renal function/ALLEN    DATE OF CONSULTATION / SERVICE:  10/5/17    ADMISSION DIAGNOSIS: Hand swelling     CHIEF COMPLAINT     I has swelling of my left upper extremities    HPI     This is a 70-year-old male with a past medical history of underlying CKD stage 5 will has recently underwent AV fistula placement on 10/2/17 and came into ER today with worsening swelling of left upper extremities after surgery  On admission patient was also found to have worsening renal function with creatinine of 6 02 and Nephrology were consulted for further management of ALLEN  Upon review of old medical record patient follows Dr Frederick Mello in the office and has underlying CKD stage 5 with baseline creatinine lately runs between 4 5-5 0  Patient has AV fistula placed anticipating hemodialysis in near future  Patient was also found to have elevated PTH level in the past and was started on calcitriol  Patient also a history of anemia but currently not on Epogen or iron supplement  Patient also a history of hypertension which is currently well controlled on oral regimen  Patient currently denies nausea, vomiting, SOB, chest pain, abdominal pain, constipation, diarrhea or rash today      PAST MEDICAL HISTORY     Past Medical History:   Diagnosis Date    Aortic aneurysm (HCC)     Arthritis     GERD (gastroesophageal reflux disease)     Hyperlipidemia     Hypertension     Irregular heart beat     afib    Renal disorder        PAST SURGICAL HISTORY     Past Surgical History:   Procedure Laterality Date    ABDOMINAL AORTIC ANEURYSM REPAIR, OPEN      CARDIAC SURGERY      COLONOSCOPY      HERNIA REPAIR      [place] ANASTOMOSIS,AV,ANY SITE Left 10/2/2017    Procedure: UPPER EXTREMITY AV FISTULA CREATION;  Surgeon: Joe Connelly MD;  Location: [facility];  Service: Vascular    VALVE REPLACEMENT ALLERGIES     Allergies   Allergen Reactions    Ativan [Lorazepam] Other (See Comments)     "it makes me go crazy"    Penicillins Swelling       SOCIAL HISTORY     History   Alcohol Use No     History   Drug Use No     History   Smoking Status    Former Smoker    Quit date: 1/1/2011   Smokeless Tobacco    Never Used       FAMILY HISTORY     Family History   Problem Relation Age of Onset    Cancer Mother     Anuerysm Father        CURRENT MEDICATIONS       Current Facility-Administered Medications:     acetaminophen (TYLENOL) tablet 650 mg, 650 mg, Oral, Q6H PRN, JUVENCIO Cole    [START ON 10/6/2017] amLODIPine (NORVASC) tablet 5 mg, 5 mg, Oral, Daily, JUVENCIO Cole    [START ON 10/6/2017] aspirin (ECOTRIN LOW STRENGTH) EC tablet 81 mg, 81 mg, Oral, Daily, JUVENCIO Cole    [START ON 10/6/2017] atorvastatin (LIPITOR) tablet 10 mg, 10 mg, Oral, Daily, JUVENCIO Cole    calcium gluconate 4 g in sodium chloride 0 9 % 100 mL IVPB, 4 g, Intravenous, Once, Natacha Casanova MD    ceFAZolin (ANCEF) 1,000 mg in sodium chloride 0 9 % 500 mL irrigation bag, , Irrigation, Once, Florencio Collado MD  [facility]  [START ON 10/6/2017] cholecalciferol (VITAMIN D3) tablet 2,000 Units, 2,000 Units, Oral, Daily, JUVENCIO Cole    epoetin sunday (EPOGEN,PROCRIT) injection 10,000 Units, 10,000 Units, Subcutaneous, Once per day on Tue Thu Sat, Natacha Casanova MD    [START ON 10/6/2017] ferrous sulfate tablet 325 mg, 325 mg, Oral, Daily With Breakfast, JUVENCIO Cole    heparin (porcine) 2,000 Units, papaverine 60 mg in multi-electrolyte (ISOLYTE-S PH 7 4 equivalent) 500 mL irrigation, , Irrigation, Once, Florencio Collado MD    HYDROcodone-acetaminophen (NORCO) 5-325 mg per tablet 1 tablet, 1 tablet, Oral, Q6H PRN, JUVENCIO Cole    metolazone (ZAROXOLYN) tablet 5 mg, 5 mg, Oral, BID, JUVENCIO Cole    [START ON 10/6/2017] multivitamin-minerals (CENTRUM) tablet 1 tablet, 1 tablet, Oral, Daily, JUVENCIO Cole    ondansetron (ZOFRAN) injection 4 mg, 4 mg, Intravenous, Q6H PRN, JUVENCIO Cole    [START ON 10/6/2017] pantoprazole (PROTONIX) EC tablet 40 mg, 40 mg, Oral, Early Morning, JUVENCIO Cole    terazosin (HYTRIN) capsule 1 mg, 1 mg, Oral, HS, JUVENCIO Cole    [START ON 10/6/2017] torsemide (DEMADEX) tablet 20 mg, 20 mg, Oral, Daily, JUVENCIO Cole    REVIEW OF SYSTEMS     Complete 10 point review of systems were obtained and discussed in length with the patient  Complete review of systems were negative / unremarkable except mentioned in the HPI section  LAB RESULTS          Results from last 7 days  Lab Units 10/05/17  1343 10/02/17  0944 09/29/17  0700   WBC Thousand/uL 14 53*  --   --    HEMOGLOBIN g/dL 7 7*  --   --    HEMATOCRIT % 23 3*  --   --    PLATELETS Thousands/uL 143*  --   --    SODIUM mmol/L 136 141 136   POTASSIUM mmol/L 4 1 3 7 3 5   CHLORIDE mmol/L 102 103 103   CO2 mmol/L 18* 22 20*   BUN mg/dL 102* 87* 81*   CREATININE mg/dL 6 02* 5 04* 4 70*   EGFR ml/min/1 73sq m 8 10 11   CALCIUM mg/dL <5 0* 5 5* 6 2*   ALBUMIN g/dL 2 6*  --   --    TOTAL PROTEIN g/dL 6 8  --   --    GLUCOSE RANDOM mg/dL 122 90  --        I have personally reviewed the old medical records and patient's previously known baseline creatinine level is ~ 4 5-5 0    RADIOLOGY RESULTS     Results for orders placed during the hospital encounter of 09/26/17   XR chest pa & lateral:  I have personally reviewed the images of the chest x-ray along with radiology report    Narrative CHEST - DUAL ENERGY    INDICATION:  Presurgery ,chronic kidney disease    COMPARISON:  July 2, 2017    VIEWS:  PA (including soft tissue/bone algorithms) and lateral projections    IMAGES:  4    FINDINGS:         Cardiomegaly seen  There is a tortuosity of the aorta  There is thoracic aortic stent  There is prosthetic aortic valve    The lungs are clear    No pneumothorax or pleural effusion  Visualized osseous structures appear within normal limits for the patient's age  Impression No acute consolidation or congestion  Cardiomegaly      Workstation performed: [id number]       2D echocardiogram done on 4/25/17 showed EF of 45%  OBJECTIVE     Current Weight: Weight - Scale: 78 kg (172 lb)  Vitals:    10/05/17 1530   BP: 111/56   Pulse: 67   Resp: 16   Temp:    SpO2: 98%     No intake or output data in the 24 hours ending 10/05/17 1740    PHYSICAL EXAMINATION     GEN:  Awake and not in acute distress  RS:  Bilateral equal air entry, no wheezing  CVS:  S1-S2 heard  Abdomen:  Soft, nontender, positive bowel sounds  Extremities:  No edema, skin is warm on touch, left upper extremities is wrapped  CNS:  Awake and follows command  HEENT:  Pale conjunctiva, no JVD, head is atraumatic  Psychiatric:  Normal mood and affect, not suicidal  Lymph Node:  No palpable cervical lymphadenopathy    PLAN / RECOMMENDATIONS      1  ALLEN on top of CKD stage 5  Multifactorial and may be secondary to progression of underlying chronic kidney disease  Upon review of old medical record patient has underlying CKD stage 5 with lately creatinine runs between 4 5-5 0 and follow Dr Nidhi Mccabe in the office and has AV fistula placed on left upper extremities on 10/2/17 in anticipation of dialysis in near future  Patient was admitted with worsening left upper extremity swelling after surgery  And found to have creatinine of 6 0  Current creatinine can be secondary to progression of chronic kidney disease versus other etiology and plan to check urine lytes to further evaluate etiology of worsening renal function  Currently patient is not experiencing any uremic symptoms and no urgent need to start dialysis although a patient need to start on dialysis during the hospital stay his AV fistula is not mature to use for dialysis and patient may need tunnel dialysis catheter    Plan to avoid NSAID and ACE-I and recheck renal function again in the morning  2   Hypocalcemia  Multifactorial, current calcium is less than 5  Plan to give calcium gluconate 4 g IV x1 dose now  Currently patient is asymptomatic from hypocalcemia perspective  Plan to recheck calcium level along with ionized calcium in the morning  3   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  Last known PTH last month was 52 which is below the goal for CKD stage 5  Currently patient is on calcitriol and will plan to stop calcitriol for time being and check PTH level with morning labs  4   Azotemia  Multifactorial, current BUN level is 102 although patient is currently asymptomatic from azotemia perspective and plan to monitor BUN level for time being without initiation of dialysis  5   Anemia  Multifactorial, suspect secondary to anemia of chronic renal failure  Current hemoglobin is 7 7 which is below the goal for chronic kidney disease and plan to give Epogen 01409 units SubQ every other day while in the hospital   Plan to check iron panel with morning labs  6   Hypertension in chronic kidney disease     Currently blood pressure is controlled and plan to monitor hypertension with amlodipine 5 mg PO daily tonight  Thank you for the consultation to participate in patient's care  I have personally discussed my plan with the referring physician  Constance Shaw MD  Nephrology  10/5/2017        Portions of the record may have been created with voice recognition software  Occasional wrong word or "sound a like" substitutions may have occurred due to the inherent limitations of voice recognition software  Read the chart carefully and recognize, using context, where substitutions have occurred

## 2017-10-05 NOTE — PLAN OF CARE
Problem: Potential for Falls  Goal: Patient will remain free of falls  INTERVENTIONS:  - Assess patient frequently for physical needs  -  Identify cognitive and physical deficits and behaviors that affect risk of falls  -  [place] fall precautions as indicated by assessment   - Educate patient/family on patient safety including physical limitations  - Instruct patient to call for assistance with activity based on assessment  - Modify environment to reduce risk of injury  - Consider OT/PT consult to assist with strengthening/mobility   Outcome: Progressing      Problem: Nutrition/Hydration-ADULT  Goal: Nutrient/Hydration intake appropriate for improving, restoring or maintaining nutritional needs  Monitor and assess patient's nutrition/hydration status for malnutrition (ex- brittle hair, bruises, dry skin, pale skin and conjunctiva, muscle wasting, smooth red tongue, and disorientation)  Collaborate with interdisciplinary team and initiate plan and interventions as ordered  Monitor patient's weight and dietary intake as ordered or per policy  Utilize nutrition screening tool and intervene per policy  Determine patient's food preferences and provide high-protein, high-caloric foods as appropriate       INTERVENTIONS:  - Monitor oral intake, urinary output, labs, and treatment plans  - Assess nutrition and hydration status and recommend course of action  - Evaluate amount of meals eaten  - Assist patient with eating if necessary   - Allow adequate time for meals  - Recommend/ encourage appropriate diets, oral nutritional supplements, and vitamin/mineral supplements  - Order, calculate, and assess calorie counts as needed  - Recommend, monitor, and adjust tube feedings and TPN/PPN based on assessed needs  - Assess need for intravenous fluids  - Provide specific nutrition/hydration education as appropriate  - Include patient/family/caregiver in decisions related to nutrition   Outcome: Progressing

## 2017-10-05 NOTE — CONSULTS
Consultation - Vascular Surgery   Marita Ramosinger 80 y o  male MRN: [id number]  Unit/Bed#: -01 Encounter: [id number]      Assessment/Plan      Assessment:  Left forearm swelling and diffuse hematoma POD3 AV fistula creation secondary to elevation of INR (4 86) from coumadin   Plan:  Elevation  Mild ace wrap  Correction of INR    History of Present Illness   Physician Requesting Consult: Robert Esrtada MD  Reason for Consult / Principal Problem: left forearm swelling as above    HPI: Benito Baez is a 80y o  year old male who presents with swelling in left arm which began when he restarted his coumadin two days ago  He is POD3 left arm AV fistula creation  Consults    Review of Systems    Historical Information   Past Medical History:   Diagnosis Date    Aortic aneurysm ([address] )     GERD (gastroesophageal reflux disease)     Hyperlipidemia     Hypertension     Irregular heart beat     afib    Renal disorder      Past Surgical History:   Procedure Laterality Date    ABDOMINAL AORTIC ANEURYSM REPAIR, OPEN      CARDIAC SURGERY      COLONOSCOPY      HERNIA REPAIR      [place] ANASTOMOSIS,AV,ANY SITE Left 10/2/2017    Procedure: UPPER EXTREMITY AV FISTULA CREATION;  Surgeon: Heidi Reddy MD;  Location: [facility];  Service: Vascular    VALVE REPLACEMENT       Social History   History   Alcohol Use No     History   Drug Use No     History   Smoking Status    Former Smoker    Quit date: 1/1/2011   Smokeless Tobacco    Never Used     Family History: non-contributory}    Meds/Allergies   all current active meds have been reviewed  Allergies   Allergen Reactions    Ativan [Lorazepam] Other (See Comments)     "it makes me go crazy"    Penicillins Swelling       Objective   Vitals: Blood pressure 111/56, pulse 67, temperature 98 5 °F (36 9 °C), temperature source Oral, resp  rate 16, height 6' (1 829 m), weight 78 kg (172 lb), SpO2 98 %  ,Body mass index is 23 33 kg/m²    No intake or output data in the 24 hours ending 10/05/17 1709  Invasive Devices     Peripheral Intravenous Line            Peripheral IV 10/05/17 Right Arm less than 1 day          Line            Hemodialysis AV Fistula 10/02/17 Left 3 days                Physical Exam   Left arm is diffusely swollen and ecchymotic, mostly from elbow on to wrist  A radial pulse is detectable by doppler  There is no active bleeding via incision which is now also bandaged  Lab Results: I have personally reviewed pertinent reports  Imaging Studies: I have personally reviewed pertinent reports        Counseling / Coordination of Care  None

## 2017-10-05 NOTE — H&P
History and Physical - [facility] Internal Medicine    Patient Information: Blanca Franco 80 y o  male MRN: [id number]  Unit/Bed#: ED 16 Encounter: [id number]  Admitting Physician: JUVENCIO Faust  PCP: Bertha Ray MD  Date of Admission:  10/05/17    Assessment/Plan:    Hospital Problem List:     Principal Problem:    Hand swelling  Active Problems:    CKD (chronic kidney disease), stage IV (HCC)    Paroxysmal atrial fibrillation (HCC)    Warfarin anticoagulation    Leukocytosis    Left upper extremity swelling      Plan for the Primary Problem(s):    · Left upper extremity/left hand swelling:  Possible Hematoma, Status post dialysis fistula insertion Monday  Patient is on Coumadin there is swelling and diffuse hyper pigmentation to the Left forearm/hand  There is positive thrills or bruits a fistula  All pulses palpable, he denies paresthesia  · Vascular surgery consulted for further management  Evaluated patient while in ED  Recommendation for light compression and elevation  · Comfort measures with Jerrica therapy and pain management  Plan for Additional Problems:   · CKD chronic kidney disease stage 5: Will consult Nephrology  · Proximal atrial fibrillation:  Rate is control, Coumadin on hold at this time for elevated INR  Telemetry  · Warfarin anticoagulation:  PT INR 47 0/4 86  Hold warfarin at this time  Patient received vitamin K while in ED  Will monitor for bleeding  Will monitor PT/INR in the Am  · Leukocytosis:  Unknown source  Afebrile, Chest x-ray is clear  Will check urine  Will continue monitor    VTE Prophylaxis: Warfarin (Coumadin)  / sequential compression device   Code Status:  DNR level 3  POLST: There is no POLST form on file for this patient (pre-hospital)    Anticipated Length of Stay:  Patient will be admitted on an Observation basis with an anticipated length of stay of  less than 2 midnights     Justification for Hospital Stay:  Left arm pain, elevated INR, possible hematoma of left upper extremity  Total Time for Visit, including Counseling / Coordination of Care: 45 minutes  Greater than 50% of this total time spent on direct patient counseling and coordination of care  Chief Complaint:   Left hand swelling    History of Present Illness:    Anayeli Rizo is a 80 y o  male history of AFib, renal disease, hypertension, who presents with chief complaint of left hand swelling  Onset 2 days ago  Status post dialysis fistula insertion to left upper extremity on Monday  Patient report noted increased discoloration and swelling to the hand followed up with his PCP and was told to come to the emergency room for further evaluation  He complains of pain  He denies hemoptysis, hematuria, denies blood in stool  Denies chest pain, shortness of breath, fever chills  Review of Systems:    Review of Systems   Cardiovascular:        Irregular rhythm  Musculoskeletal:        Left upper arm fistula  Left lower arm, hand pain, swelling hyperpigmentation  Skin:        Bilateral hyperpigmentation of upper extremities, left arm greater than right  Hematological: Bruises/bleeds easily  All other systems reviewed and are negative  Past Medical and Surgical History:     Past Medical History:   Diagnosis Date    Aortic aneurysm ([address] )     GERD (gastroesophageal reflux disease)     Hyperlipidemia     Hypertension     Irregular heart beat     afib    Renal disorder        Past Surgical History:   Procedure Laterality Date    ABDOMINAL AORTIC ANEURYSM REPAIR, OPEN      CARDIAC SURGERY      COLONOSCOPY      HERNIA REPAIR      [place] ANASTOMOSIS,AV,ANY SITE Left 10/2/2017    Procedure: UPPER EXTREMITY AV FISTULA CREATION;  Surgeon: Terrance Sandhu MD;  Location: [facility];  Service: Vascular    VALVE REPLACEMENT         Meds/Allergies:    Prior to Admission medications    Medication Sig Start Date End Date Taking?  Authorizing Provider   amLODIPine (NORVASC) 10 mg tablet Take 5 mg by mouth daily      Historical Provider, MD   aspirin (ECOTRIN LOW STRENGTH) 81 mg EC tablet Take 81 mg by mouth daily    Historical Provider, MD   atorvastatin (LIPITOR) 10 mg tablet Take 10 mg by mouth daily    Historical Provider, MD   calcitriol (ROCALTROL) 0 25 mcg capsule Take 0 25 mcg by mouth daily      Historical Provider, MD   cholecalciferol (VITAMIN D3) 1,000 units tablet Take 2,000 Units by mouth daily    Historical Provider, MD   HYDROcodone-acetaminophen (NORCO) 5-325 mg per tablet Take 1 tablet by mouth every 6 (six) hours as needed for pain (take 1/2 to 1 tablet as needed for pain every 6 hourly ) for up to 10 days Max Daily Amount: 4 tablets 10/2/17 10/12/17  Verneita Boast, MD   IRON, FERROUS GLUCONATE, PO Take 65 mg by mouth daily    Historical Provider, MD   metolazone (ZAROXOLYN) 5 mg tablet Take 5 mg by mouth 2 (two) times a day    Historical Provider, MD   Multiple Vitamin (MULTIVITAMIN) capsule Take 1 capsule by mouth daily    Historical Provider, MD   omeprazole (PriLOSEC) 20 mg delayed release capsule Take 20 mg by mouth daily    Historical Provider, MD   terazosin (HYTRIN) 1 mg capsule Take 1 mg by mouth daily at bedtime    Historical Provider, MD   torsemide (DEMADEX) 20 mg tablet Take 20 mg by mouth daily    Historical Provider, MD   warfarin (COUMADIN) 2 5 mg tablet Take 2 5 mg by mouth every other day    Historical Provider, MD   warfarin (COUMADIN) 5 mg tablet Take 5 mg by mouth daily      Historical Provider, MD     I have reviewed home medications with patient family member  Allergies:    Allergies   Allergen Reactions    Ativan [Lorazepam] Other (See Comments)     "it makes me go crazy"    Penicillins Swelling       Social History:     Marital Status: /Civil Union   Occupation:  Retired  Patient Pre-hospital Living Situation:  Lives at home with his wife  Patient Pre-hospital Level of Mobility:  With walker  Patient Pre-hospital Diet Restrictions: Renal diet  Substance Use History:   History   Alcohol Use No     History   Smoking Status    Former Smoker    Quit date: 1/1/2011   Smokeless Tobacco    Never Used     History   Drug Use No       Family History:    non-contributory    Physical Exam:     Vitals:   Blood Pressure: 111/56 (10/05/17 1530)  Pulse: 67 (10/05/17 1530)  Temperature: 98 5 °F (36 9 °C) (10/05/17 1312)  Temp Source: Oral (10/05/17 1312)  Respirations: 16 (10/05/17 1530)  Height: 6' (182 9 cm) (10/05/17 1312)  Weight - Scale: 78 kg (172 lb) (10/05/17 1312)  SpO2: 98 % (10/05/17 1530)    Physical Exam   Constitutional: He is oriented to person, place, and time  He appears well-developed and well-nourished  HENT:   Head: Normocephalic and atraumatic  Eyes: Pupils are equal, round, and reactive to light  Neck: Normal range of motion  Neck supple  Cardiovascular: Normal pulses  An irregular rhythm present  No murmur heard  Pulmonary/Chest: Effort normal and breath sounds normal  He exhibits no tenderness  Abdominal: Soft  Bowel sounds are normal  There is no tenderness  Musculoskeletal: He exhibits edema and tenderness  Right shoulder: He exhibits pain  Limited range of motion of left upper extremity, secondary to pain  With swelling  Left upper arm fistula, positive bruit and thrills  All pulses palpable, denies numbness or paresthesia  Neurological: He is alert and oriented to person, place, and time  Skin: Skin is warm and dry  Bruising and ecchymosis noted  Bilateral upper extremity hyperpigmented, worsen on the left than right associated with swelling  Psychiatric: He has a normal mood and affect  Nursing note and vitals reviewed  Additional Data:     Lab Results: I have personally reviewed pertinent reports          Results from last 7 days  Lab Units 10/05/17  1343   WBC Thousand/uL 14 53*   HEMOGLOBIN g/dL 7 7*   HEMATOCRIT % 23 3*   PLATELETS Thousands/uL 143*   NEUTROS PCT % 83*   LYMPHS PCT % 7*   MONOS PCT % 7   EOS PCT % 0       Results from last 7 days  Lab Units 10/05/17  1343   SODIUM mmol/L 136   POTASSIUM mmol/L 4 1   CHLORIDE mmol/L 102   CO2 mmol/L 18*   BUN mg/dL 102*   CREATININE mg/dL 6 02*   CALCIUM mg/dL <5 0*   TOTAL PROTEIN g/dL 6 8   BILIRUBIN TOTAL mg/dL 0 50   ALK PHOS U/L 71   ALT U/L 17   AST U/L 17   GLUCOSE RANDOM mg/dL 122       Results from last 7 days  Lab Units 10/05/17  1343   INR  4 86*       Imaging: I have personally reviewed pertinent reports  Xr Chest Pa & Lateral    Result Date: 9/28/2017  Narrative: CHEST - DUAL ENERGY INDICATION:  Presurgery ,chronic kidney disease COMPARISON:  July 2, 2017 VIEWS:  PA (including soft tissue/bone algorithms) and lateral projections IMAGES:  4 FINDINGS:     Cardiomegaly seen  There is a tortuosity of the aorta  There is thoracic aortic stent  There is prosthetic aortic valve The lungs are clear  No pneumothorax or pleural effusion  Visualized osseous structures appear within normal limits for the patient's age  Impression: No acute consolidation or congestion Cardiomegaly Workstation performed: [id number]       EKG, Pathology, and Other Studies Reviewed on Admission:   · EKG:  Atrial fibrillation    Allscripts Records Reviewed: Yes     ** Please Note: Dragon 360 Dictation voice to text software may have been used in the creation of this document   **

## 2017-10-05 NOTE — ED PROVIDER NOTES
History  Chief Complaint   Patient presents with    Post-op Problem     Pt had a fistula placed on monday, now left arm is extremely swollen        History provided by:  Patient  Arm Pain   Location:  Left forearm swelling/bruising  Quality:  Swollen and firm  Severity:  Severe  Onset quality:  Gradual  Duration:  2 days  Timing:  Constant  Progression:  Worsening  Chronicity:  New  Context:  Pt had LUE AV-fistula placed On Monday by Dr Vicky Turner, is on coumadin  Relieved by:  Nothing  Worsened by:  Nothing  Ineffective treatments:  None tried  Associated symptoms: no abdominal pain, no congestion, no cough, no rash, no rhinorrhea and no wheezing        Prior to Admission Medications   Prescriptions Last Dose Informant Patient Reported? Taking?    HYDROcodone-acetaminophen (NORCO) 5-325 mg per tablet   No No   Sig: Take 1 tablet by mouth every 6 (six) hours as needed for pain (take 1/2 to 1 tablet as needed for pain every 6 hourly ) for up to 10 days Max Daily Amount: 4 tablets   IRON, FERROUS GLUCONATE, PO   Yes No   Sig: Take 65 mg by mouth daily   Multiple Vitamin (MULTIVITAMIN) capsule   Yes No   Sig: Take 1 capsule by mouth daily   amLODIPine (NORVASC) 10 mg tablet   Yes No   Sig: Take 5 mg by mouth daily     aspirin (ECOTRIN LOW STRENGTH) 81 mg EC tablet   Yes No   Sig: Take 81 mg by mouth daily   atorvastatin (LIPITOR) 10 mg tablet   Yes No   Sig: Take 10 mg by mouth daily   calcitriol (ROCALTROL) 0 25 mcg capsule   Yes No   Sig: Take 0 25 mcg by mouth daily     cholecalciferol (VITAMIN D3) 1,000 units tablet   Yes No   Sig: Take 2,000 Units by mouth daily   metolazone (ZAROXOLYN) 5 mg tablet   Yes No   Sig: Take 5 mg by mouth 2 (two) times a day   omeprazole (PriLOSEC) 20 mg delayed release capsule   Yes No   Sig: Take 20 mg by mouth daily   terazosin (HYTRIN) 1 mg capsule   Yes No   Sig: Take 1 mg by mouth daily at bedtime   torsemide (DEMADEX) 20 mg tablet   Yes No   Sig: Take 20 mg by mouth daily warfarin (COUMADIN) 2 5 mg tablet   Yes No   Sig: Take 2 5 mg by mouth every other day   warfarin (COUMADIN) 5 mg tablet   Yes No   Sig: Take 5 mg by mouth daily        Facility-Administered Medications: None       Past Medical History:   Diagnosis Date    Aortic aneurysm (HCC)     GERD (gastroesophageal reflux disease)     Hyperlipidemia     Hypertension     Irregular heart beat     afib    Renal disorder        Past Surgical History:   Procedure Laterality Date    ABDOMINAL AORTIC ANEURYSM REPAIR, OPEN      CARDIAC SURGERY      COLONOSCOPY      HERNIA REPAIR      OH ANASTOMOSIS,AV,ANY SITE Left 10/2/2017    Procedure: UPPER EXTREMITY AV FISTULA CREATION;  Surgeon: Jensen Harris MD;  Location: [facility];  Service: Vascular    VALVE REPLACEMENT         Family History   Problem Relation Age of Onset    Cancer Mother     Anuerysm Father      I have reviewed and agree with the history as documented  Social History   Substance Use Topics    Smoking status: Former Smoker     Quit date: 1/1/2011    Smokeless tobacco: Never Used    Alcohol use No        Review of Systems   HENT: Negative for congestion and rhinorrhea  Respiratory: Negative for cough and wheezing  Gastrointestinal: Negative for abdominal pain  Skin: Negative for rash  All other systems reviewed and are negative  Physical Exam  ED Triage Vitals [10/05/17 1312]   Temperature Pulse Respirations Blood Pressure SpO2   98 5 °F (36 9 °C) 65 18 112/54 100 %      Temp Source Heart Rate Source Patient Position - Orthostatic VS BP Location FiO2 (%)   Oral Monitor Sitting Right arm --      Pain Score       No Pain           Physical Exam   Constitutional: He appears well-developed and well-nourished  HENT:   Head: Normocephalic and atraumatic  Eyes: EOM are normal  Pupils are equal, round, and reactive to light  Neck: Neck supple  Cardiovascular: Normal rate and regular rhythm  No murmur heard    Pulmonary/Chest: Effort normal and breath sounds normal  No respiratory distress  Abdominal: Soft  Bowel sounds are normal  He exhibits no distension  There is no tenderness  Musculoskeletal:        Left forearm: He exhibits tenderness, swelling and edema  He exhibits no bony tenderness  Significant left forearm swelling with bruising/hematoma, below the level of the LUE fistula in place  Fistula has positive thrill and bruit and pt still has movement/sensation to distal fingers and dopplerable pulses, (picture in EPIC)   Skin: He is not diaphoretic  There is pallor  Nursing note and vitals reviewed  ED Medications  Medications   phytonadione (AQUA-MEPHYTON) 10 mg/mL SC/IM injection 10 mg (10 mg Subcutaneous Given 10/5/17 1601)       Diagnostic Studies  Labs Reviewed   PROTIME-INR - Abnormal        Result Value Ref Range Status    Protime 47 0 (*) 12 1 - 14 4 seconds Final    INR 4 86 (*) 0 86 - 1 16 Final   APTT - Abnormal      (*) 23 - 35 seconds Final    Narrative:      Therapeutic Heparin Range = 60-90 seconds   CBC AND DIFFERENTIAL - Abnormal     WBC 14 53 (*) 4 31 - 10 16 Thousand/uL Final    RBC 2 70 (*) 3 88 - 5 62 Million/uL Final    Hemoglobin 7 7 (*) 12 0 - 17 0 g/dL Final    Hematocrit 23 3 (*) 36 5 - 49 3 % Final    RDW 18 7 (*) 11 6 - 15 1 % Final    Platelets 048 (*) 096 - 390 Thousands/uL Final    Neutrophils Relative 83 (*) 43 - 75 % Final    Lymphocytes Relative 7 (*) 14 - 44 % Final    Neutrophils Absolute 12 03 (*) 1 85 - 7 62 Thousands/µL Final    MCV 86  82 - 98 fL Final    MCH 28 5  26 8 - 34 3 pg Final    MCHC 33 0  31 4 - 37 4 g/dL Final    MPV 12 7  8 9 - 12 7 fL Final    nRBC 0  /100 WBCs Final    Monocytes Relative 7  4 - 12 % Final    Eosinophils Relative 0  0 - 6 % Final    Basophils Relative 1  0 - 1 % Final    Lymphocytes Absolute 0 98  0 60 - 4 47 Thousands/µL Final    Monocytes Absolute 0 96  0 17 - 1 22 Thousand/µL Final    Eosinophils Absolute 0 04  0 00 - 0 61 Thousand/µL Final Basophils Absolute 0 07  0 00 - 0 10 Thousands/µL Final   COMPREHENSIVE METABOLIC PANEL - Abnormal     CO2 18 (*) 21 - 32 mmol/L Final    Anion Gap 16 (*) 4 - 13 mmol/L Final     (*) 5 - 25 mg/dL Final    Creatinine 6 02 (*) 0 60 - 1 30 mg/dL Final    Comment: Standardized to IDMS reference method    Calcium <5 0 (*) 8 3 - 10 1 mg/dL Final    Comment: 3    Albumin 2 6 (*) 3 5 - 5 0 g/dL Final    Sodium 136  136 - 145 mmol/L Final    Potassium 4 1  3 5 - 5 3 mmol/L Final    Chloride 102  100 - 108 mmol/L Final    Glucose 122  65 - 140 mg/dL Final    Comment:   If the patient is fasting, the ADA then defines impaired fasting glucose as > 100 mg/dL and diabetes as > or equal to 123 mg/dL  Specimen collection should occur prior to Sulfasalazine administration due to the potential for falsely depressed results  Specimen collection should occur prior to Sulfapyridine administration due to the potential for falsely elevated results  AST 17  5 - 45 U/L Final    Comment:   Specimen collection should occur prior to Sulfasalazine administration due to the potential for falsely depressed results  ALT 17  12 - 78 U/L Final    Comment:   Specimen collection should occur prior to Sulfasalazine administration due to the potential for falsely depressed results  Alkaline Phosphatase 71  46 - 116 U/L Final    Total Protein 6 8  6 4 - 8 2 g/dL Final    Total Bilirubin 0 50  0 20 - 1 00 mg/dL Final    eGFR 8  ml/min/1 73sq m Final    Narrative:     National Kidney Disease Education Program recommendations are as follows:  GFR calculation is accurate only with a steady state creatinine  Chronic Kidney disease less than 60 ml/min/1 73 sq  meters  Kidney failure less than 15 ml/min/1 73 sq  meters         No orders to display       Procedures  Procedures      Phone Contacts  ED Phone Contact    ED Course  ED Course as of Oct 05 1618   Thu Oct 05, 2017   1500 Page placed to Dr Brissa Emanuel    [phone] with Dr Brissa Emanuel and would give Vit K to reverse INR and light compressive dressing and arm elevation and admit to medicine and to d/w surgical PA for other wound eval  Would not do FFP at present b/c would worry about putting pt in CHF with his ESRD not yet on dialysis  MDM  Number of Diagnoses or Management Options  Anemia: new and requires workup  Elevated INR: new and requires workup  Hematoma of arm, left, initial encounter: new and requires workup     Amount and/or Complexity of Data Reviewed  Clinical lab tests: ordered and reviewed  Discuss the patient with other providers: yes    Risk of Complications, Morbidity, and/or Mortality  Presenting problems: high    Patient Progress  Patient progress: stable    CritCare Time    Disposition  Final diagnoses:   Anemia   Hematoma of arm, left, initial encounter   Elevated INR     ED Disposition     ED Disposition Condition Comment    Admit  Case was discussed with Magdy Gonzalez and the patient's admission status was agreed to be Admission Status: observation status to the service of Dr Magdy Gonzalez   Follow-up Information    None       Patient's Medications   Discharge Prescriptions    No medications on file     No discharge procedures on file      ED Provider  Electronically Signed by       Stephon Ochoa MD  10/05/17 8981

## 2017-10-06 PROBLEM — I47.2 NSVT (NONSUSTAINED VENTRICULAR TACHYCARDIA) (HCC): Status: ACTIVE | Noted: 2017-10-06

## 2017-10-06 PROBLEM — E83.42 HYPOMAGNESEMIA: Status: ACTIVE | Noted: 2017-10-06

## 2017-10-06 LAB
ANION GAP SERPL CALCULATED.3IONS-SCNC: 17 MMOL/L (ref 4–13)
ANION GAP SERPL CALCULATED.3IONS-SCNC: 19 MMOL/L (ref 4–13)
BASOPHILS # BLD AUTO: 0.05 THOUSANDS/ΜL (ref 0–0.1)
BASOPHILS NFR BLD AUTO: 1 % (ref 0–1)
BUN SERPL-MCNC: 103 MG/DL (ref 5–25)
BUN SERPL-MCNC: 103 MG/DL (ref 5–25)
CA-I BLD-SCNC: 0.75 MMOL/L (ref 1.12–1.32)
CALCIUM SERPL-MCNC: 5.3 MG/DL (ref 8.3–10.1)
CALCIUM SERPL-MCNC: 6.2 MG/DL (ref 8.3–10.1)
CHLORIDE SERPL-SCNC: 103 MMOL/L (ref 100–108)
CHLORIDE SERPL-SCNC: 99 MMOL/L (ref 100–108)
CO2 SERPL-SCNC: 18 MMOL/L (ref 21–32)
CO2 SERPL-SCNC: 19 MMOL/L (ref 21–32)
CREAT SERPL-MCNC: 6.12 MG/DL (ref 0.6–1.3)
CREAT SERPL-MCNC: 6.21 MG/DL (ref 0.6–1.3)
EOSINOPHIL # BLD AUTO: 0.04 THOUSAND/ΜL (ref 0–0.61)
EOSINOPHIL NFR BLD AUTO: 0 % (ref 0–6)
ERYTHROCYTE [DISTWIDTH] IN BLOOD BY AUTOMATED COUNT: 18.6 % (ref 11.6–15.1)
FERRITIN SERPL-MCNC: 402 NG/ML (ref 8–388)
GFR SERPL CREATININE-BSD FRML MDRD: 8 ML/MIN/1.73SQ M
GFR SERPL CREATININE-BSD FRML MDRD: 8 ML/MIN/1.73SQ M
GLUCOSE P FAST SERPL-MCNC: 164 MG/DL (ref 65–99)
GLUCOSE SERPL-MCNC: 164 MG/DL (ref 65–140)
GLUCOSE SERPL-MCNC: 96 MG/DL (ref 65–140)
HCT VFR BLD AUTO: 20.8 % (ref 36.5–49.3)
HGB BLD-MCNC: 7 G/DL (ref 12–17)
INR PPP: 4.32 (ref 0.86–1.16)
IRON SATN MFR SERPL: 15 %
IRON SERPL-MCNC: 21 UG/DL (ref 65–175)
LYMPHOCYTES # BLD AUTO: 0.86 THOUSANDS/ΜL (ref 0.6–4.47)
LYMPHOCYTES NFR BLD AUTO: 8 % (ref 14–44)
MAGNESIUM SERPL-MCNC: 0.2 MG/DL (ref 1.6–2.6)
MAGNESIUM SERPL-MCNC: 0.9 MG/DL (ref 1.6–2.6)
MCH RBC QN AUTO: 29.2 PG (ref 26.8–34.3)
MCHC RBC AUTO-ENTMCNC: 33.7 G/DL (ref 31.4–37.4)
MCV RBC AUTO: 87 FL (ref 82–98)
MONOCYTES # BLD AUTO: 0.88 THOUSAND/ΜL (ref 0.17–1.22)
MONOCYTES NFR BLD AUTO: 8 % (ref 4–12)
NEUTROPHILS # BLD AUTO: 8.4 THOUSANDS/ΜL (ref 1.85–7.62)
NEUTS SEG NFR BLD AUTO: 78 % (ref 43–75)
NRBC BLD AUTO-RTO: 0 /100 WBCS
PHOSPHATE SERPL-MCNC: 4.5 MG/DL (ref 2.3–4.1)
PLATELET # BLD AUTO: 136 THOUSANDS/UL (ref 149–390)
PMV BLD AUTO: 12.5 FL (ref 8.9–12.7)
POTASSIUM SERPL-SCNC: 3.8 MMOL/L (ref 3.5–5.3)
POTASSIUM SERPL-SCNC: 3.9 MMOL/L (ref 3.5–5.3)
PROTHROMBIN TIME: 42.8 SECONDS (ref 12.1–14.4)
PTH-INTACT SERPL-MCNC: 28.6 PG/ML (ref 14–72)
RBC # BLD AUTO: 2.4 MILLION/UL (ref 3.88–5.62)
SODIUM SERPL-SCNC: 136 MMOL/L (ref 136–145)
SODIUM SERPL-SCNC: 139 MMOL/L (ref 136–145)
TIBC SERPL-MCNC: 140 UG/DL (ref 250–450)
WBC # BLD AUTO: 10.8 THOUSAND/UL (ref 4.31–10.16)

## 2017-10-06 PROCEDURE — 83550 IRON BINDING TEST: CPT | Performed by: INTERNAL MEDICINE

## 2017-10-06 PROCEDURE — 83735 ASSAY OF MAGNESIUM: CPT | Performed by: NURSE PRACTITIONER

## 2017-10-06 PROCEDURE — 84100 ASSAY OF PHOSPHORUS: CPT | Performed by: NURSE PRACTITIONER

## 2017-10-06 PROCEDURE — 82728 ASSAY OF FERRITIN: CPT | Performed by: INTERNAL MEDICINE

## 2017-10-06 PROCEDURE — 85025 COMPLETE CBC W/AUTO DIFF WBC: CPT | Performed by: INTERNAL MEDICINE

## 2017-10-06 PROCEDURE — 85610 PROTHROMBIN TIME: CPT | Performed by: FAMILY MEDICINE

## 2017-10-06 PROCEDURE — 82330 ASSAY OF CALCIUM: CPT | Performed by: INTERNAL MEDICINE

## 2017-10-06 PROCEDURE — 80048 BASIC METABOLIC PNL TOTAL CA: CPT | Performed by: INTERNAL MEDICINE

## 2017-10-06 PROCEDURE — 83540 ASSAY OF IRON: CPT | Performed by: INTERNAL MEDICINE

## 2017-10-06 PROCEDURE — 83735 ASSAY OF MAGNESIUM: CPT | Performed by: INTERNAL MEDICINE

## 2017-10-06 PROCEDURE — 83970 ASSAY OF PARATHORMONE: CPT | Performed by: INTERNAL MEDICINE

## 2017-10-06 RX ORDER — PHYTONADIONE 5 MG/1
2.5 TABLET ORAL ONCE
Status: COMPLETED | OUTPATIENT
Start: 2017-10-06 | End: 2017-10-06

## 2017-10-06 RX ORDER — MAGNESIUM SULFATE HEPTAHYDRATE 40 MG/ML
4 INJECTION, SOLUTION INTRAVENOUS ONCE
Status: COMPLETED | OUTPATIENT
Start: 2017-10-06 | End: 2017-10-06

## 2017-10-06 RX ORDER — MAGNESIUM SULFATE HEPTAHYDRATE 40 MG/ML
2 INJECTION, SOLUTION INTRAVENOUS ONCE
Status: COMPLETED | OUTPATIENT
Start: 2017-10-06 | End: 2017-10-06

## 2017-10-06 RX ADMIN — AMLODIPINE BESYLATE 5 MG: 5 TABLET ORAL at 08:48

## 2017-10-06 RX ADMIN — Medication 325 MG: at 08:48

## 2017-10-06 RX ADMIN — METOLAZONE 5 MG: 5 TABLET ORAL at 17:48

## 2017-10-06 RX ADMIN — ATORVASTATIN CALCIUM 10 MG: 10 TABLET, FILM COATED ORAL at 08:49

## 2017-10-06 RX ADMIN — IRON SUCROSE 300 MG: 20 INJECTION, SOLUTION INTRAVENOUS at 12:45

## 2017-10-06 RX ADMIN — MAGNESIUM SULFATE HEPTAHYDRATE 2 G: 40 INJECTION, SOLUTION INTRAVENOUS at 08:49

## 2017-10-06 RX ADMIN — Medication 1 TABLET: at 08:49

## 2017-10-06 RX ADMIN — METOLAZONE 5 MG: 5 TABLET ORAL at 08:49

## 2017-10-06 RX ADMIN — MAGNESIUM SULFATE HEPTAHYDRATE 4 G: 40 INJECTION, SOLUTION INTRAVENOUS at 17:48

## 2017-10-06 RX ADMIN — CALCIUM GLUCONATE 4 G: 94 INJECTION, SOLUTION INTRAVENOUS at 11:19

## 2017-10-06 RX ADMIN — TORSEMIDE 20 MG: 20 TABLET ORAL at 08:48

## 2017-10-06 RX ADMIN — TERAZOSIN HYDROCHLORIDE 1 MG: 1 CAPSULE ORAL at 21:29

## 2017-10-06 RX ADMIN — ASPIRIN 81 MG: 81 TABLET, COATED ORAL at 08:48

## 2017-10-06 RX ADMIN — PHYTONADIONE 2.5 MG: 5 TABLET ORAL at 14:00

## 2017-10-06 RX ADMIN — PANTOPRAZOLE SODIUM 40 MG: 40 TABLET, DELAYED RELEASE ORAL at 06:19

## 2017-10-06 RX ADMIN — MAGNESIUM SULFATE HEPTAHYDRATE 2 G: 40 INJECTION, SOLUTION INTRAVENOUS at 06:38

## 2017-10-06 RX ADMIN — CHOLECALCIFEROL TAB 25 MCG (1000 UNIT) 2000 UNITS: 25 TAB at 08:48

## 2017-10-06 RX ADMIN — HYDROCODONE BITARTRATE AND ACETAMINOPHEN 1 TABLET: 5; 325 TABLET ORAL at 14:58

## 2017-10-06 NOTE — PLAN OF CARE
Problem: DISCHARGE PLANNING - CARE MANAGEMENT  Goal: Discharge to post-acute care or home with appropriate resources  INTERVENTIONS:  - Conduct assessment to determine patient/family and health care team treatment goals, and need for post-acute services based on payer coverage, community resources, and patient preferences, and barriers to discharge  - Address psychosocial, clinical, and financial barriers to discharge as identified in assessment in conjunction with the patient/family and health care team  - Arrange appropriate level of post-acute services according to patients   needs and preference and payer coverage in collaboration with the physician and health care team  - Communicate with and update the patient/family, physician, and health care team regarding progress on the discharge plan  - Arrange appropriate transportation to post-acute venues  Outcome: Progressing   Spoke with patient in his room and he states that he lives with his wife in a house and they live on the first floor so there are no steps  he states He is independant with ADL's and Ambulation  He does have a walker and a W/C  Has a homecare agency but does not remember the agency  [facility]  Pt does drive but has emili driven recently  His wife drives and transports him to all of his appointments and can transfer him home at DC  Pt has had a tunneled dialysis catheter just recently inserted but has not yey started dialysis  When the time comes for diaysis he wants to go to  [facility]  Observation Paper explained and signed

## 2017-10-06 NOTE — PROGRESS NOTES
Magnesium this morning was 0 2 and calcium was 5 3  Patient received IV calcium gluconate last night  I will order 4 more g at this time  I will also order 4 g of IV magnesium sulfate at this time  Discussed this with Dr Loi Blackmon who recommended those doses  Will repeat both after administration  Clarified with pharmacy at [facility] to make sure that the magnesium and calcium were compatible and doses were sufficient  Patient is asymptomatic

## 2017-10-06 NOTE — PROGRESS NOTES
Progress Note - Vascular Surgery   Lien Arthur 80 y o  male MRN: [id number]  Unit/Bed#: -01 Encounter: [id number]      ASSESSMENT/PLAN  LUE pitting edema  S/p 10/4/17 AV fistula creation  No hematoma    -ace wrap from fingers to axilla, nursing may remove and adjust for comfort     -strict elevation on 4 pillows  -INR supra therapeutic management per SLIM  -warm packs to LUE for comfort     -call for loss of motor or sensation, pain  Subjective:  My arm is swollen, numbness since the surgery in my hand  No claudication pain in hand or arm  My hands usually are cold  Vitals:  /61   Pulse 71   Temp 98 3 °F (36 8 °C) (Oral)   Resp 18   Ht 6' (1 829 m)   Wt 79 kg (174 lb 2 6 oz)   SpO2 97%   BMI 23 62 kg/m²     I/Os:  No intake/output data recorded    I/O this shift:  In: -   Out: 300 [Urine:300]    Lab Results and Cultures:   CBC with diff:   Lab Results   Component Value Date    WBC 10 80 (H) 10/06/2017    HGB 7 0 (L) 10/06/2017    HCT 20 8 (L) 10/06/2017    MCV 87 10/06/2017     (L) 10/06/2017    MCH 29 2 10/06/2017    MCHC 33 7 10/06/2017    RDW 18 6 (H) 10/06/2017    MPV 12 5 10/06/2017    NRBC 0 10/06/2017   ,   BMP/CMP:  Lab Results   Component Value Date     10/06/2017     12/28/2015    K 3 8 10/06/2017    K 3 8 12/28/2015    CL 99 (L) 10/06/2017     (H) 12/28/2015    CO2 18 (L) 10/06/2017    CO2 22 12/28/2015    ANIONGAP 19 (H) 10/06/2017    ANIONGAP 11 12/28/2015     (H) 10/06/2017    BUN 45 (H) 12/28/2015    CREATININE 6 21 (H) 10/06/2017    CREATININE 2 83 (H) 12/28/2015    GLUCOSE 164 (H) 10/06/2017    GLUCOSE 83 12/28/2015    CALCIUM 6 2 (L) 10/06/2017    CALCIUM 7 8 (L) 12/28/2015    AST 17 10/05/2017    AST 12 02/22/2014    ALT 17 10/05/2017    ALT 14 02/22/2014    ALKPHOS 71 10/05/2017    ALKPHOS 97 02/22/2014    PROT 6 8 10/05/2017    PROT 6 1 (L) 02/24/2014    PROT 6 0 (L) 02/22/2014    ALBUMIN 2 6 (L) 10/05/2017    ALBUMIN 2 8 (L) 02/22/2014    BILITOT 0 50 10/05/2017    BILITOT 0 36 02/22/2014    EGFR 8 10/06/2017   ,   Lipid Panel:   Lab Results   Component Value Date    CHOL 77 10/15/2015   ,   Coags:   Lab Results   Component Value Date     (H) 10/05/2017    PTT 69 (H) 10/15/2015    INR 4 32 (H) 10/06/2017    INR 2 12 (H) 12/31/2015   ,     Blood Culture: No results found for: BLOODCX,   Urinalysis:   Lab Results   Component Value Date    COLORU Yellow 10/05/2017    COLORU Yellow 01/06/2016    CLARITYU Clear 10/05/2017    CLARITYU Turbid 01/06/2016    SPECGRAV 1 015 10/05/2017    SPECGRAV 1 014 01/06/2016    PHUR 5 0 10/05/2017    PHUR 6 0 01/06/2016    LEUKOCYTESUR Negative 10/05/2017    LEUKOCYTESUR Large (A) 01/06/2016    NITRITE Negative 10/05/2017    NITRITE Positive (A) 01/06/2016    PROTEINUA Negative 10/05/2017    PROTEINUA 100 (2+) (A) 01/06/2016    GLUCOSEU Negative 10/05/2017    GLUCOSEU Negative 01/06/2016    KETONESU Negative 10/05/2017    KETONESU Negative 01/06/2016    BILIRUBINUR Negative 10/05/2017    BILIRUBINUR Negative 01/06/2016    BLOODU Negative 10/05/2017    BLOODU Moderate (A) 01/06/2016   ,   Urine Culture:   Lab Results   Component Value Date    URINECX 10,000-19,000 cfu/ml Morganella morganii 01/15/2016   ,   Wound Culure: No results found for: WOUNDCULT    Medications:  Current Facility-Administered Medications   Medication Dose Route Frequency    acetaminophen (TYLENOL) tablet 650 mg  650 mg Oral Q6H PRN    amLODIPine (NORVASC) tablet 5 mg  5 mg Oral Daily    aspirin (ECOTRIN LOW STRENGTH) EC tablet 81 mg  81 mg Oral Daily    atorvastatin (LIPITOR) tablet 10 mg  10 mg Oral Daily    ceFAZolin (ANCEF) 1,000 mg in sodium chloride 0 9 % 500 mL irrigation bag   Irrigation Once    cholecalciferol (VITAMIN D3) tablet 2,000 Units  2,000 Units Oral Daily    epoetin sunday (EPOGEN,PROCRIT) injection 10,000 Units  10,000 Units Subcutaneous Once per day on Tue Thu Sat    ferrous sulfate tablet 325 mg  325 mg Oral Daily With Breakfast    heparin (porcine) 2,000 Units, papaverine 60 mg in multi-electrolyte (ISOLYTE-S PH 7 4 equivalent) 500 mL irrigation   Irrigation Once    HYDROcodone-acetaminophen (NORCO) 5-325 mg per tablet 1 tablet  1 tablet Oral Q6H PRN    iron sucrose (VENOFER) 300 mg in sodium chloride 0 9 % 250 mL IVPB  300 mg Intravenous Once    metolazone (ZAROXOLYN) tablet 5 mg  5 mg Oral BID    multivitamin-minerals (CENTRUM) tablet 1 tablet  1 tablet Oral Daily    ondansetron (ZOFRAN) injection 4 mg  4 mg Intravenous Q6H PRN    pantoprazole (PROTONIX) EC tablet 40 mg  40 mg Oral Early Morning    phytonadione (MEPHYTON) tablet 2 5 mg  2 5 mg Oral Once    terazosin (HYTRIN) capsule 1 mg  1 mg Oral HS    torsemide (DEMADEX) tablet 20 mg  20 mg Oral Daily       Imaging:   No results found  Physical Exam:    General: A & O x 3, cooperative   CV:apical rate 80s Irregular  no murmur   Respiratory: loose nonproductive cough  Course bs anteriorly  Abdominal: soft nontender NBS no masses   Extremities: LUE:  S/p AVF creation POD 4, hand is cold  But he states that it is frequently cold  Motor and sensation is intact  +3 pitting edema from fingers, to above the AVF site  Ecchymosis from dorsum of hand to above the AVF  Incision is clean dry, +thrill  No palpable radial or ulnar pulses but strong doppler signals  Ace and elevation  Neurologic: CN II - XII intact, no tremor, affect appropriate    Wound/Incision:  Healing well  mepilex dressing removed       Pulse exam:  Radial: Right:  Left[de-identified] strong doppler signal  Ulnar: Right:  Left[de-identified] strong doppler signal       Juan Ramon Qureshi PA-C  10/6/2017

## 2017-10-06 NOTE — PROGRESS NOTES
[facility] Medicine Progress Note  Patient: Shelbie Avina 80 y o  male   MRN: [id number]  PCP: Moncia Hurtado MD  Unit/Bed#: -01 Encounter: [id number]  Date Of Visit: 10/06/17    Assessment:    Principal Problem:    Hand swelling  Active Problems:    CKD (chronic kidney disease), stage IV (HCC)    Paroxysmal atrial fibrillation (HCC)    Anemia    Azotemia    Hypertensive CKD (chronic kidney disease)    Hypocalcemia    Warfarin anticoagulation    Leukocytosis    Left upper extremity swelling    ALLEN (acute kidney injury) ([facility] [address] )    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 ([facility] )    Hypomagnesemia    NSVT (nonsustained ventricular tachycardia) ([facility] )      Plan:    Severe postoperative left upper extremity edema and hematoma, s/p AV fistula creation  · Appreciate vascular surgery follow-up- defer to them regarding any imaging required   · Continue elevation, ice packs, and Ace wraps and encourage compliance  Continue tylenol for pain       Acute blood loss anemia superimposed upon anemia of chronic kidney disease  · Recommend obtaining consent for blood transfusion and providing packed red blood cells should his counts drop any further; at this time we can monitor cautiously  · Venofer was ordered by Nephrology    Severe hypomagnesemia  · Continue to replete per Nephrology and monitor closely  · Patient is having evidence of nonsustained V-tach on telemetry monitoring likely triggered by severe electrolyte abnormalities    Severe hypocalcemia  · Repletion per Nephrology    Acute kidney injury superimposed on advanced chronic kidney disease stage 5  · Pending initiation of dialysis in very near future  · Avoid nephrotoxins agents    Paroxysmal AFib  · Patient with Coumadin coagulopathy in the setting of severe hematoma, Coumadin is being held and reversed    VTE Pharmacologic Prophylaxis:   Pharmacologic: INR greater than 4  Mechanical VTE Prophylaxis in Place: No    Patient Centered Rounds: I have performed bedside rounds with nursing staff today  Discussions with Specialists or Other Care Team Provider:  Spoke with Nephrology, surgery PA    Education and Discussions with Family / Patient:  Spoke with patient's wife at bedside    Time Spent for Care: 30 minutes  More than 50% of total time spent on counseling and coordination of care as described above  Current Length of Stay: 0 day(s)    Current Patient Status: Inpatient   Certification Statement: The patient, admitted on an observation basis, will now require > 2 midnight hospital stay due to Severe hypomagnesemia with nonsustained V-tach as well as severe left upper extremity edema    Discharge Plan / Estimated Discharge Date:  Not medically stable for discharge    Code Status: Level 3 - DNAR and DNI      Subjective:   Patient reports that the fingers in his left hand have been numb ever since this swelling began  His arm is also very painful  His wife at bedside is very concerned to make sure that the vascular surgeons to evaluate his arm  Nursing reports he has not been keeping it up all the time as instructed  When asked, he does admit to some heart palpitations  He is not currently reporting any dizziness or lightheadedness  Objective:     Vitals:   Temp (24hrs), Av 1 °F (36 7 °C), Min:97 6 °F (36 4 °C), Max:98 5 °F (36 9 °C)    HR:  [63-82] 71  Resp:  [16-25] 18  BP: (106-148)/(54-61) 148/61  SpO2:  [94 %-100 %] 97 %  Body mass index is 23 62 kg/m²  Input and Output Summary (last 24 hours): Intake/Output Summary (Last 24 hours) at 10/06/17 1250  Last data filed at 10/06/17 0816   Gross per 24 hour   Intake                0 ml   Output              300 ml   Net             -300 ml       Physical Exam:     Physical Exam   Constitutional: No distress  Thin elderly male sitting up in bed attempting to eat without utilizing his left hand at all   HENT:   Head: Normocephalic and atraumatic     Eyes: Conjunctivae are normal  Right eye exhibits no discharge  Left eye exhibits no discharge  No scleral icterus  Cardiovascular:   No murmur heard  frequent ectopy   Pulmonary/Chest: No stridor  No respiratory distress  He has no wheezes  He has no rales  Decreased breath sounds throughout   Abdominal: Soft  Musculoskeletal: He exhibits edema (Severe edema in the left upper extremity extending up to the mid biceps region, with diffuse ecchymosis) and tenderness (Left Arm is noted to be very tender to palpation)  Neurological: He is alert  Skin: Skin is warm and dry  He is not diaphoretic  Nursing note and vitals reviewed  Additional Data:     Labs:      Results from last 7 days  Lab Units 10/06/17  0437   WBC Thousand/uL 10 80*   HEMOGLOBIN g/dL 7 0*   HEMATOCRIT % 20 8*   PLATELETS Thousands/uL 136*   NEUTROS PCT % 78*   LYMPHS PCT % 8*   MONOS PCT % 8   EOS PCT % 0       Results from last 7 days  Lab Units 10/06/17  0437 10/05/17  1343   SODIUM mmol/L 139 136   POTASSIUM mmol/L 3 9 4 1   CHLORIDE mmol/L 103 102   CO2 mmol/L 19* 18*   BUN mg/dL 103* 102*   CREATININE mg/dL 6 12* 6 02*   CALCIUM mg/dL 5 3* <5 0*   TOTAL PROTEIN g/dL  --  6 8   BILIRUBIN TOTAL mg/dL  --  0 50   ALK PHOS U/L  --  71   ALT U/L  --  17   AST U/L  --  17   GLUCOSE RANDOM mg/dL 96 122       Results from last 7 days  Lab Units 10/06/17  0437   INR  4 32*       * I Have Reviewed All Lab Data Listed Above  * Additional Pertinent Lab Tests Reviewed:  [facility] Admission Reviewed    Imaging:    Imaging Reports Reviewed Today Include:   Imaging Personally Reviewed by Myself Includes:      Recent Cultures (last 7 days):           Last 24 Hours Medication List:     amLODIPine 5 mg Oral Daily   aspirin 81 mg Oral Daily   atorvastatin 10 mg Oral Daily   cefazolin 1 g in sodium chloride 0 9% 500 mL irrigation bag  Irrigation Once   cholecalciferol 2,000 Units Oral Daily   epoetin sunday 10,000 Units Subcutaneous Once per day on Tue Thu Sat ferrous sulfate 325 mg Oral Daily With Breakfast   heparin 2000 units and papaverine 60 mg in isolyte equivalent 500 mL irrigation bag  Irrigation Once   iron sucrose 300 mg Intravenous Once   metolazone 5 mg Oral BID   multivitamin-minerals 1 tablet Oral Daily   pantoprazole 40 mg Oral Early Morning   phytonadione 2 5 mg Oral Once   terazosin 1 mg Oral HS   torsemide 20 mg Oral Daily        Today, Patient Was Seen By: Charity Dominguez PA-C    ** Please Note: This note has been constructed using a voice recognition system   **

## 2017-10-06 NOTE — CASE MANAGEMENT
[facility] in the [facility] by Kavon Akins for 2017  Network Utilization Review Department  Phone: [phone]; Fax [phone]  ATTENTION: The Network Utilization Review Department is now centralized for our 7 Facilities  Make a note that we have a new phone and fax numbers for our Department  Please call with any questions or concerns to [phone] and carefully follow the prompts so that you are directed to the right person  All voicemails are confidential  Fax any determinations, approvals, denials, and requests for initial or continue stay review clinical to [phone]  Due to HIGH CALL volume, it would be easier if you could please send faxed requests to expedite your requests and in part, help us provide discharge notifications faster  Initial Clinical Review    Admission: Date/Time/Statement: 10/5  1604    Orders Placed This Encounter   Procedures    Place in Observation (expected length of stay for this patient is less than two midnights)     Standing Status:   Standing     Number of Occurrences:   1     Order Specific Question:   Admitting Physician     Answer:   Heather Tripp     Order Specific Question:   Level of Care     Answer:   Med Surg [16]         ED: Date/Time/Mode of Arrival:   ED Arrival Information     Expected Arrival Acuity Means of Arrival Escorted By Service Admission Type    - 10/5/2017 13:09 Emergent Walk-In Spouse General Medicine Emergency    Arrival Complaint    NUMBNESS/SWELLING OF HAND          Chief Complaint:   Chief Complaint   Patient presents with    Post-op Problem     Pt had a fistula placed on monday, now left arm is extremely swollen        History of Illness: Jaime Villalba is a 80 y o  male history of AFib, renal disease, hypertension, who presents with chief complaint of left hand swelling  Onset 2 days ago  Status post dialysis fistula insertion to left upper extremity on Monday    Patient report noted increased discoloration and swelling to the hand followed up with his PCP and was told to come to the emergency room for further evaluation  He complains of pain  He denies hemoptysis, hematuria, denies blood in stool  Denies chest pain, shortness of breath, fever chills  ED Vital Signs:   ED Triage Vitals [10/05/17 1312]   Temperature Pulse Respirations Blood Pressure SpO2   98 5 °F (36 9 °C) 65 18 112/54 100 %      Temp Source Heart Rate Source Patient Position - Orthostatic VS BP Location FiO2 (%)   Oral Monitor Sitting Right arm --      Pain Score       No Pain        Wt Readings from Last 1 Encounters:   10/06/17 79 kg (174 lb 2 6 oz)       Vital Signs (abnormal): wnl     Abnormal Labs/Diagnostic Test Results: Co2   18, an gap  16, BUN creat   102  6 02, ibeth  <5 0, alb  2 6, ptt 100, pt inr  47 0  4 86, wbc  14 53, H&H   7 7  23 3, plt ct  143  EKG- a-fib w/ PVCs and PACs     ED Treatment:   Medication Administration from 10/05/2017 1309 to 10/05/2017 1659       Date/Time Order Dose Route Action Action by Comments     10/05/2017 1601 phytonadione (AQUA-MEPHYTON) 10 mg/mL SC/IM injection 10 mg 10 mg Subcutaneous Given Parisa Clarke RN           Past Medical/Surgical History:    Active Ambulatory Problems     Diagnosis Date Noted    CKD (chronic kidney disease), stage IV ([facility] ) 04/24/2017    Edema 04/24/2017    CHF (congestive heart failure) ([facility]) 04/24/2017    Paroxysmal atrial fibrillation ([place] [address] ) 04/28/2017    Anemia 04/28/2017    Cellulitis of extremity 04/28/2017    ALLEN (acute kidney injury) ([facility] ) 05/01/2017    Azotemia 05/01/2017    Hypertensive CKD (chronic kidney disease) 05/01/2017    Weakness generalized 07/02/2017    CAD (coronary artery disease) 07/02/2017    Hypocalcemia 07/02/2017    Warfarin anticoagulation 07/02/2017     Resolved Ambulatory Problems     Diagnosis Date Noted    Respiratory distress 04/24/2017     Past Medical History:   Diagnosis Date    Aortic aneurysm ([place] [address] )  Arthritis     GERD (gastroesophageal reflux disease)     Hyperlipidemia     Hypertension     Irregular heart beat     Renal disorder        Admitting Diagnosis: Anemia [D64 9]  Hand swelling [M79 89]  Elevated INR [R79 1]  CKD (chronic kidney disease), stage IV (HCC) [N18 4]  Hematoma of arm, left, initial encounter [S40 022A]    Age/Sex: 80 y o  male    Assessment/Plan: Hospital Problem List:      Principal Problem:    Hand swelling  Active Problems:    CKD (chronic kidney disease), stage IV (HCC)    Paroxysmal atrial fibrillation (HCC)    Warfarin anticoagulation    Leukocytosis    Left upper extremity swelling   Plan for the Primary Problem(s):     · Left upper extremity/left hand swelling:  Possible Hematoma, Status post dialysis fistula insertion Monday  Patient is on Coumadin there is swelling and diffuse hyper pigmentation to the Left forearm/hand  There is positive thrills or bruits a fistula  All pulses palpable, he denies paresthesia  ? Vascular surgery consulted for further management  Evaluated patient while in ED  Recommendation for light compression and elevation  ? Comfort measures with Jerrica therapy and pain management       Plan for Additional Problems:   · CKD chronic kidney disease stage 5: Will consult Nephrology  · Proximal atrial fibrillation:  Rate is control, Coumadin on hold at this time for elevated INR  Telemetry  · Warfarin anticoagulation:  PT INR 47 0/4 86  Hold warfarin at this time  Patient received vitamin K while in ED  Will monitor for bleeding  Will monitor PT/INR in the Am  · Leukocytosis:  Unknown source  Afebrile, Chest x-ray is clear  Will check urine    Will continue monitor     VTE Prophylaxis: Warfarin (Coumadin)  / sequential compression device   Code Status:  DNR level 3  POLST: There is no POLST form on file for this patient (pre-hospital)     Anticipated Length of Stay:  Patient will be admitted on an Observation basis with an anticipated length of stay of  less than 2 midnights  Justification for Hospital Stay:  Left arm pain, elevated INR, possible hematoma of left upper extremity  Admission Orders:  Scheduled Meds:   amLODIPine 5 mg Oral Daily   aspirin 81 mg Oral Daily   atorvastatin 10 mg Oral Daily   calcium gluconate 4 g Intravenous Once   cefazolin 1 g in sodium chloride 0 9% 500 mL irrigation bag  Irrigation Once   cholecalciferol 2,000 Units Oral Daily   epoetin sunday 10,000 Units Subcutaneous Once per day on Tue Thu Sat   ferrous sulfate 325 mg Oral Daily With Breakfast   heparin 2000 units and papaverine 60 mg in isolyte equivalent 500 mL irrigation bag  Irrigation Once   magnesium sulfate 2 g Intravenous Once   magnesium sulfate 2 g Intravenous Once   metolazone 5 mg Oral BID   multivitamin-minerals 1 tablet Oral Daily   pantoprazole 40 mg Oral Early Morning   terazosin 1 mg Oral HS   torsemide 20 mg Oral Daily     Continuous Infusions:    PRN Meds:   acetaminophen    HYDROcodone-acetaminophen    ondansetron      Renal diet   Strict bedrest   Ace wrap   Daily weight   I&O   Cbc,bmp  x3 days  cmp , mg10/6  SCD  Tele   Nephrology and vascular consult    Vascular consult 10/5  Assessment:  Left forearm swelling and diffuse hematoma POD3 AV fistula creation secondary to elevation of INR (4 86) from coumadin   Plan:  Elevation  Mild ace wrap  Correction of INR     Mg 0 2, phos   4 5, Co2   19, an gap   17, BUN creat   103  6 12, wbc   10 80, H&H  7 0  20 8, plt ct   136 , ibeth   0 75, pt inr   42 8  4 32    Nephrology consult 10/5  1  ALLEN on top of CKD stage 5  Multifactorial and may be secondary to progression of underlying chronic kidney disease    Upon review of old medical record patient has underlying CKD stage 5 with lately creatinine runs between 4 5-5 0 and follow Dr Vinh Muñiz in the office and has AV fistula placed on left upper extremities on 10/2/17 in anticipation of dialysis in near future    Patient was admitted with worsening left upper extremity swelling after surgery  And found to have creatinine of 6 0  Current creatinine can be secondary to progression of chronic kidney disease versus other etiology and plan to check urine lytes to further evaluate etiology of worsening renal function  Currently patient is not experiencing any uremic symptoms and no urgent need to start dialysis although a patient need to start on dialysis during the hospital stay his AV fistula is not mature to use for dialysis and patient may need tunnel dialysis catheter  Plan to avoid NSAID and ACE-I and recheck renal function again in the morning    2   Hypocalcemia  Multifactorial, current calcium is less than 5  Plan to give calcium gluconate 4 g IV x1 dose now  Currently patient is asymptomatic from hypocalcemia perspective  Plan to recheck calcium level along with ionized calcium in the morning    3   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  Last known PTH last month was 52 which is below the goal for CKD stage 5  Currently patient is on calcitriol and will plan to stop calcitriol for time being and check PTH level with morning labs    4   Azotemia  Multifactorial, current BUN level is 102 although patient is currently asymptomatic from azotemia perspective and plan to monitor BUN level for time being without initiation of dialysis    5   Anemia  Multifactorial, suspect secondary to anemia of chronic renal failure  Current hemoglobin is 7 7 which is below the goal for chronic kidney disease and plan to give Epogen 17891 units SubQ every other day while in the hospital   Plan to check iron panel with morning labs    6   Hypertension in chronic kidney disease     Currently blood pressure is controlled and plan to monitor hypertension with amlodipine 5 mg PO daily tonight  Nephrology progress note 10/6  1  ALLEN on top of CKD stage 5    Multifactorial and suspect due to progression of chronic kidney disease    Upon review of old medical records patient's previously known baseline creatinine was between 4 5-5 0 and follows Dr Joselin Maya in the office  Patient had AV fistula placed anticipating dialysis in near future which is not mature at this point and he was admitted with worsening left upper extremity swelling after surgical procedure  Overnight patient was found to be nonoliguric although his renal function has worsened further to creatinine of 6 2  BUN level has also worsened to 103 and currently he is not experiencing any uremic symptoms and no need to start dialysis at this point  Will plan to monitor renal function today  May consider placement of tunneled dialysis catheter if patient need to start on dialysis during this hospital stay    2   Hypomagnesemia  Severe, current magnesium is 0 2 and patient is receiving 4 g of magnesium sulfate IV  Plan to recheck magnesium level after infusion and consider re-dosing of magnesium sulfate IV if magnesium level has remained low  Currently patient is asymptomatic from hypomagnesemia perspective    3   Hypocalcemia  Multifactorial, severe  Patient has received 4 g of calcium gluconate last night and calcium level has improved to 5 3 although remained very low and plan to give 4 more g of calcium gluconate IV this morning and recheck calcium level afterward and replete with IV calcium if necessary      4   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  PTH level was 28 which is below the goal for CKD stage 5  Calcitriol was stopped yesterday and no need to resume at this point    5   Anemia  Multifactorial, hemoglobin level overnight has worsened to 7 0  Patient is also found to have iron deficiency and plan to give venofer 300 mg IV x1 dose now  Patient was also started on Epogen yesterday  Please consider blood transfusion today    6   Hypertension in chronic kidney disease    Overnight patient's blood pressure has remained controlled and at goal and monitor hypertension today with amlodipine 5 mg PO daily    7   Azotemia  Multifactorial, overnight BUN level has remained stable at 103 and currently patient is not experiencing any uremic symptoms and no need to start dialysis at this point  Plan to monitor BUN level today  Disposition:  Patient is not stable for discharge from renal standpoint in the setting of severe hypomagnesemia, hypocalcemia, worsening anemia and renal function  Internal med progress note  10/6  Assessment:   Principal Problem:    Hand swelling  Active Problems:    CKD (chronic kidney disease), stage IV ([facility])    Paroxysmal atrial fibrillation (HCC)    Anemia    Azotemia    Hypertensive CKD (chronic kidney disease)    Hypocalcemia    Warfarin anticoagulation    Leukocytosis    Left upper extremity swelling    ALLEN (acute kidney injury) ([facility] )    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 ([facility] )    Hypomagnesemia    NSVT (nonsustained ventricular tachycardia) ([facility])   Plan:   Severe postoperative left upper extremity edema and hematoma, s/p AV fistula creation  · Appreciate vascular surgery follow-up- defer to them regarding any imaging required   · Continue elevation, ice packs, and Ace wraps and encourage compliance    Continue tylenol for pain     Acute blood loss anemia superimposed upon anemia of chronic kidney disease  · Recommend obtaining consent for blood transfusion and providing packed red blood cells should his counts drop any further; at this time we can monitor cautiously  · Venofer was ordered by Nephrology  Severe hypomagnesemia  · Continue to replete per Nephrology and monitor closely  · Patient is having evidence of nonsustained V-tach on telemetry monitoring likely triggered by severe electrolyte abnormalities   Severe hypocalcemia  · Repletion per Nephrology   Acute kidney injury superimposed on advanced chronic kidney disease stage 5  · Pending initiation of dialysis in very near future  · Avoid nephrotoxins agents   Paroxysmal AFib  · Patient with Coumadin coagulopathy in the setting of severe hematoma, Coumadin is being held and reversed   VTE Pharmacologic Prophylaxis:   Pharmacologic: INR greater than 4  Mechanical VTE Prophylaxis in Place: No   Patient Centered Rounds: I have performed bedside rounds with nursing staff today    Discussions with Specialists or Other Care Team Provider:  Spoke with Nephrology, surgery PA   Education and Discussions with Family / Patient:  Spoke with patient's wife at bedside   Time Spent for Care: 30 minutes  More than 50% of total time spent on counseling and coordination of care as described above    Current Length of Stay: 0 day(s)   Current Patient Status: Inpatient   Certification Statement: The patient, admitted on an observation basis, will now require > 2 midnight hospital stay due to Severe hypomagnesemia with nonsustained V-tach as well as severe left upper extremity edema   Discharge Plan / Estimated Discharge Date:  Not medically stable for discharge

## 2017-10-06 NOTE — SOCIAL WORK
Spoke with patient in his room and he states that he lives with his wife in a house and they live on the first floor so there are no steps  he states He is independant with ADL's and Ambulation  He does have a walker and a W/C  Has a homecare agency but does not remember the agency  [facility]  Pt does drive but has emili driven recently  His wife drives and transports him to all of his appointments and can transfer him home at DC  Pt has had a tunneled dialysis catheter just recently inserted but has not yey started dialysis  When the time comes for diaysis he wants to go to  in [facility]  Observation Paper explained and signed

## 2017-10-06 NOTE — PROGRESS NOTES
NEPHROLOGY PROGRESS NOTE    Patient: Dottie Villeda               Sex: male          DOA: 10/5/2017  1:18 PM   YOB: 1935        Age:  80 y o         LOS:  LOS: 0 days     REASON FOR THE CONSULTATION:  Further management of ALLEN    HPI     This is a 80-year-old male with a past medical history of CKD stage 5 who has recently had AV fistula placed on 10/2/17 and was admitted with worsening swelling of left upper extremities  SUBJECTIVE     - overnight breathing has remained stable and patient was also found to be nonoliguric  Patient denies nausea / vomiting / headache / dizziness / SOB / chest pain today    - Reviewed last 24 hrs events     CURRENT MEDICATIONS       Current Facility-Administered Medications:     acetaminophen (TYLENOL) tablet 650 mg, 650 mg, Oral, Q6H PRN, JUVENCIO Cole    amLODIPine (NORVASC) tablet 5 mg, 5 mg, Oral, Daily, JUVENCIO Cole, 5 mg at 10/06/17 0848    aspirin (ECOTRIN LOW STRENGTH) EC tablet 81 mg, 81 mg, Oral, Daily, JUVENCIO Cole, 81 mg at 10/06/17 0848    atorvastatin (LIPITOR) tablet 10 mg, 10 mg, Oral, Daily, JUVENCIO Cole, 10 mg at 10/06/17 0849    calcium gluconate 4 g in sodium chloride 0 9 % 100 mL IVPB, 4 g, Intravenous, Once, Val Adrian PA-C    ceFAZolin (ANCEF) 1,000 mg in sodium chloride 0 9 % 500 mL irrigation bag, , Irrigation, Once, Mackenzie Ba MD    cholecalciferol (VITAMIN D3) tablet 2,000 Units, 2,000 Units, Oral, Daily, JUVENCIO Cole, 2,000 Units at 10/06/17 0848    epoetin sunday (EPOGEN,PROCRIT) injection 10,000 Units, 10,000 Units, Subcutaneous, Once per day on Tue Thu Sat, Albert Freeman MD, 10,000 Units at 10/05/17 1854    ferrous sulfate tablet 325 mg, 325 mg, Oral, Daily With Breakfast, JUVENCIO Cole, 325 mg at 10/06/17 0848    heparin (porcine) 2,000 Units, papaverine 60 mg in multi-electrolyte (ISOLYTE-S PH 7 4 equivalent) 500 mL irrigation, , Irrigation, Once, [employer] Kenneth Segura MD    HYDROcodone-acetaminophen [facility]) 5-325 mg per tablet 1 tablet, 1 tablet, Oral, Q6H PRN, JUVENCIO Cole    iron sucrose (VENOFER) 300 mg in sodium chloride 0 9 % 250 mL IVPB, 300 mg, Intravenous, Once, Lei Crawford MD    metolazone (ZAROXOLYN) tablet 5 mg, 5 mg, Oral, BID, JUVENCIO Cole, 5 mg at 10/06/17 0849    multivitamin-minerals (CENTRUM) tablet 1 tablet, 1 tablet, Oral, Daily, JUVENCIO Cole, 1 tablet at 10/06/17 0849    ondansetron (ZOFRAN) injection 4 mg, 4 mg, Intravenous, Q6H PRN, JUVENCIO Cole    pantoprazole (PROTONIX) EC tablet 40 mg, 40 mg, Oral, Early Morning, JUVENCIO Cole, 40 mg at 10/06/17 0619    terazosin (HYTRIN) capsule 1 mg, 1 mg, Oral, HS, JUVENCIO Cole, 1 mg at 10/05/17 2139    torsemide (DEMADEX) tablet 20 mg, 20 mg, Oral, Daily, JUVENCIO Cole, 20 mg at 10/06/17 0848    OBJECTIVE     Current Weight: Weight - Scale: 79 kg (174 lb 2 6 oz)  Vitals:    10/06/17 0800   BP: 124/58   Pulse: 82   Resp: 18   Temp: 97 6 °F (36 4 °C)   SpO2: 97%       Intake/Output Summary (Last 24 hours) at 10/06/17 1117  Last data filed at 10/06/17 0816   Gross per 24 hour   Intake                0 ml   Output              300 ml   Net             -300 ml       PHYSICAL EXAMINATION     GEN:  Awake and not in acute distress  RS:  Bilateral equal air entry, no wheezing  CVS:  S1-S2 heard  Abdomen:  Soft, nontender, positive bowel sounds  Extremities:  No edema, skin is warm on touch  CNS:  Awake and follows commands  HEENT:  Pale conjunctiva, no JVD, head is atraumatic  Psychiatric:  Normal mood and affect, not suicidal  Lymph Node:  No palpable cervical lymphadenopathy    LAB RESULTS       Results from last 7 days  Lab Units 10/06/17  0437 10/05/17  1343 10/02/17  0944   WBC Thousand/uL 10 80* 14 53*  --    HEMOGLOBIN g/dL 7 0* 7 7*  --    HEMATOCRIT % 20 8* 23 3*  --    PLATELETS Thousands/uL 136* 143*  --    SODIUM mmol/L 139 136 141 POTASSIUM mmol/L 3 9 4 1 3 7   CHLORIDE mmol/L 103 102 103   CO2 mmol/L 19* 18* 22   BUN mg/dL 103* 102* 87*   CREATININE mg/dL 6 12* 6 02* 5 04*   EGFR ml/min/1 73sq m 8 8 10   CALCIUM mg/dL 5 3* <5 0* 5 5*   MAGNESIUM mg/dL 0 2*  --   --    PHOSPHORUS mg/dL 4 5*  --   --    ALBUMIN g/dL  --  2 6*  --    TOTAL PROTEIN g/dL  --  6 8  --    GLUCOSE RANDOM mg/dL 96 122 90       I have personally reviewed the old medical records and patient's previously known baseline creatinine level is ~ 4 5-5 0    RADIOLOGY RESULTS      Results for orders placed during the hospital encounter of 10/22/15   XR chest portable    Narrative EXAM ROOM = [facility](Induction Manager 3)   EXAM START = [id number]   EXAM STOP = [id number]   FILMS USED = 1 VIEWS      CHEST       INDICATION-  Status post cardiac surgery      COMPARISON-  10/22/2015 chest x-ray  VIEWS-   AP frontal^  1 image      FINDINGS-  Stable findings status post endovascular repair of ascending   aortic aneurysm  Prosthetic heart valve again seen  Cardiomediastinal silhouette appears unchanged  Atelectatic changes present in the left lung base  Visualized osseous structures appear within normal limits for the   patient's age  IMPRESSION-      Atelectatic changes in the left lung base        Transcribed on- [id number]            NATALYA Schulz MD        Reading Radiologist- NATALYA Diamond MD        Electronically Signed- NATALYA Diamond MD        Released Date Time- 10/23/15 1352    ------------------------------------------------------------------------------   READ BY = [id number]^I ANG LIU   RELEASED BY = [id number]^I Dimple Sheehan      Results for orders placed during the hospital encounter of 09/26/17   XR chest pa & lateral    Narrative CHEST - DUAL ENERGY    INDICATION:  Presurgery ,chronic kidney disease    COMPARISON:  July 2, 2017    VIEWS:  PA (including soft tissue/bone algorithms) and lateral projections    IMAGES: 4    FINDINGS:         Cardiomegaly seen  There is a tortuosity of the aorta  There is thoracic aortic stent  There is prosthetic aortic valve    The lungs are clear  No pneumothorax or pleural effusion  Visualized osseous structures appear within normal limits for the patient's age  Impression No acute consolidation or congestion  Cardiomegaly      Workstation performed: [id number]       Results for orders placed in visit on 02/21/14   US retroperitoneal complete    Narrative RENAL ULTRASOUND   INDICATION- Acute kidney insufficiency  Chronic kidney disease  COMPARISON- 04/18/2012   TECHNIQUE-   Ultrasound of the retroperitoneum was performed with a   curvilinear transducer utilizing volumetric sweeps and still imaging   techniques  FINDINGS-   KIDNEYS-   Right kidney-  10 1 cm  Left kidney-  10 8 cm  Symmetric and normal size  Normal echogenicity and contour  No suspicious masses detected  Small 1 1 cm cortical cyst in lower   pole of the right kidney   No hydronephrosis  0 5 cm nonobstructing intrarenal calculus in the lower pole of the   right kidney, unchanged the previous study   No perinephric fluid collections  URETERS-   Nondilated  BLADDER-    Normally distended  No focal thickening or mass lesions  IMPRESSION-    1  No hydronephrosis  2   Stable nonobstructing lower pole 5 mm right renal calculus   unchanged the previous study  Transcribed on- NATALYA Pang MD        Reading RadiologistNATALYA Lowe MD        Releasing Radiologist- NATALYA Jaramillo MD        Released Date Time- 02/24/14 1611      ------------------------------------------------------------------------------   Bhumika Hendricks   [facility]        PLAN / RECOMMENDATIONS      1  ALLEN on top of CKD stage 5  Multifactorial and suspect due to progression of chronic kidney disease      Upon review of old medical records patient's previously known baseline creatinine was between 4 5-5 0 and follows Dr Rc Lucas in the office  Patient had AV fistula placed anticipating dialysis in near future which is not mature at this point and he was admitted with worsening left upper extremity swelling after surgical procedure  Overnight patient was found to be nonoliguric although his renal function has worsened further to creatinine of 6 2  BUN level has also worsened to 103 and currently he is not experiencing any uremic symptoms and no need to start dialysis at this point  Will plan to monitor renal function today  May consider placement of tunneled dialysis catheter if patient need to start on dialysis during this hospital stay  2   Hypomagnesemia  Severe, current magnesium is 0 2 and patient is receiving 4 g of magnesium sulfate IV  Plan to recheck magnesium level after infusion and consider re-dosing of magnesium sulfate IV if magnesium level has remained low  Currently patient is asymptomatic from hypomagnesemia perspective  3   Hypocalcemia  Multifactorial, severe  Patient has received 4 g of calcium gluconate last night and calcium level has improved to 5 3 although remained very low and plan to give 4 more g of calcium gluconate IV this morning and recheck calcium level afterward and replete with IV calcium if necessary  4   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  PTH level was 28 which is below the goal for CKD stage 5  Calcitriol was stopped yesterday and no need to resume at this point  5   Anemia  Multifactorial, hemoglobin level overnight has worsened to 7 0  Patient is also found to have iron deficiency and plan to give venofer 300 mg IV x1 dose now  Patient was also started on Epogen yesterday  Please consider blood transfusion today  6   Hypertension in chronic kidney disease    Overnight patient's blood pressure has remained controlled and at goal and monitor hypertension today with amlodipine 5 mg PO daily     7   Azotemia  Multifactorial, overnight BUN level has remained stable at 103 and currently patient is not experiencing any uremic symptoms and no need to start dialysis at this point  Plan to monitor BUN level today  Disposition:  Patient is not stable for discharge from renal standpoint in the setting of severe hypomagnesemia, hypocalcemia, worsening anemia and renal function  Plan was also d/w Bonifacio Loaiza MD  Nephrology  10/6/2017        Portions of the record may have been created with voice recognition software  Occasional wrong word or "sound a like" substitutions may have occurred due to the inherent limitations of voice recognition software  Read the chart carefully and recognize, using context, where substitutions have occurred

## 2017-10-07 VITALS
HEART RATE: 75 BPM | BODY MASS INDEX: 23.59 KG/M2 | DIASTOLIC BLOOD PRESSURE: 59 MMHG | SYSTOLIC BLOOD PRESSURE: 134 MMHG | HEIGHT: 72 IN | RESPIRATION RATE: 18 BRPM | WEIGHT: 174.16 LBS | TEMPERATURE: 97.5 F | OXYGEN SATURATION: 99 %

## 2017-10-07 LAB
ALBUMIN SERPL BCP-MCNC: 2.6 G/DL (ref 3.5–5)
ALP SERPL-CCNC: 68 U/L (ref 46–116)
ALT SERPL W P-5'-P-CCNC: 15 U/L (ref 12–78)
ANION GAP SERPL CALCULATED.3IONS-SCNC: 18 MMOL/L (ref 4–13)
ANION GAP SERPL CALCULATED.3IONS-SCNC: 18 MMOL/L (ref 4–13)
AST SERPL W P-5'-P-CCNC: 19 U/L (ref 5–45)
BASOPHILS # BLD AUTO: 0.05 THOUSANDS/ΜL (ref 0–0.1)
BASOPHILS NFR BLD AUTO: 1 % (ref 0–1)
BILIRUB SERPL-MCNC: 0.9 MG/DL (ref 0.2–1)
BUN SERPL-MCNC: 104 MG/DL (ref 5–25)
BUN SERPL-MCNC: 104 MG/DL (ref 5–25)
CA-I BLD-SCNC: 0.86 MMOL/L (ref 1.12–1.32)
CALCIUM SERPL-MCNC: 6.3 MG/DL (ref 8.3–10.1)
CALCIUM SERPL-MCNC: 6.3 MG/DL (ref 8.3–10.1)
CHLORIDE SERPL-SCNC: 100 MMOL/L (ref 100–108)
CHLORIDE SERPL-SCNC: 100 MMOL/L (ref 100–108)
CO2 SERPL-SCNC: 19 MMOL/L (ref 21–32)
CO2 SERPL-SCNC: 19 MMOL/L (ref 21–32)
CREAT SERPL-MCNC: 6.01 MG/DL (ref 0.6–1.3)
CREAT SERPL-MCNC: 6.01 MG/DL (ref 0.6–1.3)
EOSINOPHIL # BLD AUTO: 0.02 THOUSAND/ΜL (ref 0–0.61)
EOSINOPHIL NFR BLD AUTO: 0 % (ref 0–6)
ERYTHROCYTE [DISTWIDTH] IN BLOOD BY AUTOMATED COUNT: 18.4 % (ref 11.6–15.1)
GFR SERPL CREATININE-BSD FRML MDRD: 8 ML/MIN/1.73SQ M
GFR SERPL CREATININE-BSD FRML MDRD: 8 ML/MIN/1.73SQ M
GLUCOSE SERPL-MCNC: 131 MG/DL (ref 65–140)
GLUCOSE SERPL-MCNC: 131 MG/DL (ref 65–140)
HCT VFR BLD AUTO: 21.8 % (ref 36.5–49.3)
HGB BLD-MCNC: 7.4 G/DL (ref 12–17)
INR PPP: 1.92 (ref 0.86–1.16)
LYMPHOCYTES # BLD AUTO: 0.6 THOUSANDS/ΜL (ref 0.6–4.47)
LYMPHOCYTES NFR BLD AUTO: 6 % (ref 14–44)
MAGNESIUM SERPL-MCNC: 1.8 MG/DL (ref 1.6–2.6)
MAGNESIUM SERPL-MCNC: 1.9 MG/DL (ref 1.6–2.6)
MCH RBC QN AUTO: 29.2 PG (ref 26.8–34.3)
MCHC RBC AUTO-ENTMCNC: 33.9 G/DL (ref 31.4–37.4)
MCV RBC AUTO: 86 FL (ref 82–98)
MONOCYTES # BLD AUTO: 1.03 THOUSAND/ΜL (ref 0.17–1.22)
MONOCYTES NFR BLD AUTO: 10 % (ref 4–12)
NEUTROPHILS # BLD AUTO: 8.7 THOUSANDS/ΜL (ref 1.85–7.62)
NEUTS SEG NFR BLD AUTO: 80 % (ref 43–75)
NRBC BLD AUTO-RTO: 0 /100 WBCS
PLATELET # BLD AUTO: 122 THOUSANDS/UL (ref 149–390)
PMV BLD AUTO: 11.3 FL (ref 8.9–12.7)
POTASSIUM SERPL-SCNC: 3.6 MMOL/L (ref 3.5–5.3)
POTASSIUM SERPL-SCNC: 3.6 MMOL/L (ref 3.5–5.3)
PROT SERPL-MCNC: 6.6 G/DL (ref 6.4–8.2)
PROTHROMBIN TIME: 22.5 SECONDS (ref 12.1–14.4)
RBC # BLD AUTO: 2.53 MILLION/UL (ref 3.88–5.62)
SODIUM SERPL-SCNC: 137 MMOL/L (ref 136–145)
SODIUM SERPL-SCNC: 137 MMOL/L (ref 136–145)
WBC # BLD AUTO: 10.81 THOUSAND/UL (ref 4.31–10.16)

## 2017-10-07 PROCEDURE — 85610 PROTHROMBIN TIME: CPT | Performed by: PHYSICIAN ASSISTANT

## 2017-10-07 PROCEDURE — 80053 COMPREHEN METABOLIC PANEL: CPT | Performed by: PHYSICIAN ASSISTANT

## 2017-10-07 PROCEDURE — 85025 COMPLETE CBC W/AUTO DIFF WBC: CPT | Performed by: INTERNAL MEDICINE

## 2017-10-07 PROCEDURE — 83735 ASSAY OF MAGNESIUM: CPT | Performed by: PHYSICIAN ASSISTANT

## 2017-10-07 PROCEDURE — 80048 BASIC METABOLIC PNL TOTAL CA: CPT | Performed by: INTERNAL MEDICINE

## 2017-10-07 PROCEDURE — 82330 ASSAY OF CALCIUM: CPT | Performed by: INTERNAL MEDICINE

## 2017-10-07 PROCEDURE — G0008 ADMIN INFLUENZA VIRUS VAC: HCPCS | Performed by: NURSE PRACTITIONER

## 2017-10-07 PROCEDURE — 90686 IIV4 VACC NO PRSV 0.5 ML IM: CPT | Performed by: NURSE PRACTITIONER

## 2017-10-07 PROCEDURE — 83735 ASSAY OF MAGNESIUM: CPT | Performed by: INTERNAL MEDICINE

## 2017-10-07 RX ORDER — FERROUS SULFATE 325(65) MG
325 TABLET ORAL
Qty: 30 TABLET | Refills: 0 | Status: SHIPPED | OUTPATIENT
Start: 2017-10-08

## 2017-10-07 RX ORDER — CALCIUM CARBONATE 200(500)MG
1000 TABLET,CHEWABLE ORAL 3 TIMES DAILY
Status: DISCONTINUED | OUTPATIENT
Start: 2017-10-07 | End: 2017-10-07 | Stop reason: HOSPADM

## 2017-10-07 RX ORDER — CALCIUM CARBONATE 200(500)MG
1000 TABLET,CHEWABLE ORAL 3 TIMES DAILY
Qty: 60 TABLET | Refills: 0 | Status: SHIPPED | OUTPATIENT
Start: 2017-10-07

## 2017-10-07 RX ORDER — WARFARIN SODIUM 2 MG/1
2 TABLET ORAL
Qty: 30 TABLET | Refills: 0 | Status: SHIPPED | OUTPATIENT
Start: 2017-10-07 | End: 2017-11-07 | Stop reason: HOSPADM

## 2017-10-07 RX ADMIN — CHOLECALCIFEROL TAB 25 MCG (1000 UNIT) 2000 UNITS: 25 TAB at 10:02

## 2017-10-07 RX ADMIN — TORSEMIDE 20 MG: 20 TABLET ORAL at 10:01

## 2017-10-07 RX ADMIN — CALCIUM GLUCONATE 4 G: 94 INJECTION, SOLUTION INTRAVENOUS at 10:03

## 2017-10-07 RX ADMIN — ANTACID TABLETS 1000 MG: 500 TABLET, CHEWABLE ORAL at 10:02

## 2017-10-07 RX ADMIN — ATORVASTATIN CALCIUM 10 MG: 10 TABLET, FILM COATED ORAL at 10:02

## 2017-10-07 RX ADMIN — INFLUENZA VIRUS VACCINE 0.5 ML: 15; 15; 15; 15 SUSPENSION INTRAMUSCULAR at 14:01

## 2017-10-07 RX ADMIN — ASPIRIN 81 MG: 81 TABLET, COATED ORAL at 10:02

## 2017-10-07 RX ADMIN — METOLAZONE 5 MG: 5 TABLET ORAL at 10:03

## 2017-10-07 RX ADMIN — Medication 325 MG: at 10:02

## 2017-10-07 RX ADMIN — EPOETIN ALFA 10000 UNITS: 10000 SOLUTION INTRAVENOUS; SUBCUTANEOUS at 11:00

## 2017-10-07 RX ADMIN — Medication 1 TABLET: at 10:03

## 2017-10-07 RX ADMIN — PANTOPRAZOLE SODIUM 40 MG: 40 TABLET, DELAYED RELEASE ORAL at 06:36

## 2017-10-07 RX ADMIN — AMLODIPINE BESYLATE 5 MG: 5 TABLET ORAL at 10:02

## 2017-10-07 NOTE — PHYSICIAN ADVISOR
Current patient class: Inpatient  The patient is currently on Hospital Day: 2      The patient was admitted to the hospital at [phone] [phone] [id number] on 10/6/17 for the following diagnosis:  Anemia [D64 9]  Hand swelling [M79 89]  Elevated INR [R79 1]  CKD (chronic kidney disease), stage IV (HCC) [N18 4]  Hematoma of arm, left, initial encounter [S40 022A]       There is documentation in the medical record of an expected length of stay of at least 2 midnights  The patient is therefore expected to satisfy the 2 midnight benchmark and given the 2 midnight presumption is appropriate for INPATIENT ADMISSION  Given this expectation of a satisfying stay, CMS instructs us that the patient is most often appropriate for inpatient admission under part A provided medical necessity is documented in the chart  After review of the relevant documentation, labs, vital signs and test results, the patient is appropriate for INPATIENT ADMISSION  Admission to the hospital as an inpatient is a complex decision making process which requires the practitioner to consider the patients presenting complaint, history and physical examination and all relevant testing  With this in mind, in this case, the patient was deemed appropriate for INPATIENT ADMISSION  After review of the documentation and testing available at the time of the admission I concur with this clinical determination of medical necessity  Rationale is as follows:     The patient is a 80 yrs old Male who presented to the ED at 10/5/2017  1:18 PM with a chief complaint of Post-op Problem (Pt had a fistula placed on monday, now left arm is extremely swollen )    The patients vitals on arrival were ED Triage Vitals [10/05/17 1312]   Temperature Pulse Respirations Blood Pressure SpO2   98 5 °F (36 9 °C) 65 18 112/54 100 %      Temp Source Heart Rate Source Patient Position - Orthostatic VS BP Location FiO2 (%)   Oral Monitor Sitting Right arm --      Pain Score       No Pain Past Medical History:   Diagnosis Date    Aortic aneurysm (HCC)     Arthritis     GERD (gastroesophageal reflux disease)     Hyperlipidemia     Hypertension     Irregular heart beat     afib    Renal disorder      Past Surgical History:   Procedure Laterality Date    ABDOMINAL AORTIC ANEURYSM REPAIR, OPEN      CARDIAC SURGERY      COLONOSCOPY      HERNIA REPAIR      ME ANASTOMOSIS,AV,ANY SITE Left 10/2/2017    Procedure: UPPER EXTREMITY AV FISTULA CREATION;  Surgeon: Irl Mortimer, MD;  Location: [facility];  Service: Vascular    VALVE REPLACEMENT             Consults have been placed to:   IP CONSULT TO NEPHROLOGY  IP CONSULT TO VASCULAR SURGERY    Vitals:    10/06/17 1100 10/06/17 1500 10/06/17 1900 10/06/17 2300   BP: 148/61 130/60 126/60 127/60   Pulse: 71 77 70 70   Resp: 18 19 19 16   Temp: 98 3 °F (36 8 °C) 98 6 °F (37 °C) 97 7 °F (36 5 °C) (!) 97 2 °F (36 2 °C)   TempSrc: Oral Oral Oral Oral   SpO2: 97% 95% 96% 96%   Weight:       Height:           Most recent labs:    Recent Labs      10/05/17   1343  10/06/17   0437  10/06/17   1251   WBC  14 53*  10 80*   --    HGB  7 7*  7 0*   --    HCT  23 3*  20 8*   --    PLT  143*  136*   --    K  4 1  3 9  3 8   NA  136  139  136   CALCIUM  <5 0*  5 3*  6 2*   BUN  102*  103*  103*   CREATININE  6 02*  6 12*  6 21*   INR  4 86*  4 32*   --    AST  17   --    --    ALT  17   --    --    ALKPHOS  71   --    --    BILITOT  0 50   --    --        Scheduled Meds:  amLODIPine 5 mg Oral Daily   aspirin 81 mg Oral Daily   atorvastatin 10 mg Oral Daily   cefazolin 1 g in sodium chloride 0 9% 500 mL irrigation bag  Irrigation Once   cholecalciferol 2,000 Units Oral Daily   epoetin sunday 10,000 Units Subcutaneous Once per day on Tue Thu Sat   ferrous sulfate 325 mg Oral Daily With Breakfast   heparin 2000 units and papaverine 60 mg in isolyte equivalent 500 mL irrigation bag  Irrigation Once   metolazone 5 mg Oral BID   multivitamin-minerals 1 tablet Oral Daily   pantoprazole 40 mg Oral Early Morning   terazosin 1 mg Oral HS   torsemide 20 mg Oral Daily     Continuous Infusions:   PRN Meds:   acetaminophen    HYDROcodone-acetaminophen    ondansetron    Surgical procedures (if appropriate):

## 2017-10-07 NOTE — DISCHARGE SUMMARY
Discharge Summary - [address] [facility]    Patient Information: Devin Lane 80 y o  male MRN: [id number]  Unit/Bed#: -01 Encounter: [id number]    Discharging Physician / Practitioner: JUVENCIO Tafoya  PCP: Steph Foley MD  Admission Date: 10/5/2017  Discharge Date: 10/07/17    Reason for Admission:  Left upper arm swelling    Discharge Diagnoses:     Principal Problem:    Hand swelling  Active Problems:    CKD (chronic kidney disease), stage IV (HCC)    Paroxysmal atrial fibrillation (HCC)    Anemia    Azotemia    Hypertensive CKD (chronic kidney disease)    Hypocalcemia    Warfarin anticoagulation    Leukocytosis    Left upper extremity swelling    ALLEN (acute kidney injury) ([facility] [address] )    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 )    Hypomagnesemia    NSVT (nonsustained ventricular tachycardia) ([facility] )  Resolved Problems:    * No resolved hospital problems  *      Consultations During Hospital Stay:  · Nephrology  · Vascular    Procedures Performed:     · Chest x-ray, no acute pulmonary findings  Significant Findings:    · Hypocalcemia: On discharge:  Calcium 6 3:  He received 4 g of calcium gluconate prior to discharge     · Supra therapeutic PT INR :  On admission INR was 4 86  He received vitamin K   · Left upper extremity swelling:  Status post AV fistula creation from 10/04/2017  On admission left upper extremity swelling associated with hyper pigmentation, and pain  There was no hematoma noted  Encourage to Ace wrap from finger to axilla, elevate on pillows, warm packs  Advised patient to call vascular surgery if experience increased swelling, numbness and tingling  · Hypo magnesium:  0 2, he was repleted  On discharge this was normalized to 1 8  · ALLEN/ Chronic kidney disease stage 5:  Nephrology following  No hemodialysis indicated during admission, however will be anticipated in the near future  Avoid nephrotoxins medication    · Anemia:  Patient received 1 dose of venofer  Incidental Findings:   · None    Test Results Pending at Discharge (will require follow up): · None     Outpatient Tests Requested:  · I gave patient prescription to repeat PT INR, BMP and CBC on Monday  Complications:  Swelling of left upper extremity  Hospital Course:     Ginny Lechuga is a 80 y o  male with above history patient who originally presented to the hospital on 10/5/2017 due to left upper arm swelling due to AV fistular creation from 10/04/2017  On admission patient noted with +2 to 3 edema, swelling of the left upper extremity associated with pain and hyper pigmentation of left upper extremity, pulses were dopplerable  Patient was evaluated by vascular status post AV fistula creation  There was no imaging to confirm hematoma  No further interventions or imaging recommended  Patient also presented with elevated PT INR, 4 86 his Coumadin was held during admission he was treated with vitamin K and monitored  At discharge his INR decrease to  1 9  Per vascular recommendation restart Coumadin at low dose  Patient was advised to stop all previous Coumadin treatments from home  A new prescription was given for a lower dose of 2 mg  He was also advised to follow up on Monday for repeat PT INR  Other abnormal labs included magnesium 0 2, calcium at about a 5  Nephrology was consulted and was repleted Will calcium gluconate and magnesium sulfate  Prescription for Tums was initiated  At discharge:  Patient's calcium was 6 3 he received 4 g of calcium gluconate x1 via IV prior to discharge  Prescription was given for Tums 1 g t i d   No acute signs of hypokalemia noted  Prescription given to recheck BMP on Monday Patient's hemoglobin was 7 4 he denies any active signs of bleeding, he received 1 dose of Venofer during admission  Prescription given to recheck CBC on Monday    Prescription given for tums, Coumadin 2 mg, ferrous sulfate, prescription given to repeat PT INR, BMP and CBC for Monday  His wife is at the bedside  All discharge instructions reviewed with patient wife included: To keep left upper extremity elevated on pillow, to maintain Ace wrap, to maintain compresses  To notify physician if experience numbness and tingling, loss of sensation, excessive swelling and pain  Do not lift with left upper extremity until cleared by vascular  Also reviewed signs and symptoms of acute reaction of hypokalemia and magnesium  They verbalized understanding  Condition at Discharge: good      Discharge Day Visit / Exam:     Subjective:  Patient is awake alert and oriented, express concern about if his arm will ever clear up  He denies chest pain, dizziness, lightheadedness, feelings of palpitations or muscle pain or tremors  Vitals: Blood Pressure: 134/59 (10/07/17 1100)  Pulse: 75 (10/07/17 1100)  Temperature: 97 5 °F (36 4 °C) (10/07/17 1100)  Temp Source: Oral (10/07/17 1100)  Respirations: 18 (10/07/17 1100)  Height: 6' (182 9 cm) (10/05/17 1709)  Weight - Scale: 79 kg (174 lb 2 6 oz) (10/06/17 0543)  SpO2: 99 % (10/07/17 1100)    Exam:   Physical Exam   Constitutional: He is oriented to person, place, and time  He appears well-developed and well-nourished  HENT:   Head: Normocephalic  Cardiovascular: Normal rate and regular rhythm  Pulmonary/Chest: Effort normal and breath sounds normal  He exhibits no tenderness  Abdominal: Soft  Bowel sounds are normal    Musculoskeletal:   Left upper arm swelling 1-2  Full range of motion  He does complain of tenderness  Ace wrap presents with compress  Neurological: He is alert and oriented to person, place, and time  Skin:   Hyperpigmentation, ecchymotic areas bilateral upper extremities worsened on the left than right  Psychiatric: He has a normal mood and affect  Nursing note and vitals reviewed        Discharge instructions/Information to patient and family:   See after visit summary for information provided to patient and family  Provisions for Follow-Up Care:  See after visit summary for information related to follow-up care and any pertinent home health orders  Disposition:     Home    For Discharges to [facility] SNF:   · Not Applicable to this Patient - Not Applicable to this Patient    Planned Readmission: Most Likely     Discharge Statement:  I spent 50 minutes discharging the patient  This time was spent on the day of discharge  I had direct contact with the patient on the day of discharge  Greater than 50% of the total time was spent examining patient, answering all patient questions, arranging and discussing plan of care with patient as well as directly providing post-discharge instructions  Additional time then spent on discharge activities  Discharge Medications:  See after visit summary for reconciled discharge medications provided to patient and family  ** Please Note: Dragon 360 Dictation voice to text software may have been used in the creation of this document   **

## 2017-10-07 NOTE — PROGRESS NOTES
Progress Note - Vascular Surgery       Assessment:  Left arm swelling and incisional hematoma  CKD5, S/p L AVF by Dr Irma Romo 10/2/17  Paroxsymal Afib on Coumadin   Supra therapeutic INR, now resolved       Plan:  -Left arm swelling and incisional hematoma improving  Incision intact  Continue ACE wrap and arm elevation  Reinforced need arm elevation to patient, nursing please continue to encourage arm elevation   -SLIM planning for discharge today   -Follow up with Dr Irma Romo 10/17  Appt placed in chart    ______________________________________________________________________    Subjective:  Patient OOB to chair this AM with ACE wrap in place to left arm  He reports arm and incisional swelling is improving since in bed overnight  He complains it is difficult to keep his arm up on pillows  He tells me numbness in hand is also improving  Diffuse ecchymosis and swelling of the left arm  Right arm and left neck is also ecchymotic after wood chopping incident 10 days ago  H/H 7 4/ 21 8  Transfusion on hold for H/H <7 0  INR 1 92 today  Vitals:  /61   Pulse 80   Temp 97 5 °F (36 4 °C) (Axillary)   Resp 18   Ht 6' (1 829 m)   Wt 79 kg (174 lb 2 6 oz)   SpO2 95%   BMI 23 62 kg/m²     I/Os:  I/O last 3 completed shifts:  In: -   Out: 400 [Urine:400]  No intake/output data recorded      Lab Results and Cultures:   CBC with diff:   Lab Results   Component Value Date    WBC 10 81 (H) 10/07/2017    HGB 7 4 (L) 10/07/2017    HCT 21 8 (L) 10/07/2017    MCV 86 10/07/2017     (L) 10/07/2017    MCH 29 2 10/07/2017    MCHC 33 9 10/07/2017    RDW 18 4 (H) 10/07/2017    MPV 11 3 10/07/2017    NRBC 0 10/07/2017   ,   BMP/CMP:  Lab Results   Component Value Date     10/07/2017     10/07/2017     12/28/2015    K 3 6 10/07/2017    K 3 6 10/07/2017    K 3 8 12/28/2015     10/07/2017     10/07/2017     (H) 12/28/2015    CO2 19 (L) 10/07/2017    CO2 19 (L) 10/07/2017    CO2 22 12/28/2015    ANIONGAP 18 (H) 10/07/2017    ANIONGAP 18 (H) 10/07/2017    ANIONGAP 11 12/28/2015     (H) 10/07/2017     (H) 10/07/2017    BUN 45 (H) 12/28/2015    CREATININE 6 01 (H) 10/07/2017    CREATININE 6 01 (H) 10/07/2017    CREATININE 2 83 (H) 12/28/2015    GLUCOSE 131 10/07/2017    GLUCOSE 131 10/07/2017    GLUCOSE 83 12/28/2015    CALCIUM 6 3 (L) 10/07/2017    CALCIUM 6 3 (L) 10/07/2017    CALCIUM 7 8 (L) 12/28/2015    AST 19 10/07/2017    AST 12 02/22/2014    ALT 15 10/07/2017    ALT 14 02/22/2014    ALKPHOS 68 10/07/2017    ALKPHOS 97 02/22/2014    PROT 6 6 10/07/2017    PROT 6 1 (L) 02/24/2014    PROT 6 0 (L) 02/22/2014    ALBUMIN 2 6 (L) 10/07/2017    ALBUMIN 2 8 (L) 02/22/2014    BILITOT 0 90 10/07/2017    BILITOT 0 36 02/22/2014    EGFR 8 10/07/2017    EGFR 8 10/07/2017   ,   Lipid Panel:   Lab Results   Component Value Date    CHOL 77 10/15/2015   ,   Coags:   Lab Results   Component Value Date     (H) 10/05/2017    PTT 69 (H) 10/15/2015    INR 1 92 (H) 10/07/2017    INR 2 12 (H) 12/31/2015   ,     Blood Culture: No results found for: BLOODCX,   Urinalysis:   Lab Results   Component Value Date    COLORU Yellow 10/05/2017    COLORU Yellow 01/06/2016    CLARITYU Clear 10/05/2017    CLARITYU Turbid 01/06/2016    SPECGRAV 1 015 10/05/2017    SPECGRAV 1 014 01/06/2016    PHUR 5 0 10/05/2017    PHUR 6 0 01/06/2016    LEUKOCYTESUR Negative 10/05/2017    LEUKOCYTESUR Large (A) 01/06/2016    NITRITE Negative 10/05/2017    NITRITE Positive (A) 01/06/2016    PROTEINUA Negative 10/05/2017    PROTEINUA 100 (2+) (A) 01/06/2016    GLUCOSEU Negative 10/05/2017    GLUCOSEU Negative 01/06/2016    KETONESU Negative 10/05/2017    KETONESU Negative 01/06/2016    BILIRUBINUR Negative 10/05/2017    BILIRUBINUR Negative 01/06/2016    BLOODU Negative 10/05/2017    BLOODU Moderate (A) 01/06/2016   ,   Urine Culture:   Lab Results   Component Value Date    URINECX 10,000-19,000 cfu/ml Morganella morganii 01/15/2016   ,   Wound Culure: No results found for: WOUNDCULT    Medications:  Current Facility-Administered Medications   Medication Dose Route Frequency    acetaminophen (TYLENOL) tablet 650 mg  650 mg Oral Q6H PRN    amLODIPine (NORVASC) tablet 5 mg  5 mg Oral Daily    aspirin (ECOTRIN LOW STRENGTH) EC tablet 81 mg  81 mg Oral Daily    atorvastatin (LIPITOR) tablet 10 mg  10 mg Oral Daily    calcium carbonate (TUMS) chewable tablet 1,000 mg  1,000 mg Oral TID    calcium gluconate 4 g in sodium chloride 0 9 % 100 mL IVPB  4 g Intravenous Once    ceFAZolin (ANCEF) 1,000 mg in sodium chloride 0 9 % 500 mL irrigation bag   Irrigation Once    cholecalciferol (VITAMIN D3) tablet 2,000 Units  2,000 Units Oral Daily    epoetin sunday (EPOGEN,PROCRIT) injection 10,000 Units  10,000 Units Subcutaneous Once per day on Tue Thu Sat    ferrous sulfate tablet 325 mg  325 mg Oral Daily With Breakfast    heparin (porcine) 2,000 Units, papaverine 60 mg in multi-electrolyte (ISOLYTE-S PH 7 4 equivalent) 500 mL irrigation   Irrigation Once    HYDROcodone-acetaminophen (NORCO) 5-325 mg per tablet 1 tablet  1 tablet Oral Q6H PRN    influenza inactivated quadrivalent vaccine (FLULAVAL) IM injection 0 5 mL  0 5 mL Intramuscular Prior to discharge    metolazone (ZAROXOLYN) tablet 5 mg  5 mg Oral BID    multivitamin-minerals (CENTRUM) tablet 1 tablet  1 tablet Oral Daily    ondansetron (ZOFRAN) injection 4 mg  4 mg Intravenous Q6H PRN    pantoprazole (PROTONIX) EC tablet 40 mg  40 mg Oral Early Morning    terazosin (HYTRIN) capsule 1 mg  1 mg Oral HS    torsemide (DEMADEX) tablet 20 mg  20 mg Oral Daily         Physical Exam:    General: A+Ox3 NAD  CV: RRR S1S2 +prosthetic valve click   Respiratory: CTA B/L, decreased at bases    Extremities : Left arm incision intact with resolving hematoma, 1-2+ left arm swelling mid upper arm to hand   Capillary refill <5secs, dopplerable radial and quinteros arch signal  + bruit and thrill to upper arm AVF   Neurologic: grossly intact     Wound/Incision:  Left arm incsion incision clean, dry, and intact                     Bud Peat, CRNP  10/7/2017  [facility]: [phone]

## 2017-10-07 NOTE — PROGRESS NOTES
NEPHROLOGY PROGRESS NOTE    Patient: Nickolas Alaniz               Sex: male          DOA: 10/5/2017  1:18 PM   YOB: 1935        Age:  80 y o         LOS:  LOS: 1 day     REASON FOR THE CONSULTATION:  Further management of worsening renal function    HPI     This is a 77-year-old male with a past medical history of CKD stage 5 who has recently had AV fistula placed on 10/2/17 and was admitted with worsening swelling of left upper extremities  SUBJECTIVE     - non sustained V-tach noticed on telemetry yesterday   Patient has received 4 g of magnesium sulfate last night and magnesium level has improved overnight  Patient's breathing has remained stable and also found to be afebrile overnight  Patient also feels that his left upper extremity swelling has been slowly improving  Patient was also evaluated by vascular surgical team and I have personally reviewed their note and the recommendations  Patient denies nausea / vomiting / headache / dizziness / SOB / chest pain today    - Reviewed last 24 hrs events     CURRENT MEDICATIONS       Current Facility-Administered Medications:     acetaminophen (TYLENOL) tablet 650 mg, 650 mg, Oral, Q6H PRN, JUVENCIO Cole    amLODIPine (NORVASC) tablet 5 mg, 5 mg, Oral, Daily, JUVENCIO Cole, 5 mg at 10/06/17 0848    aspirin (ECOTRIN LOW STRENGTH) EC tablet 81 mg, 81 mg, Oral, Daily, JUVENCIO Cole, 81 mg at 10/06/17 0848    atorvastatin (LIPITOR) tablet 10 mg, 10 mg, Oral, Daily, JUVENCIO Cole, 10 mg at 10/06/17 0849    calcium carbonate (TUMS) chewable tablet 1,000 mg, 1,000 mg, Oral, TID, Dara Berman MD    calcium gluconate 4 g in sodium chloride 0 9 % 100 mL IVPB, 4 g, Intravenous, Once, Dara Berman MD    ceFAZolin (ANCEF) 1,000 mg in sodium chloride 0 9 % 500 mL irrigation bag, , Irrigation, Once, Pawel Little MD    cholecalciferol (VITAMIN D3) tablet 2,000 Units, 2,000 Units, Oral, Daily, Jaycob Orquidea Rdz, 2,000 Units at 10/06/17 0848    epoetin sunday (EPOGEN,PROCRIT) injection 10,000 Units, 10,000 Units, Subcutaneous, Once per day on Tue Thu Sat, Ananya Dash MD, 10,000 Units at 10/05/17 1854    ferrous sulfate tablet 325 mg, 325 mg, Oral, Daily With Breakfast, JUVENCIO Cole, 325 mg at 10/06/17 0848    heparin (porcine) 2,000 Units, papaverine 60 mg in multi-electrolyte (ISOLYTE-S PH 7 4 equivalent) 500 mL irrigation, , Irrigation, Once, Heidi Reddy MD    HYDROcodone-acetaminophen (NORCO) 5-325 mg per tablet 1 tablet, 1 tablet, Oral, Q6H PRN, JUVENICO Cole, 1 tablet at 10/06/17 1458    influenza inactivated quadrivalent vaccine (FLULAVAL) IM injection 0 5 mL, 0 5 mL, Intramuscular, Prior to discharge, JUVENCIO Cole    metolazone (ZAROXOLYN) tablet 5 mg, 5 mg, Oral, BID, JUVENCIO Cloe, 5 mg at 10/06/17 1748    multivitamin-minerals (CENTRUM) tablet 1 tablet, 1 tablet, Oral, Daily, JUVENCIO Cole, 1 tablet at 10/06/17 0849    ondansetron (ZOFRAN) injection 4 mg, 4 mg, Intravenous, Q6H PRN, JUVENCIO Cole    pantoprazole (PROTONIX) EC tablet 40 mg, 40 mg, Oral, Early Morning, JUVENCIO Cole, 40 mg at 10/07/17 0636    terazosin (HYTRIN) capsule 1 mg, 1 mg, Oral, HS, JUVENCIO Cole, 1 mg at 10/06/17 2129    torsemide (DEMADEX) tablet 20 mg, 20 mg, Oral, Daily, JUVENCIO Cole, 20 mg at 10/06/17 0848    OBJECTIVE     Current Weight: Weight - Scale: 79 kg (174 lb 2 6 oz)  Vitals:    10/07/17 0700   BP: 127/61   Pulse: 80   Resp: 18   Temp: 97 5 °F (36 4 °C)   SpO2: 95%       Intake/Output Summary (Last 24 hours) at 10/07/17 0945  Last data filed at 10/06/17 1500   Gross per 24 hour   Intake                0 ml   Output              100 ml   Net             -100 ml       PHYSICAL EXAMINATION     GEN:  Awake and not in acute distress  RS:  Bilateral equal air entry, no wheezing  CVS:  S1-S2 heard  Abdomen:  Soft, nontender, nondistended, positive bowel sounds  Extremities:  No edema, skin is warm on touch, left upper extremities is wrapped with bruise all over left upper extremities  CNS:  Awake and follows commands  HEENT:  Pale conjunctiva, no JVD, head is atraumatic  Psychiatric:  Normal mood and affect, not suicidal  Musculoskeletal:  No gross bony deformity seen in Upper extremities  Lymph Node:  No palpable cervical lymphadenopathy    LAB RESULTS       Results from last 7 days  Lab Units 10/07/17  0459 10/06/17  1251 10/06/17  0437 10/05/17  1343 10/02/17  0944   WBC Thousand/uL 10 81*  --  10 80* 14 53*  --    HEMOGLOBIN g/dL 7 4*  --  7 0* 7 7*  --    HEMATOCRIT % 21 8*  --  20 8* 23 3*  --    PLATELETS Thousands/uL 122*  --  136* 143*  --    SODIUM mmol/L 137  137 136 139 136 141   POTASSIUM mmol/L 3 6  3 6 3 8 3 9 4 1 3 7   CHLORIDE mmol/L 100  100 99* 103 102 103   CO2 mmol/L 19*  19* 18* 19* 18* 22   BUN mg/dL 104*  104* 103* 103* 102* 87*   CREATININE mg/dL 6 01*  6 01* 6 21* 6 12* 6 02* 5 04*   EGFR ml/min/1 73sq m 8  8 8 8 8 10   CALCIUM mg/dL 6 3*  6 3* 6 2* 5 3* <5 0* 5 5*   MAGNESIUM mg/dL 1 8  1 9 0 9* 0 2*  --   --    PHOSPHORUS mg/dL  --   --  4 5*  --   --    ALBUMIN g/dL 2 6*  --   --  2 6*  --    TOTAL PROTEIN g/dL 6 6  --   --  6 8  --    GLUCOSE RANDOM mg/dL 131  131 164* 96 122 90       I have personally reviewed the old medical records and patient's previously known baseline creatinine level is ~ 4 5-5 0    RADIOLOGY RESULTS      Results for orders placed during the hospital encounter of 10/22/15   XR chest portable    Narrative EXAM ROOM = [facility])   EXAM START = [id number]   EXAM STOP = [id number]   FILMS USED = 1 VIEWS      CHEST       INDICATION-  Status post cardiac surgery      COMPARISON-  10/22/2015 chest x-ray  VIEWS-   AP frontal^  1 image      FINDINGS-  Stable findings status post endovascular repair of ascending   aortic aneurysm  Prosthetic heart valve again seen  Cardiomediastinal silhouette appears unchanged  Atelectatic changes present in the left lung base  Visualized osseous structures appear within normal limits for the   patient's age  IMPRESSION-      Atelectatic changes in the left lung base  Transcribed on- [id number]            NATALYA Spears MD        Reading Radiologist- NATALYA Alejo MD        Electronically Signed- NATALYA Alejo MD        Released Date Time- 10/23/15 1352    ------------------------------------------------------------------------------   READ BY = [id number]^I ANG LIU   RELEASED BY = 28061^I [facility]      Results for orders placed during the hospital encounter of 09/26/17   XR chest pa & lateral    Narrative CHEST - DUAL ENERGY    INDICATION:  Presurgery ,chronic kidney disease    COMPARISON:  July 2, 2017    VIEWS:  PA (including soft tissue/bone algorithms) and lateral projections    IMAGES:  4    FINDINGS:         Cardiomegaly seen  There is a tortuosity of the aorta  There is thoracic aortic stent  There is prosthetic aortic valve    The lungs are clear  No pneumothorax or pleural effusion  Visualized osseous structures appear within normal limits for the patient's age  Impression No acute consolidation or congestion  Cardiomegaly      Workstation performed: [id number]       No results found for this or any previous visit  No results found for this or any previous visit  No results found for this or any previous visit  Results for orders placed in visit on 02/21/14   US retroperitoneal complete    Narrative RENAL ULTRASOUND   INDICATION- Acute kidney insufficiency  Chronic kidney disease  COMPARISON- 04/18/2012   TECHNIQUE-   Ultrasound of the retroperitoneum was performed with a   curvilinear transducer utilizing volumetric sweeps and still imaging   techniques  FINDINGS-   KIDNEYS-   Right kidney-  10 1 cm  Left kidney-  10 8 cm     Symmetric and normal size    Normal echogenicity and contour  No suspicious masses detected  Small 1 1 cm cortical cyst in lower   pole of the right kidney   No hydronephrosis  0 5 cm nonobstructing intrarenal calculus in the lower pole of the   right kidney, unchanged the previous study   No perinephric fluid collections  URETERS-   Nondilated  BLADDER-    Normally distended  No focal thickening or mass lesions  IMPRESSION-    1  No hydronephrosis  2   Stable nonobstructing lower pole 5 mm right renal calculus   unchanged the previous study  Transcribed on- NATALYA Rojas MD        Reading Radiologist- NTAALYA Solorio MD        Releasing Radiologist- NATALYA Solorio MD        Released Date Time- 02/24/14 1611      ------------------------------------------------------------------------------   Candace Rosales   [facility]        PLAN / RECOMMENDATIONS      1  ALLEN top of CKD stage 5, multifactorial and suspect due to progression of underlying chronic kidney disease stage 5  Upon review of old medical records patient's previously known baseline creatinine was between 4 5-5 0 and patient follows Dr Wendi Dos Santos in the office  Overnight patient has remained nonoliguric and renal function has also remained stable at creatinine of 6 01   Currently patient is not experiencing uremic symptoms and no urgent need to start dialysis at this point  Patient had AV fistula placed few days back in left upper extremities which is not matured yet either  Plan to monitor renal function while in the hospital     2   Hypomagnesemia  Severe and received 2 doses of magnesium sulfate 4 g IV in last 24 hours  Overnight magnesium level has improved and now found to be at goal at 1 8  Continue monitor magnesium level while in the hospital     3   Hypocalcemia    Multifactorial, overnight calcium level has remained stable at 6 3 and plan to give 4 g of calcium gluconate x1 dose today  Plan to start patient on Tums 1 g PO TID also  Currently patient is asymptomatic from hypocalcemia perspective  4   Anemia  Multifactorial, patient has received venofer 300 mg IV yesterday and also received dose of Epogen  Overnight hemoglobin level has improved to 7 4 currently no active signs of bleeding  Patient has not received blood transfusion yesterday  Continue to monitor hemoglobin level while in the hospital and may consider blood transfusion if hemoglobin goes below 7     5   Azotemia  Multifactorial, overnight BUN level has remained stable at 104 and currently patient is asymptomatic from azotemia perspective and no need to initiate dialysis at this point  Continue monitor BUN level while in the hospital     6   Hypertension in chronic kidney disease  Overnight patient's blood pressure has remained controlled and at goal and continue to monitor hypertension with amlodipine 5 mg PO daily  Disposition:  Stable for discharge from renal standpoint after calcium infusion     Schedule outpatient Nephrology follow-up with Dr Cruz Queen in 2 weeks  Plan was also d/w Yasmin Jeff MD  Nephrology  10/7/2017        Portions of the record may have been created with voice recognition software  Occasional wrong word or "sound a like" substitutions may have occurred due to the inherent limitations of voice recognition software  Read the chart carefully and recognize, using context, where substitutions have occurred

## 2017-10-09 ENCOUNTER — ALLSCRIPTS OFFICE VISIT (OUTPATIENT)
Dept: OTHER | Facility: OTHER | Age: 82
End: 2017-10-09

## 2017-10-09 ENCOUNTER — TRANSCRIBE ORDERS (OUTPATIENT)
Dept: LAB | Facility: OTHER | Age: 82
End: 2017-10-09

## 2017-10-09 ENCOUNTER — APPOINTMENT (OUTPATIENT)
Dept: LAB | Facility: OTHER | Age: 82
End: 2017-10-09
Payer: MEDICARE

## 2017-10-09 DIAGNOSIS — D64.9 ANEMIA: ICD-10-CM

## 2017-10-09 DIAGNOSIS — M79.89 HAND SWELLING: ICD-10-CM

## 2017-10-09 DIAGNOSIS — D72.829 LEUKOCYTOSIS: ICD-10-CM

## 2017-10-09 DIAGNOSIS — Z79.01 WARFARIN ANTICOAGULATION: ICD-10-CM

## 2017-10-09 LAB
BASOPHILS # BLD AUTO: 0.03 THOUSANDS/ΜL (ref 0–0.1)
BASOPHILS NFR BLD AUTO: 0 % (ref 0–1)
EOSINOPHIL # BLD AUTO: 0.02 THOUSAND/ΜL (ref 0–0.61)
EOSINOPHIL NFR BLD AUTO: 0 % (ref 0–6)
ERYTHROCYTE [DISTWIDTH] IN BLOOD BY AUTOMATED COUNT: 18.8 % (ref 11.6–15.1)
HCT VFR BLD AUTO: 21.8 % (ref 36.5–49.3)
HGB BLD-MCNC: 7.2 G/DL (ref 12–17)
INR PPP: 1.77 (ref 0.86–1.16)
LYMPHOCYTES # BLD AUTO: 0.56 THOUSANDS/ΜL (ref 0.6–4.47)
LYMPHOCYTES NFR BLD AUTO: 7 % (ref 14–44)
MCH RBC QN AUTO: 28.6 PG (ref 26.8–34.3)
MCHC RBC AUTO-ENTMCNC: 33 G/DL (ref 31.4–37.4)
MCV RBC AUTO: 87 FL (ref 82–98)
MONOCYTES # BLD AUTO: 0.65 THOUSAND/ΜL (ref 0.17–1.22)
MONOCYTES NFR BLD AUTO: 8 % (ref 4–12)
NEUTROPHILS # BLD AUTO: 6.88 THOUSANDS/ΜL (ref 1.85–7.62)
NEUTS SEG NFR BLD AUTO: 85 % (ref 43–75)
NRBC BLD AUTO-RTO: 0 /100 WBCS
PLATELET # BLD AUTO: 104 THOUSANDS/UL (ref 149–390)
PMV BLD AUTO: 11.8 FL (ref 8.9–12.7)
PROTHROMBIN TIME: 20.8 SECONDS (ref 12.1–14.4)
RBC # BLD AUTO: 2.52 MILLION/UL (ref 3.88–5.62)
WBC # BLD AUTO: 8.28 THOUSAND/UL (ref 4.31–10.16)

## 2017-10-09 PROCEDURE — 85025 COMPLETE CBC W/AUTO DIFF WBC: CPT

## 2017-10-09 PROCEDURE — 85610 PROTHROMBIN TIME: CPT

## 2017-10-09 PROCEDURE — 36415 COLL VENOUS BLD VENIPUNCTURE: CPT

## 2017-10-16 ENCOUNTER — GENERIC CONVERSION - ENCOUNTER (OUTPATIENT)
Dept: OTHER | Facility: OTHER | Age: 82
End: 2017-10-16

## 2017-10-16 ENCOUNTER — APPOINTMENT (OUTPATIENT)
Dept: LAB | Facility: OTHER | Age: 82
End: 2017-10-16
Payer: MEDICARE

## 2017-10-16 ENCOUNTER — TRANSCRIBE ORDERS (OUTPATIENT)
Dept: LAB | Facility: OTHER | Age: 82
End: 2017-10-16

## 2017-10-16 DIAGNOSIS — Z79.01 LONG TERM CURRENT USE OF ANTICOAGULANT: ICD-10-CM

## 2017-10-16 DIAGNOSIS — R44.3 HALLUCINATIONS: ICD-10-CM

## 2017-10-16 DIAGNOSIS — N18.4 CHRONIC KIDNEY DISEASE, STAGE IV (SEVERE) (HCC): ICD-10-CM

## 2017-10-16 DIAGNOSIS — I71.4 ABDOMINAL AORTIC ANEURYSM WITHOUT RUPTURE (HCC): ICD-10-CM

## 2017-10-16 LAB
INR PPP: 1.86 (ref 0.86–1.16)
PROTHROMBIN TIME: 21.6 SECONDS (ref 12.1–14.4)

## 2017-10-16 PROCEDURE — 36415 COLL VENOUS BLD VENIPUNCTURE: CPT

## 2017-10-16 PROCEDURE — 85610 PROTHROMBIN TIME: CPT

## 2017-10-17 ENCOUNTER — TRANSCRIBE ORDERS (OUTPATIENT)
Dept: LAB | Facility: OTHER | Age: 82
End: 2017-10-17

## 2017-10-17 ENCOUNTER — APPOINTMENT (OUTPATIENT)
Dept: LAB | Facility: OTHER | Age: 82
End: 2017-10-17
Payer: MEDICARE

## 2017-10-17 ENCOUNTER — ALLSCRIPTS OFFICE VISIT (OUTPATIENT)
Dept: OTHER | Facility: OTHER | Age: 82
End: 2017-10-17

## 2017-10-17 DIAGNOSIS — R44.3 HALLUCINATIONS: ICD-10-CM

## 2017-10-17 LAB
ALBUMIN SERPL BCP-MCNC: 2.9 G/DL (ref 3.5–5)
ALP SERPL-CCNC: 69 U/L (ref 46–116)
ALT SERPL W P-5'-P-CCNC: 17 U/L (ref 12–78)
ANION GAP SERPL CALCULATED.3IONS-SCNC: 11 MMOL/L (ref 4–13)
AST SERPL W P-5'-P-CCNC: 11 U/L (ref 5–45)
BILIRUB SERPL-MCNC: 0.76 MG/DL (ref 0.2–1)
BUN SERPL-MCNC: 101 MG/DL (ref 5–25)
CALCIUM SERPL-MCNC: 8.7 MG/DL (ref 8.3–10.1)
CHLORIDE SERPL-SCNC: 111 MMOL/L (ref 100–108)
CO2 SERPL-SCNC: 19 MMOL/L (ref 21–32)
CREAT SERPL-MCNC: 4.95 MG/DL (ref 0.6–1.3)
GFR SERPL CREATININE-BSD FRML MDRD: 10 ML/MIN/1.73SQ M
GLUCOSE P FAST SERPL-MCNC: 149 MG/DL (ref 65–99)
MAGNESIUM SERPL-MCNC: 1.2 MG/DL (ref 1.6–2.6)
POTASSIUM SERPL-SCNC: 4.6 MMOL/L (ref 3.5–5.3)
PROT SERPL-MCNC: 6.8 G/DL (ref 6.4–8.2)
SODIUM SERPL-SCNC: 141 MMOL/L (ref 136–145)

## 2017-10-17 PROCEDURE — 80053 COMPREHEN METABOLIC PANEL: CPT

## 2017-10-17 PROCEDURE — 36415 COLL VENOUS BLD VENIPUNCTURE: CPT

## 2017-10-17 PROCEDURE — 83735 ASSAY OF MAGNESIUM: CPT

## 2017-10-18 NOTE — PROGRESS NOTES
Assessment  1  Chronic renal insufficiency, stage IV (severe) (585 4) (N18 4)    Plan  Orders    · VAS HEMODIALYSIS ACCESS DUPLEX LEFT UPPER LIMB AVF; Status:Active; Requested  SOY:[id number];    Perform:[facility]; Due:17Oct2018; Ordered; For:Chronic renal insufficiency, stage IV (severe); Ordered By:Donna Murphy;    Discussion/Summary    Antwan post left brachiocephalic fistula creation  Unfortunately after the surgery he developed a hematoma in the forearm along with significant swelling that required admission to the hospital  His INR was 4 from Coumadin use even though we had held the Coumadin for 4 days before the surgery  His arm swelling is significantly improved  The hematoma is also improved  He has an excellent thrill over the fistula  I will see him back in 1 month to obtain a venous duplex evaluating maturity of the fistula and also evaluate the site clinically  Chief Complaint  I am here to have my fistula checked  Active Problems  1  Abdominal aortic aneurysm (441 4) (I71 4)   2  Acute foot pain, left (729 5) (M79 672)   3  Acute on chronic systolic congestive heart failure (428 23,428 0) (I50 23)   4  Anemia (285 9) (D64 9)   5  Aneurysm, 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)   6  Anticoagulant long-term use (V58 61) (Z79 01)   7  Arterial ectasia (447 8) (I77 9)   8  Ascending aortic aneurysm (441 2) (I71 2)   9  ASCVD (arteriosclerotic cardiovascular disease) (429 2,440 9) (I25 10)   10  Bilateral edema of lower extremity (782 3) (R60 0)   11  Bilateral inguinal hernia without obstruction or gangrene, recurrence not specified    (550 92) (K40 20)   12  Blood in urine (599 70) (R31 9)   13  Bradycardia (427 89) (R00 1)   14  Cardiomyopathy (425 4) (I42 9)   15  Chronic renal insufficiency, stage IV (severe) (585 4) (N18 4)   16  Chronic systolic CHF (congestive heart failure) (428 22,428 0) (I50 22)   17  CKD (chronic kidney disease), stage V (585 5) (N18 5)   18  Constipation (564 00) (K59 00)   19  Dysphagia (787 20) (R13 10)   20  Fatigue (780 79) (R53 83)   21  Fecal occult blood test positive (792 1) (R19 5)   22  Gout of both feet (274 9) (M10 9)   23  Hallucinations (780 1) (R44 3)   24  Hematuria (599 70) (R31 9)   25  Hyperlipidemia (272 4) (E78 5)   26  Hypertension (401 9) (I10)   27  Hypocalcemia (275 41) (E83 51)   28  Hypomagnesemia (275 2) (E83 42)   29  Iliac artery aneurysm, 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)   30  Incisional hernia, without obstruction or gangrene (553 21) (K43 2)   31  Iron deficiency anemia (280 9) (D50 9)   32  Mitral valve regurgitation (424 0) (I34 0)   33  Monilial rash (112 3) (B37 2)   34  Need for diphtheria-tetanus-pertussis (Tdap) vaccine, adult/adolescent (V06 1) (Z23)   35  Need for influenza vaccination (V04 81) (Z23)   36  Need for pneumococcal vaccination (V03 82) (Z23)   37  Need for shingles vaccine (V04 89) (Z23)   38  Paroxysmal atrial fibrillation (427 31) (I48 0)   39  Peripheral vascular disease (443 9) (I73 9)   40  Preop examination (V72 84) (Z01 818)   41  S/P aortic valve replacement with bioprosthetic valve (V42 2) (Z95 3)   42  Sinus bradycardia (427 89) (R00 1)   43  Skin rash (782 1) (R21)   44  Thrombocytopenia (287 5) (D69 6)   45  Visit for pre-operative examination (V72 84) (Z01 818)    Current Meds   1  AmLODIPine Besylate 5 MG Oral Tablet; TAKE 1 TABLET DAILY; Therapy: 26OJY9890 to ([phone])  Requested for: 27NCV3173; Last   Rx:09Oct2017 Ordered   2  Aspirin 81 MG TABS; TAKE 1 TABLET DAILY; Therapy: (Recorded:24Jjk2401) to Recorded   3  Atorvastatin Calcium 10 MG Oral Tablet; take 1 tablet every day; Therapy: 55Ueg0357 to (Evaluate:19Mar2018)  Requested for: 45DGG4684; Last   Rx:20Sep2017 Ordered   4  [employer] Omeprazole 20 6 (20 Base) MG Oral Capsule Delayed Release; take 1 capsule   daily; Therapy: (Recorded:47Txv8868) to Recorded   5   Ferrous Sulfate 325 (65 Fe) MG Oral Tablet; take one tablet by mouth every day; Therapy: (Recorded:65Aax9792) to Recorded   6  MetOLazone 5 MG Oral Tablet; TAKE 1 TABLET BY MOUTH DAILY  TAKE 30 MINUTES   BEFORE LASIX(FUROSEMIDE); Therapy: 68BXM5145 to (Evaluate:09Sep2018)  Requested for: 96Uey8268; Last   Rx:05Uci8505 Ordered   7  Multi-Vitamin Oral Tablet; take 1 tablet daily Recorded   8  RA Vitamin D-3 2000 UNIT Oral Capsule; TAKE 1 CAPSULE BY MOUTH ONCE DAILY   Recorded   9  Terazosin HCl - 1 MG Oral Capsule; TAKE 1 CAPSULE EVERY DAY; Therapy: 99Itu4878 to (Evaluate:43Vlv7791)  Requested for: 70UST2809; Last   Rx:23Mar2017 Ordered   10  Torsemide 20 MG Oral Tablet; Take 1 tablet daily Recorded   11  Tums 500 MG Oral Tablet Chewable; CHEW 2 TABLET Twice daily; Therapy: 43FWP9814 to (Evaluate:17Nov2017)  Requested for: 24Slw0792 Recorded   12  Warfarin Sodium 5 MG Oral Tablet; take 1 tablet every day; Therapy: 29TZJ7551 to (Evaluate:17Jan2018)  Requested for: 25Crq4878; Last    Rx:51Gnu1891 Ordered    Allergies  1  Penicillins   2  Ativan TABS    Vitals   Recorded: 52JQY8847 12:07PM   Temperature 96 1 F, Tympanic   Heart Rate 62, R Radial   Pulse Quality Regular, R Radial   Respiration Quality Normal   Respiration 14   Systolic 189, RUE, Sitting   Diastolic 50, RUE, Sitting   Height 5 ft 11 in     [address]: Patient had a LUE brachiocephalic AVF creation on 74/0/81 by Dr Ashvin Heart  His left arm is swollen and he states that he has some pain at the incision site  Postoperatively, He was admitted to the hospital with a hematoma in his forearm with a supratherapeutic INR from Coumadin  He does have some numbness and tingling in his finger tips  He cannot close his left hand all the way  He denies any fever or chills  The incision site is closed and not draining  There is a thrill and bruit  He is not currently on dialysis  Vascular Dialysis Access Surgery Post Op:     The patient is status post left upper arm brachiocephalic AV Fistula   This dialysis access surgery was performed by Dr Yudi Maguire on 10/02/2017  HPI: Reason For Visit: Patient presents for 2 week post-op problem  The patient has not started hemodialysis-- and-- does not have a permacath  The patient is currently experiencing hand weakness--   Hematoma and swelling in the left forearm  The patient is not experiencing hand numbness,-- hand pain-- and-- cramping  Follow Up:      Future Appointments    Date/Time Provider Specialty Site   10/30/2017 09:45 AM LACEY Landis  Nephrology [address]   11/13/2017 11:00 AM LACYE Story  Cardiology [facility]     Signatures   Electronically signed by :  Radha Dior MD; Oct 17 2017 12:56PM EST                       (Author)

## 2017-10-19 ENCOUNTER — ALLSCRIPTS OFFICE VISIT (OUTPATIENT)
Dept: OTHER | Facility: OTHER | Age: 82
End: 2017-10-19

## 2017-10-19 ENCOUNTER — APPOINTMENT (EMERGENCY)
Dept: RADIOLOGY | Facility: HOSPITAL | Age: 82
End: 2017-10-19
Payer: MEDICARE

## 2017-10-19 ENCOUNTER — HOSPITAL ENCOUNTER (EMERGENCY)
Facility: HOSPITAL | Age: 82
Discharge: HOME | End: 2017-10-19
Admitting: EMERGENCY MEDICINE
Payer: MEDICARE

## 2017-10-19 VITALS
WEIGHT: 177.03 LBS | RESPIRATION RATE: 18 BRPM | SYSTOLIC BLOOD PRESSURE: 168 MMHG | HEART RATE: 57 BPM | DIASTOLIC BLOOD PRESSURE: 70 MMHG | BODY MASS INDEX: 24.01 KG/M2 | OXYGEN SATURATION: 100 % | TEMPERATURE: 97.7 F

## 2017-10-19 DIAGNOSIS — R60.0 HAND EDEMA: Primary | ICD-10-CM

## 2017-10-19 DIAGNOSIS — E83.42 HYPOMAGNESEMIA: ICD-10-CM

## 2017-10-19 LAB
ABO GROUP BLD: NORMAL
ALBUMIN SERPL BCP-MCNC: 3.1 G/DL (ref 3.5–5)
ALP SERPL-CCNC: 76 U/L (ref 46–116)
ALT SERPL W P-5'-P-CCNC: 17 U/L (ref 12–78)
AMMONIA PLAS-SCNC: 10 UMOL/L (ref 11–35)
ANION GAP SERPL CALCULATED.3IONS-SCNC: 13 MMOL/L (ref 4–13)
APTT PPP: 49 SECONDS (ref 23–35)
AST SERPL W P-5'-P-CCNC: 12 U/L (ref 5–45)
BACTERIA UR QL AUTO: ABNORMAL /HPF
BASOPHILS # BLD AUTO: 0.05 THOUSANDS/ΜL (ref 0–0.1)
BASOPHILS NFR BLD AUTO: 1 % (ref 0–1)
BILIRUB SERPL-MCNC: 0.7 MG/DL (ref 0.2–1)
BILIRUB UR QL STRIP: NEGATIVE
BILIRUB UR QL STRIP: NEGATIVE
BLD GP AB SCN SERPL QL: NEGATIVE
BUN SERPL-MCNC: 94 MG/DL (ref 5–25)
CALCIUM SERPL-MCNC: 8.4 MG/DL (ref 8.3–10.1)
CHLORIDE SERPL-SCNC: 109 MMOL/L (ref 100–108)
CLARITY UR: CLEAR
CO2 SERPL-SCNC: 21 MMOL/L (ref 21–32)
COLOR UR: YELLOW
CREAT SERPL-MCNC: 4.76 MG/DL (ref 0.6–1.3)
EOSINOPHIL # BLD AUTO: 0.03 THOUSAND/ΜL (ref 0–0.61)
EOSINOPHIL NFR BLD AUTO: 1 % (ref 0–6)
ERYTHROCYTE [DISTWIDTH] IN BLOOD BY AUTOMATED COUNT: 20 % (ref 11.6–15.1)
GFR SERPL CREATININE-BSD FRML MDRD: 11 ML/MIN/1.73SQ M
GLUCOSE (HISTORICAL): NEGATIVE
GLUCOSE SERPL-MCNC: 98 MG/DL (ref 65–140)
GLUCOSE UR STRIP-MCNC: NEGATIVE MG/DL
HCT VFR BLD AUTO: 24.7 % (ref 36.5–49.3)
HGB BLD-MCNC: 7.8 G/DL (ref 12–17)
HGB UR QL STRIP.AUTO: ABNORMAL
HGB UR QL STRIP.AUTO: NEGATIVE
INR PPP: 1.67 (ref 0.86–1.16)
KETONES UR STRIP-MCNC: NEGATIVE MG/DL
KETONES UR STRIP-MCNC: NEGATIVE MG/DL
LACTATE SERPL-SCNC: 1.1 MMOL/L (ref 0.5–2)
LEUKOCYTE ESTERASE UR QL STRIP: NEGATIVE
LEUKOCYTE ESTERASE UR QL STRIP: NEGATIVE
LYMPHOCYTES # BLD AUTO: 1.03 THOUSANDS/ΜL (ref 0.6–4.47)
LYMPHOCYTES NFR BLD AUTO: 18 % (ref 14–44)
MAGNESIUM SERPL-MCNC: 0.8 MG/DL (ref 1.6–2.6)
MCH RBC QN AUTO: 29 PG (ref 26.8–34.3)
MCHC RBC AUTO-ENTMCNC: 31.6 G/DL (ref 31.4–37.4)
MCV RBC AUTO: 92 FL (ref 82–98)
MONOCYTES # BLD AUTO: 0.37 THOUSAND/ΜL (ref 0.17–1.22)
MONOCYTES NFR BLD AUTO: 7 % (ref 4–12)
NEUTROPHILS # BLD AUTO: 4.04 THOUSANDS/ΜL (ref 1.85–7.62)
NEUTS SEG NFR BLD AUTO: 72 % (ref 43–75)
NITRITE UR QL STRIP: NEGATIVE
NITRITE UR QL STRIP: NEGATIVE
NON-SQ EPI CELLS URNS QL MICRO: ABNORMAL /HPF
NRBC BLD AUTO-RTO: 0 /100 WBCS
NT-PROBNP SERPL-MCNC: ABNORMAL PG/ML
PH UR STRIP.AUTO: 5 [PH]
PH UR STRIP.AUTO: 5 [PH] (ref 4.5–8)
PLATELET # BLD AUTO: 129 THOUSANDS/UL (ref 149–390)
PMV BLD AUTO: 12.8 FL (ref 8.9–12.7)
POTASSIUM SERPL-SCNC: 4.9 MMOL/L (ref 3.5–5.3)
PROT SERPL-MCNC: 7 G/DL (ref 6.4–8.2)
PROT UR STRIP-MCNC: NEGATIVE MG/DL
PROT UR STRIP-MCNC: NEGATIVE MG/DL
PROTHROMBIN TIME: 20.1 SECONDS (ref 12.1–14.4)
RBC # BLD AUTO: 2.69 MILLION/UL (ref 3.88–5.62)
RBC #/AREA URNS AUTO: ABNORMAL /HPF
RH BLD: POSITIVE
SODIUM SERPL-SCNC: 143 MMOL/L (ref 136–145)
SP GR UR STRIP.AUTO: 1.01
SP GR UR STRIP.AUTO: 1.01 (ref 1–1.03)
SPECIMEN EXPIRATION DATE: NORMAL
TROPONIN I SERPL-MCNC: 0.02 NG/ML
UROBILINOGEN UR QL STRIP.AUTO: 0.2
UROBILINOGEN UR QL STRIP.AUTO: 0.2 E.U./DL
WBC # BLD AUTO: 5.65 THOUSAND/UL (ref 4.31–10.16)
WBC #/AREA URNS AUTO: ABNORMAL /HPF

## 2017-10-19 PROCEDURE — 81001 URINALYSIS AUTO W/SCOPE: CPT | Performed by: NURSE PRACTITIONER

## 2017-10-19 PROCEDURE — 84484 ASSAY OF TROPONIN QUANT: CPT | Performed by: NURSE PRACTITIONER

## 2017-10-19 PROCEDURE — 83735 ASSAY OF MAGNESIUM: CPT | Performed by: NURSE PRACTITIONER

## 2017-10-19 PROCEDURE — 86901 BLOOD TYPING SEROLOGIC RH(D): CPT | Performed by: NURSE PRACTITIONER

## 2017-10-19 PROCEDURE — 36415 COLL VENOUS BLD VENIPUNCTURE: CPT | Performed by: NURSE PRACTITIONER

## 2017-10-19 PROCEDURE — 85730 THROMBOPLASTIN TIME PARTIAL: CPT | Performed by: NURSE PRACTITIONER

## 2017-10-19 PROCEDURE — 85025 COMPLETE CBC W/AUTO DIFF WBC: CPT | Performed by: NURSE PRACTITIONER

## 2017-10-19 PROCEDURE — 80053 COMPREHEN METABOLIC PANEL: CPT | Performed by: NURSE PRACTITIONER

## 2017-10-19 PROCEDURE — 83880 ASSAY OF NATRIURETIC PEPTIDE: CPT | Performed by: NURSE PRACTITIONER

## 2017-10-19 PROCEDURE — 86850 RBC ANTIBODY SCREEN: CPT | Performed by: NURSE PRACTITIONER

## 2017-10-19 PROCEDURE — 71020 HB CHEST X-RAY 2VW FRONTAL&LATL: CPT

## 2017-10-19 PROCEDURE — 83605 ASSAY OF LACTIC ACID: CPT | Performed by: NURSE PRACTITIONER

## 2017-10-19 PROCEDURE — 85610 PROTHROMBIN TIME: CPT | Performed by: NURSE PRACTITIONER

## 2017-10-19 PROCEDURE — 99284 EMERGENCY DEPT VISIT MOD MDM: CPT

## 2017-10-19 PROCEDURE — 82140 ASSAY OF AMMONIA: CPT | Performed by: NURSE PRACTITIONER

## 2017-10-19 PROCEDURE — 96365 THER/PROPH/DIAG IV INF INIT: CPT

## 2017-10-19 PROCEDURE — 96361 HYDRATE IV INFUSION ADD-ON: CPT

## 2017-10-19 PROCEDURE — 96366 THER/PROPH/DIAG IV INF ADDON: CPT

## 2017-10-19 PROCEDURE — 93005 ELECTROCARDIOGRAM TRACING: CPT | Performed by: NURSE PRACTITIONER

## 2017-10-19 PROCEDURE — 87040 BLOOD CULTURE FOR BACTERIA: CPT | Performed by: NURSE PRACTITIONER

## 2017-10-19 PROCEDURE — 86900 BLOOD TYPING SEROLOGIC ABO: CPT | Performed by: NURSE PRACTITIONER

## 2017-10-19 RX ORDER — MAGNESIUM SULFATE HEPTAHYDRATE 40 MG/ML
4 INJECTION, SOLUTION INTRAVENOUS ONCE
Status: COMPLETED | OUTPATIENT
Start: 2017-10-19 | End: 2017-10-19

## 2017-10-19 RX ORDER — MAGNESIUM SULFATE HEPTAHYDRATE 40 MG/ML
2 INJECTION, SOLUTION INTRAVENOUS ONCE
Status: DISCONTINUED | OUTPATIENT
Start: 2017-10-19 | End: 2017-10-19

## 2017-10-19 RX ADMIN — MAGNESIUM SULFATE HEPTAHYDRATE 4 G: 40 INJECTION, SOLUTION INTRAVENOUS at 17:55

## 2017-10-19 RX ADMIN — SODIUM CHLORIDE 500 ML: 0.9 INJECTION, SOLUTION INTRAVENOUS at 16:11

## 2017-10-19 NOTE — DISCHARGE INSTRUCTIONS
Hypomagnesemia   WHAT YOU NEED TO KNOW:   Hypomagnesemia is a condition that develops when the amount of magnesium in your body is too low  Magnesium is a mineral that helps your heart, muscles, and nerves work normally  It also helps strengthen your bones  DISCHARGE INSTRUCTIONS:   Return to the emergency department if:   · You have numbness and tingling in your arms or legs  · You have painful muscle spasms and tremors in your arms or legs  · You are not able to move your muscles, and you have trouble thinking clearly  · Your heartbeat is faster than usual, or is irregular  · You have a seizure  Contact your healthcare provider if:   · You have fatigue and muscle tremors or twitching  · You become irritable and have trouble sleeping  · You have questions or concerns about your condition or care  Prevent hypomagnesemia:   · Manage health conditions  by following your treatment plan  Health conditions such as congestive heart failure, diabetes, and chronic diarrhea can put you at risk for hypomagnesemia  · Eat foods that contain magnesium every day  Ask your dietitian or healthcare provider how much magnesium you need each day  · Limit or do not drink alcohol  Alcohol can prevent your body from absorbing magnesium  Alcohol also makes your body release large amounts of magnesium through your urine  · You may need to take a magnesium supplement  Ask your healthcare provider which supplement to take and how often to take it    Foods that contain magnesium:   · Almonds, cashews, peanuts, and peanut butter    · Dark green leafy vegetables, such as spinach    · Raisins, bananas, apples, broccoli, and carrots    · Soy milk and soy beans    · Black beans and kidney beans    · Whole-wheat bread and brown rice    · Shredded-wheat cereal, oatmeal, and other breakfast cereals fortified with magnesium    · Plain low-fat yogurt and milk    · Cooked halibut  Follow up with your healthcare provider or specialist as directed:  Write down your questions so you remember to ask them during your visits  © 2017 [employer] Information is for End User's use only and may not be sold, redistributed or otherwise used for commercial purposes  All illustrations and images included in CareNotes® are the copyrighted property of [employer]  or [address]  The above information is an  only  It is not intended as medical advice for individual conditions or treatments  Talk to your doctor, nurse or pharmacist before following any medical regimen to see if it is safe and effective for you  Arteriovenous Graft Creation for Hemodialysis   AMBULATORY CARE:   What you need to know about an arteriovenous graft (AVG) creation: An AVG creation is surgery to connect an artery to a vein using a graft  A graft is an artificial tube  You may need an AVG if your artery and vein cannot be directly joined together for hemodialysis  The AVG is usually placed in your forearm or upper arm  How to prepare for an AVG creation:  Your surgeon will talk to you about how to prepare for surgery  You may need to stop taking blood thinners 1 week before surgery  Your surgeon may tell you not to eat or drink anything after midnight on the day of your surgery  He will tell you what medicines to take or not take on the day of your surgery  You may be given an antibiotic through your IV to help prevent a bacterial infection  Tell a healthcare provider if you have had an allergic reaction to an antibiotic  Ask someone to drive you home and stay with you after surgery  What will happen during an AVG creation:  You may be given general anesthesia to keep you asleep and free from pain during surgery  You may instead be given local or regional anesthesia to numb the surgery area   With local or regional anesthesia, you may still feel pressure or pushing during surgery, but you should not feel any pain  Your surgeon will make an incision in your arm  He will connect your artery and vein with the graft  Your incision will be closed with stitches and covered with a bandage  What will happen after an AVG creation:   · Healthcare providers will monitor you until you are awake  They will feel the area over your AVG for a thrill, and listen for a bruit  A thrill is a vibration, and a bruit is a humming noise  The presence of a bruit and a thrill mean that blood is moving through your AVG properly  A healthcare provider will show you how to feel for a thrill  · Your arm may feel sore for several days after your surgery  You may have mild bruising or swelling near your wound  Your wound may drain a few drops of blood or pink fluid for 24 hours  Your AVG will take 2 to 3 weeks to heal  After this time, it can be used for hemodialysis  Risks of an AVG creation:  You may bleed more than expected or get an infection  Your AVG may become narrow or blocked  This may stop blood flow through your AVG, or to your arm or hand  You may need surgery to fix this or create another AVG  You may get a blood clot in your arm or leg  This may become life-threatening  Call 911 for any of the following:   · You feel lightheaded, short of breath, and have chest pain  · You cough up blood  · You have trouble breathing  · You cannot stop the bleeding from your wound even after you hold firm pressure for 10 minutes  Seek care immediately if:   · Blood soaks through your bandage  · Your arm, hand, or fingers are cold, numb, blue, or pale  · Your bruise suddenly gets bigger  · You have trouble moving your arm or hand  · Your arm or leg feels warm, tender, and painful  It may look swollen and red  Contact your healthcare provider if:   · You have a fever or chills  · Your wound is red, swollen, or draining pus  · You have nausea or are vomiting      · Your skin is itchy, swollen, or you have a rash     · You cannot feel a thrill over your AVG  · You have questions or concerns about your condition or care  Medicines: You may need any of the following:  · Prescription pain medicine  may be given  Ask your healthcare provider how to take this medicine safely  Some prescription pain medicines contain acetaminophen  Do not take other medicines that contain acetaminophen without talking to your healthcare provider  Too much acetaminophen may cause liver damage  Prescription pain medicine may cause constipation  Ask your healthcare provider how to prevent or treat constipation  · Acetaminophen  decreases pain and fever  It is available without a doctor's order  Ask how much to take and how often to take it  Follow directions  Read the labels of all other medicines you are using to see if they also contain acetaminophen, or ask your doctor or pharmacist  Acetaminophen can cause liver damage if not taken correctly  Do not use more than 4 grams (4,000 milligrams) total of acetaminophen in one day  · Take your medicine as directed  Contact your healthcare provider if you think your medicine is not helping or if you have side effects  Tell him or her if you are allergic to any medicine  Keep a list of the medicines, vitamins, and herbs you take  Include the amounts, and when and why you take them  Bring the list or the pill bottles to follow-up visits  Carry your medicine list with you in case of an emergency  Care for your wound as directed:  Remove your bandage in 48 hours or as directed  Carefully wash around the wound with soap and water  Dry the area and put on new, clean bandages as directed  Change your bandages when they get wet or dirty  If bleeding from your wound occurs:  Apply firm, steady pressure to stop the bleeding  Apply pressure with a clean gauze or towel for 5 to 10 minutes  Call 911 if bleeding becomes heavy or does not stop     Activity guidelines for your arm with the AVF: · Do not lift anything heavier than 5 pounds for 48 hours or as directed  · Do not push or pull with your arm  · Do not sleep with your arm tucked under you  · Do not carry a purse or bags with your arm  · Do not wear tight-fitting clothing or jewelry over your arm or hand  · After 48 hours, do gentle arm exercises as directed  These exercises will help your AVF heal   Feel for a thrill over your AVG:  Your healthcare provider will tell you how often to feel for a thrill  Place your index finger and second finger over your wound  You should feel a vibration  Apply ice:  Apply ice on your wound for 15 to 20 minutes every hour or as directed  Use an ice pack, or put crushed ice in a plastic bag  Cover it with a towel before you apply it to your skin  Ice helps prevent tissue damage and decreases swelling and pain  Elevate your arm:  Elevate your arm with the AVF above the level of your heart as often as you can  This will help decrease swelling and pain  Prop your arm on pillows or blankets to keep it elevated comfortably  Tell healthcare providers that you have an AVG  Tell them not to do IVs, blood draws, and blood pressure readings in your arm with the AVG  Do not get injections in your arm with your AVG  These actions can help prevent infection, bleeding, or damage to your AVG  Follow up with your healthcare provider as directed:  Write down your questions so you remember to ask them during your visits  © 2017 [employer] Information is for End User's use only and may not be sold, redistributed or otherwise used for commercial purposes  All illustrations and images included in CareNotes® are the copyrighted property of [employer]  or [address]  The above information is an  only  It is not intended as medical advice for individual conditions or treatments   Talk to your doctor, nurse or pharmacist before following any medical regimen to see if it is safe and effective for you

## 2017-10-19 NOTE — ED PROVIDER NOTES
History  Chief Complaint   Patient presents with    Abnormal Lab     Pt called from PCP to come into ED for abnormal labs and weakness  Recent fistula placement on left arm with cold hands  Pt states having hallucinations  44-year-old male patient presenting here today with generalized malaise fatigue feeling weak  He is status post left arm fistula graft over a week ago  He was seen by the PCPs office today they were concerned given the swelling of the distal extremity and that his fingers were cold  His fingers seem to be called on both sides  He still does make urine and he has been urinating quite often  He is under the care of our nephrology team   He was seen by his vascular surgeon yesterday  He is reportedly having abnormal PT INRs and abnormal electrolytes  The plan is to evaluate him this and other sepsis syndromes  I also evaluate for anemia Etc  History provided by:  Patient and spouse      Prior to Admission Medications   Prescriptions Last Dose Informant Patient Reported? Taking?    IRON, FERROUS GLUCONATE, PO   Yes No   Sig: Take 65 mg by mouth daily   Multiple Vitamin (MULTIVITAMIN) capsule   Yes No   Sig: Take 1 capsule by mouth daily   amLODIPine (NORVASC) 10 mg tablet   Yes No   Sig: Take 5 mg by mouth daily     aspirin (ECOTRIN LOW STRENGTH) 81 mg EC tablet   Yes No   Sig: Take 81 mg by mouth daily   atorvastatin (LIPITOR) 10 mg tablet   Yes No   Sig: Take 10 mg by mouth daily   calcitriol (ROCALTROL) 0 25 mcg capsule   Yes No   Sig: Take 0 25 mcg by mouth daily     calcium carbonate (TUMS) 500 mg chewable tablet   No No   Sig: Chew 2 tablets 3 (three) times a day   cholecalciferol (VITAMIN D3) 1,000 units tablet   Yes No   Sig: Take 2,000 Units by mouth daily   epoetin sunday (EPOGEN,PROCRIT) 10,000 units/mL   No No   Sig: Inject 1 mL under the skin 3 (three) times a week   ferrous sulfate 325 (65 Fe) mg tablet   No No   Sig: Take 1 tablet by mouth daily with breakfast metolazone (ZAROXOLYN) 5 mg tablet   Yes No   Sig: Take 5 mg by mouth 2 (two) times a day   omeprazole (PriLOSEC) 20 mg delayed release capsule   Yes No   Sig: Take 20 mg by mouth daily   terazosin (HYTRIN) 1 mg capsule   Yes No   Sig: Take 1 mg by mouth daily at bedtime   torsemide (DEMADEX) 20 mg tablet   Yes No   Sig: Take 20 mg by mouth daily   warfarin (COUMADIN) 2 mg tablet   No No   Sig: Take 1 tablet by mouth daily      Facility-Administered Medications: None       Past Medical History:   Diagnosis Date    Aortic aneurysm (HCC)     Arthritis     GERD (gastroesophageal reflux disease)     Hyperlipidemia     Hypertension     Irregular heart beat     afib    Renal disorder        Past Surgical History:   Procedure Laterality Date    ABDOMINAL AORTIC ANEURYSM REPAIR, OPEN      CARDIAC SURGERY      COLONOSCOPY      HERNIA REPAIR      NV ANASTOMOSIS,AV,ANY SITE Left 10/2/2017    Procedure: UPPER EXTREMITY AV FISTULA CREATION;  Surgeon: Khang Wiggins MD;  [facility]: [facility];  Service: Vascular    VALVE REPLACEMENT         Family History   Problem Relation Age of Onset    Cancer Mother     Anuerysm Father      I have reviewed and agree with the history as documented  Social History   Substance Use Topics    Smoking status: Former Smoker     Quit date: 1/1/2011    Smokeless tobacco: Never Used    Alcohol use No        Review of Systems   Constitutional: Positive for fatigue  Negative for diaphoresis and fever  HENT: Negative for congestion, ear pain, nosebleeds and sore throat  Eyes: Negative for photophobia, pain, discharge and visual disturbance  Respiratory: Negative for cough, choking, chest tightness, shortness of breath and wheezing  Cardiovascular: Negative for chest pain and palpitations  Gastrointestinal: Negative for abdominal distention, abdominal pain, diarrhea and vomiting  Genitourinary: Negative for dysuria, flank pain and frequency     Musculoskeletal: Negative for back pain, gait problem and joint swelling  Skin: Negative for color change and rash  Neurological: Negative for dizziness, syncope and headaches  Psychiatric/Behavioral: Negative for behavioral problems and confusion  The patient is not nervous/anxious  All other systems reviewed and are negative  Physical Exam  ED Triage Vitals   Temperature Pulse Respirations Blood Pressure SpO2   10/19/17 1333 10/19/17 1332 10/19/17 1332 10/19/17 1332 10/19/17 1332   97 7 °F (36 5 °C) 57 17 149/67 100 %      Temp Source Heart Rate Source Patient Position - Orthostatic VS BP Location FiO2 (%)   10/19/17 1333 10/19/17 1332 10/19/17 1332 10/19/17 1332 --   Oral Monitor Lying Right arm       Pain Score       10/19/17 1630       No Pain           Physical Exam   Constitutional: He is oriented to person, place, and time  He appears well-developed and well-nourished  HENT:   Head: Normocephalic and atraumatic  Eyes: Pupils are equal, round, and reactive to light  Neck: Normal range of motion  Neck supple  Cardiovascular: Normal rate, regular rhythm, normal heart sounds and normal pulses  PMI is not displaced  Pulmonary/Chest: Effort normal and breath sounds normal  No respiratory distress  Abdominal: Soft  He exhibits no distension  There is no guarding  Musculoskeletal: Normal range of motion  Arms:  Lymphadenopathy:     He has no cervical adenopathy  Neurological: He is alert and oriented to person, place, and time  Skin: Skin is warm and dry  No rash noted  He is not diaphoretic  No pallor  Psychiatric: He has a normal mood and affect  Vitals reviewed        ED Medications  Medications   magnesium sulfate 4 g/100 mL IVPB (premix) 4 g (not administered)   sodium chloride 0 9 % bolus 500 mL (500 mL Intravenous New Bag 10/19/17 1611)       Diagnostic Studies  Labs Reviewed   CBC AND DIFFERENTIAL - Abnormal        Result Value Ref Range Status    RBC 2 69 (*) 3 88 - 5 62 Million/uL Final Hemoglobin 7 8 (*) 12 0 - 17 0 g/dL Final    Hematocrit 24 7 (*) 36 5 - 49 3 % Final    RDW 20 0 (*) 11 6 - 15 1 % Final    MPV 12 8 (*) 8 9 - 12 7 fL Final    Platelets 756 (*) 214 - 390 Thousands/uL Final    WBC 5 65  4 31 - 10 16 Thousand/uL Final    MCV 92  82 - 98 fL Final    MCH 29 0  26 8 - 34 3 pg Final    MCHC 31 6  31 4 - 37 4 g/dL Final    nRBC 0  /100 WBCs Final    Neutrophils Relative 72  43 - 75 % Final    Lymphocytes Relative 18  14 - 44 % Final    Monocytes Relative 7  4 - 12 % Final    Eosinophils Relative 1  0 - 6 % Final    Basophils Relative 1  0 - 1 % Final    Neutrophils Absolute 4 04  1 85 - 7 62 Thousands/µL Final    Lymphocytes Absolute 1 03  0 60 - 4 47 Thousands/µL Final    Monocytes Absolute 0 37  0 17 - 1 22 Thousand/µL Final    Eosinophils Absolute 0 03  0 00 - 0 61 Thousand/µL Final    Basophils Absolute 0 05  0 00 - 0 10 Thousands/µL Final   COMPREHENSIVE METABOLIC PANEL - Abnormal     Chloride 109 (*) 100 - 108 mmol/L Final    BUN 94 (*) 5 - 25 mg/dL Final    Creatinine 4 76 (*) 0 60 - 1 30 mg/dL Final    Comment: Standardized to IDMS reference method    Albumin 3 1 (*) 3 5 - 5 0 g/dL Final    Sodium 143  136 - 145 mmol/L Final    Potassium 4 9  3 5 - 5 3 mmol/L Final    CO2 21  21 - 32 mmol/L Final    Anion Gap 13  4 - 13 mmol/L Final    Glucose 98  65 - 140 mg/dL Final    Comment:   If the patient is fasting, the ADA then defines impaired fasting glucose as > 100 mg/dL and diabetes as > or equal to 123 mg/dL  Specimen collection should occur prior to Sulfasalazine administration due to the potential for falsely depressed results  Specimen collection should occur prior to Sulfapyridine administration due to the potential for falsely elevated results  Calcium 8 4  8 3 - 10 1 mg/dL Final    AST 12  5 - 45 U/L Final    Comment:   Specimen collection should occur prior to Sulfasalazine administration due to the potential for falsely depressed results       ALT 17  12 - 78 U/L Final    Comment:   Specimen collection should occur prior to Sulfasalazine administration due to the potential for falsely depressed results  Alkaline Phosphatase 76  46 - 116 U/L Final    Total Protein 7 0  6 4 - 8 2 g/dL Final    Total Bilirubin 0 70  0 20 - 1 00 mg/dL Final    eGFR 11  ml/min/1 73sq m Final    Narrative:     National Kidney Disease Education Program recommendations are as follows:  GFR calculation is accurate only with a steady state creatinine  Chronic Kidney disease less than 60 ml/min/1 73 sq  meters  Kidney failure less than 15 ml/min/1 73 sq  meters  AMMONIA - Abnormal     Ammonia 10 (*) 11 - 35 umol/L Final    Comment:   Specimen collection should occur prior to Sulfapyridine administration due to the potential for falsely depressed results  MAGNESIUM - Abnormal     Magnesium 0 8 (*) 1 6 - 2 6 mg/dL Final   UA W REFLEX TO MICROSCOPIC WITH REFLEX TO CULTURE - Abnormal     Blood, UA Trace-Intact (*) Negative Final    Color, UA Yellow   Final    Clarity, UA Clear   Final    Specific [place], UA 1 015  1 003 - 1 030 Final    pH, UA 5 0  4 5 - 8 0 Final    Leukocytes, UA Negative  Negative Final    Nitrite, UA Negative  Negative Final    Protein, UA Negative  Negative mg/dl Final    Glucose, UA Negative  Negative mg/dl Final    Ketones, UA Negative  Negative mg/dl Final    Urobilinogen, UA 0 2  0 2, 1 0 E U /dl E U /dl Final    Bilirubin, UA Negative  Negative Final   NT-BNP PRO (BRAIN NATRIURETIC PEPTIDE) - Abnormal     NT-proBNP 106,946 (*) <450 pg/mL Final   PROTIME-INR - Abnormal     Protime 20 1 (*) 12 1 - 14 4 seconds Final    INR 1 67 (*) 0 86 - 1 16 Final   APTT - Abnormal     PTT 49 (*) 23 - 35 seconds Final    Narrative:      Therapeutic Heparin Range = 60-90 seconds   URINE MICROSCOPIC - Abnormal     RBC, UA 2-4 (*) None Seen, 0-5 /hpf Final    WBC, UA 0-1 (*) None Seen, 0-5, 5-55, 5-65 /hpf Final    Epithelial Cells Occasional  None Seen, Occasional /hpf Final    Bacteria, UA Occasional  None Seen, Occasional /hpf Final   LACTIC ACID, PLASMA - Normal    LACTIC ACID 1 1  0 5 - 2 0 mmol/L Final    Narrative:     Result may be elevated if tourniquet was used during collection  TROPONIN I - Normal    Troponin I 0 02  <=0 04 ng/mL Final    Narrative:     [employer] Chemistry analyzer 99% cutoff is > 0 04 ng/mL in network labs    o cTnI 99% cutoff is useful only when applied to patients in the clinical setting of myocardial ischemia  o cTnI 99% cutoff should be interpreted in the context of clinical history, ECG findings and possibly cardiac imaging to establish correct diagnosis  o cTnI 99% cutoff may be suggestive but clearly not indicative of a coronary event without the clinical setting of myocardial ischemia  BLOOD CULTURE   BLOOD CULTURE   TYPE AND SCREEN    ABO Grouping O   Final    Rh Factor Positive   Final    Antibody Screen Negative   Final    Specimen Expiration Date [id number]   Final       XR chest 2 views   Final Result      Mild pulmonary vascular congestion and small bilateral pleural effusions  Workstation performed: [id number]             Procedures  ECG 12 Lead Documentation  Date/Time: 10/19/2017 3:00 PM  Performed by: Shey Macdonald  Authorized by: Shey Macdonald     ECG reviewed by me, the ED Provider: yes    Patient location:  ED  Previous ECG:     Previous ECG:  Compared to current    Similarity:  No change  Interpretation:     Interpretation: abnormal    Rhythm:     Rhythm: atrial fibrillation    Ectopy:     Ectopy: none    Conduction:     Conduction: normal    T waves:     T waves: inverted      Inverted:  AVL          Phone Contacts  ED Phone Contact    ED Course  ED Course                                MDM  Number of Diagnoses or Management Options  Hand edema: new and requires workup  Diagnosis management comments: The case was discussed with vascular surgery Dr Vicky Turner  I discussed the findings of coolness and distal edema    He states that the distal edema is to be expected following the large hematoma that he had  He states that he did Doppler examinations in the office in everything checked out the 2 days ago  Patient at this point has a good capillary refill  His skin is discolored and ecchymotic likely from resolving hematoma  He does have numbness of the 1st 3 digits the but when I discussed this with the nephrology was thought that this is likely an injury to the median nerve and is quite common with the fistula  I had Dr Rayo Come of evaluate the patient as well just to see if he looked as if he was baseline and he felt comfortable with the patient's laboratory studies and is okay for him to be discharged home  CritCare Time    Disposition  Final diagnoses:   Hand edema   Hypomagnesemia     ED Disposition     ED Disposition Condition Comment    Discharge  Kassidy Officer Jerson discharge to home/self care  Condition at discharge: Good        Follow-up Information     Follow up With Specialties Details Why [address], MD Vascular Surgery, Radiology Schedule an appointment as soon as possible for a visit For Continued Evaluation [facility]  [phone]          Patient's Medications   Discharge Prescriptions    No medications on file     No discharge procedures on file      ED Provider  Electronically Signed by       JUVENCIO Ramos  10/19/17 9639

## 2017-10-19 NOTE — PROGRESS NOTES
Patient was seen and examined earlier as the request of ER physician  Patient was sent into the ER earlier today due to bluish discoloration of left upper extremities  Patient is known to nephrology service will had underwent left upper extremity fistula placed earlier anticipating dialysis in near future  Patient was admitted few weeks back due to swelling of left upper extremities and clinically his left upper extremity edema has resolved now  Patient does complain of tingling and numbness of lateral 2 5 fingers which can be due to nerve damage during fistula surgery  Currently patient's hands are equally warm and does not have any discoloration  Vitals are also stable  I have also reviewed patient's lab blood work  Currently no urgent need to initiate dialysis  Bluish discoloration of hands in left upper extremities can be due to steal syndrome and if that continue to happen may need vascular intervention  Patient was seen and evaluated by Dr Bud Mcdermott earlier and his fistula is maturing well at this point  Stable from renal standpoint for discharge home  Patient follows Dr Jeramy Langston and has scheduled appointment to see Dr Jeramy Langston in near future      Vance Song MD  Nephrology  10/19/2017

## 2017-10-20 LAB
ATRIAL RATE: 62 BPM
QRS AXIS: -33 DEGREES
QRSD INTERVAL: 136 MS
QT INTERVAL: 438 MS
QTC INTERVAL: 438 MS
T WAVE AXIS: 111 DEGREES
VENTRICULAR RATE: 60 BPM

## 2017-10-23 ENCOUNTER — TRANSCRIBE ORDERS (OUTPATIENT)
Dept: LAB | Facility: OTHER | Age: 82
End: 2017-10-23

## 2017-10-23 ENCOUNTER — APPOINTMENT (OUTPATIENT)
Dept: LAB | Facility: OTHER | Age: 82
End: 2017-10-23
Payer: MEDICARE

## 2017-10-23 DIAGNOSIS — N18.4 CHRONIC KIDNEY DISEASE, STAGE IV (SEVERE) (HCC): ICD-10-CM

## 2017-10-23 DIAGNOSIS — I48.0 PAROXYSMAL ATRIAL FIBRILLATION (HCC): ICD-10-CM

## 2017-10-23 DIAGNOSIS — Z79.01 LONG TERM CURRENT USE OF ANTICOAGULANT: ICD-10-CM

## 2017-10-23 LAB
ANION GAP SERPL CALCULATED.3IONS-SCNC: 11 MMOL/L (ref 4–13)
BILIRUB UR QL STRIP: NEGATIVE
BUN SERPL-MCNC: 86 MG/DL (ref 5–25)
CALCIUM SERPL-MCNC: 9.1 MG/DL (ref 8.3–10.1)
CHLORIDE SERPL-SCNC: 115 MMOL/L (ref 100–108)
CLARITY UR: CLEAR
CO2 SERPL-SCNC: 15 MMOL/L (ref 21–32)
COLOR UR: YELLOW
CREAT SERPL-MCNC: 4.78 MG/DL (ref 0.6–1.3)
CREAT UR-MCNC: 52.8 MG/DL
ERYTHROCYTE [DISTWIDTH] IN BLOOD BY AUTOMATED COUNT: 20.3 % (ref 11.6–15.1)
GFR SERPL CREATININE-BSD FRML MDRD: 11 ML/MIN/1.73SQ M
GLUCOSE P FAST SERPL-MCNC: 86 MG/DL (ref 65–99)
GLUCOSE UR STRIP-MCNC: NEGATIVE MG/DL
HCT VFR BLD AUTO: 26.1 % (ref 36.5–49.3)
HGB BLD-MCNC: 8.4 G/DL (ref 12–17)
HGB UR QL STRIP.AUTO: NEGATIVE
INR PPP: 1.94 (ref 0.86–1.16)
KETONES UR STRIP-MCNC: NEGATIVE MG/DL
LEUKOCYTE ESTERASE UR QL STRIP: NEGATIVE
MCH RBC QN AUTO: 29.5 PG (ref 26.8–34.3)
MCHC RBC AUTO-ENTMCNC: 32.2 G/DL (ref 31.4–37.4)
MCV RBC AUTO: 92 FL (ref 82–98)
NITRITE UR QL STRIP: NEGATIVE
PH UR STRIP.AUTO: 6 [PH] (ref 4.5–8)
PHOSPHATE SERPL-MCNC: 4.4 MG/DL (ref 2.3–4.1)
PLATELET # BLD AUTO: 78 THOUSANDS/UL (ref 149–390)
POTASSIUM SERPL-SCNC: 4.8 MMOL/L (ref 3.5–5.3)
PROT UR STRIP-MCNC: NEGATIVE MG/DL
PROT UR-MCNC: 29 MG/DL
PROT/CREAT UR: 0.55 MG/G{CREAT} (ref 0–0.1)
PROTHROMBIN TIME: 22.3 SECONDS (ref 12.1–14.4)
PTH-INTACT SERPL-MCNC: 45.9 PG/ML (ref 14–72)
RBC # BLD AUTO: 2.85 MILLION/UL (ref 3.88–5.62)
SODIUM SERPL-SCNC: 141 MMOL/L (ref 136–145)
SP GR UR STRIP.AUTO: 1.01 (ref 1–1.03)
UROBILINOGEN UR QL STRIP.AUTO: 0.2 E.U./DL
WBC # BLD AUTO: 5.04 THOUSAND/UL (ref 4.31–10.16)

## 2017-10-23 PROCEDURE — 36415 COLL VENOUS BLD VENIPUNCTURE: CPT

## 2017-10-23 PROCEDURE — 80048 BASIC METABOLIC PNL TOTAL CA: CPT

## 2017-10-23 PROCEDURE — 81003 URINALYSIS AUTO W/O SCOPE: CPT

## 2017-10-23 PROCEDURE — 85027 COMPLETE CBC AUTOMATED: CPT

## 2017-10-23 PROCEDURE — 83970 ASSAY OF PARATHORMONE: CPT

## 2017-10-23 PROCEDURE — 84156 ASSAY OF PROTEIN URINE: CPT

## 2017-10-23 PROCEDURE — 85610 PROTHROMBIN TIME: CPT

## 2017-10-23 PROCEDURE — 84100 ASSAY OF PHOSPHORUS: CPT

## 2017-10-23 PROCEDURE — 82570 ASSAY OF URINE CREATININE: CPT

## 2017-10-24 ENCOUNTER — GENERIC CONVERSION - ENCOUNTER (OUTPATIENT)
Dept: OTHER | Facility: OTHER | Age: 82
End: 2017-10-24

## 2017-10-24 LAB
BACTERIA BLD CULT: NORMAL
BACTERIA BLD CULT: NORMAL

## 2017-10-25 ENCOUNTER — HOSPITAL ENCOUNTER (EMERGENCY)
Facility: HOSPITAL | Age: 82
DRG: 885 | End: 2017-10-25
Attending: EMERGENCY MEDICINE
Payer: MEDICARE

## 2017-10-25 ENCOUNTER — HOSPITAL ENCOUNTER (INPATIENT)
Facility: HOSPITAL | Age: 82
LOS: 13 days | Discharge: NURSING, REHAB OR LONG-TERM CARE | DRG: 885 | End: 2017-11-07
Attending: PSYCHIATRY & NEUROLOGY | Admitting: PSYCHIATRY & NEUROLOGY
Payer: MEDICARE

## 2017-10-25 ENCOUNTER — TELEPHONE (OUTPATIENT)
Dept: BEHAVIORAL HEALTH UNIT | Facility: HOSPITAL | Age: 82
End: 2017-10-25

## 2017-10-25 ENCOUNTER — APPOINTMENT (EMERGENCY)
Dept: RADIOLOGY | Facility: HOSPITAL | Age: 82
DRG: 885 | End: 2017-10-25
Payer: MEDICARE

## 2017-10-25 VITALS
OXYGEN SATURATION: 98 % | TEMPERATURE: 97.6 F | HEART RATE: 78 BPM | DIASTOLIC BLOOD PRESSURE: 64 MMHG | WEIGHT: 174 LBS | RESPIRATION RATE: 26 BRPM | BODY MASS INDEX: 23.6 KG/M2 | SYSTOLIC BLOOD PRESSURE: 139 MMHG

## 2017-10-25 DIAGNOSIS — I12.9 HYPERTENSIVE CKD (CHRONIC KIDNEY DISEASE): ICD-10-CM

## 2017-10-25 DIAGNOSIS — R45.1 AGITATION: ICD-10-CM

## 2017-10-25 DIAGNOSIS — I48.0 PAROXYSMAL ATRIAL FIBRILLATION (HCC): ICD-10-CM

## 2017-10-25 DIAGNOSIS — F09 PSYCHOSIS, ORGANIC: ICD-10-CM

## 2017-10-25 DIAGNOSIS — R53.1 WEAKNESS GENERALIZED: Primary | ICD-10-CM

## 2017-10-25 DIAGNOSIS — N18.4 CKD (CHRONIC KIDNEY DISEASE), STAGE IV (HCC): Primary | ICD-10-CM

## 2017-10-25 DIAGNOSIS — I50.9 CHF (CONGESTIVE HEART FAILURE) (HCC): ICD-10-CM

## 2017-10-25 DIAGNOSIS — R44.3 HALLUCINATION: ICD-10-CM

## 2017-10-25 LAB
ALBUMIN SERPL BCP-MCNC: 3.3 G/DL (ref 3.5–5)
ALP SERPL-CCNC: 81 U/L (ref 46–116)
ALT SERPL W P-5'-P-CCNC: 17 U/L (ref 12–78)
AMPHETAMINES SERPL QL SCN: NEGATIVE
ANION GAP SERPL CALCULATED.3IONS-SCNC: 10 MMOL/L (ref 4–13)
AST SERPL W P-5'-P-CCNC: 36 U/L (ref 5–45)
ATRIAL RATE: 98 BPM
BACTERIA UR QL AUTO: ABNORMAL /HPF
BARBITURATES UR QL: NEGATIVE
BASOPHILS # BLD AUTO: 0.02 THOUSANDS/ΜL (ref 0–0.1)
BASOPHILS NFR BLD AUTO: 0 % (ref 0–1)
BENZODIAZ UR QL: NEGATIVE
BILIRUB SERPL-MCNC: 0.85 MG/DL (ref 0.2–1)
BILIRUB UR QL STRIP: NEGATIVE
BUN SERPL-MCNC: 90 MG/DL (ref 5–25)
CALCIUM SERPL-MCNC: 8.8 MG/DL (ref 8.3–10.1)
CHLORIDE SERPL-SCNC: 111 MMOL/L (ref 100–108)
CLARITY UR: CLEAR
CO2 SERPL-SCNC: 20 MMOL/L (ref 21–32)
COCAINE UR QL: NEGATIVE
COLOR UR: YELLOW
COLOR, POC: NORMAL
CREAT SERPL-MCNC: 4.81 MG/DL (ref 0.6–1.3)
EOSINOPHIL # BLD AUTO: 0.05 THOUSAND/ΜL (ref 0–0.61)
EOSINOPHIL NFR BLD AUTO: 1 % (ref 0–6)
ERYTHROCYTE [DISTWIDTH] IN BLOOD BY AUTOMATED COUNT: 19.9 % (ref 11.6–15.1)
ETHANOL EXG-MCNC: 0 MG/DL
GFR SERPL CREATININE-BSD FRML MDRD: 10 ML/MIN/1.73SQ M
GLUCOSE SERPL-MCNC: 111 MG/DL (ref 65–140)
GLUCOSE UR STRIP-MCNC: NEGATIVE MG/DL
HCT VFR BLD AUTO: 26.2 % (ref 36.5–49.3)
HGB BLD-MCNC: 8.4 G/DL (ref 12–17)
HGB UR QL STRIP.AUTO: ABNORMAL
HYALINE CASTS #/AREA URNS LPF: ABNORMAL /LPF
KETONES UR STRIP-MCNC: NEGATIVE MG/DL
LEUKOCYTE ESTERASE UR QL STRIP: NEGATIVE
LYMPHOCYTES # BLD AUTO: 1.2 THOUSANDS/ΜL (ref 0.6–4.47)
LYMPHOCYTES NFR BLD AUTO: 19 % (ref 14–44)
MCH RBC QN AUTO: 29.2 PG (ref 26.8–34.3)
MCHC RBC AUTO-ENTMCNC: 32.1 G/DL (ref 31.4–37.4)
MCV RBC AUTO: 91 FL (ref 82–98)
METHADONE UR QL: NEGATIVE
MONOCYTES # BLD AUTO: 0.42 THOUSAND/ΜL (ref 0.17–1.22)
MONOCYTES NFR BLD AUTO: 7 % (ref 4–12)
NEUTROPHILS # BLD AUTO: 4.63 THOUSANDS/ΜL (ref 1.85–7.62)
NEUTS SEG NFR BLD AUTO: 73 % (ref 43–75)
NITRITE UR QL STRIP: NEGATIVE
NON-SQ EPI CELLS URNS QL MICRO: ABNORMAL /HPF
NRBC BLD AUTO-RTO: 0 /100 WBCS
OPIATES UR QL SCN: NEGATIVE
PCP UR QL: NEGATIVE
PH UR STRIP.AUTO: 5 [PH] (ref 4.5–8)
PLATELET # BLD AUTO: 77 THOUSANDS/UL (ref 149–390)
POTASSIUM SERPL-SCNC: 5.3 MMOL/L (ref 3.5–5.3)
PROT SERPL-MCNC: 7.9 G/DL (ref 6.4–8.2)
PROT UR STRIP-MCNC: ABNORMAL MG/DL
QRS AXIS: -70 DEGREES
QRSD INTERVAL: 138 MS
QT INTERVAL: 450 MS
QTC INTERVAL: 438 MS
RBC # BLD AUTO: 2.88 MILLION/UL (ref 3.88–5.62)
RBC #/AREA URNS AUTO: ABNORMAL /HPF
SODIUM SERPL-SCNC: 141 MMOL/L (ref 136–145)
SP GR UR STRIP.AUTO: 1.01 (ref 1–1.03)
SPECIMEN SOURCE: NORMAL
T WAVE AXIS: 100 DEGREES
THC UR QL: NEGATIVE
TROPONIN I BLD-MCNC: 0.02 NG/ML (ref 0–0.08)
TSH SERPL DL<=0.05 MIU/L-ACNC: 4.24 UIU/ML (ref 0.36–3.74)
UROBILINOGEN UR QL STRIP.AUTO: 0.2 E.U./DL
VENTRICULAR RATE: 57 BPM
WBC # BLD AUTO: 6.41 THOUSAND/UL (ref 4.31–10.16)
WBC #/AREA URNS AUTO: ABNORMAL /HPF

## 2017-10-25 PROCEDURE — 70450 CT HEAD/BRAIN W/O DYE: CPT

## 2017-10-25 PROCEDURE — 97163 PT EVAL HIGH COMPLEX 45 MIN: CPT

## 2017-10-25 PROCEDURE — G8987 SELF CARE CURRENT STATUS: HCPCS

## 2017-10-25 PROCEDURE — 80307 DRUG TEST PRSMV CHEM ANLYZR: CPT | Performed by: EMERGENCY MEDICINE

## 2017-10-25 PROCEDURE — 81002 URINALYSIS NONAUTO W/O SCOPE: CPT | Performed by: EMERGENCY MEDICINE

## 2017-10-25 PROCEDURE — G8988 SELF CARE GOAL STATUS: HCPCS

## 2017-10-25 PROCEDURE — 97532 HB COGNITIVE SKILLS DEVELOPMENT: CPT

## 2017-10-25 PROCEDURE — 81001 URINALYSIS AUTO W/SCOPE: CPT

## 2017-10-25 PROCEDURE — 82075 ASSAY OF BREATH ETHANOL: CPT | Performed by: EMERGENCY MEDICINE

## 2017-10-25 PROCEDURE — G8978 MOBILITY CURRENT STATUS: HCPCS

## 2017-10-25 PROCEDURE — 36415 COLL VENOUS BLD VENIPUNCTURE: CPT | Performed by: EMERGENCY MEDICINE

## 2017-10-25 PROCEDURE — G8979 MOBILITY GOAL STATUS: HCPCS

## 2017-10-25 PROCEDURE — 99285 EMERGENCY DEPT VISIT HI MDM: CPT

## 2017-10-25 PROCEDURE — 80053 COMPREHEN METABOLIC PANEL: CPT | Performed by: EMERGENCY MEDICINE

## 2017-10-25 PROCEDURE — 84484 ASSAY OF TROPONIN QUANT: CPT

## 2017-10-25 PROCEDURE — 71020 HB CHEST X-RAY 2VW FRONTAL&LATL: CPT

## 2017-10-25 PROCEDURE — 97167 OT EVAL HIGH COMPLEX 60 MIN: CPT

## 2017-10-25 PROCEDURE — 93005 ELECTROCARDIOGRAM TRACING: CPT

## 2017-10-25 PROCEDURE — 84443 ASSAY THYROID STIM HORMONE: CPT | Performed by: EMERGENCY MEDICINE

## 2017-10-25 PROCEDURE — 85025 COMPLETE CBC W/AUTO DIFF WBC: CPT | Performed by: EMERGENCY MEDICINE

## 2017-10-25 RX ORDER — ACETAMINOPHEN 325 MG/1
650 TABLET ORAL EVERY 4 HOURS PRN
Status: DISCONTINUED | OUTPATIENT
Start: 2017-10-25 | End: 2017-11-07 | Stop reason: HOSPADM

## 2017-10-25 RX ORDER — MAGNESIUM HYDROXIDE/ALUMINUM HYDROXICE/SIMETHICONE 120; 1200; 1200 MG/30ML; MG/30ML; MG/30ML
30 SUSPENSION ORAL EVERY 4 HOURS PRN
Status: CANCELLED | OUTPATIENT
Start: 2017-10-25

## 2017-10-25 RX ORDER — WARFARIN SODIUM 2 MG/1
2 TABLET ORAL
Status: DISCONTINUED | OUTPATIENT
Start: 2017-10-25 | End: 2017-10-25 | Stop reason: HOSPADM

## 2017-10-25 RX ORDER — MAGNESIUM HYDROXIDE/ALUMINUM HYDROXICE/SIMETHICONE 120; 1200; 1200 MG/30ML; MG/30ML; MG/30ML
30 SUSPENSION ORAL EVERY 4 HOURS PRN
Status: DISCONTINUED | OUTPATIENT
Start: 2017-10-25 | End: 2017-11-07 | Stop reason: HOSPADM

## 2017-10-25 RX ORDER — TRAZODONE HYDROCHLORIDE 50 MG/1
25 TABLET ORAL
Status: DISCONTINUED | OUTPATIENT
Start: 2017-10-25 | End: 2017-11-07 | Stop reason: HOSPADM

## 2017-10-25 RX ORDER — RISPERIDONE 0.5 MG/1
0.5 TABLET, ORALLY DISINTEGRATING ORAL EVERY 8 HOURS PRN
Status: CANCELLED | OUTPATIENT
Start: 2017-10-25

## 2017-10-25 RX ORDER — HYDROXYZINE HYDROCHLORIDE 25 MG/1
25 TABLET, FILM COATED ORAL EVERY 6 HOURS PRN
Status: DISCONTINUED | OUTPATIENT
Start: 2017-10-25 | End: 2017-11-07 | Stop reason: HOSPADM

## 2017-10-25 RX ORDER — RISPERIDONE 0.5 MG/1
0.5 TABLET, ORALLY DISINTEGRATING ORAL EVERY 8 HOURS PRN
Status: DISCONTINUED | OUTPATIENT
Start: 2017-10-25 | End: 2017-11-07 | Stop reason: HOSPADM

## 2017-10-25 RX ORDER — BENZTROPINE MESYLATE 1 MG/1
1 TABLET ORAL EVERY 6 HOURS PRN
Status: CANCELLED | OUTPATIENT
Start: 2017-10-25

## 2017-10-25 RX ORDER — HALOPERIDOL 1 MG/1
1 TABLET ORAL EVERY 6 HOURS PRN
Status: DISCONTINUED | OUTPATIENT
Start: 2017-10-25 | End: 2017-11-07 | Stop reason: HOSPADM

## 2017-10-25 RX ORDER — FUROSEMIDE 20 MG/1
20 TABLET ORAL DAILY
COMMUNITY
End: 2017-11-07 | Stop reason: HOSPADM

## 2017-10-25 RX ORDER — BENZTROPINE MESYLATE 1 MG/1
1 TABLET ORAL EVERY 6 HOURS PRN
Status: DISCONTINUED | OUTPATIENT
Start: 2017-10-25 | End: 2017-11-07 | Stop reason: HOSPADM

## 2017-10-25 RX ORDER — HALOPERIDOL 1 MG/1
1 TABLET ORAL EVERY 6 HOURS PRN
Status: CANCELLED | OUTPATIENT
Start: 2017-10-25

## 2017-10-25 RX ORDER — FUROSEMIDE 20 MG/1
20 TABLET ORAL ONCE
Status: DISCONTINUED | OUTPATIENT
Start: 2017-10-25 | End: 2017-10-25 | Stop reason: HOSPADM

## 2017-10-25 RX ORDER — TRAZODONE HYDROCHLORIDE 50 MG/1
25 TABLET ORAL
Status: CANCELLED | OUTPATIENT
Start: 2017-10-25

## 2017-10-25 RX ORDER — ACETAMINOPHEN 325 MG/1
650 TABLET ORAL EVERY 4 HOURS PRN
Status: CANCELLED | OUTPATIENT
Start: 2017-10-25

## 2017-10-25 RX ORDER — HYDROXYZINE HYDROCHLORIDE 25 MG/1
25 TABLET, FILM COATED ORAL EVERY 6 HOURS PRN
Status: CANCELLED | OUTPATIENT
Start: 2017-10-25

## 2017-10-25 RX ORDER — ACETAMINOPHEN 325 MG/1
650 TABLET ORAL EVERY 6 HOURS PRN
Status: CANCELLED | OUTPATIENT
Start: 2017-10-25

## 2017-10-25 RX ORDER — ACETAMINOPHEN 325 MG/1
650 TABLET ORAL EVERY 6 HOURS PRN
Status: DISCONTINUED | OUTPATIENT
Start: 2017-10-25 | End: 2017-11-07 | Stop reason: HOSPADM

## 2017-10-25 NOTE — OCCUPATIONAL THERAPY NOTE
[address] Evaluation     Patient Name: Anusha Oakes  KTAKR'Z Date: 10/25/2017  Problem List  Patient Active Problem List   Diagnosis    CKD (chronic kidney disease), stage IV (HCC)    Edema    CHF (congestive heart failure) (HCC)    Paroxysmal atrial fibrillation (HCC)    Anemia    Cellulitis of extremity    [facility]EN [facility]acute [facility]) ([facility]    Azotemia    Hypertensive CKD (chronic kidney disease)    Weakness generalized    CAD (coronary artery disease)    Hypocalcemia    Warfarin anticoagulation    Hand swelling    Leukocytosis    Left upper extremity swelling    [facility]EN [facility]acute [facility]) [facility]    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    Hypomagnesemia    NSVT [facility]nonsustained ventricular tachycardia) ([facility])     Past Medical History  Past Medical History:   Diagnosis Date    Aortic aneurysm (HCC)     Arthritis     GERD (gastroesophageal reflux disease)     Hyperlipidemia     Hypertension     Irregular heart beat     afib    Renal disorder      Past Surgical History  Past Surgical History:   Procedure Laterality Date    ABDOMINAL AORTIC ANEURYSM REPAIR, OPEN      CARDIAC SURGERY      COLONOSCOPY      HERNIA REPAIR      ID ANASTOMOSIS,AV,ANY SITE Left 10/2/2017    Procedure: UPPER EXTREMITY AV FISTULA CREATION;  Surgeon: Heladio Amaya MD;  Location: [facility];  Service: Vascular    VALVE REPLACEMENT                 10/25/17 1332   Note Type   Note type Eval/Treat   Restrictions/Precautions   Weight Bearing Precautions Per Order No   Other Precautions Fall Risk;Telemetry;Cognitive; Impulsive;Agitated;Visual impairment   Pain Assessment   Pain Assessment No/denies pain   Pain Score No Pain   Home Living   Type of [address] [place] [address] One level;Stairs to enter with rails  (2 BRAYDEN)   Bathroom Shower/Tub Tub/shower unit   Bathroom Toilet Standard   Bathroom Accessibility Accessible   Home Equipment Walker;Cane   Additional Comments PT W/ USE OF RW PTA  Prior Function   Level of [place] Independent with ADLs and functional mobility   Lives With Spouse; Family   Receives Help From Family   ADL Assistance Independent  (RECENT FUNCTIONAL DECLINE REQUIRING ASSISTANCE)   IADLs Needs assistance   Falls in the last 6 months 1 to 4  (WIFE REPORTS AT LEAST 3 FALLS IN THE PAST 6 MONTHS)   Vocational Retired   Lifestyle   Autonomy PT REPORTS BASELINE INDEPENDENCE IN ADLS/IADLS/DRIVING  PT REPORTS NOT DRIVING RECENTLY STATING "I'VE BEEN UNDER THE WEATHER"  Reciprocal Relationships PT RESIDES WITH SPOUSE  SPOUSE IS RETIRED  Service to Others PT IS RETIRED  PREVIOUSLY OWNED A TIRE BUSINESS  Intrinsic Gratification 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)  PT REPORTS HE HASN'T BEEN DOING MUCH OF ANYTHING RECENTLY  Psychosocial   Psychosocial (WDL) X   Patient Behaviors/Mood Irritable   Subjective   Subjective "WELL I'VE BEEN UNDER THE WEATHER FOR A FEW WEEKS"   ADL   Where Assessed Chair   Eating Assistance 5  Supervision/Setup   Grooming Assistance 5  Supervision/Setup   UB Bathing Assistance 4  Minimal Assistance   LB Bathing Assistance 4  Minimal Assistance   UB Dressing Assistance 4  Minimal Assistance   LB Dressing Assistance 4  [address]  4  Minimal Assistance   Bed Mobility   Supine to Sit 4  Minimal assistance   Additional items Assist x 1   Sit to Supine Unable to assess   Additional Comments PT LEFT SITTING AT EOB W/ WIFE PRESENT      Transfers   Sit to Stand 5  Supervision   Additional items Verbal cues   Stand to Sit 5  Supervision   Additional items Verbal cues   Functional Mobility   Functional Mobility 4  Minimal assistance   Additional Comments CGA/MIN W/ RW AND VERBAL CUES FOR SAFETY   Additional items Rolling walker   Balance   Static Sitting Good   Dynamic Sitting Fair +   Static Standing Fair   Dynamic Standing Fair -   Ambulatory Fair -   Activity Tolerance   Activity Tolerance Treatment limited secondary to agitation   Medical Staff Made Aware F/U W/ MD [facility]   Nurse Made Aware OKAY TO SEE PER RN GILBERT   RUTESSA Assessment   RUE Assessment WFL  (+ DISTAL EDEMA)   LUE Assessment   LUE Assessment WFL  (+ DISTAL EDEMA)   Hand Function   Gross Motor Coordination Functional   Fine Motor Coordination Functional   Sensation   Light Touch Partial deficits in the RUE;Partial deficits in the LUE   Sharp/Dull Partial deficits in the RUE;Partial deficits in the LUE   Stereognosis Partial deficits in the RUE;Partial deficits in the LUE   Proprioception   Proprioception No apparent deficits   Vision-Basic Assessment   Current Vision Wears glasses only for reading  (NOT PRESENT IN HOSPITAL  )   Vision - Complex Assessment   Ocular Range of Motion WFL   Head Position WDL   Tracking Able to track stimulus in all quads without difficulty   Perception   Inattention/Neglect Appears intact   Cognition   Overall Cognitive Status Impaired   Arousal/Participation Responsive   Attention Attends with cues to redirect   Orientation Level Oriented to person;Oriented to place; Disoriented to time;Disoriented to situation   Memory Decreased recall of precautions;Decreased recall of recent events;Decreased short term memory   Following Commands Follows one step commands with increased time or repetition   Comments PT APPEARS LIMITED 2* IMPAIRED ATTENTION, INSIGHT, SAFETY, JUDGEMENT, ORIENTATION AND PROCESSING  PT REQUIRES CUES FOR SAFETY T/O FUNCTIONAL ACTIVITY  Assessment   Limitation Decreased ADL status; Decreased Safe judgement during ADL;Decreased cognition;Decreased self-care trans;Decreased high-level ADLs  (BALANCE, AGITATION, FALL RISK)   Prognosis Fair   Assessment PT IS AN 81 YO M ADMIT W/ AMS AS WELL AS REPORTED SOB AND LIGHTHEADEDNESS X FEW WEEKS  FAMILY REPORTS PT HAS BEEN DISORIENTED, HALLUCINATING PEOPLE RUNNING THROUGH THE HOUSE AT NIGHT AND AGITATED SINCE RECENT AV FISTULA PLACEMENT 10/04/17   FAMILY ALSO REPORTS A RECENT FALL 9/29 W/ HEAD IMPACT  PT W/ PMH SIGNIFICANT FOR AAA, DYSPHAGIA, CAD, CABG, HLD, HTN, NSVT, ALLEN, AFIB, ASCVD, MITRAL VALVE REGURITATION, PVD AND METIONED AV FISTULA PLACEMENT  BASED ON CHART REVIEW, PT HAS HAD 1 RECENT HOSPITALIZATION AND 2 RECENT ED VISITS IN THE PAST TWO MONTHS  PT IS FROM HOME W/ FAMILY AND REPORTS BASELINE INDEPENDENCE IN ADLS/IADLS/DRIVING  PT REPORTS SPOUSE COMPLETES MAJORITY OF IADLS AT BASELINE  PT ALSO REPORTS RECENTLY STOPPING DRIVING AS HE WAS "FEELING UNDER THE WEATHER"  PT REPORTS NO CONCERNS RE: ADL COMPLETION AT BASELINE  SPOUSE HOWEVER REPORTS PT HAS RECENTLY BEEN UNABLE TO CARE FOR HIMSELF AND APPEARS TO "FORGET" HOW TO BATHE AND DRESS AND ULTIMATELY REQUIRING ASSISTANCE FOR THESE TASKS  PT REPORTS NO RECENT H/O FALLS HOWEVER SPOUSE REPORTS THE PT HAS HAD AT LEAST 3 FALLS IN THE PAST SIX MONTHS, MOST RECENT FALL ON 9/29 W/ HEAD IMPACT  CURRENTLY PT REQUIRING MIN A UB ADLS, MIN A LB ADLS, SBA FUNCTIONAL TRANSFERS AND CGA/MIN A FUNCTIONAL MOBILITY USING RW W/ CUES FOR SAFETY  PT APPEARS LIMITED AT THIS TIME 2* IMPAIRED BALANCE, ACTIVITY TOLERANCE, MEDICAL STATUS, WEAKNESS, DECONDITIONING, B/L UE DISTAL EDEMA/ECHHYMOSIS/SENSATION DEFICITS, ADL IMPAIRMENTS, VISUAL DEFICITS AS WELL AS NOTED IMPAIRED ATTENTION, INSIGHT, SAFETY, JUDGEMENT, ORIENTATION, PROCESSING AND OCCASIONAL AGITATION/OUTBURSTS  SPOUSE AT THIS TIME REPORTS CONCERNS FOR CARING FOR THE PATIENT IN THE HOME ENVIRONMENT  FROM OT PERSPECTIVE, PT MAY BENEFIT FROM CONTINUED OT SERVICES IN AN INPT REHAB SETTING  HOWEVER, PENDING CONTINUED PROGRESS AND STABILIZATION OF SYMPTOMS PT MAY DEMONSTRATE ABILITIES TO D/C HOME W/ FAMILY SUPPORT  SPOKE TO MD THIS PM RE: RECOMMENDATION FOR GERIATRIC C/S AND PSYCH C/S  WILL CONTINUE TO FOLLOW PT ON CASELOAD IN ORDER TO MEET THE BELOW DESCRIBED GOALS  Goals   Patient Goals PT WOULD LIKE TO GO HOME TODAY   LTG Time Frame 7-10   Long Term Goal #1 PLEASE SEE BELOW DESCRIBED GOALS  Plan   Treatment Interventions ADL retraining;Functional transfer training;UE strengthening/ROM; Endurance training;Cognitive reorientation;Patient/family training;Equipment evaluation/education; Compensatory technique education;Continued evaluation; Activityengagement; Energy conservation   Goal Expiration Date 11/04/17   OT Frequency 3-5x/wk   Recommendation   OT Discharge Recommendation Short Term Rehab  (VS HOME PENDING PROGRESS)   OT - OK to Discharge (STR VS HOME PENDING PROGRESS)   Barthel Index   Feeding 10   Bathing 0   Grooming Score 0   Dressing Score 5   Bladder Score 5   Bowels Score 10   Toilet Use Score 5   Transfers (Bed/Chair) Score 10   Mobility (Level Surface) Score 10   Stairs Score 0   Barthel Index Score 55   Modified [place] Scale   Modified [place] Scale 4     GOALS TO BE MET IN 7-10 DAYS:    1) Pt will increase bed mobility to SBA and transfer EOB to participate in functional activity with G tolerance and balance  2) Pt will improve functional transfers to SBA on/off all surfaces using DME PRN w/ G balance/safety including toileting  3) Pt will increase independence in all ADLS to SBA with G balance sitting upright in chair  4) Pt will complete toileting w/ SBA w/ G hygiene/thoroughness using DME PRN  5) Pt will improve activity tolerance to G for min 30 min txment sessions  6) Pt will participate in light grooming task with SBA using setup standing at sink ~3-5mins with G safety and balance  7) Pt will engage in ongoing cognitive assessment(S) w/ G participation to A w/ safe d/c planning/recommendations      8) Pt will follow 100% 2 step commands and be A&O x4 consistently with environmental cues to increase activity participation to [address], [place], OTR/L

## 2017-10-25 NOTE — ED NOTES
CW checked EVS and pt is not on file  PT main insurance is medicare part A and B  No Pre-cert required  The pt secondary is [employer]  CW called the PT [employer] plan at [phone]   Jourdan Mai stated that they would follow the pt medicare days and no COB are required with this plan

## 2017-10-25 NOTE — ED ATTENDING ATTESTATION
Cosme Stark MD, saw and evaluated the patient  I have discussed the patient with the resident/non-physician practitioner and agree with the resident's/non-physician practitioner's findings, Plan of Care, and MDM as documented in the resident's/non-physician practitioner's note, except where noted  All available labs and Radiology studies were reviewed  At this point I agree with the current assessment done in the Emergency Department  I have conducted an independent evaluation of this patient a history and physical is as follows:      Critical Care Time  CritCare Time    79 yo male with hx of afib on warfarin, htn, hld, altered from baseline per family with increased hallucinations, more aggressive  Pt had dialysis fistula placed one month ago and since then worsening  Pt seen in ed last week  Pt with increased incontinence  Pt feels weak and lightheaded  No cp, mild sob, no abdominal pain, no n/v/d, no fevers  Pt fell on sept 30 and hit head  Vss, afebrile, lungs lower basilar crackles  , rrr, abdomen soft nontender, no focal neuro deficits  Pt with anisocoria    Ct head, cxr, urine, labs, ekg,

## 2017-10-25 NOTE — SOCIAL WORK
Cm met with Pt and wife  Wife is stating that Pt is to aggressive, combative and hallucinating at home and she is unable to care for him  Wife reported that if Pt's behaviors can be adjusted then she wants him to return home  CM provided Pt with a resource booklet for long-term housing options if she chooses in the future

## 2017-10-25 NOTE — PLAN OF CARE
Problem: PHYSICAL THERAPY ADULT  Goal: Performs mobility at highest level of function for planned discharge setting  See evaluation for individualized goals  Treatment/Interventions: Functional transfer training, LE strengthening/ROM, Elevations, Therapeutic exercise, Endurance training, Bed mobility, Gait training, Equipment eval/education  Equipment Recommended: [place] (RW)       See flowsheet documentation for full assessment, interventions and recommendations  Ghada Whitman, PT    Prognosis: Fair  Problem List: Decreased strength, Decreased endurance, Impaired balance, Decreased mobility, Decreased cognition, Impaired judgement, Decreased safety awareness, Decreased coordination  Assessment: PT COMPLETED EVALUATION OF 80YEAR OLD MALE IN EMERGENCY DEPARTMENT WHO PRESENTED FROM HOME WITH INCREASED AGITATION AND HALLUCINATIONS PER FAMILY  THEY REPORTS THIS HAS INCREASED SINCE THE PLACEMENT OF A DIALYSIS PORT  PATIENT REPORTS INCREASED WEAKNESS AND LIGHTHEADEDNESS  CURRENT MEDICAL AND PHYSICAL INSTABILITIES INCLUDE O2/TELEMETRY/HR MONITORING, FALLS RISK, AND A REGRESSION IN FUNCTIONAL STATUS FROM BASELINE  PMH IS SIGNIFICANT FOR ANEMIA, CKD, AND ALLEN  PRIOR TO THIS ADMISSION PATIENT RESIDED IN ONE LEVEL HOME (2 BRAYDEN) WITH SPOUSE AND FAMILY WHERE AT BASELINE HE REPORTS I AMBULATION (RW PRN) AND PERFORMANCE OF ADLS  SPOUSE ASSISTS WITH IADLS PRN  CURRENT IMPAIRMENTS INCLUDE DECREASED ACTIVITY TOLERANCE, B/L LE STRENGTH, AND BALANCE AND COGNITIVE DEFICITS INCLUDING DECREASED SAFETY AWARENESS AND JUDGEMENT (COMPROMISING SAFE MOBILITY AND SELF CARE)  SEE OT COGNITIVE ASSESSMENT  SPOUSE PRESENT AND REPORTS CONCERNS FOR SAFELY CARING FOR PATIENT IF D/C HOME  PT D/C RECOMMENDATION IS FOR STR WITH POTENTIAL FOR LONG TERM PLACEMENT IS FAMILY IS UNABLE TO PROVIDE NECESSARY LEVEL OF CARE  RECOMMEND GERIATRIC AND GERIATRIC PSYCH EVALUATIONS   WILL CONTINUE TO PROVIDE SKILLED INPT PT SERVICES TO [facility] [address] AND ACHIEVE MAXIMAL FUNCTION AND SAFETY  Barriers to Discharge: Decreased caregiver support  Barriers to Discharge Comments: (S) FAMILY (SPOUSE) TEARFUL WHEN PATIENT BECAME AGITATED AND EXPRESSING CONCERNS ABOUT CARING FOR PATIENT UPON D/C  Recommendation: (S) Short-term skilled PT, Long-term skilled nursing home placement, Psych Consult ([address] )     PT - OK to Discharge: Yes (TO STR WHEN MED MICHAEL; FAMILY DOES NOT FEEL SAFE FOR D/C HOME )    See flowsheet documentation for full assessment     Saunders Reasons, PT

## 2017-10-25 NOTE — PLAN OF CARE
Problem: OCCUPATIONAL THERAPY ADULT  Goal: Performs self-care activities at highest level of function for planned discharge setting  See evaluation for individualized goals  Treatment Interventions: ADL retraining, Functional transfer training, UE strengthening/ROM, Endurance training, Cognitive reorientation, Patient/family training, Equipment evaluation/education, Compensatory technique education, Continued evaluation, Activityengagement, Energy conservation          See flowsheet documentation for full assessment, interventions and recommendations  Outcome: Not Progressing  Limitation: Decreased ADL status, Decreased Safe judgement during ADL, Decreased cognition, Decreased self-care trans, Decreased high-level ADLs (BALANCE, AGITATION, FALL RISK)  Prognosis: Fair    PT SEEN FOR ADD'L OT TXMENT SESSION FOCUSED ON COMPLETION OF FORMAL COGNITIVE ASSESSMENT TO ASSIST W/ D/C PLANNING/RECOMMENDATIONS  PT ENGAGED IN THE ALEKS COGNITIVE LEVEL SCREEN (ACLS)  BASED ON PERFORMANCE PT SCORED 3 4/6 0 DEMONSTRATING RECOMMENDATION FOR 24/7 CARE TO SEQUENCE THROUGH ADL TASKS  (PLEASE SEE BELOW FOR DETAILED INFORMATION RE: IDENTIFIED COGNITIVE LEVEL)  IT IS IMPORTANT TO NOTE THAT PT WAS AGITATED T/O ASSESSMENT (CURSING/RAISING VOICE) RESULTING IN THE EVENTUAL TEARING OF A LACE DURING THE ASSESSMENT  AFTER TEARING THE LACE THE PT HANDED THE ASSESSMENT TO THE THERAPIST STATING "THERE YOU GO"  SPOKE TO MD RE: ATTEMPTING TO COMPLETE THE ASSESSMENT AN ADD'L TIME HOWEVER DEEMED THIS INAPPROPRIATE DUE TO THE PATIENT'S EVIDENT FRUSTRATION/AGITATION  OT CONTINUES TO RECOMMEND D/C TO SHORT TERM REHAB AT THIS TIME AND CONTINUED RECOMMENDATION FOR GERIATRICS C/S AND PSYCH C/S  WILL CONTINUE TO FOLLOW PT ON CASELOAD IN ORDER TO MEET PREVIOUSLY ESTABLISHED GOALS       OT Discharge Recommendation: Short Term Rehab (VS HOME PENDING PROGRESS)  OT - OK to Discharge:  (STR VS HOME PENDING PROGRESS)

## 2017-10-25 NOTE — ED PROVIDER NOTES
History  Chief Complaint   Patient presents with    Altered Mental Status     pt family stated that pt has been hallucinating since his port was placed 3 weeksa ago  79yo male pmhx CHF, CKD, paroxysmal A Fib (on warfrain), CAD presents for evaluation of altered mental status  Patient says he has felt SOB on exertion and lightheaded last few weeks  They say patient has not been acting like himself for a few months now but worsening recently  Daughter and wife say last few weeks patient has been disoriented, hallucinating and agitated ever since his surgery for AV fistula insertion on 10/04/17 (not dialyzed yet)  According to wife and daughter, patient has hallucinated that "people are in the house and wants to grab his guns "  Note an increase in frequency of bowel and urine incontinence  Also note patient fell on 09/29/17 and hit the back of his head hard, deny LOC but was not seen in ED  Admit to cough  Deny fever, chills, nausea, vomiting, headache, chest pain, SOB, diarrhea, hematochezia, dysuria  Only recent change in medication has been addition of iron  Prior to Admission Medications   Prescriptions Last Dose Informant Patient Reported? Taking?    IRON, FERROUS GLUCONATE, PO   Yes Yes   Sig: Take 65 mg by mouth daily   Multiple Vitamin (MULTIVITAMIN) capsule   Yes Yes   Sig: Take 1 capsule by mouth daily   amLODIPine (NORVASC) 10 mg tablet   Yes Yes   Sig: Take 5 mg by mouth daily     aspirin (ECOTRIN LOW STRENGTH) 81 mg EC tablet   Yes Yes   Sig: Take 81 mg by mouth daily   atorvastatin (LIPITOR) 10 mg tablet   Yes Yes   Sig: Take 10 mg by mouth daily   calcium carbonate (TUMS) 500 mg chewable tablet   No Yes   Sig: Chew 2 tablets 3 (three) times a day   Patient taking differently: Chew 1,000 mg 2 (two) times a day     cholecalciferol (VITAMIN D3) 1,000 units tablet   Yes Yes   Sig: Take 2,000 Units by mouth daily   ferrous sulfate 325 (65 Fe) mg tablet   No Yes   Sig: Take 1 tablet by mouth daily with breakfast   furosemide (LASIX) 20 mg tablet   Yes Yes   Sig: Take 20 mg by mouth daily   metolazone (ZAROXOLYN) 5 mg tablet   Yes Yes   Sig: Take 5 mg by mouth daily     omeprazole (PriLOSEC) 20 mg delayed release capsule   Yes Yes   Sig: Take 20 mg by mouth daily   terazosin (HYTRIN) 1 mg capsule   Yes Yes   Sig: Take 1 mg by mouth daily at bedtime   torsemide (DEMADEX) 20 mg tablet   Yes Yes   Sig: Take 20 mg by mouth daily   warfarin (COUMADIN) 2 mg tablet   No Yes   Sig: Take 1 tablet by mouth daily   Patient taking differently: Take 2 mg by mouth daily 2mg x 3 days, 4mg 3rd day, then back on 2mg x 3 days    Facility-Administered Medications: None       Past Medical History:   Diagnosis Date    Aortic aneurysm (HCC)     Arthritis     GERD (gastroesophageal reflux disease)     Heart failure (HCC)     Hyperlipidemia     Hypertension     Irregular heart beat     afib    Psychiatric illness     Renal disorder        Past Surgical History:   Procedure Laterality Date    ABDOMINAL AORTIC ANEURYSM REPAIR, OPEN      CARDIAC SURGERY      COLONOSCOPY      HERNIA REPAIR      AR ANASTOMOSIS,AV,ANY SITE Left 10/2/2017    Procedure: UPPER EXTREMITY AV FISTULA CREATION;  Surgeon: Vianey Garcia MD;  Location: [facility];  Service: Vascular    VALVE REPLACEMENT         Family History   Problem Relation Age of Onset    Cancer Mother     Anuerysm Father      I have reviewed and agree with the history as documented  Social History   Substance Use Topics    Smoking status: Former Smoker     Quit date: 1/1/2011    Smokeless tobacco: Never Used    Alcohol use No        Review of Systems   Constitutional: Negative for chills, diaphoresis, fatigue and fever  HENT: Negative for congestion, rhinorrhea and sore throat  Eyes: Negative for photophobia and visual disturbance  Respiratory: Positive for shortness of breath  Negative for cough and chest tightness      Cardiovascular: Negative for chest pain and palpitations  Gastrointestinal: Negative for abdominal pain, blood in stool, constipation, diarrhea, nausea and vomiting  Genitourinary: Negative for dysuria, frequency and hematuria  Musculoskeletal: Negative for back pain, gait problem, myalgias, neck pain and neck stiffness  Skin: Negative for pallor and rash  Neurological: Negative for weakness, light-headedness, numbness and headaches  Hematological: Negative for adenopathy  Bruises/bleeds easily  Psychiatric/Behavioral: Positive for agitation, confusion and hallucinations  All other systems reviewed and are negative  Physical Exam  ED Triage Vitals   Temperature Pulse Respirations Blood Pressure SpO2   10/25/17 1020 10/25/17 1020 10/25/17 1020 10/25/17 1020 10/25/17 1020   97 6 °F (36 4 °C) (!) 52 18 162/72 96 %      Temp src Heart Rate Source Patient Position - Orthostatic VS BP Location FiO2 (%)   -- 10/25/17 1020 10/25/17 1247 10/25/17 1915 --    Monitor Lying Right arm       Pain Score       10/25/17 1020       No Pain           Physical Exam   Constitutional: He is oriented to person, place, and time  He appears well-developed and well-nourished  No distress  Patient alert and oriented, appears well and non-toxic, in no acute distress    HENT:   Head: Normocephalic and atraumatic  Eyes: Conjunctivae and EOM are normal    Anisocoria with left pupil bigger than right, both round and  reactive to light and accomodation   Neck: Normal range of motion  Neck supple  Cardiovascular: Normal rate, regular rhythm and intact distal pulses  Murmur heard  Pulmonary/Chest: Effort normal and breath sounds normal  No respiratory distress  Abdominal: Soft  Bowel sounds are normal  He exhibits no distension  There is no tenderness  Musculoskeletal: Normal range of motion  Lymphadenopathy:     He has no cervical adenopathy  Neurological: He is alert and oriented to person, place, and time  He displays normal reflexes   No cranial nerve deficit or sensory deficit  He exhibits normal muscle tone  Coordination normal    No facial asymmetry noted, CN 2-12 intact, full ROM of upper and lower extremities, muscle strength 5/5 throughout, DTRs normal, sensation intact throughout, negative finger to nose/Breezy, negative Babinski  Unable to assess gait   Skin: Skin is warm and dry  Capillary refill takes less than 2 seconds  No rash noted  He is not diaphoretic  No erythema  No pallor  Ecchymosis and mild edema of b/l upper extremities  Left upper arm AV fistula with palpable thrill and audible hum, moderate ecchymosis and mild edema of surrounding skin and arm, no erythema noted   Psychiatric: He has a normal mood and affect  His behavior is normal  Judgment and thought content normal    Nursing note and vitals reviewed        ED Medications  Medications - No data to display    Diagnostic Studies  Labs Reviewed   CBC AND DIFFERENTIAL - Abnormal        Result Value Ref Range Status    RBC 2 88 (*) 3 88 - 5 62 Million/uL Final    Hemoglobin 8 4 (*) 12 0 - 17 0 g/dL Final    Hematocrit 26 2 (*) 36 5 - 49 3 % Final    RDW 19 9 (*) 11 6 - 15 1 % Final    Platelets 77 (*) 269 - 390 Thousands/uL Final    Comment: Manual Review of Smear Performed    WBC 6 41  4 31 - 10 16 Thousand/uL Final    MCV 91  82 - 98 fL Final    MCH 29 2  26 8 - 34 3 pg Final    MCHC 32 1  31 4 - 37 4 g/dL Final    nRBC 0  /100 WBCs Final    Neutrophils Relative 73  43 - 75 % Final    Lymphocytes Relative 19  14 - 44 % Final    Monocytes Relative 7  4 - 12 % Final    Eosinophils Relative 1  0 - 6 % Final    Basophils Relative 0  0 - 1 % Final    Neutrophils Absolute 4 63  1 85 - 7 62 Thousands/µL Final    Lymphocytes Absolute 1 20  0 60 - 4 47 Thousands/µL Final    Monocytes Absolute 0 42  0 17 - 1 22 Thousand/µL Final    Eosinophils Absolute 0 05  0 00 - 0 61 Thousand/µL Final    Basophils Absolute 0 02  0 00 - 0 10 Thousands/µL Final   COMPREHENSIVE METABOLIC PANEL - Abnormal     Chloride 111 (*) 100 - 108 mmol/L Final    CO2 20 (*) 21 - 32 mmol/L Final    BUN 90 (*) 5 - 25 mg/dL Final    Creatinine 4 81 (*) 0 60 - 1 30 mg/dL Final    Comment: Standardized to IDMS reference method    Albumin 3 3 (*) 3 5 - 5 0 g/dL Final    Sodium 141  136 - 145 mmol/L Final    Potassium 5 3  3 5 - 5 3 mmol/L Final    Comment: Slightly Hemolyzed; Results May be Affected    Anion Gap 10  4 - 13 mmol/L Final    Glucose 111  65 - 140 mg/dL Final    Comment:   If the patient is fasting, the ADA then defines impaired fasting glucose as > 100 mg/dL and diabetes as > or equal to 123 mg/dL  Specimen collection should occur prior to Sulfasalazine administration due to the potential for falsely depressed results  Specimen collection should occur prior to Sulfapyridine administration due to the potential for falsely elevated results  Calcium 8 8  8 3 - 10 1 mg/dL Final    AST 36  5 - 45 U/L Final    Comment: Slightly Hemolyzed; Results May be Affected  Specimen collection should occur prior to Sulfasalazine administration due to the potential for falsely depressed results  ALT 17  12 - 78 U/L Final    Comment:   Specimen collection should occur prior to Sulfasalazine and/or Sulfapyridine administration due to the potential for falsely depressed results  Alkaline Phosphatase 81  46 - 116 U/L Final    Total Protein 7 9  6 4 - 8 2 g/dL Final    Total Bilirubin 0 85  0 20 - 1 00 mg/dL Final    eGFR 10  ml/min/1 73sq m Final    Narrative:     National Kidney Disease Education Program recommendations are as follows:  GFR calculation is accurate only with a steady state creatinine  Chronic Kidney disease less than 60 ml/min/1 73 sq  meters  Kidney failure less than 15 ml/min/1 73 sq  meters     URINE MICROSCOPIC - Abnormal     RBC, UA 2-4 (*) None Seen, 0-5 /hpf Final    Bacteria, UA Moderate (*) None Seen, Occasional /hpf Final    WBC, UA None Seen  None Seen, 0-5, 5-55, 5-65 /hpf Final    Epithelial Cells None Seen  None Seen, Occasional /hpf Final    Hyaline Casts, UA None Seen  None Seen /lpf Final   TSH, 3RD GENERATION - Abnormal     TSH 3RD GENERATON 4 240 (*) 0 358 - 3 740 uIU/mL Final    Narrative:     Patients undergoing fluorescein dye angiography may retain small amounts of fluorescein in the body for 48-72 hours post procedure  Samples containing fluorescein can produce falsely depressed TSH values  If the patient had this procedure,a specimen should be resubmitted post fluorescein clearance  ED URINE MACROSCOPIC - Abnormal     Protein, UA Trace (*) Negative mg/dl Final    Blood, UA Trace (*) Negative Final    Color, UA Yellow   Final    Clarity, UA Clear   Final    pH, UA 5 0  4 5 - 8 0 Final    Leukocytes, UA Negative  Negative Final    Nitrite, UA Negative  Negative Final    Glucose, UA Negative  Negative mg/dl Final    Ketones, UA Negative  Negative mg/dl Final    Urobilinogen, UA 0 2  0 2, 1 0 E U /dl E U /dl Final    Bilirubin, UA Negative  Negative Final    Specific [place], UA 1 010  1 003 - 1 030 Final    Narrative:     CLINITEK RESULT   RAPID DRUG SCREEN, URINE - Normal    Amph/Meth UR Negative  Negative Final    Barbiturate Ur Negative  Negative Final    Benzodiazepine Urine Negative  Negative Final    Cocaine Urine Negative  Negative Final    Methadone Urine Negative  Negative Final    Opiate Urine Negative  Negative Final    PCP Ur Negative  Negative Final    THC Urine Negative  Negative Final    Narrative:     FOR MEDICAL PURPOSES ONLY  IF CONFIRMATION NEEDED PLEASE CONTACT THE LAB WITHIN 5 DAYS      Drug Screen Cutoff Levels:  AMPHETAMINE/METHAMPHETAMINES  1000 ng/mL  BARBITURATES     200 ng/mL  BENZODIAZEPINES     200 ng/mL  COCAINE      300 ng/mL  METHADONE      300 ng/mL  OPIATES      300 ng/mL  PHENCYCLIDINE     25 ng/mL  THC       50 ng/mL   POCT URINALYSIS DIPSTICK - Normal    Color, UA -   Final   POCT ALCOHOL BREATH TEST - Normal    EXTBreath Alcohol 0 000   Final   POCT TROPONIN - Normal    POC Troponin I 0 02  0 00 - 0 08 ng/ml Final    Specimen Type VENOUS   Final    Narrative:     [employer] i-Stat handheld analyzer 99% cutoff is > 0 08ng/mL in network Emergency Departments    o cTnI 99% cutoff is useful only when applied to patients in the clinical setting of myocardial ischemia  o cTnI 99% cutoff should be interpreted in the context of clinical history, ECG findings and possibly cardiac imaging to establish correct diagnosis  o cTnI 99% cutoff may be suggestive but clearly not indicative of a coronary event without the clinical setting of myocardial ischemia  XR chest 2 views   ED Interpretation   Left pleural effusion noted       Final Result      No acute pulmonary disease  Persistent small left pleural effusion  Workstation performed: [id number]         CT head without contrast   Final Result      No acute intracranial abnormality  Microangiopathic changes  Workstation performed: [id number]             Procedures  Procedures      Phone Consults  ED Phone Contact    ED Course  ED Course as of Oct 26 1330   Wed Oct 25, 2017   1618 Patient reported by OT to be agitated  Crisis consulted for geriatric psych evaluation    2007 Signed out to Dr Jesus Bond            Identification of Seniors at [address] [place] [address] Most Recent Value   (ISAR) Identification of Seniors at Risk   Before the illness or injury that brought you to the Emergency, did you need someone to help you on a regular basis? 1 Filed at: 10/25/2017 1019   In the last 24 hours, have you needed more help than usual?  1 Filed at: 10/25/2017 1019   Have you been hospitalized for one or more nights during the past 6 months? 1 Filed at: 10/25/2017 1019   In general, do you see well?  0 Filed at: 10/25/2017 1019   In general, do you have serious problems with your memory? 1 Filed at: 10/25/2017 1019   Do you take more than three different medications every day?   1 Filed at: 10/25/2017 1019   ISAR Score  5 Filed at: 10/25/2017 1019                          [facility]  Number of Diagnoses or Management Options  Agitation:   Hallucination:   Weakness generalized:   Diagnosis management comments: Impression: 79yo male presents for evaluation of altered mental status  Ddx: ACS, CVA, electrolyte abnormality, uti, pna  Plan: cardiac, head ct, UA    CritCare Time    Disposition  Final diagnoses:   Weakness generalized   Hallucination   Agitation     Time reflects when diagnosis was documented in both MDM as applicable and the Disposition within this note     Time User Action Codes Description Comment    10/25/2017  3:38 PM Comfort Honey [R53 1] Weakness generalized     10/25/2017  3:38 PM Andrea Randall [R53 1] Weakness generalized     10/25/2017  3:38 PM Andrea Randall [R53 1] Weakness generalized     10/25/2017  6:23 PM Riesa Solan Add [R44 3] Hallucination     10/25/2017  6:24 PM Riesa Solan Add [R45 1] Agitation       ED Disposition     ED Disposition Condition Comment    Transfer to [address] [place] [address]  Pt to be transferred to [facility] inpatient behavioral health unit with continuation of patient care by Dr Molly Christianson MD [facility]   Accepting Physician  Dr Ruth Parks Name, Frankie Sorenson       RN Documentation    [facility] Name, [facility]      Follow-up Information    None       Discharge Medication List as of 10/25/2017  9:40 PM      CONTINUE these medications which have NOT CHANGED    Details   amLODIPine (NORVASC) 10 mg tablet Take 5 mg by mouth daily  , Historical Med      aspirin (ECOTRIN LOW STRENGTH) 81 mg EC tablet Take 81 mg by mouth daily, Until Discontinued, Historical Med      atorvastatin (LIPITOR) 10 mg tablet Take 10 mg by mouth daily, Until Discontinued, Historical Med      calcium carbonate (TUMS) 500 mg chewable tablet Chew 2 tablets 3 (three) times a day, Starting Sat 10/7/2017, Print      cholecalciferol (VITAMIN D3) 1,000 units tablet Take 2,000 Units by mouth daily, Until Discontinued, Historical Med      ferrous sulfate 325 (65 Fe) mg tablet Take 1 tablet by mouth daily with breakfast, Starting Sun 10/8/2017, Print      furosemide (LASIX) 20 mg tablet Take 20 mg by mouth daily, Historical Med      IRON, FERROUS GLUCONATE, PO Take 65 mg by mouth daily, Until Discontinued, Historical Med      metolazone (ZAROXOLYN) 5 mg tablet Take 5 mg by mouth daily  , Historical Med      Multiple Vitamin (MULTIVITAMIN) capsule Take 1 capsule by mouth daily, Historical Med      omeprazole (PriLOSEC) 20 mg delayed release capsule Take 20 mg by mouth daily, Until Discontinued, Historical Med      terazosin (HYTRIN) 1 mg capsule Take 1 mg by mouth daily at bedtime, Until Discontinued, Historical Med      torsemide (DEMADEX) 20 mg tablet Take 20 mg by mouth daily, Historical Med      warfarin (COUMADIN) 2 mg tablet Take 1 tablet by mouth daily, Starting Sat 10/7/2017, Print           No discharge procedures on file  ED Provider  Attending physically available and evaluated Trinidad Padgett  MORAIMA managed the patient along with the ED Attending      Electronically Signed by       No Coy MD  Resident  10/26/17 8514

## 2017-10-25 NOTE — OCCUPATIONAL THERAPY NOTE
Occupational Therapy Progress Note      Patient Name: Trinidad Padgett    XZEVR'Y Date: 10/25/2017    Problem List:   Patient Active Problem List   Diagnosis    CKD (chronic kidney disease), stage IV (HCC)    Edema    [facility]    Paroxysmal atrial fibrillation ([facility])    Anemia    Cellulitis of extremity    [facility]    Azotemia    Hypertensive CKD (chronic kidney disease)    Weakness generalized    CAD (coronary artery disease)    Hypocalcemia    Warfarin anticoagulation    Hand swelling    Leukocytosis    Left upper extremity swelling    ALLEN [facility]) [facility]    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 [facility]    Hypomagnesemia    NSVT (nonsustained ventricular tachycardia) ([facility])       PT SEEN FOR ADD'L OT TXMENT SESSION FOCUSED ON COMPLETION OF FORMAL COGNITIVE ASSESSMENT TO ASSIST W/ D/C PLANNING/RECOMMENDATIONS  PT ENGAGED IN THE ALEKS COGNITIVE LEVEL SCREEN (ACLS)  BASED ON PERFORMANCE PT SCORED 3 4/6 0 DEMONSTRATING RECOMMENDATION FOR 24/7 CARE TO SEQUENCE THROUGH ADL TASKS  (PLEASE SEE BELOW FOR DETAILED INFORMATION RE: IDENTIFIED COGNITIVE LEVEL)  IT IS IMPORTANT TO NOTE THAT PT WAS AGITATED T/O ASSESSMENT (CURSING/RAISING VOICE) RESULTING IN THE EVENTUAL TEARING OF A LACE DURING THE ASSESSMENT  AFTER TEARING THE LACE THE PT HANDED THE ASSESSMENT TO THE THERAPIST STATING "THERE YOU GO"  SPOKE TO MD RE: ATTEMPTING TO COMPLETE THE ASSESSMENT AN ADD'L TIME HOWEVER DEEMED THIS INAPPROPRIATE DUE TO THE PATIENT'S EVIDENT FRUSTRATION/AGITATION  OT CONTINUES TO RECOMMEND D/C TO SHORT TERM REHAB AT THIS TIME AND CONTINUED RECOMMENDATION FOR GERIATRICS C/S AND PSYCH C/S  WILL CONTINUE TO FOLLOW PT ON CASELOAD IN ORDER TO MEET PREVIOUSLY ESTABLISHED GOALS  3 4    Administered Adonis Pizano Cognitive Level Screen (ACLS)    Pt scored 3 4/6 0 indicating 24 hour care is recommended to sequence through routine steps of toileting, bathing, grooming and dressing  Behavior:  Speaks without considering comprehension of listener  May be distractible  Grooming:  Spontaneously sustains actions of combing, brushing, shaving with electric razor or applying makeup  Uses too much or too little toothpaste/makeup  Looks at objects but fails to note effects  Restrict access to harmful objects  Dressing:  Selects items laid out and begins to don garments  May pick several items and be unable to decide  May quit before finished or don several layers  May not pay attention to condition of garments (cleanliness or need for repair)  Bathing:  Picks up washcloth, soap, towel and wipes easy to reach body parts  May wash in one spot, forget to use soap, may not remove all dirt or quit before task is complete  Patient should not be left alone during bathing tasks  Walking and exercising:  Ambulates within 2 or 3 familiar rooms to access desirable activities  Can alter ambulation pace but is easily distracted  May be impulsive when changing positions  Has difficulty walking and talking at the same time  Is at risk for falls  Eating:  May anticipate meal times based on familiar signs (activity in kitchen)  Uses all utensils except knife  Rate may be rushed and may eat strongly preferred items only  Failure to observe manners may alienate others  Check food and beverage temperature  Cannot follow dietary restrictions  Toileting:  Recognizes need to void and goes to familiar bathrooms  May not close door while in bathroom  Dons and doffs clothing slowly, may need help with unusual fasteners  Wipes but does not check results or wipes repeatedly using excess toilet paper  May forget to wash hands  May leave zipper open  May need reminders to toilet in order to avoid accidents  Medications: May recognize medications by color or shape when it is given daily but does not note amounts or time of day    Does not understand purpose of medications or side effects, may mistake for candy  Prescription bottles need to be child proof  Store medications in secure location away from patient  Pre-measured medications should be handed to the patient  Use of adaptive devices: May be able to propel a wheelchair but cannot get around furniture and may get lost if allowed outside  May not be able to utilize adaptive devices in safe manner  May not be able to learn use of new adaptive device  Housekeeping:  No awareness of need for housekeeping  May  cloth and begin action of cleaning  Does not note results and may quit when distracted  Meal Preparation:  Does not plan for food  May eat from refrigerator  May be able to replicate repetitive actions for simple meal prep with supervision  May be impulsive and require frequent redirection  Restrict access to sharp tools and hot objects/surfaces  Spending money:  May recognize local currency  May hand money to another person in a familiar exchange situation; however, may not attend to amount given/received  May not understand money is owed for services  May loose money  Should have assistance for all finances  Shopping: Follows a guide to shopping areas  Looks in windows or at displays with no intent to purchase  May take items without paying  May fail to notice price or if they have enough money to pay  Do not leave alone in shopping areas  Laundry:  May not recognize clothing is dirty  Has no awareness of methods of doing laundry  May do repetitive actions but may not be able to sequence through steps of task  Traveling: May be able to sit for 30 minutes in a car  May not be aware of destination  May recognize features on a familiar route  May attempt to enter or leave car before it is fully stopped  Use child safety locks  Telephone:  Able to  phone when it rings and say hello  May be able to call for another person or hang up when phone is not for them    Not able to take messages  May dial 1 or 2 known numbers, but may call for no reason  May forget to hang up     Driving:  Should not operate a motor vehicle       [address] [place], OTR/L

## 2017-10-25 NOTE — ED NOTES
Pt daughter state they would like him have an evaluation to determine his mental capability  Pt hallucinations and anger have caused them to hide all the guns in the house         Jason Dixon RN  12/07/15 [id number]

## 2017-10-25 NOTE — PHYSICAL THERAPY NOTE
Physical Therapy Evaluation    Patient's Name: Tressa Brandon    Admitting Diagnosis  Mental status alteration [R41 82]    Problem List  Patient Active Problem List   Diagnosis    CKD (chronic kidney disease), stage IV (HCC)    Edema    CHF (congestive heart failure) (HCC)    Paroxysmal atrial fibrillation (HCC)    Anemia    Cellulitis of extremity    [facility]EN [facility]) [facility]    Azotemia    Hypertensive CKD (chronic kidney disease)    Weakness generalized    CAD (coronary artery disease)    Hypocalcemia    Warfarin anticoagulation    Hand swelling    Leukocytosis    Left upper extremity swelling    ALLEN [facility]acute kidney [facility]) [facility]    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    Hypomagnesemia    NSVT [facility]nonsustained ventricular tachycardia) ([facility])       Past Medical History  Past Medical History:   Diagnosis Date    Aortic aneurysm (HCC)     Arthritis     GERD (gastroesophageal reflux disease)     Hyperlipidemia     Hypertension     Irregular heart beat     afib    Renal disorder        Past Surgical History  Past Surgical History:   Procedure Laterality Date    ABDOMINAL AORTIC ANEURYSM REPAIR, OPEN      CARDIAC SURGERY      COLONOSCOPY      HERNIA REPAIR      KS ANASTOMOSIS,AV,ANY SITE Left 10/2/2017    Procedure: UPPER EXTREMITY AV FISTULA CREATION;  Surgeon: Joel Garsia MD;  Location: [facility];  Service: Vascular    VALVE REPLACEMENT          10/25/17 5668   Note Type   Note type Eval only   Pain Assessment   Pain Assessment No/denies pain   Pain Score No Pain   Home Living   Type of Home House   Home Layout One level   Home Equipment Walker;Cane   Additional Comments PER PATIENT HE RESIDES WITH SPOUSE AND FAMILY IN ONE LEVEL HOME W/ 2 BRAYDEN  Prior Function   Level of [place] Independent with ADLs and functional mobility   Lives With Spouse; Family   Receives Help From Family   ADL Assistance Independent   IADLs Needs assistance   Falls in the last 6 months 1 to 4  (AT LEAST 1 PER CHART REVIEW)   Vocational Retired   Comments AT BASELINE PATIENT IS I WITH MOBILITY AND ADLS  FAMILY ASSISTS WITH IADLS PRN    Restrictions/Precautions   Weight Bearing Precautions Per Order No   Braces or Orthoses (DENIES)   Other Precautions Fall Risk;Telemetry;Multiple lines;Cognitive   General   Family/Caregiver Present Yes  (SPOUSE)   Cognition   Overall Cognitive Status Impaired   Comments SEE OT COG  ASSESSMENT    RUE Assessment   RUE Assessment WFL   LUE Assessment   LUE Assessment WFL   RLE Assessment   RLE Assessment (4-/5 (GROSSLY))   LLE Assessment   LLE Assessment WFL  (4-/5 (GROSSLY))   Bed Mobility   Supine to Sit 4  Minimal assistance   Additional items Assist x 1   Sit to Supine Unable to assess  (PATIENT SEATED EOB W/ OT POST EVAL )   Transfers   Sit to Stand 5  Supervision   Stand to Sit 5  Supervision   Ambulation/Elevation   Gait pattern Excessively slow; Shuffling;Decreased foot clearance; Inconsistent rome   Gait Assistance 4  Minimal assist  (CONTACT GUARD)   Additional items Assist x 1   Assistive Device Rolling walker   Distance 50 FEET X2   Balance   Static Sitting Good   Static Standing Fair   Ambulatory Fair -   Endurance Deficit   Endurance Deficit Yes   Endurance Deficit Description GENERALIZED DECONDITIOING   Activity Tolerance   Activity Tolerance Patient tolerated treatment well   Medical Staff Made Aware OT (JOELLE)    Nurse Made Aware MICHAEL TO SEE PER RN    Assessment   Prognosis Fair   Problem List Decreased strength;Decreased endurance; Impaired balance;Decreased mobility; Decreased cognition; Impaired judgement;Decreased safety awareness;Decreased coordination   Assessment PT COMPLETED EVALUATION OF 80YEAR OLD MALE IN EMERGENCY DEPARTMENT WHO PRESENTED FROM HOME WITH INCREASED AGITATION AND HALLUCINATIONS PER FAMILY  THEY REPORTS THIS HAS INCREASED SINCE THE PLACEMENT OF A DIALYSIS PORT  PATIENT REPORTS INCREASED WEAKNESS AND LIGHTHEADEDNESS   CURRENT MEDICAL AND PHYSICAL INSTABILITIES INCLUDE O2/TELEMETRY/HR MONITORING, FALLS RISK, AND A REGRESSION IN FUNCTIONAL STATUS FROM BASELINE  PMH IS SIGNIFICANT FOR ANEMIA, CKD, AND ALLEN  PRIOR TO THIS ADMISSION PATIENT RESIDED IN ONE LEVEL HOME (2 BRAYDEN) WITH SPOUSE AND FAMILY WHERE AT BASELINE HE REPORTS I AMBULATION (RW PRN) AND PERFORMANCE OF ADLS  SPOUSE ASSISTS WITH IADLS PRN  CURRENT IMPAIRMENTS INCLUDE DECREASED ACTIVITY TOLERANCE, B/L LE STRENGTH, AND BALANCE AND COGNITIVE DEFICITS INCLUDING DECREASED SAFETY AWARENESS AND JUDGEMENT (COMPROMISING SAFE MOBILITY AND SELF CARE)  SEE OT COGNITIVE ASSESSMENT  SPOUSE PRESENT AND REPORTS CONCERNS FOR SAFELY CARING FOR PATIENT IF D/C HOME  PT D/C RECOMMENDATION IS FOR STR WITH POTENTIAL FOR LONG TERM PLACEMENT IS FAMILY IS UNABLE TO PROVIDE NECESSARY LEVEL OF CARE  RECOMMEND GERIATRIC AND GERIATRIC PSYCH EVALUATIONS  WILL CONTINUE TO PROVIDE SKILLED INPT PT SERVICES TO IMPROVE MOBLITY AND ACHIEVE MAXIMAL FUNCTION AND SAFETY  Barriers to Discharge Decreased caregiver support   Barriers to Discharge Comments FAMILY (SPOUSE) TEARFUL WHEN PATIENT BECAME AGITATED AND EXPRESSING CONCERNS ABOUT CARING FOR PATIENT UPON D/C  Goals   Patient Goals TO GO HOME   LTG Expiration Date 11/05/17   Long Term Goal #1 7-10 DAYS: 1) COMPLETE BED MOBILITY MOD-I; 2) PERFORM SIT<-->STAND TRANSFER MOD-I; 3) AMBULATE 300 FEET I/MOD-I W/ LEAST RESTICTIVE DEVICE; 4) NAVIGATE 2 STEPS S LEVEL; 5) IMPROVE B/L LE STRENGTH BY 1/2 GRADE; 6) IMPROVE STRENGTH BY 1/2 GRADE   Treatment Day 0   Plan   Treatment/Interventions Functional transfer training;LE strengthening/ROM; Elevations; Therapeutic exercise; Endurance training;Bed mobility;Gait training;Equipment eval/education   PT Frequency 5x/wk   Recommendation   Recommendation Short-term skilled PT;Long-term skilled nursing home placement;Psych Consult  (FAMILY EXPRESSING CONCERNS ABOUT CARING FOR PATIENT AT HOME )   Equipment Recommended Mitul Valente  ([address] [place] [address]) PT - OK to Discharge Yes  (TO STR WHEN MED MICHAEL; FAMILY DOES NOT FEEL SAFE FOR D/C HOME )   Barthel Index   Feeding 10   Bathing 0   Grooming Score 0   Dressing Score 5   Bladder Score 5   Bowels Score 10   Toilet Use Score 10   Transfers (Bed/Chair) Score 15   Mobility (Level Surface) Score 10   Stairs Score 0   Barthel Index Score 65           Jarvis Reasons, PT

## 2017-10-25 NOTE — ED PROCEDURE NOTE
Procedure  ECG 12 Lead Documentation  Date/Time: 10/25/2017 1:12 PM  Performed by: Nestor Hayden  Authorized by: Denae Barnett     Indications / Diagnosis:  Ams  ECG reviewed by me, the ED Provider: yes    Patient location:  ED and bedside  Previous ECG:     Previous ECG:  Compared to current    Comparison ECG info:  Serial changes of septal infarct     Similarity:  Changes noted  Interpretation:     Interpretation: abnormal    Rate:     ECG rate:  57    ECG rate assessment: bradycardic    Rhythm:     Rhythm: atrial fibrillation    Ectopy:     Ectopy: none    QRS:     QRS axis:  Left    QRS intervals:   Wide  Conduction:     Conduction: abnormal      Abnormal conduction: non-specific intraventricular conduction delay    ST segments:     ST segments:  Non-specific    Depression:  V2 and V3  T waves:     T waves: inverted      Inverted:  I and aVL
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
## 2017-10-26 PROBLEM — F09 PSYCHOSIS, ORGANIC: Status: ACTIVE | Noted: 2017-10-26

## 2017-10-26 LAB
25(OH)D3 SERPL-MCNC: 43.4 NG/ML (ref 30–100)
ALBUMIN SERPL BCP-MCNC: 2.9 G/DL (ref 3.5–5)
ALP SERPL-CCNC: 73 U/L (ref 46–116)
ALT SERPL W P-5'-P-CCNC: 13 U/L (ref 12–78)
ANION GAP SERPL CALCULATED.3IONS-SCNC: 9 MMOL/L (ref 4–13)
AST SERPL W P-5'-P-CCNC: 11 U/L (ref 5–45)
BASOPHILS # BLD AUTO: 0.03 THOUSANDS/ΜL (ref 0–0.1)
BASOPHILS NFR BLD AUTO: 1 % (ref 0–1)
BILIRUB SERPL-MCNC: 0.97 MG/DL (ref 0.2–1)
BUN SERPL-MCNC: 84 MG/DL (ref 5–25)
CALCIUM SERPL-MCNC: 8.8 MG/DL (ref 8.3–10.1)
CHLORIDE SERPL-SCNC: 112 MMOL/L (ref 100–108)
CHOLEST SERPL-MCNC: 65 MG/DL (ref 50–200)
CO2 SERPL-SCNC: 19 MMOL/L (ref 21–32)
CREAT SERPL-MCNC: 4.57 MG/DL (ref 0.6–1.3)
EOSINOPHIL # BLD AUTO: 0.06 THOUSAND/ΜL (ref 0–0.61)
EOSINOPHIL NFR BLD AUTO: 1 % (ref 0–6)
ERYTHROCYTE [DISTWIDTH] IN BLOOD BY AUTOMATED COUNT: 19.9 % (ref 11.6–15.1)
FOLATE SERPL-MCNC: 19 NG/ML (ref 3.1–17.5)
GFR SERPL CREATININE-BSD FRML MDRD: 11 ML/MIN/1.73SQ M
GLUCOSE SERPL-MCNC: 85 MG/DL (ref 65–140)
HCT VFR BLD AUTO: 23.7 % (ref 36.5–49.3)
HDLC SERPL-MCNC: 40 MG/DL (ref 40–60)
HGB BLD-MCNC: 7.8 G/DL (ref 12–17)
LDLC SERPL CALC-MCNC: 18 MG/DL (ref 0–100)
LYMPHOCYTES # BLD AUTO: 1.06 THOUSANDS/ΜL (ref 0.6–4.47)
LYMPHOCYTES NFR BLD AUTO: 23 % (ref 14–44)
MCH RBC QN AUTO: 29.3 PG (ref 26.8–34.3)
MCHC RBC AUTO-ENTMCNC: 32.9 G/DL (ref 31.4–37.4)
MCV RBC AUTO: 89 FL (ref 82–98)
MONOCYTES # BLD AUTO: 0.35 THOUSAND/ΜL (ref 0.17–1.22)
MONOCYTES NFR BLD AUTO: 8 % (ref 4–12)
NEUTROPHILS # BLD AUTO: 3.14 THOUSANDS/ΜL (ref 1.85–7.62)
NEUTS SEG NFR BLD AUTO: 67 % (ref 43–75)
NRBC BLD AUTO-RTO: 0 /100 WBCS
PLATELET # BLD AUTO: 63 THOUSANDS/UL (ref 149–390)
POTASSIUM SERPL-SCNC: 4.7 MMOL/L (ref 3.5–5.3)
PROT SERPL-MCNC: 6.5 G/DL (ref 6.4–8.2)
RBC # BLD AUTO: 2.66 MILLION/UL (ref 3.88–5.62)
SODIUM SERPL-SCNC: 140 MMOL/L (ref 136–145)
TRIGL SERPL-MCNC: 35 MG/DL
VIT B12 SERPL-MCNC: 1828 PG/ML (ref 100–900)
WBC # BLD AUTO: 4.68 THOUSAND/UL (ref 4.31–10.16)

## 2017-10-26 PROCEDURE — 86592 SYPHILIS TEST NON-TREP QUAL: CPT | Performed by: PSYCHIATRY & NEUROLOGY

## 2017-10-26 PROCEDURE — 82746 ASSAY OF FOLIC ACID SERUM: CPT | Performed by: PSYCHIATRY & NEUROLOGY

## 2017-10-26 PROCEDURE — 82306 VITAMIN D 25 HYDROXY: CPT | Performed by: PSYCHIATRY & NEUROLOGY

## 2017-10-26 PROCEDURE — 82607 VITAMIN B-12: CPT | Performed by: PSYCHIATRY & NEUROLOGY

## 2017-10-26 PROCEDURE — 80053 COMPREHEN METABOLIC PANEL: CPT | Performed by: PSYCHIATRY & NEUROLOGY

## 2017-10-26 PROCEDURE — 80061 LIPID PANEL: CPT | Performed by: PSYCHIATRY & NEUROLOGY

## 2017-10-26 PROCEDURE — 85025 COMPLETE CBC W/AUTO DIFF WBC: CPT | Performed by: PSYCHIATRY & NEUROLOGY

## 2017-10-26 RX ORDER — FERROUS SULFATE 325(65) MG
325 TABLET ORAL
Status: DISCONTINUED | OUTPATIENT
Start: 2017-10-27 | End: 2017-10-26

## 2017-10-26 RX ORDER — AMLODIPINE BESYLATE 5 MG/1
5 TABLET ORAL DAILY
Status: DISCONTINUED | OUTPATIENT
Start: 2017-10-26 | End: 2017-11-07 | Stop reason: HOSPADM

## 2017-10-26 RX ORDER — ASPIRIN 81 MG/1
81 TABLET ORAL DAILY
Status: DISCONTINUED | OUTPATIENT
Start: 2017-10-26 | End: 2017-11-07 | Stop reason: HOSPADM

## 2017-10-26 RX ORDER — MELATONIN
2000 DAILY
Status: DISCONTINUED | OUTPATIENT
Start: 2017-10-26 | End: 2017-11-07 | Stop reason: HOSPADM

## 2017-10-26 RX ORDER — TORSEMIDE 20 MG/1
20 TABLET ORAL DAILY
Status: DISCONTINUED | OUTPATIENT
Start: 2017-10-26 | End: 2017-11-07 | Stop reason: HOSPADM

## 2017-10-26 RX ORDER — FERROUS GLUCONATE 256(28)MG
65 TABLET ORAL DAILY
Status: DISCONTINUED | OUTPATIENT
Start: 2017-10-26 | End: 2017-10-26

## 2017-10-26 RX ORDER — WARFARIN SODIUM 2 MG/1
2 TABLET ORAL
Status: DISCONTINUED | OUTPATIENT
Start: 2017-10-26 | End: 2017-10-28

## 2017-10-26 RX ORDER — METOLAZONE 5 MG/1
5 TABLET ORAL DAILY
Status: DISCONTINUED | OUTPATIENT
Start: 2017-10-26 | End: 2017-11-07 | Stop reason: HOSPADM

## 2017-10-26 RX ORDER — CALCIUM CARBONATE 200(500)MG
1000 TABLET,CHEWABLE ORAL 3 TIMES DAILY
Status: DISCONTINUED | OUTPATIENT
Start: 2017-10-26 | End: 2017-11-07 | Stop reason: HOSPADM

## 2017-10-26 RX ORDER — FERROUS SULFATE 325(65) MG
325 TABLET ORAL DAILY
Status: DISCONTINUED | OUTPATIENT
Start: 2017-10-26 | End: 2017-10-28 | Stop reason: SDUPTHER

## 2017-10-26 RX ORDER — ATORVASTATIN CALCIUM 10 MG/1
10 TABLET, FILM COATED ORAL
Status: DISCONTINUED | OUTPATIENT
Start: 2017-10-26 | End: 2017-11-07 | Stop reason: HOSPADM

## 2017-10-26 RX ORDER — TERAZOSIN 1 MG/1
1 CAPSULE ORAL
Status: DISCONTINUED | OUTPATIENT
Start: 2017-10-26 | End: 2017-11-07 | Stop reason: HOSPADM

## 2017-10-26 RX ORDER — QUETIAPINE FUMARATE 25 MG/1
25 TABLET, FILM COATED ORAL
Status: DISCONTINUED | OUTPATIENT
Start: 2017-10-26 | End: 2017-10-31

## 2017-10-26 RX ADMIN — WARFARIN SODIUM 2 MG: 2 TABLET ORAL at 17:36

## 2017-10-26 RX ADMIN — DARBEPOETIN ALFA 25 MCG: 25 INJECTION, SOLUTION INTRAVENOUS; SUBCUTANEOUS at 16:23

## 2017-10-26 RX ADMIN — Medication 1000 MG: at 16:21

## 2017-10-26 RX ADMIN — AMLODIPINE BESYLATE 5 MG: 5 TABLET ORAL at 16:21

## 2017-10-26 RX ADMIN — VITAMIN D, TAB 1000IU (100/BT) 2000 UNITS: 25 TAB at 16:21

## 2017-10-26 RX ADMIN — ASPIRIN 81 MG: 81 TABLET, COATED ORAL at 16:21

## 2017-10-26 RX ADMIN — METOLAZONE 5 MG: 5 TABLET ORAL at 16:21

## 2017-10-26 RX ADMIN — Medication 1000 MG: at 21:18

## 2017-10-26 RX ADMIN — TORSEMIDE 20 MG: 20 TABLET ORAL at 16:21

## 2017-10-26 RX ADMIN — ATORVASTATIN CALCIUM 10 MG: 10 TABLET, FILM COATED ORAL at 16:21

## 2017-10-26 RX ADMIN — Medication 325 MG: at 16:21

## 2017-10-26 RX ADMIN — TERAZOSIN HYDROCHLORIDE 1 MG: 1 CAPSULE ORAL at 21:47

## 2017-10-26 RX ADMIN — QUETIAPINE FUMARATE 25 MG: 25 TABLET ORAL at 21:47

## 2017-10-26 NOTE — CMS CERTIFICATION NOTE
Certification: Based upon physical, mental and social evaluations, I certify that inpatient psychiatric services are medically necessary for this patient for a duration of 7 midnights for the treatment of psychosis  Available alternative community resources do not meet the patient's mental health care needs  I further attest that an established written individualized plan of care has been implemented and is outlined in the patient's medical records

## 2017-10-26 NOTE — PROGRESS NOTES
Pt calm, pleasant and cooperative, no medications given this morning, denies SI, HI, anxiety and depression, pt reports, "I feel good, I don't know why they bring me here" visible in dayroom, social with peers, denies A/VH, OOB for meals, will continue to monitor

## 2017-10-26 NOTE — ASSESSMENT & PLAN NOTE
· Creatinine is stable  · He has had an AV fistula created but is not on HD yet  · We should resume his pre-hospital medications of torsemide and metalzone     · Can follow creatinine as needed

## 2017-10-26 NOTE — DISCHARGE INSTR - OTHER ORDERS
Isreal Schulte on [address], [address] [place] [address]  Phone: [phone]  Fax: [phone]  Office Hours: Monday-Friday 8:00 am to 4:30 pm    [id number]  [place]  Telephone Service [phone] provides 24 hour, 7 day a week crisis phone service for any individual in mental health crisis in [facility]  The telephone service is a hotline, manned by a trained professional  Individuals can receive support, information and referral services 24 hours a day 7 days a week       [facility]  [address] [facility]  [place]   Phone: [phone]  Fax: [phone]    [facility] [address]ESLEY PHILLIPS Office is located @ [address] [place] [address], [address] [facility], [place] [address]  Phone: [phone]  Open Monday-Friday 8:30 am to 4:30 pm   24/7 Crisis Hotline: [facility]: [phone]

## 2017-10-26 NOTE — ASSESSMENT & PLAN NOTE
· Rate is well controlled  · Was on coumadin as an outpatient  · Restarted at home dosing  Med rec has varying dosing from 2-4 mg on certain days and allscripts outpatient record has 5mg daily recorded     · INR 1 94  · Continue current dosing and follow INR closely
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
## 2017-10-26 NOTE — PLAN OF CARE
Problem: DISCHARGE PLANNING  Goal: Discharge to home or other facility with appropriate resources  INTERVENTIONS:  - Identify barriers to discharge w/patient and caregiver  - Arrange for needed discharge resources and transportation as appropriate  - Identify discharge learning needs (meds, wound care, etc )  - Arrange for interpretive services to assist at discharge as needed  - Refer to Case Management Department for coordinating discharge planning if the patient needs post-hospital services based on physician/advanced practitioner order or complex needs related to functional status, cognitive ability, or social support system   Outcome: Progressing  CM met with Pt and completed intake assessment, reviewed Tx plan and Pt refused to sign Tx plan at this time  Pt signed ROD for PCP, wife/POA and daughter Lara Olivia)  CM contacted both Pt's wife and daughter to update both on Pt's current status and to discuss discharge planning

## 2017-10-26 NOTE — ED NOTES
Pt has been accept to [facility] by nurse Alexis Segundo and Dr Trevon Kothari  The pt has to wait until his son is transferred to [facility]  The nurse on [facility] call CW once the transfer happens  Once the transfer happens the pt is able to go to [facility] at that time

## 2017-10-26 NOTE — ED NOTES
Report given to Rashad on [facility] at this time  She will call when room is ready for pt       Toribio Camargo RN  10/25/17 8825

## 2017-10-26 NOTE — H&P
History and Physical - General Surgery   Luigiherlinda Arthur 80 y o  male MRN: [id number]  Unit/Bed#: DX0 569-01 Encounter: [id number]    History of Present Illness     HPI:  Loan Joy is a 80 y o  male who presents to the ER with his wife secondary to increased hallucinations, combativeness, and aggression  He resides at home with his wife who states he has been having an altered mental status from his baseline  Patient had a dialysis fistula placed about 3 weeks ago, and since then his mental state has declined  He stated that a week ago he had found some electrical cables in his attic and didn't know where they came from  He also awoke at 2 AM this morning and took a shower because he thought it was 5 AM   He denies any past psychiatric history and denies suicidal ideations, homicidal ideations, anxiety, depressions, or any hallucinations  Now admitted as an impatient for further psych evaluation and treatment  Review of Systems   Constitutional: Negative for chills, diaphoresis, fatigue, fever and unexpected weight change  HENT: Negative for congestion, ear pain, hearing loss, postnasal drip, rhinorrhea, sore throat and trouble swallowing  Eyes: Negative for photophobia, redness and visual disturbance  Respiratory: Negative for cough, chest tightness, shortness of breath, wheezing and stridor  Cardiovascular: Negative for chest pain, palpitations and leg swelling  He states dyspnea with exertion for many years  Gastrointestinal: Negative for abdominal distention, abdominal pain, constipation, diarrhea, nausea and vomiting  Endocrine: Negative for cold intolerance, heat intolerance, polydipsia, polyphagia and polyuria  Genitourinary: Negative for decreased urine volume, difficulty urinating, dysuria, flank pain, frequency, hematuria and urgency  Musculoskeletal: Negative for arthralgias, back pain, gait problem, joint swelling and myalgias     Skin: Negative for color change, pallor and rash  Neurological: Negative for dizziness, tremors, seizures, syncope, facial asymmetry, speech difficulty, weakness, light-headedness, numbness and headaches  Hematological: Negative for adenopathy  Does not bruise/bleed easily  Psychiatric/Behavioral: Positive for confusion, hallucinations and sleep disturbance  Negative for agitation, behavioral problems and suicidal ideas  The patient is not nervous/anxious            Historical Information   Past Medical History:   Diagnosis Date    Aortic aneurysm ([address] )     Arthritis     GERD (gastroesophageal reflux disease)     Heart failure (HCC)     Hyperlipidemia     Hypertension     Irregular heart beat     afib    Psychiatric illness     Renal disorder      Past Surgical History:   Procedure Laterality Date    ABDOMINAL AORTIC ANEURYSM REPAIR, OPEN      CARDIAC SURGERY      COLONOSCOPY      HERNIA REPAIR      [place] ANASTOMOSIS,AV,ANY SITE Left 10/2/2017    Procedure: UPPER EXTREMITY AV FISTULA CREATION;  Surgeon: Nadeen Gomez MD;  Location[facility];  Service: Vascular    VALVE REPLACEMENT       Social History   History   Alcohol Use No     History   Drug Use No     History   Smoking Status    Former Smoker    Quit date: 1/1/2011   Smokeless Tobacco    Never Used     Family History:   Family History   Problem Relation Age of Onset    Cancer Mother     Anuerysm Father        Meds/Allergies   Current Facility-Administered Medications   Medication Dose Route Frequency    acetaminophen (TYLENOL) tablet 650 mg  650 mg Oral Q6H PRN    acetaminophen (TYLENOL) tablet 650 mg  650 mg Oral Q4H PRN    aluminum-magnesium hydroxide-simethicone (MYLANTA) 200-200-20 mg/5 mL oral suspension 30 mL  30 mL Oral Q4H PRN    benztropine (COGENTIN) tablet 1 mg  1 mg Oral Q6H PRN    haloperidol (HALDOL) tablet 1 mg  1 mg Oral Q6H PRN    hydrOXYzine HCL (ATARAX) tablet 25 mg  25 mg Oral Q6H PRN    magnesium hydroxide (MILK OF MAGNESIA) 400 mg/5 mL oral suspension 30 mL  30 mL Oral Daily PRN    QUEtiapine (SEROquel) tablet 25 mg  25 mg Oral HS    risperiDONE (RisperDAL M-TABS) dispersible tablet 0 5 mg  0 5 mg Oral Q8H PRN    traZODone (DESYREL) tablet 25 mg  25 mg Oral HS PRN     Prescriptions Prior to Admission   Medication    amLODIPine (NORVASC) 10 mg tablet    aspirin (ECOTRIN LOW STRENGTH) 81 mg EC tablet    atorvastatin (LIPITOR) 10 mg tablet    calcium carbonate (TUMS) 500 mg chewable tablet    cholecalciferol (VITAMIN D3) 1,000 units tablet    ferrous sulfate 325 (65 Fe) mg tablet    furosemide (LASIX) 20 mg tablet    IRON, FERROUS GLUCONATE, PO    metolazone (ZAROXOLYN) 5 mg tablet    Multiple Vitamin (MULTIVITAMIN) capsule    omeprazole (PriLOSEC) 20 mg delayed release capsule    terazosin (HYTRIN) 1 mg capsule    torsemide (DEMADEX) 20 mg tablet    warfarin (COUMADIN) 2 mg tablet     Allergies   Allergen Reactions    Ativan [Lorazepam] Other (See Comments)     "it makes me go crazy"    Penicillins Swelling       Objective     BP (!) 173/73   Pulse 66   Temp 97 6 °F (36 4 °C) (Tympanic)   Resp 18   Ht 6' (1 829 m)   Wt 77 kg (169 lb 12 1 oz)   SpO2 100%   BMI 23 02 kg/m²     Current Vitals:   Blood Pressure: (!) 173/73 (10/26/17 0659)  Pulse: 66 (10/26/17 0659)  Temperature: 97 6 °F (36 4 °C) (10/26/17 0659)  Temp Source: Tympanic (10/26/17 0659)  Respirations: 18 (10/26/17 0659)  Height: 6' (182 9 cm) (10/25/17 2100)  Weight - Scale: 77 kg (169 lb 12 1 oz) (10/25/17 2100)  SpO2: 100 % (10/26/17 0659)    No intake or output data in the 24 hours ending 10/26/17 1244    Invasive Devices     Peripheral Intravenous Line            Peripheral IV 10/25/17 Right Forearm 1 day          Line            Hemodialysis AV Fistula 10/02/17 Left 24 days                Physical Exam   Constitutional: He is oriented to person, place, and time  He appears well-developed and well-nourished  No distress     HENT:   Head: Normocephalic and atraumatic  Right Ear: External ear normal    Left Ear: External ear normal    Nose: Nose normal    Mouth/Throat: Oropharynx is clear and moist  No oropharyngeal exudate  Eyes: Conjunctivae and EOM are normal  Pupils are equal, round, and reactive to light  Neck: Normal range of motion  Neck supple  No tracheal deviation present  No thyromegaly present  Cardiovascular: Normal rate, normal heart sounds and intact distal pulses  Exam reveals no gallop and no friction rub  No murmur heard  He had an audible clicking on auscultation of his heart due to having a past valve replacement  He also had slightly irregular heart rhythm secondary to history of atrial fibrillation  Pulmonary/Chest: Effort normal and breath sounds normal  No respiratory distress  He has no wheezes  He has no rales  Abdominal: Soft  Bowel sounds are normal  He exhibits no distension and no mass  There is no tenderness  There is no rebound and no guarding  Musculoskeletal: Normal range of motion  He exhibits no edema, tenderness or deformity  Lymphadenopathy:     He has no cervical adenopathy  Neurological: He is alert and oriented to person, place, and time  He has normal reflexes  No cranial nerve deficit  He exhibits normal muscle tone  Coordination normal    Skin: Skin is warm and dry  No rash noted  He is not diaphoretic  No erythema  He has multiple ecchymotic markings on his arms and decreased skin turgor  Psychiatric: He has a normal mood and affect   His behavior is normal  Judgment and thought content normal          Lab Results:   Admission on 10/25/2017   Component Date Value    WBC 10/26/2017 4 68     RBC 10/26/2017 2 66*    Hemoglobin 10/26/2017 7 8*    Hematocrit 10/26/2017 23 7*    MCV 10/26/2017 89     MCH 10/26/2017 29 3     MCHC 10/26/2017 32 9     RDW 10/26/2017 19 9*    Platelets 48/67/9907 63*    nRBC 10/26/2017 0     Neutrophils Relative 10/26/2017 67     Lymphocytes Relative 10/26/2017 23     Monocytes Relative 10/26/2017 8     Eosinophils Relative 10/26/2017 1     Basophils Relative 10/26/2017 1     Neutrophils Absolute 10/26/2017 3 14     Lymphocytes Absolute 10/26/2017 1 06     Monocytes Absolute 10/26/2017 0 35     Eosinophils Absolute 10/26/2017 0 06     Basophils Absolute 10/26/2017 0 03     Sodium 10/26/2017 140     Potassium 10/26/2017 4 7     Chloride 10/26/2017 112*    CO2 10/26/2017 19*    Anion Gap 10/26/2017 9     BUN 10/26/2017 84*    Creatinine 10/26/2017 4 57*    Glucose 10/26/2017 85     Calcium 10/26/2017 8 8     AST 10/26/2017 11     ALT 10/26/2017 13     Alkaline Phosphatase 10/26/2017 73     Total Protein 10/26/2017 6 5     Albumin 10/26/2017 2 9*    Total Bilirubin 10/26/2017 0 97     eGFR 10/26/2017 11     Folate 10/26/2017 19 0*    Cholesterol 10/26/2017 65     Triglycerides 10/26/2017 35     HDL, Direct 10/26/2017 40     LDL Calculated 10/26/2017 18     Vitamin B-12 10/26/2017 1828*    Vit D, 25-Hydroxy 10/26/2017 43 4      Imaging: I have personally reviewed pertinent reports  EKG, Pathology, and Other Studies: I have personally reviewed pertinent reports  Assessment/Plan     Assessment:  1  This is an elderly 80year old  male who was brought to the ED by his wife for changes in his mental state including visual hallucinations, aggressiveness, and combativeness  He denies any past psych history  2  CKD- stage 4, Creat- 4 57- taking Demadex and Zaroxolyn   3  Anemia- secondary to renal disease- Hgb- 7 8, Hct-  23 7- taking oral Fe supplement  4  A-Fib rate controlled- patient was taking coumadin  5  History of CHF- On Lasix  6  History of HTN- taking Norvasc, Hytrin  7  History of Hyperlipidemia/ CAD- On Lipitor  8  GERD- taking Prilosec and Tums  9  History of Aortic aneurysm repair  10  History of heart valve replacement  11  Elevated TSH- 4 24    12   S/P creation of left upper arm AV fistula- 10/2/17        Plan:  1  Admit to [facility] for further psych evaluation and treatment  Will consult [facility] for multiple medical management  May require Free T4 level if repeat TSH still elevated  Will need to monitor Hemoglobin levels  Counseling / Coordination of Care  Total floor / unit time spent today 1 hour  Greater than 50% of total time was spent with the patient and / or family counseling and / or coordination of care        Kiana Hough PA-C  10/26/2017

## 2017-10-26 NOTE — CONSULTS
Inpatient Medical Consultation - [facility] Internal Medicine    Patient Information: Constance Gross 80 y o  male MRN: [id number]  Unit/Bed#: CK1 569-01 Encounter: [id number]  PCP: Jeevan Roland MD  Date of Admission:  10/25/2017  Date of Consultation: 10/26/17  Requesting Physician: Jon Gonzalez MD    Reason For Consultation:   Medical management     Assessment/Plan:    Problem List as of 10/26/2017 Reviewed: 10/26/2017  3:08 PM by JUVENCIO French       Endocrine    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 [facility])       Cardiovascular and Mediastinum    NSVT (nonsustained ventricular tachycardia) ([facility] [address] )    CHF (congestive heart failure) [facility])    Last Assessment & Plan 10/25/2017 Hospital Encounter Written 10/26/2017  3:11 PM by JUVENCIO French       No acute exacerbation  Daily weights   Continue diuretics as he takes at home           Paroxysmal atrial fibrillation [facility])    Last Assessment & Plan 10/25/2017 Hospital Encounter Edited 10/26/2017  3:17 PM by JUVENCIO French       Rate is well controlled  Was on coumadin as an outpatient  Restarted at home dosing  Med rec has varying dosing from 2-4 mg on certain days and [employer] outpatient record has 5mg daily recorded     INR 1 94  Continue current dosing and follow INR closely         CAD (coronary artery disease)    Last Assessment & Plan 10/25/2017 Hospital Encounter Written 10/26/2017  3:18 PM by JUVENCIO French       Continue current home medication regimen  Stable             Nervous and Auditory    * (Principal)Psychosis, organic    Last Assessment & Plan 10/25/2017 Hospital Encounter Written 10/26/2017  3:09 PM by JUVENCIO French     Per psychiatry             Genitourinary    ALLEN (acute kidney injury) [facility])    Hypertensive CKD (chronic kidney disease)    Last Assessment & Plan 7/2/2017 Hospital Encounter Written 7/5/2017  1:13 PM by Tariq Ramirez       Nephrology following  CA improved slightly to 6 9 continue to replete per renal  Creatine improved today slightly at 4 03   Bmp and mg in am           ALLEN (acute kidney injury) ([facility] [address] )    CKD (chronic kidney disease), stage IV [facility])    Last Assessment & Plan 10/25/2017 Hospital Encounter Written 10/26/2017  3:10 PM by JUVENCIO Lui       Creatinine is stable  He has had an AV fistula created but is not on HD yet  We should resume his pre-hospital medications of torsemide and metalzone  Other    Edema    Last Assessment & Plan 4/24/2017 Hospital Encounter Written 5/2/2017 12:17 PM by Reba Tellez PA-C     Likely due to cellulitis, LLE worse than RLE; he feels right is back to baseline  Continue IV ancef and lasix 80mg BID  Podiatry ongoing recs appreciated - MRI shows hematoma +/- superinfection; plan for I&D today if able  Doppler study negative for DVT           Cellulitis of extremity    Last Assessment & Plan 4/24/2017 Hospital Encounter Written 5/2/2017 12:20 PM by Reba Tellez PA-C     LLE extremity cellulitis, slowly improving; MRI showing hematoma, cannot entirely exclude infected hematoma  Per podiatry continue IV treatment due to slow response  Continue IV ancef  Continue recommendations per podiatry  Based on MRI findings evaluate for discharge readiness  PT/OT for discharge recs         Azotemia    Weakness generalized    Last Assessment & Plan 7/2/2017 Hospital Encounter Written 7/5/2017  1:20 PM by UJVENCIO Angeles       Likely given worsening renal function and electrolyte changes  PT/OT eval completed both recommending home with support  Podiatry consulted due to foot pain and increased uric acid  Injected left foot and wanted indocin started on patient   Cannot start recommended medication given renal function will discuss further with renal           Hypocalcemia    Last Assessment & Plan 7/2/2017 Hospital Encounter Written 7/5/2017  1:22 PM by JUVENCIO Angeles        treatment per renal  Continue calcium and mag replete  Repeat labs in am if improved can likely discharge patient tomorrow  Warfarin anticoagulation    Last Assessment & Plan 7/2/2017 Hospital Encounter Written 7/5/2017  1:23 PM by JUVENCIO Irene       INR improved today 2 41 will give 2 5 mg coumadin today  INR in am           Hand swelling    Leukocytosis    Left upper extremity swelling    Hypomagnesemia    Anemia    Last Assessment & Plan 10/25/2017 Hospital Encounter Written 10/26/2017  3:18 PM by JUVENCIO Light       Continue FeSO4 as prior to admission  Should get an aranesp injection tomorrow  Will order           ·     VTE Prophylaxis: Warfarin (Coumadin)  /   Recommendations for Discharge:  · Resume pre hospital medications  · Daily weights  · Strict low sodium diet  · Follow up with vascular as scheduled to check your new AV fistula site    Counseling / Coordination of Care Time: 20 minutes  Greater than 50% of total time spent on patient counseling and coordination of care  Collaboration of Care: Were Recommendations Directly Discussed with Primary Treatment Team? - No     History of Present Illness:    Leon Irene is a 80 y o  male who is originally admitted to the psychiatric service on 10/25/2017 due to hallucinations  We are consulted for medical management  This unfortunate 80year old gentleman has a complex medical history including heart failure (systolic), PAF, CKD stage IV, recent AVF creation, ACD, LUE hematoma following the AVF creation requiring admission to [facility], ESRD associated hypocalemia, and HTn who has been admitted to the geriatric psyche unit for hallucinations  He denies complaints at this time  He has no CP or SOB  He has no fever or chills  He urinates frequently at baseline but does not void large amounts  He has no pain  Review of Systems:    Review of Systems   Constitutional: Negative  HENT: Negative  Eyes: Negative  Respiratory: Negative  Cardiovascular: Negative  Gastrointestinal: Negative  Genitourinary: Negative  Musculoskeletal: Negative  Neurological: Negative  Hematological: Negative  Psychiatric/Behavioral: Negative  Past Medical and Surgical History:     Past Medical History:   Diagnosis Date    Aortic aneurysm ([address] )     Arthritis     GERD (gastroesophageal reflux disease)     Heart failure (HCC)     Hyperlipidemia     Hypertension     Irregular heart beat     afib    Psychiatric illness     Renal disorder        Past Surgical History:   Procedure Laterality Date    ABDOMINAL AORTIC ANEURYSM REPAIR, OPEN      CARDIAC SURGERY      COLONOSCOPY      HERNIA REPAIR      [place] ANASTOMOSIS,AV,ANY SITE Left 10/2/2017    Procedure: UPPER EXTREMITY AV FISTULA CREATION;  Surgeon: Mackenzie Ba MD;  Location: [facility];  Service: Vascular    VALVE REPLACEMENT         Meds/Allergies:    all medications and allergies reviewed    Allergies: Allergies   Allergen Reactions    Ativan [Lorazepam] Other (See Comments)     "it makes me go crazy"    Penicillins Swelling       Social History:     Marital Status: /Civil Union    Substance Use History:   History   Alcohol Use No     History   Smoking Status    Former Smoker    Quit date: 1/1/2011   Smokeless Tobacco    Never Used     History   Drug Use No       Family History:    heart disease    Physical Exam:     Vitals:   Blood Pressure: (!) 173/73 (10/26/17 0659)  Pulse: 66 (10/26/17 0659)  Temperature: 97 6 °F (36 4 °C) (10/26/17 0659)  Temp Source: Tympanic (10/26/17 0659)  Respirations: 18 (10/26/17 0659)  Height: 6' (182 9 cm) (10/25/17 2100)  Weight - Scale: 77 kg (169 lb 12 1 oz) (10/25/17 2100)  SpO2: 100 % (10/26/17 0659)    Physical Exam   Constitutional: He is oriented to person, place, and time  He appears well-nourished  No distress  HENT:   Head: Normocephalic  Eyes: Pupils are equal, round, and reactive to light  Neck: Normal range of motion  Neck supple  Cardiovascular: Normal rate  No murmur heard  Pulmonary/Chest: Effort normal and breath sounds normal  No respiratory distress  He has no wheezes  He has no rales  Abdominal: Soft  Bowel sounds are normal    Musculoskeletal: Normal range of motion  Neurological: He is alert and oriented to person, place, and time  Skin: Skin is warm  Ecchymosis to BUE's   Nursing note and vitals reviewed  Additional Data:     Lab Results: I have personally reviewed pertinent reports  Results from last 7 days  Lab Units 10/26/17  0558   WBC Thousand/uL 4 68   HEMOGLOBIN g/dL 7 8*   HEMATOCRIT % 23 7*   PLATELETS Thousands/uL 63*   NEUTROS PCT % 67   LYMPHS PCT % 23   MONOS PCT % 8   EOS PCT % 1       Results from last 7 days  Lab Units 10/26/17  0558   SODIUM mmol/L 140   POTASSIUM mmol/L 4 7   CHLORIDE mmol/L 112*   CO2 mmol/L 19*   BUN mg/dL 84*   CREATININE mg/dL 4 57*   CALCIUM mg/dL 8 8   TOTAL PROTEIN g/dL 6 5   BILIRUBIN TOTAL mg/dL 0 97   ALK PHOS U/L 73   ALT U/L 13   AST U/L 11   GLUCOSE RANDOM mg/dL 85       Results from last 7 days  Lab Units 10/23/17  0732   INR  1 94*       Imaging: I have personally reviewed pertinent reports  Xr Chest 2 Views    Result Date: 10/25/2017  Narrative: CHEST INDICATION:  Altered mental status  COMPARISON:  10/19/2017 VIEWS:  Frontal and lateral projections IMAGES:  3 FINDINGS:     Cardiomediastinal silhouette is stable, including cardiomegaly, aortic valve prosthesis and thoracic aortic stent graft  The lungs are clear  Stable small left pleural effusion    Sternal wires are present  Visualized osseous structures appear otherwise unremarkable for the patient's age  Impression: No acute pulmonary disease  Persistent small left pleural effusion  Workstation performed: [id number]     Xr Chest 2 Views    Result Date: 10/19/2017  Narrative: CHEST INDICATION: Cough  COMPARISON: 9/26/2017   VIEWS:  Frontal and lateral projections; 2 images FINDINGS:    Stable cardiomegaly is noted  Thoracic aortic stent graft is noted  Median sternotomy wires and cardiac valve prosthesis again present  Mild pulmonary vascular congestion is noted  Small bilateral pleural effusions are present  Osseous structures are age appropriate  Impression: Mild pulmonary vascular congestion and small bilateral pleural effusions  Workstation performed: [id number]     Ct Head Without Contrast    Result Date: 10/25/2017  Narrative: CT BRAIN - WITHOUT CONTRAST INDICATION:  Altered mental status  COMPARISON:  1/14/2011 TECHNIQUE:  CT examination of the brain was performed  In addition to axial images, coronal reformatted images were created and submitted for interpretation  Radiation dose length product (DLP) for this visit:  1081 04 mGy-cm   This examination, like all CT scans performed in the [facility], was performed utilizing techniques to minimize radiation dose exposure, including the use of iterative reconstruction and automated exposure control  IMAGE QUALITY:  Diagnostic  FINDINGS:  PARENCHYMA:  Decreased attenuation is noted in the supratentorial white matter demonstrating an appearance most consistent with mild microangiopathic change  No intracranial mass, mass effect or midline shift  No CT signs of acute infarction  There is no parenchymal hemorrhage  VENTRICLES AND EXTRA-AXIAL SPACES:  Age-appropriate volume loss is noted  No hydrocephalus  VISUALIZED ORBITS AND PARANASAL SINUSES:  Unremarkable  CALVARIUM AND EXTRACRANIAL SOFT TISSUES:   Normal      Impression: No acute intracranial abnormality  Microangiopathic changes  Workstation performed: [id number]       ** Please Note: This note has been constructed using a voice recognition system   **

## 2017-10-26 NOTE — PLAN OF CARE
Problem: Ineffective Coping  Goal: Participates in unit activities  Interventions:  - Provide therapeutic environment   - Provide required programming   - Redirect inappropriate behaviors    Outcome: Not Progressing  Pt agreed to attend groups yet walk past the day room and kept going back to his room

## 2017-10-26 NOTE — H&P
Psychiatric Evaluation - Behavioral Health     Identification Data:Duncan Arthur 80 y o  male MRN: [id number]  Unit/Bed#: WH7 569-01 Encounter: [id number]    Chief Complaint:  Visual hallucinations and persecutory ideas    History of present illness:    Patient is a 80years old   male who reports that recently he has noticed some electrical heavy equipment are in his home and he has no idea who is bringing them there  He is afraid that he will be prosecuted for having stolen the equipment  He regrets that he does not have any friends among [employer] troopers so he could report this to them  He gets frustrated when nobody believes him  His wife and his children do not see these items in the house and he believes that he is being lied to  He has not been sleeping or eating well  His symptoms got worse after he recently had a procedure done on a shunt which has been inserted for possible dialysis due to his history of chronic kidney disease  He is also upset because he recently ordered 7 cords of firewood and as he was splitting the logs to stack them, 1 of them fell on his foot and caused injury  Apparently his wife was concerned about this type of behavior but he insists that he needed firewood for the winter  He has not been taking his medications  While on the unit, he started to take a shower at 2:00 a m  which is uncharacteristic of him  He does not have any explanation for that  Psychiatric Review Of Systems:  Change in sleep: yes  appetite changes: yes  weight changes:  Unknown  Change in energy/anergy: yes  Change in interest/pleasure/anhedonia: yes  somatic symptoms: no  anxiety/panic: no  shea: no  guilty/hopeless: no  self injurious behavior/risky behavior: no    Historical Information     Past Psychiatric History:   The patient denies any prior history of psychiatric illness including depression, shea or psychosis  He has never attempted suicide      Substance Abuse History:  The patient denies any usage of drugs or alcohol    Family Psychiatric History:     Unknown  Social History:  Developmental:  Education: high school diploma/GED  Marital history:   Living arrangement, social support: wife  Occupational History: retired-worked for a [employer] company for 43 years  Traumatic History:   Abuse:none is reported  Other Traumatic Events:  None    Past Medical History:   Diagnosis Date    Aortic aneurysm ([address] )     Arthritis     GERD (gastroesophageal reflux disease)     Hyperlipidemia     Hypertension     Irregular heart beat     afib    Psychiatric illness     Renal disorder        Medical Review Of Systems:  Pertinent items are noted in HPI  Meds/Allergies   all current active meds have been reviewed  Allergies   Allergen Reactions    Ativan [Lorazepam] Other (See Comments)     "it makes me go crazy"    Penicillins Swelling     Objective   Vital signs in last 24 hours:  Temp:  [96 6 °F (35 9 °C)-97 6 °F (36 4 °C)] 97 6 °F (36 4 °C)  HR:  [43-88] 66  Resp:  [17-26] 18  BP: (139-173)/(62-73) 173/73    No intake or output data in the 24 hours ending 10/26/17 1018    Mental Status Evaluation:  Appearance:  {Good eye contact and disheveled   Behavior:  calm, cooperative and friendly   Speech:   Language Normal rate and Normal volume  No overt abnormality   Mood:  dysphoric   Affect:    Thought process appropriate and mood-congruent  goal directed and coherent   Associations: intact associations   Thought Content:  Delusions of persecution   Perceptual Disturbances: Visual hallucinations   Risk Potential: No suicidal or homicidal ideation   Orientation  Oriented x 3   Memory grossly intact   Attention/Concentration attention span and concentration were age appropriate   Fund of knowledge aware of current events and vocabulary Average   Insight:  No insight   Judgment: Good judgment   Gait/Station: Not observed   Motor Activity: No abnormal movement noted         Lab Results: I have personally reviewed pertinent lab results  Imaging Studies:  See EHR  EKG, Pathology, and Other Studies:see EHR  Code Status: Level 1 - Full Code  Advance Directive and Living Will:      Power of :    POLST:      Assessment/Plan     Principal Problem:    Psychosis, organic    Plan: We will start quetiapine to alleviate psychotic symptoms and hallucinations and promote sleep  Risks, benefits and possible side effects of Medications:   Risks, benefits, and possible side effects of medications explained to patient and patient verbalizes understanding

## 2017-10-26 NOTE — CASE MANAGEMENT
Pt presents on 12, BEHAVIORAL MEDICINE AT [facility], lives in house with wife and adult son, has 's license and does drive, ambulates with walker, receives SSI and has POA (wife- Nik Nickerson) and Living Will  Pt presents irritable and withdrawn, stating "I don't need to be here, I am not crazy and I am going to talk with my wife about going home when she visits " Pt states he was having visual hallucinations "of things" at home but denies currently  Pt reports increased stress related to his medical issues (recently had fistula placed for potential renal dialysis and hit head with piece of wood while splitting wood in 9/2017), recent surgeries and son's mental health issues  Pt has no prior inpatient psych treatment and does not have outpatient mental health provider and states he is not interested in psychiatric treatment upon discharge  Pt signed ROD for the following:  LACEY Gutiérrez  (PCP): [phone]  [place] Acrda (spouse): [phone]  Becky Sorto (daughter): [phone]     CM contacted Pt's daughter Becky Sorto) and Pt's daughter reports Pt has been hallucinating @ home daily is angry/hostile with his wife and states, " ever since he has had these recent surgeries, after each surgery he becomes more confused and since this last surgery has had hallucinations of things being in the house that are not there " Per Pt's daughter, Pt's wife cannot handle Pt's behaviors and is having her own health issues  Pt's daughter states they family has had conversations about Assisted Living or SNF for Pt but Pt is refusing to consider these options and wants to remain in his home  Pt's wife visited Pt and CM met with Pt's wife to discuss discharge planning  Pt's wife reports that pt is difficult to manage at home, is obstinate and refusing to ask for help with the upkeep of their home and insists on doing things himself with his walker and current medical issues

## 2017-10-27 LAB
ANION GAP SERPL CALCULATED.3IONS-SCNC: 8 MMOL/L (ref 4–13)
BASOPHILS # BLD AUTO: 0.02 THOUSANDS/ΜL (ref 0–0.1)
BASOPHILS NFR BLD AUTO: 1 % (ref 0–1)
BUN SERPL-MCNC: 87 MG/DL (ref 5–25)
CALCIUM SERPL-MCNC: 8.8 MG/DL (ref 8.3–10.1)
CHLORIDE SERPL-SCNC: 112 MMOL/L (ref 100–108)
CO2 SERPL-SCNC: 20 MMOL/L (ref 21–32)
CREAT SERPL-MCNC: 4.68 MG/DL (ref 0.6–1.3)
EOSINOPHIL # BLD AUTO: 0.04 THOUSAND/ΜL (ref 0–0.61)
EOSINOPHIL NFR BLD AUTO: 1 % (ref 0–6)
ERYTHROCYTE [DISTWIDTH] IN BLOOD BY AUTOMATED COUNT: 19.7 % (ref 11.6–15.1)
GFR SERPL CREATININE-BSD FRML MDRD: 11 ML/MIN/1.73SQ M
GLUCOSE SERPL-MCNC: 87 MG/DL (ref 65–140)
HCT VFR BLD AUTO: 23.7 % (ref 36.5–49.3)
HGB BLD-MCNC: 7.8 G/DL (ref 12–17)
INR PPP: 1.73 (ref 0.86–1.16)
LYMPHOCYTES # BLD AUTO: 1.23 THOUSANDS/ΜL (ref 0.6–4.47)
LYMPHOCYTES NFR BLD AUTO: 28 % (ref 14–44)
MCH RBC QN AUTO: 29.3 PG (ref 26.8–34.3)
MCHC RBC AUTO-ENTMCNC: 32.9 G/DL (ref 31.4–37.4)
MCV RBC AUTO: 89 FL (ref 82–98)
MONOCYTES # BLD AUTO: 0.31 THOUSAND/ΜL (ref 0.17–1.22)
MONOCYTES NFR BLD AUTO: 7 % (ref 4–12)
NEUTROPHILS # BLD AUTO: 2.75 THOUSANDS/ΜL (ref 1.85–7.62)
NEUTS SEG NFR BLD AUTO: 63 % (ref 43–75)
NRBC BLD AUTO-RTO: 0 /100 WBCS
PLATELET # BLD AUTO: 57 THOUSANDS/UL (ref 149–390)
POTASSIUM SERPL-SCNC: 4.6 MMOL/L (ref 3.5–5.3)
PROTHROMBIN TIME: 20.4 SECONDS (ref 12.1–14.4)
RBC # BLD AUTO: 2.66 MILLION/UL (ref 3.88–5.62)
RPR SER QL: NORMAL
SODIUM SERPL-SCNC: 140 MMOL/L (ref 136–145)
WBC # BLD AUTO: 4.4 THOUSAND/UL (ref 4.31–10.16)

## 2017-10-27 PROCEDURE — 85610 PROTHROMBIN TIME: CPT | Performed by: NURSE PRACTITIONER

## 2017-10-27 PROCEDURE — 85025 COMPLETE CBC W/AUTO DIFF WBC: CPT | Performed by: NURSE PRACTITIONER

## 2017-10-27 PROCEDURE — 80048 BASIC METABOLIC PNL TOTAL CA: CPT | Performed by: NURSE PRACTITIONER

## 2017-10-27 RX ADMIN — WARFARIN SODIUM 2 MG: 2 TABLET ORAL at 17:09

## 2017-10-27 RX ADMIN — VITAMIN D, TAB 1000IU (100/BT) 2000 UNITS: 25 TAB at 08:18

## 2017-10-27 RX ADMIN — Medication 1000 MG: at 08:18

## 2017-10-27 RX ADMIN — METOLAZONE 5 MG: 5 TABLET ORAL at 08:18

## 2017-10-27 RX ADMIN — QUETIAPINE FUMARATE 25 MG: 25 TABLET ORAL at 21:27

## 2017-10-27 RX ADMIN — Medication 1000 MG: at 17:09

## 2017-10-27 RX ADMIN — Medication 325 MG: at 08:18

## 2017-10-27 RX ADMIN — Medication 1000 MG: at 21:27

## 2017-10-27 RX ADMIN — TERAZOSIN HYDROCHLORIDE 1 MG: 1 CAPSULE ORAL at 21:27

## 2017-10-27 RX ADMIN — AMLODIPINE BESYLATE 5 MG: 5 TABLET ORAL at 08:18

## 2017-10-27 RX ADMIN — ASPIRIN 81 MG: 81 TABLET, COATED ORAL at 08:18

## 2017-10-27 RX ADMIN — ATORVASTATIN CALCIUM 10 MG: 10 TABLET, FILM COATED ORAL at 17:09

## 2017-10-27 RX ADMIN — TORSEMIDE 20 MG: 20 TABLET ORAL at 08:18

## 2017-10-27 NOTE — CASE MANAGEMENT
CM met with Pt and reviewed Tx plan but Pt is refusing to sign Tx plan and states he wants to go home  CM contacted Pt's wife and updated same on Pt's status of stay and Pt's wife states that she does not think Pt can be managed at home and would like him to go to a SNF  Pt's wife states she would prefer Pt be in a SNF close to home ([place], [place]) area and mentioned [facility] in [facility]  and [facility] in [place], [place] as 2 choices

## 2017-10-27 NOTE — PROGRESS NOTES
Progress Note - Behavioral Health     Luther Arthur 80 y o  male MRN: [id number]  Unit/Bed#: KO8 569-01 Encounter: [id number]    The patient was seen for continuing care and reviewed with treatment team  Patient withdrawn to his room and making poor eye contact  Irritable and guarded during interview  He does not feel like he belongs here  He is no longer having visual hallucinations and now says that the "stolen electrical equipment" that he saw in his attic prior to admission may have been in his imagination  He denies further visual hallucinations since being on the unit, and denies auditory hallucinations  Patient denies SI/HI  [facility] is following patient for multiple medical co-morbidities and abnormal lab values  PT/INR improving since yesterday, BUN/Cr worsening secondary to patient's CKD  He recently had AV fistula placed, but has not yet started hemodialysis  Receiving Aranesp injection later for low Hgb  Patient was not taking medications at home and ability to care for self has declines  Family is questioning competency to make medical decisions  Compliant with medications on the unit  Patient reports poor sleep last night  Appetite is improving         Psychiatric Review of Systems:  Behavior over the last 24 hours:  unchanged  Sleep: reports poor sleep, but staff states otherwise   Appetite: normal  Medication side effects: No  ROS: no complaints    Mental Status Evaluation:  Appearance:  age appropriate, dressed in hospital attire, poor eye contact   Behavior:  guarded, irritable, withdrawn    Speech:  normal rate and volume   Mood:  dysphoric, irritable   Affect:  blunted, constricted   Thought Process:  coherent, goal directed   Associations: intact associations   Thought Content:  no overt delusions at this time    Perceptual Disturbances: denying hallucinations at this time    Risk Potential: Suicidal ideation - None  Homicidal ideation - None  Potential for aggression - No   Sensorium:  oriented to person, place and time/date   Memory:  short term memory questionable , long term memory grossly intact   Consciousness:  alert and awake   Attention: attention span and concentration appear shorter than expected for age   Insight:  limited   Judgment: fair   Gait/Station: not observed   Motor Activity: no abnormal movements     Progress Toward Goals: Not reporting hallucinations  Some improvement in that regard  Principal Problem:    Psychosis, organic  Active Problems:    CKD (chronic kidney disease), stage IV (HCC)    CHF (congestive heart failure) (HCC)    Paroxysmal atrial fibrillation (HCC)    Anemia    CAD (coronary artery disease)    Recommended Treatment:   1) continue current medications; will not increase Seroquel for sleep at this time due to abnormal PT/INR and drug interaction with Coumadin    2) neuropsych consult to assess for competency in medical decision making    amLODIPine 5 mg Oral Daily   aspirin 81 mg Oral Daily   atorvastatin 10 mg Oral Daily With Dinner   calcium carbonate 1,000 mg Oral TID   cholecalciferol 2,000 Units Oral Daily   ferrous sulfate 325 mg Oral Daily   metolazone 5 mg Oral Daily   QUEtiapine 25 mg Oral HS   terazosin 1 mg Oral HS   torsemide 20 mg Oral Daily   warfarin 2 mg Oral Daily (warfarin)       Vitals:    10/27/17 0709   BP: 141/61   Pulse: 62   Resp: 16   Temp: 97 9 °F (36 6 °C)   SpO2: 99%       Risks, benefits and possible side effects of Medications:   Risks, benefits, and possible side effects of medications explained to patient and patient verbalizes understanding        Kasey oDminguez PA-C

## 2017-10-27 NOTE — PLAN OF CARE
Problem: Ineffective Coping  Goal: Participates in unit activities  Interventions:  - Provide therapeutic environment   - Provide required programming   - Redirect inappropriate behaviors    Outcome: Not Progressing  Pt able to state that he would never use a gun to harm someone,feels his wife is afraid of him and that the thoughts of voices are only troubling thoughts not real

## 2017-10-27 NOTE — PROGRESS NOTES
Patient remained in bed for the majority of evening shift  Denied SI and minimized the reason he was hospitalized here  He shared that he had a phone call from his wife this evening which went well  Took medications with no issue

## 2017-10-27 NOTE — PROGRESS NOTES
Pt remained in his room this shift  Encouraged to come out states he "wants to be left alone"  Scant in conversation   Denies s/s Med compliant

## 2017-10-28 RX ORDER — WARFARIN SODIUM 5 MG/1
5 TABLET ORAL
Status: DISCONTINUED | OUTPATIENT
Start: 2017-10-28 | End: 2017-11-05

## 2017-10-28 RX ORDER — FERROUS SULFATE 325(65) MG
325 TABLET ORAL DAILY
Status: DISCONTINUED | OUTPATIENT
Start: 2017-10-28 | End: 2017-11-07 | Stop reason: HOSPADM

## 2017-10-28 RX ADMIN — QUETIAPINE FUMARATE 25 MG: 25 TABLET ORAL at 21:32

## 2017-10-28 RX ADMIN — AMLODIPINE BESYLATE 5 MG: 5 TABLET ORAL at 08:41

## 2017-10-28 RX ADMIN — Medication 1000 MG: at 08:41

## 2017-10-28 RX ADMIN — Medication 1000 MG: at 21:32

## 2017-10-28 RX ADMIN — ATORVASTATIN CALCIUM 10 MG: 10 TABLET, FILM COATED ORAL at 17:05

## 2017-10-28 RX ADMIN — METOLAZONE 5 MG: 5 TABLET ORAL at 08:41

## 2017-10-28 RX ADMIN — ASPIRIN 81 MG: 81 TABLET, COATED ORAL at 08:41

## 2017-10-28 RX ADMIN — WARFARIN SODIUM 5 MG: 5 TABLET ORAL at 17:06

## 2017-10-28 RX ADMIN — TORSEMIDE 20 MG: 20 TABLET ORAL at 08:41

## 2017-10-28 RX ADMIN — Medication 1000 MG: at 17:04

## 2017-10-28 RX ADMIN — VITAMIN D, TAB 1000IU (100/BT) 2000 UNITS: 25 TAB at 08:41

## 2017-10-28 RX ADMIN — TERAZOSIN HYDROCHLORIDE 1 MG: 1 CAPSULE ORAL at 21:32

## 2017-10-28 NOTE — PROGRESS NOTES
Pt had a visit from his wife this afternoon  Pt was more seclusive to his bedroom once wife left  Pt advised that 'it is like a prison in here'  Pt was encouraged to go out to milieu and he accepted, stating that he was going out for breakfast   Advised pt that it is dinner time, and that it is still Saturday  Pt was mildly confused this evening, but continues to be pleasant and calm  Will continue to monitor

## 2017-10-28 NOTE — PROGRESS NOTES
Pt has been calm and cooperative with staff  Pt is medication compliant and education was provided regarding medications  Pt was out and social in the milieu, with little encouragement needed  Will continue to monitor

## 2017-10-28 NOTE — PROGRESS NOTES
Progress Note - Behavioral Health   Lauren Arthur 80 y o  male MRN: [id number]  Unit/Bed#: WM8 569-01 Encounter: [id number]    Assessment/Plan   Principal Problem:    Psychosis, organic  Active Problems:    CKD (chronic kidney disease), stage IV (HCC)    CHF (congestive heart failure) (HCC)    Paroxysmal atrial fibrillation (HCC)    Anemia    CAD (coronary artery disease)      Subjective:  Patient being followed by [facility] for multiple chronic comorbidities  Appears he is not taking care of himself outside of hospital   Neuropsych consult is placed for capacity of decision making skills  Appears untreated health issues are likely culprit for resolved psychotic symptoms  Appears dysthymic in affect  Denied anxiety and depression  Denied SI/HI, psychosis, and does not show signs of shea  Medication compliant here on unit  Appears to be tolerating medications well without serious side effects      Current Medications:  Current Facility-Administered Medications   Medication Dose Route Frequency    acetaminophen (TYLENOL) tablet 650 mg  650 mg Oral Q6H PRN    acetaminophen (TYLENOL) tablet 650 mg  650 mg Oral Q4H PRN    aluminum-magnesium hydroxide-simethicone (MYLANTA) 200-200-20 mg/5 mL oral suspension 30 mL  30 mL Oral Q4H PRN    amLODIPine (NORVASC) tablet 5 mg  5 mg Oral Daily    aspirin (ECOTRIN LOW STRENGTH) EC tablet 81 mg  81 mg Oral Daily    atorvastatin (LIPITOR) tablet 10 mg  10 mg Oral Daily With Dinner    benztropine (COGENTIN) tablet 1 mg  1 mg Oral Q6H PRN    calcium carbonate (TUMS) chewable tablet 1,000 mg  1,000 mg Oral TID    cholecalciferol (VITAMIN D3) tablet 2,000 Units  2,000 Units Oral Daily    ferrous sulfate tablet 325 mg  325 mg Oral Daily    haloperidol (HALDOL) tablet 1 mg  1 mg Oral Q6H PRN    hydrOXYzine HCL (ATARAX) tablet 25 mg  25 mg Oral Q6H PRN    magnesium hydroxide (MILK OF MAGNESIA) 400 mg/5 mL oral suspension 30 mL  30 mL Oral Daily PRN    metolazone (ZAROXOLYN) tablet 5 mg  5 mg Oral Daily    QUEtiapine (SEROquel) tablet 25 mg  25 mg Oral HS    risperiDONE (RisperDAL M-TABS) dispersible tablet 0 5 mg  0 5 mg Oral Q8H PRN    terazosin (HYTRIN) capsule 1 mg  1 mg Oral HS    torsemide (DEMADEX) tablet 20 mg  20 mg Oral Daily    traZODone (DESYREL) tablet 25 mg  25 mg Oral HS PRN    warfarin (COUMADIN) tablet 5 mg  5 mg Oral Daily (warfarin)       Behavioral Health Medications: all current active meds have been reviewed and continue current psychiatric medications  Vitals:  Vitals:    10/28/17 0711   BP: 157/63   Pulse: 76   Resp: 16   Temp: (!) 96 1 °F (35 6 °C)   SpO2: 95%       Labs: reviewed    Psychiatric Review of Systems:  Behavior over the last 24 hours:  unchanged  Sleep: normal  Appetite: normal  Medication side effects: No  ROS: no complaints    Mental Status Evaluation:  Appearance:  casually dressed   Behavior:  guarded   Speech:  soft   Mood:  dysthymic   Affect:  mood-congruent   Language sparse   Thought Process:  goal directed   Thought Content:  normal  Denied delusions/obsessions   Perceptual Disturbances: None   Risk Potential: Denied SI/HI  Potential for aggression: No   Sensorium:  person and place   Cognition:  grossly intact   Consciousness:  alert and awake    Attention: attention span appeared shorter than expected for age   Insight:  limited   Judgment: partial   Gait/Station: in wheelchair   Motor Activity: no abnormal movements     Progress Toward Goals: unchanged    Recommended Treatment: Continue with group therapy, milieu therapy and occupational therapy  1   Continue current medications   2  [facility] to follow  3  Neuropsych consult is ordered for capacity  4  Disposition planning    Risks, benefits and possible side effects of Medications:   Risks, benefits, and possible side effects of medications explained to patient and patient verbalizes understanding        Terry Holbrook PA-C

## 2017-10-28 NOTE — PROGRESS NOTES
Assessment  Assessed    1  Acute on chronic systolic congestive heart failure (428 23,428 0) (I50 23)   2  Cardiomyopathy (425 4) (I42 9)   3  Paroxysmal atrial fibrillation (427 31) (I48 0)   4  Hyperlipidemia (272 4) (E78 5)   5  Abdominal aortic aneurysm (441 4) (I71 4)   6  Anticoagulant long-term use (V58 61) (Z79 01)   7  Hypertension (401 9) (I10)   8  S/P aortic valve replacement with bioprosthetic valve (V42 2) (Z95 3)   9  Mitral valve regurgitation (424 0) (I34 0)    Plan  Paroxysmal atrial fibrillation    · From  AmLODIPine Besylate 10 MG Oral Tablet TAKE 1 TABLET DAILY ToAmLODIPine Besylate 5 MG Oral Tablet TAKE 1 TABLET DAILY   Rx By: Jorge Das; Dispense: 30 Days ; #:30 Tablet; Refill: 2;Paroxysmal atrial fibrillation; JOE = N; Record    Discussion/Summary  Cardiology Discussion Summary Free Text Note Form [facility]:   Echo 04/25/2017- EF 45%, mild diffuse hypokinesis, moderate concentric hypertrophy, moderately dilated LA, mildly dilated RA, moderate-severe MR, normally functioning bioprosthetic aortic valve, mild TR  seems to be in mild acute systolic HF  I asked him to increase his torsemide to twice a day and decrease his Amlodipine to 5 mg daily (due to soft BP)  If that does not help, he needs to go to the hospital for management of his acute heart failure  / Acute on chronic systolic CHF- Cont Metolazone, increase torsemide  He was asked to check his weight daily  If he notices rapid weight gain, edema, orthopnea, SOB, he may take additional dose of diuretic  Importance of salt restriction reviewed  Patient to follow up with Dr Johan Weller for CKD  Event monitor with minimal hear rate of 67 bpm, no pauses  BB was earlier d/dilan  Maintains vent rate control without need of a rate control medicine  AC with coumadin  No bleeding troubles  Patient understands risk and benefits of AC for thromboembolic risk  INR through PCP  bioprosthetic AVR/moderate-severe MR- stable  BP soft   Decrease Amlodipine to 5 mg daily  continue statin  lipid panel followed by PCP  and symptoms of CAD, CHF, valvular disease discussed  Patient advised to report any concerning symptoms aggressive risk factor modification ant therapeutic lifestyle changes  advised to continue low sodium, low fat/cholesterol diet with light exercise as tolerated  were answered  effects of medications discussed  to return for follow-up as scheduled in 1 month or sooner as needed  Goals and Barriers: The patient has the current Goals: Improvement of heart failure  The patent has the current Barriers: CKD  Patient's Capacity to Self-Care: Patient is able to Self-Care  Medication SE Review and Pt Understands Tx: Possible side effects of new medications were reviewed with the patient/guardian today  The treatment plan was reviewed with the patient/guardian  The patient/guardian understands and agrees with the treatment plan    Counseling Documentation With Imm: The patient was counseled regarding diagnostic results,-- instructions for management,-- risk factor reductions,-- patient and family education,-- importance of compliance with treatment  Chief Complaint  Chief Complaint Free Text Note Form: Left arm pain and swellingweak and SOB      History of Present Illness  Cardiology HPI Free Text Note Form [facility]: Patient was discharged from [facility] 2 days ago (Oct 7, 2017)  Comes in complaining that his left arm is still swollen and still hurts, the reason for which he had gone to the hospital  But now he is also feeling very weak and short of breath  Couldn't sleep last night as he couldn't lie flat  Dozed off in the recliner  Denies chest pain/pressure, palpitations, lightheadedness, syncope, PND   Swelling feet +    with history of chronic systolic CHF, cardiomyopathy, paroxysmal atrial fibrillation on chronic anticoagulation with coumadin, aortic valve disorder s/p replacement with bioprosthetic aortic valve, thoracic and abdominal aortic aneurysm s/p repair, HTN, HPL, anemia of chronic disease, CKD etc   admission to [address] [place] [address] on 04/28/2017-05/02/2017 for complaints of increased lower extremity edema and shortness of breath  Patient was treated with diuretics for volume overload secondary to systolic CHF and antibiotics for suspected PNA  During admission, patient as also treated by podiatry for cellulitis of LLE  Also patient was noted to have bradycardia and his metoprolol was decreased from 25 mg to 12 5 BID and later eventually stopped  Atrial Fibrillation (Follow-Up): The patient presents with paroxysmal atrial fibrillation  The treatment strategy for this patient is rate control  He states his atrial fibrillation has been stable since the last visit  Symptoms: denies palpitations,-- denies chest pain,-- worsened exercise intolerance,-- worsened dyspnea on exertion-- and-- denies dizziness  Associated symptoms include no syncope,-- no focal neurologic deficit,-- no tendency for easy bleeding-- and-- no tendency for easy bruising  Medications: the patient is adherent with his medication regimen  Congestive Heart Failure (Follow-Up): The patient presents for follow-up of chronic, systolic heart failure  Comorbid Illnesses: hypertensive heart disease-- and-- chronic kidney disease (1-4)  The patient is NYHA functional Class III  The patient states he has been doing poorly with his heart failure symptoms since the last visit  Symptoms: worsened lower extremity edema,-- worsened dyspnea on exertion,-- worsened fatigue,-- worsened exercise intolerance,-- worsened orthopnea-- and-- denies paroxysmal nocturnal dyspnea  Associated symptoms include no chest pain,-- no syncope-- and-- no palpitations  Hypertension (Follow-Up): The patient presents for follow-up of essential hypertension  The patient states he has been doing well with his blood pressure control since the last visit   Comorbid Illnesses: nephropathy-- and-- cardiac failure  Symptoms: worsened dyspnea,-- denies chest pain,-- denies intermittent leg claudication-- and-- worsened lower extremity edema  Associated symptoms include no headache,-- no focal neurologic deficits-- and-- no memory loss  Medications: the patient is adherent with his medication regimen  -- He denies medication side effects  Review of Systems  Cardiology Male ROS:    Cardiac: has swelling in the LLE, but-- no chest pain,-- no fainting/blackouts,-- no palpitations present-- and-- no syncope/fainting  Skin: No complaints of nonhealing sores or skin rash  Genitourinary: No complaints of recurrent urinary tract infections, frequent urination at night, difficult urination, blood in urine, kidney stones, loss of bladder control, no kidney or prostate problems, no erectile dysfunction  Psychological: No complaints of feeling depressed, anxiety, panic attacks, or difficulty concentrating  General: No complaints of trouble sleeping, lack of energy, fatigue, appetite changes, weight changes, fever, frequent infections, or night sweats  Respiratory: as noted in HPI  HEENT: No complaints of serious problems, hearing problems, nose problems, throat problems, or snoring  Gastrointestinal: No complaints of liver problems, nausea, vomiting, heartburn, constipation, bloody stools, diarrhea, problems swallowing, adbominal pain, or rectal bleeding  Hematologic: No complaints of bleeding disorders, anemia, blood clots, or excessive brusing  Neurological: dizziness, but-- no numbness,-- no weakness-- and-- no headaches  Musculoskeletal: arthritis    ROS Reviewed:   ROS reviewed  Active Problems  Problems    1  Abdominal aortic aneurysm (441 4) (I71 4)   2  Acute foot pain, left (729 5) (M79 672)   3  Anemia (285 9) (D64 9)   4  Aneurysm, 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)   5  Anticoagulant long-term use (V58 61) (Z79 01)   6  Arterial ectasia (447 8) (I77 9)   7   Ascending aortic aneurysm (441 2) (I71 2)   8  ASCVD (arteriosclerotic cardiovascular disease) (429 2,440 9) (I25 10)   9  Bilateral edema of lower extremity (782 3) (R60 0)   10  Bilateral inguinal hernia without obstruction or gangrene, recurrence not specified  (550 92) (K40 20)   11  Blood in urine (599 70) (R31 9)   12  Bradycardia (427 89) (R00 1)   13  Cardiomyopathy (425 4) (I42 9)   14  Chronic renal insufficiency, stage IV (severe) (585 4) (N18 4)   15  Chronic systolic CHF (congestive heart failure) (428 22,428 0) (I50 22)   16  CKD (chronic kidney disease), stage V (585 5) (N18 5)   17  Constipation (564 00) (K59 00)   18  Dysphagia (787 20) (R13 10)   19  Fatigue (780 79) (R53 83)   20  Fecal occult blood test positive (792 1) (R19 5)   21  Gout of both feet (274 9) (M10 9)   22  Hematuria (599 70) (R31 9)   23  Hyperlipidemia (272 4) (E78 5)   24  Hypertension (401 9) (I10)   25  Hypocalcemia (275 41) (E83 51)   26  Hypomagnesemia (275 2) (E83 42)   27  Iliac artery aneurysm, 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)   28  Incisional hernia, without obstruction or gangrene (553 21) (K43 2)   29  Iron deficiency anemia (280 9) (D50 9)   30  Mitral valve regurgitation (424 0) (I34 0)   31  Monilial rash (112 3) (B37 2)   32  Need for diphtheria-tetanus-pertussis (Tdap) vaccine, adult/adolescent (V06 1) (Z23)   33  Need for influenza vaccination (V04 81) (Z23)   34  Need for pneumococcal vaccination (V03 82) (Z23)   35  Need for shingles vaccine (V04 89) (Z23)   36  Paroxysmal atrial fibrillation (427 31) (I48 0)   37  Peripheral vascular disease (443 9) (I73 9)   38  Preop examination (V72 84) (Z01 818)   39  S/P aortic valve replacement with bioprosthetic valve (V42 2) (Z95 3)   40  Sinus bradycardia (427 89) (R00 1)   41  Skin rash (782 1) (R21)   42  Thrombocytopenia (287 5) (D69 6)   43  Visit for pre-operative examination (V72 84) (O24 623)    Past Medical History  Problems    1  Anemia (285 9) (D64 9)   2   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)   3  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)   4  ASCVD (arteriosclerotic cardiovascular disease) (429 2,440 9) (I25 10)   5  Chronic renal insufficiency, stage IV (severe) (585 4) (N18 4)   6  Dysphagia (787 20) (R13 10)   7  Hyperlipidemia (272 4) (E78 5)   8  History of Need for pneumococcal vaccination (V03 82) (Z23)   9  Negative depression screening   10  Visit for pre-operative examination (V72 84) (X29 648)  Active Problems And Past Medical History Reviewed: The active problems and past medical history were reviewed and updated today  Surgical History  Problems    1  History of Aortic Aneurysm Repair   2  History of Asc Aortic Aneurysm Repair W/ Graft, Aortic Root Replacement   3  History of CABG   4  History of Colonoscopy   5  History of Endovascular Repair Of Aortic Aneurysm   6  History of Thoracic Aortic Aneurysm Repair Endovasc Carlosdebra Dior  Surgical History Reviewed: The surgical history was reviewed and updated today  Family History  Father    1  Family history of Aneurysm Of Abdominal Aorta  Son    2  Family history of Aneurysm Of Abdominal Aorta  Sister    3  Family history of diabetes mellitus (V18 0) (Z83 3)  Family History Reviewed: The family history was reviewed and updated today  Social History  Problems    · Denied: Drug use (305 90) (F19 90)   · Employed   · Exercises occasionally (V49 89) (Z78 9)   · Five children   · Former smoker (R87 05) (Z82 720)   ·    · No advance directives (V49 89) (Z78 9)   · Occasional alcohol use   · Occasional caffeine consumption  Social History Reviewed: The social history was reviewed and updated today  The social history was reviewed and is unchanged  Current Meds   1  AmLODIPine Besylate 10 MG Oral Tablet; TAKE 1 TABLET DAILY; Therapy: 23ZQK2874 to (Anne Smith)  Requested for: 02GAO9490; Last Rx:13Mar2017 Ordered   2  Aspirin 81 MG TABS; TAKE 1 TABLET DAILY;  Therapy: (Recorded:03Tei7527) to Recorded   3  Atorvastatin Calcium 10 MG Oral Tablet; take 1 tablet every day; Therapy: 17Nrl2782 to (Evaluate:19Mar2018)  Requested for: 41GIC9414; Last Rx:18Pde3014 Ordered   4  [employer] Omeprazole 20 6 (20 Base) MG Oral Capsule Delayed Release; take 1 capsule daily; Therapy: (Recorded:05May2016) to Recorded   5  Ferrous Sulfate 325 (65 Fe) MG Oral Tablet; take one tablet by mouth every day; Therapy: (Recorded:05May2016) to Recorded   6  MetOLazone 5 MG Oral Tablet; TAKE 1 TABLET BY MOUTH DAILY  TAKE 30 MINUTES BEFORE LASIX(FUROSEMIDE); Therapy: 01XLV1698 to (Evaluate:09Sep2018)  Requested for: 04Zjo5487; Last Rx:12Nsh3620 Ordered   7  Multi-Vitamin Oral Tablet; take 1 tablet daily Recorded   8  RA Vitamin D-3 2000 UNIT Oral Capsule; TAKE 1 CAPSULE BY MOUTH ONCE DAILY Recorded   9  Terazosin HCl - 1 MG Oral Capsule; TAKE 1 CAPSULE EVERY DAY; Therapy: 48Qhk5632 to (Evaluate:71Uxi9799)  Requested for: 85JIK2618; Last Rx:23Mar2017 Ordered   10  Torsemide 20 MG Oral Tablet; Take 1 tablet daily Recorded   11  Tums 500 MG Oral Tablet Chewable; CHEW 2 TABLET Twice daily; Therapy: 95MFT5917 to (Evaluate:17Nov2017)  Requested for: 18Sep2017 Recorded   12  Warfarin Sodium 5 MG Oral Tablet (Coumadin); take 1 tablet every day; Therapy: 14BZR5081 to (Evaluate:17Jan2018)  Requested for: 46Smg0503; Last  Rx:31Edx9037 Ordered  Medication List Reviewed: The medication list was reviewed and updated today  Allergies  Medication    1  Penicillins   2  Ativan TABS    Vitals  Vital Signs    Recorded: 25QVM0573 09:47AM   Heart Rate 90   Systolic 777   Diastolic 56   Height 5 ft 11 in   Weight 183 lb    BMI Calculated 25 52   BSA Calculated 2 03   O2 Saturation 94       Physical Exam   Constitutional  General appearance: Abnormal  -- Dyspneic  Eyes  Conjunctiva and Sclera examination: Conjunctiva pink, sclera anicteric     Ears, Nose, Mouth, and Throat - External inspection of ears and nose: Normal without deformities or discharge  -- Oropharynx: Clear, nares are clear, mucous membranes are moist   Neck  Neck and thyroid: Normal, supple, trachea midline, no thyromegaly  Pulmonary  Respiratory effort: Abnormal  -- Dyspneic  Auscultation of lungs: Abnormal  -- Minimal bilateral basilar crackles  Cardiovascular  Palpation of heart: Normal PMI, no thrills  Auscultation of heart: Abnormal  -- Crisp prosthetic valve sound +  Carotid pulses: Normal, 2+ bilaterally  Pedal pulses: Normal, 2+ bilaterally  Examination of extremities for edema and/or varicosities: Normal    Chest - Chest: Normal   Abdomen  Abdomen: Non-tender and no distention  Liver and spleen: No hepatomegaly or splenomegaly  Musculoskeletal Gait and station: Abnormal -- Using a walker  -- Digits and nails: Normal without clubbing or cyanosis  Skin - Skin and subcutaneous tissue: Abnormal -- chronic venous stasis skin changes L>R  Neurologic - Speech: Normal    Psychiatric - Orientation to person, place, and time: Normal -- Mood and affect: Normal       Health Management  History of diarrhea   COLONOSCOPY; every 2 years; Last 00RSJ9107; Next Due: 49MVH8925; Overdue    Future Appointments    Date/Time Provider Specialty Site   10/30/2017 09:45 AM LACEY Mendoza   Nephrology [facility]   10/17/2017 11:45 AM Saranya Márquez MD Vascular Surgery [facility]       Signatures   Electronically signed by : LACEY Alcala ; Oct  9 2017 10:37AM EST                       (Author)

## 2017-10-28 NOTE — PROGRESS NOTES
[facility]   Progress Note - Janeth Arthur 80 y o  male MRN: [id number]    Unit/Bed#: RK3 379-91 Encounter: [id number]   Date of Service: October 28, 2017        * Psychosis, organic   Assessment & Plan    · Per psychiatry         [facility]   Assessment & Plan      · Creatinine is stable  · He has had an AV fistula created but is not on HD yet  · We should resume his pre-hospital medications of torsemide and metalzone  · Can follow creatinine as needed        CHF (congestive heart failure) ([facility])   Assessment & Plan      · No acute exacerbation  · Daily weights   · Continue diuretics as he takes at home          Paroxysmal atrial fibrillation [facility])   Assessment & Plan      · Rate is well controlled  · Was on coumadin as an outpatient  · Restarted at home dosing  Med rec has varying dosing from 2-4 mg on certain days and [employer] outpatient record has 5mg daily recorded  · INR 1 94  · Continue current dosing and follow INR closely        Anemia   Assessment & Plan      · Continue FeSO4 as prior to admission  · Got aranesp on 10/26/2017        CAD (coronary artery disease)   Assessment & Plan      · Continue current home medication regimen  · Stable             Patient Centered Rounds: I have performed bedside rounds with nursing staff today  Discussions with Specialists or Other Care Team Provider: Primary RN    Education and Discussions with Family / Patient: Patient     Time Spent for Care: 15 minutes  More than 50% of total time spent on counseling and coordination of care as described above  Current Length of Stay: 3 day(s)    Current Patient Status: Inpatient Psych       Discharge Plan / Estimated Discharge Date: per psychiatry       Code Status: Level 1 - Full Code      Subjective:   Offers no complaints today  No SOB or CP  Arms are getting better  Slept well but is not sure what time it is  "Is nighttime coming soon?" States he is bored  Objective:     Vitals:   Temp (24hrs), Av 2 °F (36 2 °C), Min:96 1 °F (35 6 °C), Max:98 2 °F (36 8 °C)    HR:  [62-76] 76  Resp:  [16] 16  BP: (139-158)/(60-65) 157/63  SpO2:  [95 %-100 %] 95 %  Body mass index is 22 96 kg/m²  Input and Output Summary (last 24 hours):     No intake or output data in the 24 hours ending 10/28/17 1232    Physical Exam:     Physical Exam   Constitutional: He is oriented to person, place, and time  He appears well-nourished  No distress  HENT:   Head: Normocephalic  Eyes: Pupils are equal, round, and reactive to light  Neck: Normal range of motion  Neck supple  Cardiovascular: Normal rate and regular rhythm  Pulmonary/Chest: Effort normal and breath sounds normal    Abdominal: Soft  Bowel sounds are normal    Musculoskeletal: Normal range of motion  Neurological: He is alert and oriented to person, place, and time  Skin: Skin is warm  Healing ecchymosis to both arms    Psychiatric: He has a normal mood and affect  His behavior is normal    Nursing note and vitals reviewed        Additional Data:     Labs:      Results from last 7 days  Lab Units 10/27/17  0600   WBC Thousand/uL 4 40   HEMOGLOBIN g/dL 7 8*   HEMATOCRIT % 23 7*   PLATELETS Thousands/uL 57*   NEUTROS PCT % 63   LYMPHS PCT % 28   MONOS PCT % 7   EOS PCT % 1       Results from last 7 days  Lab Units 10/27/17  0600 10/26/17  0558   SODIUM mmol/L 140 140   POTASSIUM mmol/L 4 6 4 7   CHLORIDE mmol/L 112* 112*   CO2 mmol/L 20* 19*   BUN mg/dL 87* 84*   CREATININE mg/dL 4 68* 4 57*   CALCIUM mg/dL 8 8 8 8   TOTAL PROTEIN g/dL  --  6 5   BILIRUBIN TOTAL mg/dL  --  0 97   ALK PHOS U/L  --  73   ALT U/L  --  13   AST U/L  --  11   GLUCOSE RANDOM mg/dL 87 85       Results from last 7 days  Lab Units 10/27/17  0600   INR  1 73*         Recent Cultures (last 7 days):           Last 24 Hours Medication List:     amLODIPine 5 mg Oral Daily   aspirin 81 mg Oral Daily   atorvastatin 10 mg Oral Daily With [employer] calcium carbonate 1,000 mg Oral TID   cholecalciferol 2,000 Units Oral Daily   ferrous sulfate 325 mg Oral Daily   metolazone 5 mg Oral Daily   QUEtiapine 25 mg Oral HS   terazosin 1 mg Oral HS   torsemide 20 mg Oral Daily   warfarin 2 mg Oral Daily (warfarin)        Today, Patient Was Seen By: JUVENCIO Guido    ** Please Note: Dragon 360 Dictation voice to text software may have been used in the creation of this document   **

## 2017-10-28 NOTE — PROGRESS NOTES
Pt seclusive to room  Pt pleasant and cooperative and med compliant  Requested to eat supper in room  Encouraged pt to eat with peers  Pt denied  Denies any s/s  Will continue to monitor

## 2017-10-29 LAB
INR PPP: 1.6 (ref 0.86–1.16)
PROTHROMBIN TIME: 19.2 SECONDS (ref 12.1–14.4)

## 2017-10-29 PROCEDURE — 85610 PROTHROMBIN TIME: CPT | Performed by: NURSE PRACTITIONER

## 2017-10-29 RX ADMIN — QUETIAPINE FUMARATE 25 MG: 25 TABLET ORAL at 21:19

## 2017-10-29 RX ADMIN — ASPIRIN 81 MG: 81 TABLET, COATED ORAL at 08:24

## 2017-10-29 RX ADMIN — TORSEMIDE 20 MG: 20 TABLET ORAL at 08:24

## 2017-10-29 RX ADMIN — Medication 1000 MG: at 08:23

## 2017-10-29 RX ADMIN — TERAZOSIN HYDROCHLORIDE 1 MG: 1 CAPSULE ORAL at 21:19

## 2017-10-29 RX ADMIN — AMLODIPINE BESYLATE 5 MG: 5 TABLET ORAL at 08:24

## 2017-10-29 RX ADMIN — Medication 1000 MG: at 21:19

## 2017-10-29 RX ADMIN — Medication 325 MG: at 06:41

## 2017-10-29 RX ADMIN — METOLAZONE 5 MG: 5 TABLET ORAL at 08:24

## 2017-10-29 RX ADMIN — ATORVASTATIN CALCIUM 10 MG: 10 TABLET, FILM COATED ORAL at 17:19

## 2017-10-29 RX ADMIN — WARFARIN SODIUM 5 MG: 5 TABLET ORAL at 17:19

## 2017-10-29 RX ADMIN — Medication 1000 MG: at 17:19

## 2017-10-29 RX ADMIN — VITAMIN D, TAB 1000IU (100/BT) 2000 UNITS: 25 TAB at 08:24

## 2017-10-29 NOTE — PROGRESS NOTES
Progress Note - Behavioral Health   Karenhari Gatito Arthur 80 y o  male MRN: [id number]  Unit/Bed#: ED7 569-01 Encounter: [id number]    Assessment/Plan   Principal Problem:    Psychosis, organic  Active Problems:    CKD (chronic kidney disease), stage IV (HCC)    CHF (congestive heart failure) (HCC)    Paroxysmal atrial fibrillation (HCC)    Anemia    CAD (coronary artery disease)      Subjective:   Patient awaiting Neuropsych consult for capacity of medical decision making  Denied psychotic symptoms today  Appears flat and constricted  Reports depressive symptoms of anhedonia, lack of motivation, and reduced energy  Did deny SI/HI  Denied paranoid delusions  Does not show signs of shea  Does not appear overly anxious  Medication compliant  Appears to be tolerating Seroquel well without serious side effects      Current Medications:  Current Facility-Administered Medications   Medication Dose Route Frequency    acetaminophen (TYLENOL) tablet 650 mg  650 mg Oral Q6H PRN    acetaminophen (TYLENOL) tablet 650 mg  650 mg Oral Q4H PRN    aluminum-magnesium hydroxide-simethicone (MYLANTA) 200-200-20 mg/5 mL oral suspension 30 mL  30 mL Oral Q4H PRN    amLODIPine (NORVASC) tablet 5 mg  5 mg Oral Daily    aspirin (ECOTRIN LOW STRENGTH) EC tablet 81 mg  81 mg Oral Daily    atorvastatin (LIPITOR) tablet 10 mg  10 mg Oral Daily With Dinner    benztropine (COGENTIN) tablet 1 mg  1 mg Oral Q6H PRN    calcium carbonate (TUMS) chewable tablet 1,000 mg  1,000 mg Oral TID    cholecalciferol (VITAMIN D3) tablet 2,000 Units  2,000 Units Oral Daily    ferrous sulfate tablet 325 mg  325 mg Oral Daily    haloperidol (HALDOL) tablet 1 mg  1 mg Oral Q6H PRN    hydrOXYzine HCL (ATARAX) tablet 25 mg  25 mg Oral Q6H PRN    magnesium hydroxide (MILK OF MAGNESIA) 400 mg/5 mL oral suspension 30 mL  30 mL Oral Daily PRN    metolazone (ZAROXOLYN) tablet 5 mg  5 mg Oral Daily    QUEtiapine (SEROquel) tablet 25 mg  25 mg Oral HS    risperiDONE (RisperDAL M-TABS) dispersible tablet 0 5 mg  0 5 mg Oral Q8H PRN    terazosin (HYTRIN) capsule 1 mg  1 mg Oral HS    torsemide (DEMADEX) tablet 20 mg  20 mg Oral Daily    traZODone (DESYREL) tablet 25 mg  25 mg Oral HS PRN    warfarin (COUMADIN) tablet 5 mg  5 mg Oral Daily (warfarin)       Behavioral Health Medications: all current active meds have been reviewed and continue current psychiatric medications  Vitals:  Vitals:    10/29/17 0709   BP: 130/61   Pulse: 62   Resp: 17   Temp: 97 5 °F (36 4 °C)   SpO2: 98%       Labs:  reviewed    Psychiatric Review of Systems:  Behavior over the last 24 hours:  unchanged  Sleep: normal  Appetite: poor  Medication side effects: No  ROS: no complaints    Mental Status Evaluation:  Appearance:  casually dressed   Behavior:  guarded   Speech:  soft   Mood:  dysthymic   Affect:  constricted and mood-congruent   Language sparse   Thought Process:  goal directed   Thought Content:  normal  Denied delusions/obsessions   Perceptual Disturbances: None   Risk Potential: Denied SI/HI  Potential for aggression: No   Sensorium:  person and place   Cognition:  grossly intact   Consciousness:  alert and awake    Attention: attention span appeared shorter than expected for age   Insight:  limited   Judgment: partial   Gait/Station: Slow gait   Motor Activity: no abnormal movements     Progress Toward Goals: unchanged    Recommended Treatment: Continue with group therapy, milieu therapy and occupational therapy  1   Continue current medications  2  SLIM continues medical management  3  Awaiting Neuropsych consult for capacity  4  Disposition planning    Risks, benefits and possible side effects of Medications:   Risks, benefits, and possible side effects of medications explained to patient and patient verbalizes understanding        Master Jarrell PA-C

## 2017-10-29 NOTE — PROGRESS NOTES
Pt quiet and seclusive to his room the entire evening  Denies S/S and SI but appears depressed  Pt did take HS medications and is pleasant on approach however  Will monitor

## 2017-10-29 NOTE — PLAN OF CARE
Alteration in Orientation     Treatment Goal: Demonstrate a reduction of confusion and improved orientation to person, place, time and/or situation upon discharge, according to optimum baseline/ability Progressing     Cooperate with recommended testing/procedures Progressing     Attend and participate in unit activities, including therapeutic, recreational, and educational groups Progressing     Complete daily ADLs, including personal hygiene independently, as able Progressing        Alteration in Thoughts and Perception     Verbalize thoughts and feelings Progressing     Refrain from acting on delusional thinking/internal stimuli Progressing     Attend and participate in unit activities, including therapeutic, recreational, and educational groups Progressing     Recognize dysfunctional thoughts, communicate reality-based thoughts at the time of discharge [address] Discharge to home or other facility with appropriate resources Progressing        Ineffective Coping     Participates in unit activities Progressing

## 2017-10-29 NOTE — PROGRESS NOTES
Pt continues to be medication compliant and cooperative with staff  Pt is bright upon approach and jokes with staff  Pt denies s/s  Pt is currently in his room  Will continue to monitor

## 2017-10-30 PROBLEM — R79.89 ELEVATED TSH: Status: ACTIVE | Noted: 2017-10-30

## 2017-10-30 RX ADMIN — Medication 1000 MG: at 08:28

## 2017-10-30 RX ADMIN — TORSEMIDE 20 MG: 20 TABLET ORAL at 08:28

## 2017-10-30 RX ADMIN — QUETIAPINE FUMARATE 25 MG: 25 TABLET ORAL at 21:15

## 2017-10-30 RX ADMIN — ATORVASTATIN CALCIUM 10 MG: 10 TABLET, FILM COATED ORAL at 17:39

## 2017-10-30 RX ADMIN — Medication 1000 MG: at 17:40

## 2017-10-30 RX ADMIN — Medication 325 MG: at 05:45

## 2017-10-30 RX ADMIN — Medication 1000 MG: at 21:15

## 2017-10-30 RX ADMIN — METOLAZONE 5 MG: 5 TABLET ORAL at 08:28

## 2017-10-30 RX ADMIN — ASPIRIN 81 MG: 81 TABLET, COATED ORAL at 08:28

## 2017-10-30 RX ADMIN — AMLODIPINE BESYLATE 5 MG: 5 TABLET ORAL at 08:28

## 2017-10-30 RX ADMIN — VITAMIN D, TAB 1000IU (100/BT) 2000 UNITS: 25 TAB at 09:01

## 2017-10-30 RX ADMIN — TERAZOSIN HYDROCHLORIDE 1 MG: 1 CAPSULE ORAL at 21:15

## 2017-10-30 RX ADMIN — WARFARIN SODIUM 5 MG: 5 TABLET ORAL at 20:15

## 2017-10-30 NOTE — CONSULTS
Consultation - Neuropsychology/Psychology Department  Aaron Arthur 80 y o  male MRN: [id number]  Unit/Bed#: IR9 569-01 Encounter: [id number]        BACKGROUND:  Karissa Loredo is a 80y o  year old male who was referred for a Neuropsychological Exam to assess cognitive functioning and comment on capacity to make informed medical and self care decisions  History of Present Illness  Admitted due to visual hallucinations and persecutory ideas    Physician Requesting Consult: Daren Dumas MD  Consults      Historical Information   Past Medical History:   Diagnosis Date    Aortic aneurysm ([facility] [address] )     Arthritis     GERD (gastroesophageal reflux disease)     Heart failure ([facility] [address] [facility]     Hyperlipidemia     Hypertension     Irregular heart beat     afib    Psychiatric illness     Renal disorder      Past Surgical History:   Procedure Laterality Date    ABDOMINAL AORTIC ANEURYSM REPAIR, OPEN      CARDIAC SURGERY      COLONOSCOPY      HERNIA REPAIR      TN ANASTOMOSIS,AV,ANY SITE Left 10/2/2017    Procedure: UPPER EXTREMITY AV FISTULA CREATION;  Surgeon: Kevin Henderson MD;  Location: [facility];  Service: Vascular    VALVE REPLACEMENT       Social History   History   Alcohol Use No     History   Drug Use No     History   Smoking Status    Former Smoker    Quit date: 1/1/2011   Smokeless Tobacco    Never Used     Family History:   Family History   Problem Relation Age of Onset    Cancer Mother    Irene Newman Father        Meds/Allergies   current meds:   Current Facility-Administered Medications   Medication Dose Route Frequency    acetaminophen (TYLENOL) tablet 650 mg  650 mg Oral Q6H PRN    acetaminophen (TYLENOL) tablet 650 mg  650 mg Oral Q4H PRN    aluminum-magnesium hydroxide-simethicone (MYLANTA) 200-200-20 mg/5 mL oral suspension 30 mL  30 mL Oral Q4H PRN    amLODIPine (NORVASC) tablet 5 mg  5 mg Oral Daily    aspirin (ECOTRIN LOW STRENGTH) EC tablet 81 mg  81 mg Oral Daily    atorvastatin (LIPITOR) tablet 10 mg  10 mg Oral Daily With Dinner    benztropine (COGENTIN) tablet 1 mg  1 mg Oral Q6H PRN    calcium carbonate (TUMS) chewable tablet 1,000 mg  1,000 mg Oral TID    cholecalciferol (VITAMIN D3) tablet 2,000 Units  2,000 Units Oral Daily    ferrous sulfate tablet 325 mg  325 mg Oral Daily    haloperidol (HALDOL) tablet 1 mg  1 mg Oral Q6H PRN    hydrOXYzine HCL (ATARAX) tablet 25 mg  25 mg Oral Q6H PRN    magnesium hydroxide (MILK OF MAGNESIA) 400 mg/5 mL oral suspension 30 mL  30 mL Oral Daily PRN    metolazone (ZAROXOLYN) tablet 5 mg  5 mg Oral Daily    QUEtiapine (SEROquel) tablet 25 mg  25 mg Oral HS    risperiDONE (RisperDAL M-TABS) dispersible tablet 0 5 mg  0 5 mg Oral Q8H PRN    terazosin (HYTRIN) capsule 1 mg  1 mg Oral HS    torsemide (DEMADEX) tablet 20 mg  20 mg Oral Daily    traZODone (DESYREL) tablet 25 mg  25 mg Oral HS PRN    warfarin (COUMADIN) tablet 5 mg  5 mg Oral Daily (warfarin)       Allergies   Allergen Reactions    Ativan [Lorazepam] Other (See Comments)     "it makes me go crazy"    Penicillins Swelling       Imaging Studies:       Family and Social Support:   No Data Recorded     Behavioral Observations: Alert, oriented x 3, cooperative; affect appropriate to content; denied depressed mood, anxiety or auditory and visual hallucinations  Patient reported he did not know reason for hospitalization but did report there are electrical supplies in his attic and does not know how they got there and fears they will be stolen by his son  Patient denied any medical history  He states he was sent to the hospital by a Physician for an undefined reason  States that his spouse manages his medications  Cognitive Examination    General Cognitive Functioning  MMSE = 24/30 with deficits in recall and working memory; [employer] of Information = Average    Attention/Concertration  Auditory Selective Attention = Severely Impaired;   Auditory Vigilance = Impaired; Information Processing Speed = Grossly intact    Frontal Systems/Executive Functioning  Mental Flexibility/Cognitive Control = Borderline/Mildly Impaired; Working Memory = Mildly Impaired; Abstract Reasoning = Mildly Impaired; Novel Spatial Problem Solving = Moderately Impaired; Generative Ability = Severely Impaired;  Commonsense Reasoning and Judgement = Mildly Impaired    Language Functioning  Confrontation naming = Average; Phonemic Fluency = Severely Impaired; Semantic Retrieval = Average; Comprehension of Complex Ideational Material = Impaired;  Praxis = WNL's; Repetition = WNL'S; Basic Reading = WNL's; Written Expression = WNL's; Following Commands = WNL's    Memory Functioning  Narrative Recall - Short Delay = [place] Impaired; Long Delay Narrative Recall = Low Average;  Visual Recognition = Borderline Impaired    Visuo-Spatial Abilities  Visual Perception = Average;  Visuo-Construction (Blocks) = Impaired; Visuo-Construction ([employer]) = WNL's; Clock = Average    Functional Knowledge  Health & Safety Knowledge =Impaired    Emotional Functioning: Denied depressed mood and anxiety    Summary/Impression: Results of Neuropsychological Exam revealed deficits/weaknesses in abstract reasoning ability, commonsense reasoning and judgment, problem solving, mental flexibility, auditory vigilance and auditory selective attention, generative ability, and mild weaknesses in memory  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

## 2017-10-30 NOTE — PLAN OF CARE
Problem: Ineffective Coping  Goal: Participates in unit activities  Interventions:  - Provide therapeutic environment   - Provide required programming   - Redirect inappropriate behaviors    Outcome: Not Progressing  Continues to refuse groups yet has been pleasant upon approach at bedside  His on ly focus is on going home and he does not display insight enough to connect groups with recovery

## 2017-10-30 NOTE — ASSESSMENT & PLAN NOTE
· Rate is well controlled  · Was on coumadin as an outpatient and restarted inpatient   · C/w 5mg coumadin daily    · INR 1 6 today, recheck again in am, typically takes 72 hours after initiation of coumadin to see an accurate value

## 2017-10-30 NOTE — PROGRESS NOTES
Pt more visible this evening and interacting pleasantly with staff  Pt was compliant with medications and denying SI/HI and any hallucinations  Is mostly seclusive to his room  Will monitor

## 2017-10-30 NOTE — ASSESSMENT & PLAN NOTE
· Creatinine is stable  · He has had an AV fistula created but is not on HD yet  · c/w pre-hospital medications of torsemide and metalzone     · Check am bmp

## 2017-10-30 NOTE — ASSESSMENT & PLAN NOTE
· No acute exacerbation  · Daily weights   · Continue diuretics as he takes at home  · Wt 73kg down from 76, recheck in am and monitor

## 2017-10-30 NOTE — PROGRESS NOTES
Pt OOB for breakfast, calm and pleasant upon approach; and remains medication compliant  Pt denies all symptoms at this time  Will continue to monitor

## 2017-10-30 NOTE — PROGRESS NOTES
Progress Note - Behavioral Health     Olivier Arthur 80 y o  male MRN: [id number]  Unit/Bed#: VW9 594-89 Encounter: [id number]    The patient was seen for continuing care and reviewed with treatment team  Patient observed in his room this morning, but was noted to be social with peers and staff this weekend  Patient states, "I am ready to get the hell out of here," and feels that being in the hospital is exacerbating his depression  Denies anxiety  Patient reports low energy while on the unit, but says he wants to get back to moving around once he goes home  Denies psychosis and SI  He completed a neuropsych evaluation this morning, awaiting results  Patient refusing to go to SNF despite family's wishes for him to go  Continues to report interrupted sleep pattern  Psychiatric Review of Systems:  Behavior over the last 24 hours:  unchanged  Sleep: interrupted  Appetite: normal  Medication side effects: No  ROS: no complaints    Mental Status Evaluation:  Appearance:  age appropriate, casually dressed   Behavior:  calm, guarded   Speech:  normal rate and volume   Mood:  dysphoric   Affect:  blunted, constricted   Thought Process:  coherent, goal directed   Associations: intact associations   Thought Content:  no overt delusions   Perceptual Disturbances: none   Risk Potential: Suicidal ideation - None  Homicidal ideation - None  Potential for aggression - No   Sensorium:  oriented to person and place   Memory:  short term memory questionable , long term memory grossly intact   Consciousness:  alert and awake   Attention: attention span and concentration appear shorter than expected for age   Insight:  limited   Judgment: limited   Gait/Station: in bed   Motor Activity: no abnormal movements     Progress Toward Goals: Denies psychotic features  Still has limited insight  Unchanged          Principal Problem:    Psychosis, organic  Active Problems:    CKD (chronic kidney disease), stage IV (HCC)    CHF (congestive heart failure) (HCC)    Paroxysmal atrial fibrillation (HCC)    Anemia    CAD (coronary artery disease)    Recommended Treatment:   1) continue current medications   2) await results of neuropsych testing   3) disposition planning     amLODIPine 5 mg Oral Daily   aspirin 81 mg Oral Daily   atorvastatin 10 mg Oral Daily With Dinner   calcium carbonate 1,000 mg Oral TID   cholecalciferol 2,000 Units Oral Daily   ferrous sulfate 325 mg Oral Daily   metolazone 5 mg Oral Daily   QUEtiapine 25 mg Oral HS   terazosin 1 mg Oral HS   torsemide 20 mg Oral Daily   warfarin 5 mg Oral Daily (warfarin)       Vitals:    10/30/17 0712   BP: 155/67   Pulse: 74   Resp: 18   Temp: 97 6 °F (36 4 °C)   SpO2: 97%       Risks, benefits and possible side effects of Medications:   Risks, benefits, and possible side effects of medications explained to patient and patient verbalizes understanding        Halina Tavares PA-C

## 2017-10-30 NOTE — CASE MANAGEMENT
Dr Trish Loaiza is currently assessing the patient for capacity  Will await to see the results, as his family wants him to be placed for SNF and he does not

## 2017-10-30 NOTE — CASE MANAGEMENT
Patient's wife visited today- I met with her and she was updated about the capacity testing  Testing has not been completed a/w results from Dr Jacob Funes  Will let wife know when results are available  At that time, discharge planning can continue, as patient does not want placement, although family cannot handle him at home  Wife approached writer and said I am too small and frail to handle him being aggressive

## 2017-10-30 NOTE — PROGRESS NOTES
Progress Note - Juana Arthur 80 y o  male MRN: [id number]    Unit/Bed#: CU8 569-01 Encounter: [id number]        Elevated TSH   Assessment & Plan    · Check t4 with am labs        CAD (coronary artery disease)   Assessment & Plan      · Continue current home medication regimen  · Stable   · No complaints of CP        Anemia   Assessment & Plan    · Continue FeSO4   · Got aranesp on 10/26/2017  · hgb 10/29 7 8  · Recheck cbc in am         Paroxysmal atrial fibrillation (HCC)   Assessment & Plan    · Rate is well controlled  · Was on coumadin as an outpatient and restarted inpatient   · C/w 5mg coumadin daily    · INR 1 6 today, recheck again in am, typically takes 72 hours after initiation of coumadin to see an accurate value  CHF (congestive heart failure) ([facility])   Assessment & Plan    · No acute exacerbation  · Daily weights   · Continue diuretics as he takes at home  · Wt 73kg down from 76, recheck in am and monitor           CKD (chronic kidney disease), stage IV ([facility])   Assessment & Plan    · Creatinine is stable  · He has had an AV fistula created but is not on HD yet  · c/w pre-hospital medications of torsemide and metalzone  · Check am bmp        * Psychosis, organic   Assessment & Plan    · Per psychiatry             VTE Pharmacologic Prophylaxis:   Pharmacologic: Warfarin (Coumadin)  Mechanical VTE Prophylaxis in Place: Yes    Patient Centered Rounds: I have performed bedside rounds with nursing staff today  Discussions with Specialists or Other Care Team Provider: rn    Education and Discussions with Family / Patient: d/w patient    Time Spent for Care: 30 minutes  More than 50% of total time spent on counseling and coordination of care as described above      Current Length of Stay: 5 day(s)    Current Patient Status: Inpatient Psych   Certification Statement: The patient will continue to require additional inpatient hospital stay due to per psych     Discharge Plan: per psych     Code Status: Level 1 - Full Code      Subjective:   Pt denies any complaints, reports he thinks he might go home today  Per RN he is pending a neuropsych eval     Objective:     Vitals:   Temp (24hrs), Av 9 °F (36 6 °C), Min:97 6 °F (36 4 °C), Max:98 1 °F (36 7 °C)    HR:  [72-74] 74  Resp:  [17-18] 18  BP: (143-155)/(63-67) 155/67  SpO2:  [97 %] 97 %  Body mass index is 21 92 kg/m²  Input and Output Summary (last 24 hours):     No intake or output data in the 24 hours ending 10/30/17 1349    Physical Exam:      Physical Exam   Constitutional: He is oriented to person, place, and time  He appears well-developed  No distress  Neck: Neck supple  Cardiovascular: Normal rate, regular rhythm, normal heart sounds and intact distal pulses  No murmur heard  Pulmonary/Chest: Effort normal and breath sounds normal  No respiratory distress  He has no wheezes  He has no rales  Abdominal: Soft  Bowel sounds are normal  He exhibits no distension  There is no tenderness  There is no rebound  Musculoskeletal: He exhibits edema (trace b/l ankle)  He exhibits no tenderness  Neurological: He is alert and oriented to person, place, and time  No cranial nerve deficit  Skin: Skin is warm and dry  No rash noted  He is not diaphoretic  No erythema  Psychiatric: He has a normal mood and affect         Additional Data:     Labs:      Results from last 7 days  Lab Units 10/27/17  0600   WBC Thousand/uL 4 40   HEMOGLOBIN g/dL 7 8*   HEMATOCRIT % 23 7*   PLATELETS Thousands/uL 57*   NEUTROS PCT % 63   LYMPHS PCT % 28   MONOS PCT % 7   EOS PCT % 1       Results from last 7 days  Lab Units 10/27/17  0600 10/26/17  0558   SODIUM mmol/L 140 140   POTASSIUM mmol/L 4 6 4 7   CHLORIDE mmol/L 112* 112*   CO2 mmol/L 20* 19*   BUN mg/dL 87* 84*   CREATININE mg/dL 4 68* 4 57*   CALCIUM mg/dL 8 8 8 8   TOTAL PROTEIN g/dL  --  6 5   BILIRUBIN TOTAL mg/dL  --  0 97   ALK PHOS U/L  --  73   ALT U/L  --  13   AST U/L  --  11   GLUCOSE RANDOM mg/dL 87 85       Results from last 7 days  Lab Units 10/29/17  0605   INR  1 60*       * I Have Reviewed All Lab Data Listed Above  * Additional Pertinent Lab Tests Reviewed: All Labs Within Last 24 Hours Reviewed    Imaging:    Imaging Reports Reviewed Today Include: ct head, chewst xray      Recent Cultures (last 7 days):           Last 24 Hours Medication List:     amLODIPine 5 mg Oral Daily   aspirin 81 mg Oral Daily   atorvastatin 10 mg Oral Daily With Dinner   calcium carbonate 1,000 mg Oral TID   cholecalciferol 2,000 Units Oral Daily   ferrous sulfate 325 mg Oral Daily   metolazone 5 mg Oral Daily   QUEtiapine 25 mg Oral HS   terazosin 1 mg Oral HS   torsemide 20 mg Oral Daily   warfarin 5 mg Oral Daily (warfarin)        Today, Patient Was Seen By: JUVENCIO Baxter    ** Please Note: Dragon 360 Dictation voice to text software may have been used in the creation of this document   **

## 2017-10-30 NOTE — PLAN OF CARE
Problem: Alteration in Thoughts and Perception  Goal: Verbalize thoughts and feelings  Interventions:  - Promote a nonjudgmental and trusting relationship with the patient through active listening and therapeutic communication  - Assess patient's level of functioning, behavior and potential for risk  - Engage patient in 1 on 1 interactions for a minimum of 15 minutes each session  - Encourage patient to express fears, feelings, frustrations, and discuss symptoms    - [place] patient to reality, help patient recognize reality-based thinking   - Administer medications as ordered and assess for potential side effects  - Provide the patient education related to the signs and symptoms of the illness and desired effects of prescribed medications   Outcome: Progressing    Goal: Refrain from acting on delusional thinking/internal stimuli  Interventions:  - Monitor patient closely, per order   - Utilize least restrictive measures   - Set reasonable limits, give positive feedback for acceptable   - Administer medications as ordered and monitor of potential side effects   Outcome: Progressing    Goal: Attend and participate in unit activities, including therapeutic, recreational, and educational groups  Interventions:  - Provide therapeutic and educational activities daily, encourage attendance and participation, and document same in the medical record    Outcome: Progressing    Goal: Recognize dysfunctional thoughts, communicate reality-based thoughts at the time of discharge  Interventions:  - Provide medication and psycho-education to assist patient in compliance and developing insight into his/her illness    Outcome: Progressing      Problem: Alteration in Orientation  Goal: Treatment Goal: Demonstrate a reduction of confusion and improved orientation to person, place, time and/or situation upon discharge, according to optimum baseline/ability  Outcome: Progressing    Goal: Cooperate with recommended testing/procedures  Interventions:  - Determine need for ancillary testing  - Observe for mental status changes  - Implement falls/precaution protocol    Outcome: Progressing    Goal: Attend and participate in unit activities, including therapeutic, recreational, and educational groups  Interventions:  - Provide therapeutic and educational activities daily, encourage attendance and participation, and document same in the medical record   - Provide appropriate opportunities for reminiscence   - Provide a consistent daily routine   - Encourage family contact/visitation    Outcome: Progressing    Goal: Complete daily ADLs, including personal hygiene independently, as able  Interventions:  - Observe, teach, and assist patient with ADLS   Outcome: Progressing      Problem: Ineffective Coping  Goal: Participates in unit activities  Interventions:  - Provide therapeutic environment   - Provide required programming   - Redirect inappropriate behaviors    Outcome: Progressing

## 2017-10-31 LAB
ANION GAP SERPL CALCULATED.3IONS-SCNC: 9 MMOL/L (ref 4–13)
BUN SERPL-MCNC: 94 MG/DL (ref 5–25)
CALCIUM SERPL-MCNC: 9.2 MG/DL (ref 8.3–10.1)
CHLORIDE SERPL-SCNC: 110 MMOL/L (ref 100–108)
CO2 SERPL-SCNC: 21 MMOL/L (ref 21–32)
CREAT SERPL-MCNC: 4.89 MG/DL (ref 0.6–1.3)
ERYTHROCYTE [DISTWIDTH] IN BLOOD BY AUTOMATED COUNT: 19.2 % (ref 11.6–15.1)
GFR SERPL CREATININE-BSD FRML MDRD: 10 ML/MIN/1.73SQ M
GLUCOSE SERPL-MCNC: 86 MG/DL (ref 65–140)
HCT VFR BLD AUTO: 22.2 % (ref 36.5–49.3)
HGB BLD-MCNC: 7.2 G/DL (ref 12–17)
INR PPP: 1.75 (ref 0.86–1.16)
MCH RBC QN AUTO: 28.9 PG (ref 26.8–34.3)
MCHC RBC AUTO-ENTMCNC: 32.4 G/DL (ref 31.4–37.4)
MCV RBC AUTO: 89 FL (ref 82–98)
PLATELET # BLD AUTO: 57 THOUSANDS/UL (ref 149–390)
POTASSIUM SERPL-SCNC: 4.5 MMOL/L (ref 3.5–5.3)
PROTHROMBIN TIME: 20.6 SECONDS (ref 12.1–14.4)
RBC # BLD AUTO: 2.49 MILLION/UL (ref 3.88–5.62)
SODIUM SERPL-SCNC: 140 MMOL/L (ref 136–145)
T3FREE SERPL-MCNC: 1.52 PG/ML (ref 2.3–4.2)
T4 SERPL-MCNC: 6.3 UG/DL (ref 4.7–13.3)
WBC # BLD AUTO: 4.68 THOUSAND/UL (ref 4.31–10.16)

## 2017-10-31 PROCEDURE — 80048 BASIC METABOLIC PNL TOTAL CA: CPT | Performed by: NURSE PRACTITIONER

## 2017-10-31 PROCEDURE — 84436 ASSAY OF TOTAL THYROXINE: CPT | Performed by: NURSE PRACTITIONER

## 2017-10-31 PROCEDURE — 84481 FREE ASSAY (FT-3): CPT | Performed by: INTERNAL MEDICINE

## 2017-10-31 PROCEDURE — 85610 PROTHROMBIN TIME: CPT | Performed by: NURSE PRACTITIONER

## 2017-10-31 PROCEDURE — 85027 COMPLETE CBC AUTOMATED: CPT | Performed by: NURSE PRACTITIONER

## 2017-10-31 RX ORDER — QUETIAPINE FUMARATE 25 MG/1
50 TABLET, FILM COATED ORAL
Status: DISCONTINUED | OUTPATIENT
Start: 2017-10-31 | End: 2017-11-07 | Stop reason: HOSPADM

## 2017-10-31 RX ADMIN — QUETIAPINE FUMARATE 50 MG: 25 TABLET ORAL at 21:38

## 2017-10-31 RX ADMIN — ASPIRIN 81 MG: 81 TABLET, COATED ORAL at 08:33

## 2017-10-31 RX ADMIN — AMLODIPINE BESYLATE 5 MG: 5 TABLET ORAL at 08:33

## 2017-10-31 RX ADMIN — ATORVASTATIN CALCIUM 10 MG: 10 TABLET, FILM COATED ORAL at 17:03

## 2017-10-31 RX ADMIN — TERAZOSIN HYDROCHLORIDE 1 MG: 1 CAPSULE ORAL at 21:38

## 2017-10-31 RX ADMIN — VITAMIN D, TAB 1000IU (100/BT) 2000 UNITS: 25 TAB at 08:34

## 2017-10-31 RX ADMIN — WARFARIN SODIUM 5 MG: 5 TABLET ORAL at 17:03

## 2017-10-31 RX ADMIN — Medication 1000 MG: at 08:33

## 2017-10-31 RX ADMIN — Medication 1000 MG: at 21:38

## 2017-10-31 RX ADMIN — METOLAZONE 5 MG: 5 TABLET ORAL at 10:30

## 2017-10-31 RX ADMIN — TORSEMIDE 20 MG: 20 TABLET ORAL at 08:34

## 2017-10-31 RX ADMIN — Medication 325 MG: at 07:34

## 2017-10-31 NOTE — PROGRESS NOTES
Progress Note - Behavioral Health   Nate Arthur 80 y o  male MRN: [id number]  Unit/Bed#: TS7 569-01 Encounter: [id number]    The patient was seen for continuing care and reviewed with treatment team   Mood is still dysphoric but improving  He is no longer experiencing hallucinations  He has accepted the fact that he needs placement at this time    Mental Status Evaluation:  Appearance:  Adequate hygiene and grooming and Good eye contact   Behavior:  calm, cooperative and friendly   Fund of knowledge  Not assessed   Speech:   Language: Normal rate and Normal volume  No overt abnormality   Mood:  dysphoric   Affect:   Associations: constricted and mood-congruent  intact associations   Thought Process:  goal directed and coherent   Thought Content:  Does not verbalize delusional material   Perceptual Disturbances: Denies hallucinations and does not appear to be responding to internal stimuli   Risk Potential: No suicidal or homicidal ideation   Orientation  Oriented x 3   Memory Not tested   Attention/Concentration attention span and concentration were age appropriate   Insight:  limited   Judgment: Good judgment   Gait/Station:  needs assistive device   Motor Activity: No abnormal movement noted     Progress Toward Goals: Slowly improving    Assessment/Plan    Principal Problem:    Psychosis, organic  Active Problems:    CKD (chronic kidney disease), stage IV ([facility])    CHF (congestive heart failure) ([facility])    Paroxysmal atrial fibrillation (HCC)    Anemia    CAD (coronary artery disease)    Elevated TSH      Recommended Treatment: Continue with pharmacotherapy, group therapy, milieu therapy and occupational therapy    The patient will be maintained on the following medications:    amLODIPine 5 mg Oral Daily   aspirin 81 mg Oral Daily   atorvastatin 10 mg Oral Daily With Dinner   calcium carbonate 1,000 mg Oral TID   cholecalciferol 2,000 Units Oral Daily   ferrous sulfate 325 mg Oral Daily   metolazone 5 mg Oral Daily   QUEtiapine 25 mg Oral HS   terazosin 1 mg Oral HS   torsemide 20 mg Oral Daily   warfarin 5 mg Oral Daily (warfarin)       Risks, benefits and possible side effects of Medications:   Risks, benefits, and possible side effects of medications explained to patient and patient verbalizes understanding

## 2017-10-31 NOTE — PLAN OF CARE
Alteration in Orientation     Treatment Goal: Demonstrate a reduction of confusion and improved orientation to person, place, time and/or situation upon discharge, according to optimum baseline/ability Progressing     Cooperate with recommended testing/procedures Progressing     Attend and participate in unit activities, including therapeutic, recreational, and educational groups Progressing     Complete daily ADLs, including personal hygiene independently, as able Progressing        Alteration in Thoughts and Perception     Verbalize thoughts and feelings Progressing     Refrain from acting on delusional thinking/internal stimuli Progressing     Attend and participate in unit activities, including therapeutic, recreational, and educational groups Progressing     Recognize dysfunctional thoughts, communicate reality-based thoughts at the time of discharge Progressing

## 2017-10-31 NOTE — CASE MANAGEMENT
Patient was found to not have any capacity to make informed decisions  I notified patient's wife this AM at [address] and encouraged her to start calling facilities that she and her family may be interested  MA 46 & PASRR has been completed, faxed and received by [facility]  Patient currently does not have any diagnosis indicating dementia, I have requested from MD, and he noted that he will do a SLUMS or other cognitive exams to confirm dementia diagnosis  Patient still has not signed treatment plan, I have presented to patient's wife and discussed and she has signed onhis behalf  I met with patient's wife today and I explained process of [address]  I also noted that he may not meet SNF but [facility] will make that decision  She was given a list of SNFs in [facility] list of [address] [facility] in [facility]  I also gave her information and numbers for [employer]  She was encouraged to reach out to [facility] for any further questions  I explained that we can help find facilities and offer suggestions, but family ultimately makes the decision to where he goes and they handle financial decisions

## 2017-10-31 NOTE — PROGRESS NOTES
Pt seclusive to his room since change of shift  Continues to be med compliant however  Appears depressed but denies so at this time  Will monitor

## 2017-10-31 NOTE — PROGRESS NOTES
Pt alert, calm, cooperative, bright on approach, denies SI, HI, A/VH and anxiety, continues to report depression, medication compliant, refuse to attend all groups, visible in dining room only for meals then retreated back to room, no interaction or socialization with peers, will continue to monitor

## 2017-10-31 NOTE — ASSESSMENT & PLAN NOTE
· No acute exacerbation  · Daily weights   · Continue diuretics as he takes at home  · Wt 73kg yesterday 76 the day prior and today is back to 76  Likely the 73kg documentation was documented in error  C/w monitoring daily weights

## 2017-10-31 NOTE — CONSULTS
Consultation - Nephrology   Loan Joy 80 y o  male MRN: [id number]  Unit/Bed#: GF8 569-01 Encounter: [id number]    ASSESSMENT and PLAN:  1  CKD V:  Baseline creatinine recently 4 5-5 and he follows with Dr Allison Justin   He did have an episode in early October when his creatinine increased 6 for a few days but since then has improved and again has been stable in his baseline      -Creatinine today is 4 89 and BUN 94   -current he had no uremic symptoms besides fatigue and no indication to start dialysis   -his electrolytes and acid-base are stable   -would continue to trend renal function and monitor urine   -hopefully we can wait to start dialysis until his AV fistula is ready to use   -will check BMP again on Thursday   2  Access:  Left upper extremity AV fistula placed 10/2 by Dr Manish Rodrigues    -appears to be maturing well  3  Hypertension:  Blood pressure acceptable   -continue amlodipine and diuretic  4  CHF:  EF 40% in April   -chest x-ray on 10/25 showed no active pulmonary disease and just a stable small left pleural effusion   -continue his home diuretics torsemide 20 mg daily and metolazone 5 mg daily  5  Anemia: hgb down to 7 2   -10/15: iron sat 15%, ferritin 402   -he did receive venofer 300mg and epogen 10,000 units on 10/6   -consider redosing epogen   6  Bone mineral disease of CKD:  Last phosphorus was 4 4 on 10/23 and PTH was 45 9 on 10/23  7  Psychosis:  Continue current treatment per geriatric psych      HISTORY OF PRESENT ILLNESS:  Requesting Physician: Joselin Mejia MD  Reason for Consult:  CKD    Loan Joy is a 80y o  year old male who was admitted to [facility] after presenting with altered mental status  Patient lives at home with his wife and for about the past 3 weeks before admission she noticed his mental Health declining    Patient was convinced that someone had broken into his house because he found some electrical equipment in the attic and did not know where it came from  He also thinks that people are in the house and and want to steal his guns  His wife brought him to the ER and he was admitted to geriatric psych  He does have also have a history of advanced CKD and is status post AV fistula placement  Evidently his symptoms started to worsen after fistula placement  Renal was consulted today to help manage his CKD while he is admitted  The patient states he was initially told he had kidney disease in 2011  He is unsure of the reason for his kidney failure but he follows with Dr Eyad Azar   He states that since being admitted he has been urinating about the same as he was at home  His appetite is still good and he has no nausea, vomiting, metallic taste, twitching  He does report fatigue which is about the same as his baseline  Overall he is very upset about being in the hospital and becomes tearful during our discussion stating that he just wants to go home and take care of his wife      PAST MEDICAL HISTORY:  Past Medical History:   Diagnosis Date    Aortic aneurysm (HCC)     Arthritis     GERD (gastroesophageal reflux disease)     Heart failure (HCC)     Hyperlipidemia     Hypertension     Irregular heart beat     afib    Psychiatric illness     Renal disorder        PAST SURGICAL HISTORY:  Past Surgical History:   Procedure Laterality Date    ABDOMINAL AORTIC ANEURYSM REPAIR, OPEN      CARDIAC SURGERY      COLONOSCOPY      HERNIA REPAIR      CT ANASTOMOSIS,AV,ANY SITE Left 10/2/2017    Procedure: UPPER EXTREMITY AV FISTULA CREATION;  Surgeon: Farrah Powell MD;  Location: [facility];  Service: Vascular    VALVE REPLACEMENT         ALLERGIES:  Allergies   Allergen Reactions    Ativan [Lorazepam] Other (See Comments)     "it makes me go crazy"    Penicillins Swelling       SOCIAL HISTORY:  History   Alcohol Use No     History   Drug Use No     History   Smoking Status    Former Smoker    Quit date: 1/1/2011   Smokeless Tobacco    Never Used FAMILY HISTORY:  Family History   Problem Relation Age of Onset    Cancer Mother     Anuerysm Father        MEDICATIONS:  Scheduled Meds:  amLODIPine 5 mg Oral Daily   aspirin 81 mg Oral Daily   atorvastatin 10 mg Oral Daily With Dinner   calcium carbonate 1,000 mg Oral TID   cholecalciferol 2,000 Units Oral Daily   ferrous sulfate 325 mg Oral Daily   metolazone 5 mg Oral Daily   QUEtiapine 50 mg Oral HS   terazosin 1 mg Oral HS   torsemide 20 mg Oral Daily   warfarin 5 mg Oral Daily (warfarin)       PRN Meds:   acetaminophen    acetaminophen    aluminum-magnesium hydroxide-simethicone    benztropine    haloperidol    hydrOXYzine HCL    magnesium hydroxide    risperiDONE    traZODone    REVIEW OF SYSTEMS:  A complete review of systems was done  Pertinent positives and negatives noted in the HPI but otherwise the review of systems is negative      PHYSICAL EXAM:  Current Weight: Weight - Scale: 76 5 kg (168 lb 10 4 oz)  First Weight: Weight - Scale: 77 kg (169 lb 12 1 oz)  Vitals:    10/31/17 0710   BP: 112/53   Pulse: 72   Resp: 18   Temp: 97 9 °F (36 6 °C)   SpO2: 98%     General:  No acute distress  Skin:  No rash  Eyes:  Sclerae anicteric  ENT:  Moist mucous membranes  Neck:  Supple, symmetric  Chest:  Some wheezes and rhonchi  CVS:  Regular rate and rhythm  Abdomen:  Soft, nontender, nondistended  Extremities:  No lower extremity edema, left upper extremity AV fistula with good bruit and thrill  Neuro:  Awake and alert  Psych:  Appropriate affect    Lab Results:   Lab Results   Component Value Date    WBC 4 68 10/31/2017    HGB 7 2 (L) 10/31/2017    HCT 22 2 (L) 10/31/2017    MCV 89 10/31/2017    PLT 57 (L) 10/31/2017     Lab Results   Component Value Date    GLUCOSE 86 10/31/2017    CALCIUM 9 2 10/31/2017     10/31/2017    K 4 5 10/31/2017    CO2 21 10/31/2017     (H) 10/31/2017    BUN 94 (H) 10/31/2017    CREATININE 4 89 (H) 10/31/2017     Lab Results   Component Value Date CALCIUM 9 2 10/31/2017    PHOS 4 4 (H) 10/23/2017

## 2017-10-31 NOTE — ASSESSMENT & PLAN NOTE
· Rate is well controlled  · Was on coumadin as an outpatient and restarted inpatient   · C/w 5mg coumadin daily - pt was on 2/2/4 rotation prior to admission  · INR 1 75 today, recheck again in am

## 2017-10-31 NOTE — PROGRESS NOTES
Progress Note - Luigi Arthur 80 y o  male MRN: [id number]    Unit/Bed#: NW5 569-01 Encounter: [id number]        Elevated TSH   Assessment & Plan    · t4 6  3- within normal range  · Recheck tsh in 6 weeks        CAD (coronary artery disease)   Assessment & Plan    · Continue current home medication regimen  · Stable   · No complaints of CP        Anemia   Assessment & Plan    · Continue FeSO4   · hgb 7 2 today  · Recheck cbc in am         Paroxysmal atrial fibrillation [facility])   Assessment & Plan    · Rate is well controlled  · Was on coumadin as an outpatient and restarted inpatient   · C/w 5mg coumadin daily - pt was on 2/2/4 rotation prior to admission  · INR 1 75 today, recheck again in am        CHF (congestive heart failure) ([facility])   Assessment & Plan    · No acute exacerbation  · Daily weights   · Continue diuretics as he takes at home  · Wt 73kg yesterday 76 the day prior and today is back to 76  Likely the 73kg documentation was documented in error  C/w monitoring daily weights  CKD (chronic kidney disease), stage IV ([facility])   Assessment & Plan    · Creatinine is stable  · He has had an AV fistula created but is not on HD yet  · c/w pre-hospital medications of torsemide and metalzone  · Check am bmp  · Nephrology consult appreciated         * Psychosis, organic   Assessment & Plan    · Per psychiatry             VTE Pharmacologic Prophylaxis:   Pharmacologic: Warfarin (Coumadin)  Mechanical VTE Prophylaxis in Place: Yes    Patient Centered Rounds: I have performed bedside rounds with nursing staff today  Discussions with Specialists or Other Care Team Provider: rn    Education and Discussions with Family / Patient: d/w patient and wife at bedside     Time Spent for Care: 30 minutes  More than 50% of total time spent on counseling and coordination of care as described above      Current Length of Stay: 6 day(s)    Current Patient Status: Inpatient Psych   Certification Statement: The patient will continue to require additional inpatient hospital stay due to per psych    Discharge Plan: per psych     Code Status: Level 1 - Full Code      Subjective:   Pt denies any complaints     Objective:     Vitals:   Temp (24hrs), Av °F (36 7 °C), Min:97 9 °F (36 6 °C), Max:98 °F (36 7 °C)    HR:  [66-72] 72  Resp:  [17-18] 18  BP: (112-139)/(53-65) 112/53  SpO2:  [98 %-100 %] 98 %  Body mass index is 22 87 kg/m²  Input and Output Summary (last 24 hours):     No intake or output data in the 24 hours ending 10/31/17 1420    Physical Exam:      Physical Exam   Constitutional: He is oriented to person, place, and time  No distress  Neck: Neck supple  Cardiovascular: Normal rate, regular rhythm, normal heart sounds and intact distal pulses  No murmur heard  Pulmonary/Chest: Effort normal and breath sounds normal  No respiratory distress  He has no wheezes  He has no rales  Abdominal: Soft  Bowel sounds are normal  He exhibits no distension  There is no tenderness  There is no rebound  Musculoskeletal: He exhibits no edema or tenderness  Neurological: He is alert and oriented to person, place, and time  No cranial nerve deficit  Skin: Skin is warm and dry  No rash noted  He is not diaphoretic  No erythema  Psychiatric: His affect is blunt         Additional Data:     Labs:      Results from last 7 days  Lab Units 10/31/17  0640 10/27/17  0600   WBC Thousand/uL 4 68 4 40   HEMOGLOBIN g/dL 7 2* 7 8*   HEMATOCRIT % 22 2* 23 7*   PLATELETS Thousands/uL 57* 57*   NEUTROS PCT %  --  63   LYMPHS PCT %  --  28   MONOS PCT %  --  7   EOS PCT %  --  1       Results from last 7 days  Lab Units 10/31/17  0640  10/26/17  0558   SODIUM mmol/L 140  < > 140   POTASSIUM mmol/L 4 5  < > 4 7   CHLORIDE mmol/L 110*  < > 112*   CO2 mmol/L 21  < > 19*   BUN mg/dL 94*  < > 84*   CREATININE mg/dL 4 89*  < > 4 57*   CALCIUM mg/dL 9 2  < > 8 8   TOTAL PROTEIN g/dL  --   --  6 5   BILIRUBIN TOTAL mg/dL  --   --  0 97   ALK PHOS U/L  --   --  73   ALT U/L  --   --  13   AST U/L  --   --  11   GLUCOSE RANDOM mg/dL 86  < > 85   < > = values in this interval not displayed  Results from last 7 days  Lab Units 10/31/17  0640   INR  1 75*       * I Have Reviewed All Lab Data Listed Above  * Additional Pertinent Lab Tests Reviewed: All Labs Within Last 24 Hours Reviewed      Recent Cultures (last 7 days):           Last 24 Hours Medication List:     amLODIPine 5 mg Oral Daily   aspirin 81 mg Oral Daily   atorvastatin 10 mg Oral Daily With Dinner   calcium carbonate 1,000 mg Oral TID   cholecalciferol 2,000 Units Oral Daily   ferrous sulfate 325 mg Oral Daily   metolazone 5 mg Oral Daily   QUEtiapine 50 mg Oral HS   terazosin 1 mg Oral HS   torsemide 20 mg Oral Daily   warfarin 5 mg Oral Daily (warfarin)        Today, Patient Was Seen By: JUVENCIO Rodriges    ** Please Note: Dragon 360 Dictation voice to text software may have been used in the creation of this document   **

## 2017-10-31 NOTE — ASSESSMENT & PLAN NOTE
· Creatinine is stable  · He has had an AV fistula created but is not on HD yet  · c/w pre-hospital medications of torsemide and metalzone     · Check am bmp  · Nephrology consult appreciated

## 2017-11-01 LAB
ANION GAP SERPL CALCULATED.3IONS-SCNC: 11 MMOL/L (ref 4–13)
BUN SERPL-MCNC: 95 MG/DL (ref 5–25)
CALCIUM SERPL-MCNC: 9.5 MG/DL (ref 8.3–10.1)
CHLORIDE SERPL-SCNC: 107 MMOL/L (ref 100–108)
CO2 SERPL-SCNC: 22 MMOL/L (ref 21–32)
CREAT SERPL-MCNC: 4.94 MG/DL (ref 0.6–1.3)
ERYTHROCYTE [DISTWIDTH] IN BLOOD BY AUTOMATED COUNT: 18.8 % (ref 11.6–15.1)
GFR SERPL CREATININE-BSD FRML MDRD: 10 ML/MIN/1.73SQ M
GLUCOSE SERPL-MCNC: 84 MG/DL (ref 65–140)
HCT VFR BLD AUTO: 24.6 % (ref 36.5–49.3)
HGB BLD-MCNC: 8 G/DL (ref 12–17)
INR PPP: 1.8 (ref 0.86–1.16)
MCH RBC QN AUTO: 29.2 PG (ref 26.8–34.3)
MCHC RBC AUTO-ENTMCNC: 32.5 G/DL (ref 31.4–37.4)
MCV RBC AUTO: 90 FL (ref 82–98)
PLATELET # BLD AUTO: 72 THOUSANDS/UL (ref 149–390)
POTASSIUM SERPL-SCNC: 3.8 MMOL/L (ref 3.5–5.3)
PROTHROMBIN TIME: 21 SECONDS (ref 12.1–14.4)
RBC # BLD AUTO: 2.74 MILLION/UL (ref 3.88–5.62)
SODIUM SERPL-SCNC: 140 MMOL/L (ref 136–145)
WBC # BLD AUTO: 6.04 THOUSAND/UL (ref 4.31–10.16)

## 2017-11-01 PROCEDURE — 85027 COMPLETE CBC AUTOMATED: CPT | Performed by: NURSE PRACTITIONER

## 2017-11-01 PROCEDURE — 80048 BASIC METABOLIC PNL TOTAL CA: CPT | Performed by: NURSE PRACTITIONER

## 2017-11-01 PROCEDURE — 85610 PROTHROMBIN TIME: CPT | Performed by: NURSE PRACTITIONER

## 2017-11-01 RX ADMIN — TORSEMIDE 20 MG: 20 TABLET ORAL at 08:45

## 2017-11-01 RX ADMIN — VITAMIN D, TAB 1000IU (100/BT) 2000 UNITS: 25 TAB at 08:44

## 2017-11-01 RX ADMIN — QUETIAPINE FUMARATE 50 MG: 25 TABLET ORAL at 22:11

## 2017-11-01 RX ADMIN — ATORVASTATIN CALCIUM 10 MG: 10 TABLET, FILM COATED ORAL at 17:30

## 2017-11-01 RX ADMIN — AMLODIPINE BESYLATE 5 MG: 5 TABLET ORAL at 08:44

## 2017-11-01 RX ADMIN — Medication 1000 MG: at 08:45

## 2017-11-01 RX ADMIN — ASPIRIN 81 MG: 81 TABLET, COATED ORAL at 08:45

## 2017-11-01 RX ADMIN — METOLAZONE 5 MG: 5 TABLET ORAL at 08:45

## 2017-11-01 RX ADMIN — Medication 1000 MG: at 17:00

## 2017-11-01 RX ADMIN — WARFARIN SODIUM 5 MG: 5 TABLET ORAL at 18:05

## 2017-11-01 RX ADMIN — TERAZOSIN HYDROCHLORIDE 1 MG: 1 CAPSULE ORAL at 22:11

## 2017-11-01 RX ADMIN — Medication 325 MG: at 06:07

## 2017-11-01 RX ADMIN — Medication 1000 MG: at 22:00

## 2017-11-01 NOTE — PROGRESS NOTES
Pt alert, calm, irritable but cooperative with care, denies SI, HI, A/VH and anxiety, reports less depression, medication compliant, refuse to attend group, no interaction with staff or peers, seclusive to room, will continue to monitor

## 2017-11-01 NOTE — PROGRESS NOTES
Progress Note - Behavioral Health     Kiera Arthur 80 y o  male MRN: [id number]  Unit/Bed#: DV8 122-36 Encounter: [id number]    The patient was seen for continuing care and reviewed with treatment team  Patient seclusive to his room, only coming out for meals  He reports some improvement in depression, but states that being in the the hospital is causing him additional frustration  He denies anxiety  No psychosis or SI  Patient is irritable and short in conversation, and adamantly refuses to go to group or interact with peers/ staff  Patient is able to make needs known  PO intake has been adequate, and patient slept all night  Psychiatric Review of Systems:  Behavior over the last 24 hours:  unchanged  Sleep: normal  Appetite: normal  Medication side effects: No  ROS: no complaints    Mental Status Evaluation:  Appearance:  casually dressed   Behavior:  agitated, guarded   Speech:  normal rate and volume   Mood:  depressed, irritable   Affect:  blunted, constricted   Thought Process:  coherent, goal directed   Associations: intact associations   Thought Content:  no overt delusions   Perceptual Disturbances: none   Risk Potential: Suicidal ideation - None  Homicidal ideation - None  Potential for aggression - No   Sensorium:  oriented to person, place and time/date   Memory:  short term memory mildly impaired, long term memory grossly intact   Consciousness:  alert and awake   Attention: attention span and concentration appear shorter than expected for age   Insight:  limited   Judgment: limited   Gait/Station: in bed   Motor Activity: no abnormal movements      Progress Toward Goals: Patient denies hallucinations  Some improvement           Principal Problem:    Psychosis, organic  Active Problems:    CKD (chronic kidney disease), stage IV (HCC)    CHF (congestive heart failure) (HCC)    Paroxysmal atrial fibrillation (HCC)    Anemia    CAD (coronary artery disease)    Elevated TSH    Recommended Treatment: 1) continue current medications   2) disposition planning     amLODIPine 5 mg Oral Daily   aspirin 81 mg Oral Daily   atorvastatin 10 mg Oral Daily With Dinner   calcium carbonate 1,000 mg Oral TID   cholecalciferol 2,000 Units Oral Daily   ferrous sulfate 325 mg Oral Daily   metolazone 5 mg Oral Daily   QUEtiapine 50 mg Oral HS   terazosin 1 mg Oral HS   torsemide 20 mg Oral Daily   warfarin 5 mg Oral Daily (warfarin)       Vitals:    11/01/17 0709   BP: 141/63   Pulse: 60   Resp: 16   Temp: (!) 97 3 °F (36 3 °C)   SpO2: 95%       Risks, benefits and possible side effects of Medications:   Risks, benefits, and possible side effects of medications explained to patient and patient verbalizes understanding        Mickey Quick PA-C

## 2017-11-01 NOTE — PROGRESS NOTES
Pt seclusive to room at the start of this shift, calm and pleasant upon approach  Refused to attend group and remains medication compliant  Pt denies all symptoms at this time

## 2017-11-01 NOTE — CASE MANAGEMENT
[place] from [facility] was in to assess patient  I spoke with Deanne Maddie patient's daughter and she provided 3 facilities for Case Management to contact:  [facility] ([address] [place] [address] Street @ [phone])  [facility] GENARO DICKENS  @ [address] @ [place]: [phone] liaison is Ana Mills informed writer that patient's wife, Arthur Cameron, had a nodule on her lung that was deemed as cancerous, they were just confirmed with the diagnosis on 10/31/17  She will be having surgery in December  Arthur Cameron told patient yesterday but he did not have an understanding of the diagnosis that she may have lung cancer  Thus, at this time Arthur Cameron is barely unable to care for herself  I have loaded his information into Allscripts and will await each facilities reply

## 2017-11-02 LAB
ANION GAP SERPL CALCULATED.3IONS-SCNC: 10 MMOL/L (ref 4–13)
BUN SERPL-MCNC: 97 MG/DL (ref 5–25)
CALCIUM SERPL-MCNC: 9.3 MG/DL (ref 8.3–10.1)
CHLORIDE SERPL-SCNC: 106 MMOL/L (ref 100–108)
CO2 SERPL-SCNC: 22 MMOL/L (ref 21–32)
CREAT SERPL-MCNC: 4.85 MG/DL (ref 0.6–1.3)
GFR SERPL CREATININE-BSD FRML MDRD: 10 ML/MIN/1.73SQ M
GLUCOSE SERPL-MCNC: 93 MG/DL (ref 65–140)
POTASSIUM SERPL-SCNC: 4.1 MMOL/L (ref 3.5–5.3)
SODIUM SERPL-SCNC: 138 MMOL/L (ref 136–145)

## 2017-11-02 PROCEDURE — 80048 BASIC METABOLIC PNL TOTAL CA: CPT | Performed by: PHYSICIAN ASSISTANT

## 2017-11-02 RX ORDER — LOPERAMIDE HYDROCHLORIDE 2 MG/1
2 CAPSULE ORAL EVERY 4 HOURS PRN
Status: DISCONTINUED | OUTPATIENT
Start: 2017-11-02 | End: 2017-11-07 | Stop reason: HOSPADM

## 2017-11-02 RX ADMIN — Medication 325 MG: at 06:12

## 2017-11-02 RX ADMIN — AMLODIPINE BESYLATE 5 MG: 5 TABLET ORAL at 08:28

## 2017-11-02 RX ADMIN — ATORVASTATIN CALCIUM 10 MG: 10 TABLET, FILM COATED ORAL at 17:18

## 2017-11-02 RX ADMIN — WARFARIN SODIUM 5 MG: 5 TABLET ORAL at 17:21

## 2017-11-02 RX ADMIN — Medication 1000 MG: at 17:19

## 2017-11-02 RX ADMIN — METOLAZONE 5 MG: 5 TABLET ORAL at 08:27

## 2017-11-02 RX ADMIN — Medication 1000 MG: at 21:59

## 2017-11-02 RX ADMIN — Medication 1000 MG: at 08:27

## 2017-11-02 RX ADMIN — TORSEMIDE 20 MG: 20 TABLET ORAL at 08:27

## 2017-11-02 RX ADMIN — ASPIRIN 81 MG: 81 TABLET, COATED ORAL at 08:28

## 2017-11-02 RX ADMIN — VITAMIN D, TAB 1000IU (100/BT) 2000 UNITS: 25 TAB at 08:28

## 2017-11-02 RX ADMIN — TERAZOSIN HYDROCHLORIDE 1 MG: 1 CAPSULE ORAL at 21:59

## 2017-11-02 RX ADMIN — QUETIAPINE FUMARATE 50 MG: 25 TABLET ORAL at 21:59

## 2017-11-02 NOTE — PROGRESS NOTES
TSH noted to have mild elevation with a normal free T4  Free T3 was slightly low but  this can occur during acute illness phases  Patient was asymptomatic  Recommend repeating TSH and free T4 in 4-6 weeks as an outpatient  Will sign off please call with questions

## 2017-11-02 NOTE — PROGRESS NOTES
Progress Note - Behavioral Health     Masoud Arthur 80 y o  male MRN: [id number]  Unit/Bed#: GZ2 093-93 Encounter: [id number]    The patient was seen for continuing care and reviewed with treatment team  Patient remains withdrawn and seclusive in his room  He is less irritable this morning, but reports feeling "crappy" because he is in the hospital and does not see a need for it which makes him feel depressed  He endorses some anxiety, but this is not his primary symptoms  Denies psychosis and SI  He reports that his sleep was interrupted last night because of frequent bathroom trips  Patient said that he ate peanuts last night, which he believes contributed to loose stools  Patient does not recall having a diagnosis of diverticular disease  He denies bloody stools and abdominal pain  He does report "burning" when he defecates  Patient is forgetful, as he told me that he did not eat any breakfast this morning, but I&O flowsheet indicates that patient ate 100% of his breakfast  Patient's kidney function continues to worsen, but nephrology feels that kidney problems and dialysis are better dealt with outpatient       Psychiatric Review of Systems:  Behavior over the last 24 hours:  unchanged  Sleep: insomnia, interrupted   Appetite: normal  Medication side effects: No  ROS: loose stools     Mental Status Evaluation:  Appearance:  casually dressed   Behavior:  calm, guarded   Speech:  normal rate and volume   Mood:  depressed   Affect:  blunted, constricted   Thought Process:  coherent, goal directed   Associations: intact associations   Thought Content:  no overt delusions   Perceptual Disturbances: none   Risk Potential: Suicidal ideation - None  Homicidal ideation - None  Potential for aggression - No   Sensorium:  oriented to person, place and time/date   Memory:  short term memory mildly impaired, long term memory grossly intact   Consciousness:  alert and awake   Attention: attention span and concentration appear shorter than expected for age   Insight:  limited   Judgment: limited   Gait/Station: in bed   Motor Activity: no abnormal movements     Progress Toward Goals: Patient's behavior at baseline  He is preoccupied with leaving  Progressing, just awaiting placement  Principal Problem:    Psychosis, organic  Active Problems:    CKD (chronic kidney disease), stage IV (HCC)    CHF (congestive heart failure) (HCC)    Paroxysmal atrial fibrillation (HCC)    Anemia    CAD (coronary artery disease)    Elevated TSH    Recommended Treatment:   1) order Imodium 2 mg Q4 hours for loose stools/diarrhea   2) order Preparation H ointment for alvino-rectal irritation   3) disposition planning    amLODIPine 5 mg Oral Daily   aspirin 81 mg Oral Daily   atorvastatin 10 mg Oral Daily With Dinner   calcium carbonate 1,000 mg Oral TID   cholecalciferol 2,000 Units Oral Daily   ferrous sulfate 325 mg Oral Daily   metolazone 5 mg Oral Daily   QUEtiapine 50 mg Oral HS   terazosin 1 mg Oral HS   torsemide 20 mg Oral Daily   warfarin 5 mg Oral Daily (warfarin)       Vitals:    11/02/17 0705   BP: 141/63   Pulse: 84   Resp: 18   Temp: (!) 97 3 °F (36 3 °C)   SpO2: 99%       Risks, benefits and possible side effects of Medications:   Risks, benefits, and possible side effects of medications explained to patient and patient verbalizes understanding        Ruby Tran PA-C

## 2017-11-02 NOTE — CASE MANAGEMENT
[facility] @ [phone] and Pt has been declined for admission  CM contacted [facility] - Admissions) @ [phone] and Pt has been declined for admission  CM contacted [facility] @ [place] [facility] and Spencer Alcaraz (Liasion) will visit Pt today to evaluate for admission  CM contacted Pt's daughter Romie Hammond) and informed same of above  CM placed [employer]s Referral for the following SNF:    [address]: no beds available  [facility]    CM contacted [facility] and spoke with Admissions and Pt will be reviewed and CM to receive call back  CM contacted [facility] and spoke with Jason Melchor- Admissions Director and Pt will be reviewed and CM will receive call back  CM contacted [facility] [employer] and spoke with Renetta Bliss in Admissions and Pt will be reviewed and CM will receive call back

## 2017-11-02 NOTE — PLAN OF CARE
Problem: DISCHARGE PLANNING  Goal: Discharge to home or other facility with appropriate resources  INTERVENTIONS:  - Identify barriers to discharge w/patient and caregiver  - Arrange for needed discharge resources and transportation as appropriate  - Identify discharge learning needs (meds, wound care, etc )  - Arrange for interpretive services to assist at discharge as needed  - Refer to Case Management Department for coordinating discharge planning if the patient needs post-hospital services based on physician/advanced practitioner order or complex needs related to functional status, cognitive ability, or social support system   Outcome: Progressing  [employer]s Referral widened for SNF placement  CM contacted [address] [place] [address], [address] at [facility] and [address] in preparation for discharge planning  CM contacted Pt's daughter to update same on Pt's status of stay and to discuss discharge planning and [employer] SNF Referrals

## 2017-11-02 NOTE — MEDICAL STUDENT
This morning the patient continues to state there is no reason for him to be here and denies any improvement of feelings of depression and is still irritable  He says he may have hallucinated while getting up in the night but cut himself off and would not return to the subject  He also prefers to go back home over a treatment facility  I reviewed his treatment plan with him once again, and he seems reluctantly agreeable to it

## 2017-11-02 NOTE — PROGRESS NOTES
NEPHROLOGY PROGRESS NOTE   Melony Arthur 80 y o  male MRN: [id number]  Unit/Bed#: ZL5 569-01 Encounter: [id number]      ASSESSMENT & PLAN:    1   Stage 5 chronic kidney disease  -creatinine remained stable there is no urgent indication for dialysis  -no need to check lab work daily, can check monthly  -has a left upper extremity AV fistula    2  Hypertension  -continue current blood pressure medication    3  Congestive heart failure  -compensated    4    Anemia  -can give 1 more dose of Epogen 98651 units x1    Can follow up with Dr Domenica Mcdonald upon discharge  Will see as needed please call with questions      SUBJECTIVE:    Patient with no chest pain or shortness of Breath fevers chills nausea diarrhea constipation    OBJECTIVE:  Current Weight: Weight - Scale: 76 5 kg (168 lb 10 4 oz)  Vitals:    11/02/17 0705   BP: 141/63   Pulse: 84   Resp: 18   Temp: (!) 97 3 °F (36 3 °C)   SpO2: 99%       Intake/Output Summary (Last 24 hours) at 11/02/17 1322  Last data filed at 11/01/17 1544   Gross per 24 hour   Intake                0 ml   Output                1 ml   Net               -1 ml       General: conscious, cooperative, in not acute distress  Eyes: conjunctivae pink, anicteric sclerae  ENT: lips and mucous membranes moist  Neck: supple, no JVD  Chest: clear breath sounds bilateral, no crackles, ronchus or wheezings  CVS: distinct S1 & S2, normal rate, regular rhythm  Abdomen: soft, non-tender, non-distended, normoactive bowel sounds  Extremities: no edema of both legs  Skin: no rash  Neuro: awake, alert, oriented    Medications:    Current Facility-Administered Medications:     acetaminophen (TYLENOL) tablet 650 mg, 650 mg, Oral, Q6H PRN, Ye Scott MD    acetaminophen (TYLENOL) tablet 650 mg, 650 mg, Oral, Q4H PRN, Ye Scott MD    aluminum-magnesium hydroxide-simethicone (MYLANTA) 200-200-20 mg/5 mL oral suspension 30 mL, 30 mL, Oral, Q4H PRN, Ye Scott MD    amLODIPine (NORVASC) tablet 5 mg, 5 mg, Oral, Daily, Brian Barraza, COLENP, 5 mg at 11/02/17 7345    aspirin (ECOTRIN LOW STRENGTH) EC tablet 81 mg, 81 mg, Oral, Daily, COLE DiazNP, 81 mg at 11/02/17 1446    atorvastatin (LIPITOR) tablet 10 mg, 10 mg, Oral, Daily With Dinner, COLE DiazNP, 10 mg at 11/01/17 1730    benztropine (COGENTIN) tablet 1 mg, 1 mg, Oral, Q6H PRN, Era Sanchez MD    calcium carbonate (TUMS) chewable tablet 1,000 mg, 1,000 mg, Oral, TID, JUVENCIO Diaz, 1,000 mg at 11/02/17 0827    cholecalciferol (VITAMIN D3) tablet 2,000 Units, 2,000 Units, Oral, Daily, COLE DiazNP, 2,000 Units at 11/02/17 8381    ferrous sulfate tablet 325 mg, 325 mg, Oral, Daily, Anastacio Eldridge DO, 325 mg at 11/02/17 0612    haloperidol (HALDOL) tablet 1 mg, 1 mg, Oral, Q6H PRN, Era Sanchez MD    hydrOXYzine HCL (ATARAX) tablet 25 mg, 25 mg, Oral, Q6H PRN, Era Sanchez MD    loperamide (IMODIUM) capsule 2 mg, 2 mg, Oral, Q4H PRN, Candida Hanks, LORETO    magnesium hydroxide (MILK OF MAGNESIA) 400 mg/5 mL oral suspension 30 mL, 30 mL, Oral, Daily PRN, Era Sanchez MD    metolazone (ZAROXOLYN) tablet 5 mg, 5 mg, Oral, Daily, COLE DiazNP, 5 mg at 11/02/17 0827    pramoxine-phenylephrine-glycerin-petrolatum (PREPARATION H MAX) 1-0 25-14 4-15 % rectal cream, , Rectal, BID PRN, PABLITO MesaC    QUEtiapine (SEROquel) tablet 50 mg, 50 mg, Oral, HS, Tyron Newberry MD, 50 mg at 11/01/17 2211    risperiDONE (RisperDAL M-TABS) dispersible tablet 0 5 mg, 0 5 mg, Oral, Q8H PRN, Era Sanchez MD    terazosin (HYTRIN) capsule 1 mg, 1 mg, Oral, HS, JUVENCIO Diaz, 1 mg at 11/01/17 2211    torsemide (DEMADEX) tablet 20 mg, 20 mg, Oral, Daily, JUVENCIO Diaz, 20 mg at 11/02/17 0827    traZODone (DESYREL) tablet 25 mg, 25 mg, Oral, HS PRN, Era Sanchez MD    warfarin (COUMADIN) tablet 5 mg, 5 mg, Oral, Daily (warfarin), JUVENCIO Diaz, 5 mg at 11/01/17 1805    Invasive Devices:      Lab Results:     Results from last 7 days  Lab Units 11/02/17  0612 11/01/17  0703 10/31/17  0640 10/27/17  0600   WBC Thousand/uL  --  6 04 4 68 4 40   HEMOGLOBIN g/dL  --  8 0* 7 2* 7 8*   HEMATOCRIT %  --  24 6* 22 2* 23 7*   PLATELETS Thousands/uL  --  72* 57* 57*   SODIUM mmol/L 138 140 140 140   POTASSIUM mmol/L 4 1 3 8 4 5 4 6   CHLORIDE mmol/L 106 107 110* 112*   CO2 mmol/L 22 22 21 20*   BUN mg/dL 97* 95* 94* 87*   CREATININE mg/dL 4 85* 4 94* 4 89* 4 68*   CALCIUM mg/dL 9 3 9 5 9 2 8 8   GLUCOSE RANDOM mg/dL 93 84 86 87       Previous work up:  Please see previous note

## 2017-11-02 NOTE — PROGRESS NOTES
Pt calm, pleasant, cooperative, but continues to be scant in conversation  Pt continues to be med compliant  Pt out in milieu for meals, but resorts back to his room  Pt denies s/s at current  Will continue to monitor

## 2017-11-02 NOTE — PROGRESS NOTES
Encouraged pt to go to afternoon group  Pt became increasingly agitated with staff stating 'I'm telling you I'm not going ' Will continue to monitor

## 2017-11-03 RX ADMIN — METOLAZONE 5 MG: 5 TABLET ORAL at 08:10

## 2017-11-03 RX ADMIN — TORSEMIDE 20 MG: 20 TABLET ORAL at 08:10

## 2017-11-03 RX ADMIN — ATORVASTATIN CALCIUM 10 MG: 10 TABLET, FILM COATED ORAL at 16:58

## 2017-11-03 RX ADMIN — TERAZOSIN HYDROCHLORIDE 1 MG: 1 CAPSULE ORAL at 22:05

## 2017-11-03 RX ADMIN — Medication 325 MG: at 06:09

## 2017-11-03 RX ADMIN — AMLODIPINE BESYLATE 5 MG: 5 TABLET ORAL at 08:10

## 2017-11-03 RX ADMIN — Medication 1000 MG: at 22:05

## 2017-11-03 RX ADMIN — ASPIRIN 81 MG: 81 TABLET, COATED ORAL at 08:10

## 2017-11-03 RX ADMIN — VITAMIN D, TAB 1000IU (100/BT) 2000 UNITS: 25 TAB at 08:10

## 2017-11-03 RX ADMIN — Medication 1000 MG: at 08:10

## 2017-11-03 RX ADMIN — Medication 1000 MG: at 16:57

## 2017-11-03 RX ADMIN — QUETIAPINE FUMARATE 50 MG: 25 TABLET ORAL at 22:05

## 2017-11-03 RX ADMIN — EPOETIN ALFA 10000 UNITS: 10000 SOLUTION INTRAVENOUS; SUBCUTANEOUS at 08:35

## 2017-11-03 RX ADMIN — WARFARIN SODIUM 5 MG: 5 TABLET ORAL at 17:00

## 2017-11-03 NOTE — PLAN OF CARE
Problem: Ineffective Coping  Goal: Participates in unit activities  Interventions:  - Provide therapeutic environment   - Provide required programming   - Redirect inappropriate behaviors    Outcome: Progressing  Not coming to groups yet up pleasant and observing some groups from hallway before refusing to attend them

## 2017-11-03 NOTE — PROGRESS NOTES
Progress Note - Behavioral Health     Belkisduglasirving Arthur 80 y o  male MRN: [id number]  Unit/Bed#: PZ9 576-01 Encounter: [id number]    The patient was seen for continuing care and reviewed with treatment team  Patient seclusive to his room  He reports that his mood is "no worse than any other day " Feels that his hospital stay is causing him to feel depressed and anxious  He denies psychosis  No SI or HI  Patient occasionally becomes irritable with staff when they encourage him to attend group therapy  Patient reports interrupted sleep at night, but sleeps for most of the day  Psychiatric Review of Systems:  Behavior over the last 24 hours:  unchanged  Sleep: normal  Appetite: normal  Medication side effects: No  ROS: no complaints    Mental Status Evaluation:  Appearance:  age appropriate, casually dressed   Behavior:  calm, guarded   Speech:  normal rate and volume, scant    Mood:  depressed, dysphoric   Affect:  blunted, constricted   Thought Process:  coherent, goal directed   Associations: intact associations   Thought Content:  no overt delusions   Perceptual Disturbances: none   Risk Potential: Suicidal ideation - None  Homicidal ideation - None  Potential for aggression - No   Sensorium:  oriented to person, place and time/date   Memory:  short term memory mildly impaired, long term memory grossly intact   Consciousness:  alert and awake   Attention: attention span and concentration appear shorter than expected for age   Insight:  limited   Judgment: limited   Gait/Station: in bed   Motor Activity: no abnormal movements     Progress Toward Goals: Patient's behavior at baseline  Preoccupied with leaving            Principal Problem:    Psychosis, organic  Active Problems:    CKD (chronic kidney disease), stage IV (HCC)    CHF (congestive heart failure) (HCC)    Paroxysmal atrial fibrillation (HCC)    Anemia    CAD (coronary artery disease)    Elevated TSH    Recommended Treatment:  1) continue current medications   2) searching for placement for disposition planning     amLODIPine 5 mg Oral Daily   aspirin 81 mg Oral Daily   atorvastatin 10 mg Oral Daily With Dinner   calcium carbonate 1,000 mg Oral TID   cholecalciferol 2,000 Units Oral Daily   ferrous sulfate 325 mg Oral Daily   metolazone 5 mg Oral Daily   QUEtiapine 50 mg Oral HS   terazosin 1 mg Oral HS   torsemide 20 mg Oral Daily   warfarin 5 mg Oral Daily (warfarin)       Vitals:    11/03/17 0726   BP: 118/56   Pulse: 74   Resp: 18   Temp: 97 8 °F (36 6 °C)   SpO2: 100%       Risks, benefits and possible side effects of Medications:   Risks, benefits, and possible side effects of medications explained to patient and patient verbalizes understanding        Dolly Monreal PA-C

## 2017-11-03 NOTE — PROGRESS NOTES
Pt alert, calm, pleasant and cooperative, denies SI, HI, anxiety, depression and A/VH  Pt then verbalizes to this nurse, "I am tired of being here and I feel more anxious and depress being here than I did before coming here", encourage pt to attend group and to interact with peers, pt denies  Seclusive to room after meals, spends most of his time in his room sleeping  Medication compliant will continue to monitor

## 2017-11-03 NOTE — PROGRESS NOTES
Patient was pleasant this evening, joking with staff  Pt had a visit from his wife, all seemed to go well  Pt continues to be medication compliant and cooperative  Currently denies s/s  Will continue to monitor

## 2017-11-03 NOTE — CASE MANAGEMENT
SNF letter of determination notice received from [facility] on [address] along with [facility] and [facility] and Pt is SNF Clinically Eligible and copies of previous sent for scan into Pt's EMR  Pt was visited by [facility] @ [id number] [facility] Liaison [facility]) and reviewed by administration and denied admission  Pt is now being considered by the following SNF:     [facility] on Monday 11/6/17  [address] and [address] emailed SNF letter of eligibility to [facility]  Awaiting call back from Boo Ibarra (Admissions) to determine if Pt will be admitted  [address] [facility],[address] [facility], [facility] faxed updated notes and SNF letter of eligibility and awaiting call back from [address] (Admissions) to determine if Pt will be admitted   updated Pt's wife on all of above and at this time Pt' wife states that family's first choice is [facility], followed by [facility] and then [facility] - last choice as this is over 30 minutes away for Pt's wife

## 2017-11-04 RX ADMIN — ASPIRIN 81 MG: 81 TABLET, COATED ORAL at 08:38

## 2017-11-04 RX ADMIN — VITAMIN D, TAB 1000IU (100/BT) 2000 UNITS: 25 TAB at 08:37

## 2017-11-04 RX ADMIN — Medication 1000 MG: at 17:25

## 2017-11-04 RX ADMIN — ATORVASTATIN CALCIUM 10 MG: 10 TABLET, FILM COATED ORAL at 17:26

## 2017-11-04 RX ADMIN — METOLAZONE 5 MG: 5 TABLET ORAL at 08:38

## 2017-11-04 RX ADMIN — TORSEMIDE 20 MG: 20 TABLET ORAL at 08:37

## 2017-11-04 RX ADMIN — Medication 1000 MG: at 21:35

## 2017-11-04 RX ADMIN — AMLODIPINE BESYLATE 5 MG: 5 TABLET ORAL at 08:37

## 2017-11-04 RX ADMIN — Medication 1000 MG: at 08:37

## 2017-11-04 RX ADMIN — Medication 325 MG: at 06:30

## 2017-11-04 RX ADMIN — TERAZOSIN HYDROCHLORIDE 1 MG: 1 CAPSULE ORAL at 21:36

## 2017-11-04 RX ADMIN — WARFARIN SODIUM 5 MG: 5 TABLET ORAL at 17:26

## 2017-11-04 RX ADMIN — QUETIAPINE FUMARATE 50 MG: 25 TABLET ORAL at 21:35

## 2017-11-04 NOTE — PROGRESS NOTES
[facility] contacted r/t pt's last PT/INR was 11/1  Per Dr Ranulfo Lea - telephone order PT/INR for 11/5 am and continue to administer Coumadin 5 mg this evening

## 2017-11-04 NOTE — PROGRESS NOTES
Progress Note - Behavioral Health   Sirena Arthur 80 y o  male MRN: [id number]  Unit/Bed#: QH8 577-01 Encounter: [id number]    The patient was seen for continuing care and reviewed with treatment  team    He is withdrawn to his room for the most part and complains of being bored  He has not had any visual hallucinations  He still believes that there were equipment in his house although he is not verbalizing it spontaneously  Generally cooperative with treatment team     Mental Status Evaluation:  Appearance:  Adequate hygiene and grooming and Good eye contact   Behavior:  Withdrawn and isolative and cooperative   Fund of knowledge  Not assessed   Speech:   Language: Normal rate and Normal volume  No overt abnormality   Mood:  dysphoric   Affect:   Associations: appropriate and mood-congruent  intact associations   Thought Process:  goal directed and coherent   Thought Content:  Does not verbalize delusional material   Perceptual Disturbances: Denies hallucinations and does not appear to be responding to internal stimuli   Risk Potential: No suicidal or homicidal ideation   Orientation  Oriented to place and person   Memory Not tested   Attention/Concentration attention span and concentration were age appropriate   Insight:  limited   Judgment: Good judgment   Gait/Station: Not observed   Motor Activity: No abnormal movement noted     Progress Toward Goals:  No significant changes    Assessment/Plan    Principal Problem:    Psychosis, organic  Active Problems:    CKD (chronic kidney disease), stage IV (HCC)    CHF (congestive heart failure) (HCC)    Paroxysmal atrial fibrillation (HCC)    Anemia    CAD (coronary artery disease)    Elevated TSH      Recommended Treatment: Continue with pharmacotherapy, group therapy, milieu therapy and occupational therapy    The patient will be maintained on the following medications:    amLODIPine 5 mg Oral Daily   aspirin 81 mg Oral Daily   atorvastatin 10 mg Oral Daily With Dinner   calcium carbonate 1,000 mg Oral TID   cholecalciferol 2,000 Units Oral Daily   ferrous sulfate 325 mg Oral Daily   metolazone 5 mg Oral Daily   QUEtiapine 50 mg Oral HS   terazosin 1 mg Oral HS   torsemide 20 mg Oral Daily   warfarin 5 mg Oral Daily (warfarin)       Risks, benefits and possible side effects of Medications:   Risks, benefits, and possible side effects of medications explained to patient and patient verbalizes understanding

## 2017-11-04 NOTE — PROGRESS NOTES
Pt calm and cooperative  Visible in milieu for meals only, otherwise remained in room  States he wants to go home  He has things to do there and here he does not  Denies s/s   Med compliant

## 2017-11-04 NOTE — PROGRESS NOTES
Pt is cooperative and calm, and continues to be medication compliant  Pt was out for meals but did not attend evening group  Pt denies Si/Hi, anxiety and depression  Pt is pleasant and talkative with staff  Will continue to monitor

## 2017-11-05 LAB
INR PPP: 3.21 (ref 0.86–1.16)
PROTHROMBIN TIME: 33.3 SECONDS (ref 12.1–14.4)

## 2017-11-05 PROCEDURE — 85610 PROTHROMBIN TIME: CPT | Performed by: INTERNAL MEDICINE

## 2017-11-05 RX ADMIN — TORSEMIDE 20 MG: 20 TABLET ORAL at 08:20

## 2017-11-05 RX ADMIN — ASPIRIN 81 MG: 81 TABLET, COATED ORAL at 08:20

## 2017-11-05 RX ADMIN — Medication 325 MG: at 06:11

## 2017-11-05 RX ADMIN — QUETIAPINE FUMARATE 50 MG: 25 TABLET ORAL at 21:18

## 2017-11-05 RX ADMIN — Medication 1000 MG: at 16:20

## 2017-11-05 RX ADMIN — TERAZOSIN HYDROCHLORIDE 1 MG: 1 CAPSULE ORAL at 21:18

## 2017-11-05 RX ADMIN — AMLODIPINE BESYLATE 5 MG: 5 TABLET ORAL at 08:20

## 2017-11-05 RX ADMIN — ATORVASTATIN CALCIUM 10 MG: 10 TABLET, FILM COATED ORAL at 16:20

## 2017-11-05 RX ADMIN — VITAMIN D, TAB 1000IU (100/BT) 2000 UNITS: 25 TAB at 08:20

## 2017-11-05 RX ADMIN — Medication 1000 MG: at 08:20

## 2017-11-05 RX ADMIN — Medication 1000 MG: at 21:18

## 2017-11-05 RX ADMIN — METOLAZONE 5 MG: 5 TABLET ORAL at 08:20

## 2017-11-05 NOTE — PROGRESS NOTES
INR 3 21  Patient on Coumadin for chronic atrial fibrillation 5 mg daily  Will hold Coumadin tonight  Recheck INR tomorrow   JUVENCIO Grissom

## 2017-11-05 NOTE — PROGRESS NOTES
Progress Note - Behavioral Health   Olivier Arthur 80 y o  male MRN: [id number]  Unit/Bed#: AK1 577-01 Encounter: [id number]    The patient was seen for continuing care and reviewed with treatment team   No significant events in the past 24 hours  Generally isolative  Denies suicidal ideation and does not spontaneously verbalize delusional material    Mental Status Evaluation:  Appearance:  Adequate hygiene and grooming and Good eye contact   Behavior:  Withdrawn and isolative and calm   Fund of knowledge  Not assessed   Speech:   Language: Normal rate and Normal volume  No overt abnormality   Mood:  dysphoric   Affect:   Associations: appropriate  intact associations   Thought Process:  goal directed and coherent   Thought Content:  Does not verbalize delusional material   Perceptual Disturbances: Denies hallucinations and does not appear to be responding to internal stimuli   Risk Potential: No suicidal or homicidal ideation   Orientation  Oriented to place and person   Memory Not tested   Attention/Concentration attention span and concentration were age appropriate   Insight:  limited   Judgment: Good judgment   Gait/Station: Not observed   Motor Activity: No abnormal movement noted     Progress Toward Goals:  No changes    Assessment/Plan    Principal Problem:    Psychosis, organic  Active Problems:    CKD (chronic kidney disease), stage IV ([facility])    CHF (congestive heart failure) ([facility])    Paroxysmal atrial fibrillation (HCC)    Anemia    CAD (coronary artery disease)    Elevated TSH      Recommended Treatment: Continue with pharmacotherapy, group therapy, milieu therapy and occupational therapy    The patient will be maintained on the following medications:    amLODIPine 5 mg Oral Daily   aspirin 81 mg Oral Daily   atorvastatin 10 mg Oral Daily With Dinner   calcium carbonate 1,000 mg Oral TID   cholecalciferol 2,000 Units Oral Daily   ferrous sulfate 325 mg Oral Daily   metolazone 5 mg Oral Daily QUEtiapine 50 mg Oral HS   terazosin 1 mg Oral HS   torsemide 20 mg Oral Daily   warfarin 5 mg Oral Daily (warfarin)       Risks, benefits and possible side effects of Medications:   Risks, benefits, and possible side effects of medications explained to patient and patient verbalizes understanding

## 2017-11-05 NOTE — PROGRESS NOTES
Pt seclusive to his room the remainder of the evening  However, pt bright on approach and med compliant   No issues/concerns noted otherwise  Will monitor

## 2017-11-05 NOTE — PROGRESS NOTES
Pt cooperative with care  He remained in his room this shift  Visible in milieu for meals only  Denies s/s  Med compliant  Wife at bedside for visit

## 2017-11-06 LAB
INR PPP: 3.06 (ref 0.86–1.16)
PROTHROMBIN TIME: 32.1 SECONDS (ref 12.1–14.4)

## 2017-11-06 PROCEDURE — 85610 PROTHROMBIN TIME: CPT | Performed by: NURSE PRACTITIONER

## 2017-11-06 PROCEDURE — 97116 GAIT TRAINING THERAPY: CPT

## 2017-11-06 RX ADMIN — Medication 1000 MG: at 21:34

## 2017-11-06 RX ADMIN — QUETIAPINE FUMARATE 50 MG: 25 TABLET ORAL at 21:35

## 2017-11-06 RX ADMIN — TORSEMIDE 20 MG: 20 TABLET ORAL at 09:03

## 2017-11-06 RX ADMIN — ASPIRIN 81 MG: 81 TABLET, COATED ORAL at 09:03

## 2017-11-06 RX ADMIN — Medication 1000 MG: at 09:04

## 2017-11-06 RX ADMIN — VITAMIN D, TAB 1000IU (100/BT) 2000 UNITS: 25 TAB at 09:04

## 2017-11-06 RX ADMIN — Medication 325 MG: at 06:05

## 2017-11-06 RX ADMIN — AMLODIPINE BESYLATE 5 MG: 5 TABLET ORAL at 09:04

## 2017-11-06 RX ADMIN — METOLAZONE 5 MG: 5 TABLET ORAL at 09:03

## 2017-11-06 RX ADMIN — Medication 1000 MG: at 16:08

## 2017-11-06 RX ADMIN — ATORVASTATIN CALCIUM 10 MG: 10 TABLET, FILM COATED ORAL at 16:08

## 2017-11-06 RX ADMIN — TERAZOSIN HYDROCHLORIDE 1 MG: 1 CAPSULE ORAL at 21:35

## 2017-11-06 NOTE — PROGRESS NOTES
Patient is pleasant, calm and cooperative  Patient denies all symptoms  Patient reports he is looking forward to his discharge to his new living facility and is hopeful that he will be able to go tomorrow

## 2017-11-06 NOTE — PROGRESS NOTES
Pt continues to be seclusive to his room in the evening  However, on approach pt is still bright  Pt remains med compliant as well  No issues/concerns noted otherwise  Will monitor

## 2017-11-06 NOTE — PROGRESS NOTES
[address] Internal Medicine Consultation Follow Up Progress Note  Patient: Trinidad Padgett 80 y o  male   MRN: [id number]  PCP: Alvino Lamb MD  Unit/Bed#: HH5 547-85 Encounter: [id number]  Date Of Visit: 11/06/17    Primary Service: Ruby Vickers MD     Reason for Consultation: Medical Management    Assessment & Plan:    · Psych issues: per primary service  · CKD Stage V: Baseline creatinine 4 5-5; Follows with Dr Rc Lucas  Renal had been following, please see their consult for recommendations  · Has AVF 10/2 by Dr Alanis Wells; maturing well  F/U OP to assess maturity   · Will need HD in future   · Can periodically check BMP   · Anemia of chronic kidney disease: s/p venofer and epogen last month and again on 11/2  Stable  · HTN: Pressures acceptable  Continue norvasc/diuretic  Would caution lowering BP aggressively given need for renal perfusion  · Chronic systolic CHF: EF 28% 8/7785  Continue home diuretics torsemide 20 mg daily and metolazone 5 mg daily  Continue daily weights; weights have been stable  · PAF: rate controlled  Goal INR 2-3  INR elevated yesterday so warfarin was held  Slightly above baseline today, will hold again this evening and recheck in AM  Per allscripts dose appears to be 5 mg daily  · Abnormal TFTs: TSH mildly elevated but FT4 WNL  FT3 was slightly low but can occur with acute illness  Repeat TFTs in 4-6 weeks as OP  Was care plan discussed with the Primary service today?: Yes Primary RN    Education and Discussions with Family / Patient: patient  Time Spent for Care: 45 minutes  More than 50% of total time spent on counseling and coordination of care as described above  Is patient acceptable for discharge from medicine standpoint?: Yes    Discharge Recommendations: Continue home medications  Follow up with renal, PCP and vascular as outpatient  Subjective:   Pt seen eating breakfast  He states he is feeling well  No nausea, vomiting, confusion, SOB   He denies any signs of bleeding  Has good appetite  Objective:     Vitals:   Temp (24hrs), Av 8 °F (36 6 °C), Min:97 5 °F (36 4 °C), Max:98 1 °F (36 7 °C)    HR:  [61-80] 75  Resp:  [17-18] 17  BP: (139-160)/(61-68) 160/68  SpO2:  [96 %-99 %] 99 %  Body mass index is 23 05 kg/m²  Input and Output Summary (last 24 hours): Intake/Output Summary (Last 24 hours) at 17 0800  Last data filed at 17 1603   Gross per 24 hour   Intake                0 ml   Output                1 ml   Net               -1 ml       Physical Exam:     Physical Exam   Constitutional: He is oriented to person, place, and time  No distress  Cardiovascular: Normal rate  An irregularly irregular rhythm present  Pulmonary/Chest: Effort normal and breath sounds normal  No respiratory distress  Abdominal: Soft  Bowel sounds are normal  He exhibits no distension  There is no tenderness  Musculoskeletal: He exhibits no edema  Neurological: He is alert and oriented to person, place, and time  Skin: He is not diaphoretic  Bruising noted to upper extremities    Psychiatric: He has a normal mood and affect  Nursing note and vitals reviewed  Additional Data:       Results from last 7 days  Lab Units 17  0703   WBC Thousand/uL 6 04   HEMOGLOBIN g/dL 8 0*   HEMATOCRIT % 24 6*   PLATELETS Thousands/uL 72*       Results from last 7 days  Lab Units 17  0612   SODIUM mmol/L 138   POTASSIUM mmol/L 4 1   CHLORIDE mmol/L 106   CO2 mmol/L 22   BUN mg/dL 97*   CREATININE mg/dL 4 85*   CALCIUM mg/dL 9 3   GLUCOSE RANDOM mg/dL 93       Results from last 7 days  Lab Units 17  0637   INR  3 06*       * I Have Reviewed All Lab Data Listed Above  * Additional Pertinent Lab Tests Reviewed: [address] Admission Reviewed    Imaging: I have personally reviewed pertinent reports        Last 24 Hours Medication List:     amLODIPine 5 mg Oral Daily   aspirin 81 mg Oral Daily   atorvastatin 10 mg Oral Daily With Dinner   calcium carbonate 1,000 mg Oral TID   cholecalciferol 2,000 Units Oral Daily   ferrous sulfate 325 mg Oral Daily   metolazone 5 mg Oral Daily   QUEtiapine 50 mg Oral HS   terazosin 1 mg Oral HS   torsemide 20 mg Oral Daily        Today, Patient Was Seen By: George La PA-C    ** Please Note: This note has been constructed using a voice recognition system   **

## 2017-11-06 NOTE — PLAN OF CARE
Problem: PHYSICAL THERAPY ADULT  Goal: Performs mobility at highest level of function for planned discharge setting  See evaluation for individualized goals  Treatment/Interventions: Functional transfer training, LE strengthening/ROM, Therapeutic exercise, Gait training, Equipment eval/education  Equipment Recommended: Lucina Akers (TERRANCE)       See flowsheet documentation for full assessment, interventions and recommendations  Bhavesh Rock PT     Outcome: Progressing  Prognosis: Good  Problem List: Decreased endurance, Impaired balance, Decreased strength, Decreased mobility, Impaired judgement, Decreased safety awareness  Assessment: PT INITIATED TREATMENT SESSION IN ORDER TO ASSIST PATIENT IN ACHIEVING GOALS FOR IMPROVED AMBULATION  UPON ARRIVAL PATIENT IN BED AND PERFORMED SUPINE-->SIT TRANSFER MOD-I W USE OF BED RAIL  HE REQUIRED S FOR SIT<-->STAND TRANSFER AND FOR AMBULATION WITH RW  PATIENT AMBULATED 200 FEET W/ RW + 80 FEET W/O RW  RECOMMEND CONTINUED USE OF RW FOR IMPROVED BALANCE AND SAFETY  PATIENT CONTINUES TO FUNCTIONG BELOW HIS I BASELINE AND WILL BENEFIT FROM STR UPON D/C  HE WILL BENEFIT FROM CONTINUED SKILLED INPT PT THIS ADMISSION TO ACHIEVE MAXIMAL FUNCTION AND SAFETY  Recommendation: (S) Short-term skilled PT     PT - OK to Discharge: (S) Yes (TO STR WHEN MED MICHAEL)    See flowsheet documentation for full assessment     Bhavesh Rock PT

## 2017-11-06 NOTE — PLAN OF CARE
Alteration in Orientation     Treatment Goal: Demonstrate a reduction of confusion and improved orientation to person, place, time and/or situation upon discharge, according to optimum baseline/ability Progressing     Cooperate with recommended testing/procedures Progressing     Attend and participate in unit activities, including therapeutic, recreational, and educational groups Progressing     Complete daily ADLs, including personal hygiene independently, as able Progressing        Alteration in Thoughts and Perception     Verbalize thoughts and feelings Progressing     Refrain from acting on delusional thinking/internal stimuli Progressing     Attend and participate in unit activities, including therapeutic, recreational, and educational groups Progressing     Recognize dysfunctional thoughts, communicate reality-based thoughts at the time of discharge Progressing        Ineffective Coping     Participates in unit activities Progressing        Prexisting or High Potential for Compromised Skin Integrity     Skin integrity is maintained or improved Progressing

## 2017-11-06 NOTE — CASE MANAGEMENT
Reba from [place] was in this morning to see patient, and patient was accepted to "[place] [facility] "  PC made to daughter Mckayla Marti to see when we can schedule DC, she is going to [place] tomorrow on Tuesday in the am to sign paperwork  She wants patient DC'ed tomorrow  DC and transport has been set up via [facility] for 2pm   [place] [facility] [place] [phone] & Fax: [phone]  Mariaelena updated with all information

## 2017-11-06 NOTE — PLAN OF CARE
Problem: Ineffective Coping  Goal: Participates in unit activities  Interventions:  - Provide therapeutic environment   - Provide required programming   - Redirect inappropriate behaviors    Outcome: Progressing  Pt did attend one of three groups today,sitting among his peers in the day room,however would not engage in the seated exercises,just observed  Pt returns to bed when not in sessions

## 2017-11-06 NOTE — PROGRESS NOTES
Progress Note - Behavioral Health   Valentino Geovany Arthur 80 y o  male MRN: [id number]  Unit/Bed#: HZ6 577-01 Encounter: [id number]    Patient seen, chart reviewed, and discussed with staff  Patient has been cooperative and compliant with treatment plan  No adverse effects noted to his medications  He is sleeping well through the night has an adequate appetite  He has been isolative to his room and not attending many groups  He is pleasant and cooperative on approach  He is polite with interview  No aggressive or agitated behavior  No delusions or hallucinations were voiced today      Behavior over the last 24 hours:  unchanged  Sleep: normal  Appetite: normal  Medication side effects: No  ROS: no complaints  /68   Pulse 75   Temp 97 5 °F (36 4 °C) (Tympanic)   Resp 17   Ht 6' (1 829 m)   Wt 77 1 kg (169 lb 15 6 oz)   SpO2 99%   BMI 23 05 kg/m²     Medications:   Current Facility-Administered Medications   Medication Dose Route Frequency    acetaminophen (TYLENOL) tablet 650 mg  650 mg Oral Q6H PRN    acetaminophen (TYLENOL) tablet 650 mg  650 mg Oral Q4H PRN    aluminum-magnesium hydroxide-simethicone (MYLANTA) 200-200-20 mg/5 mL oral suspension 30 mL  30 mL Oral Q4H PRN    amLODIPine (NORVASC) tablet 5 mg  5 mg Oral Daily    aspirin (ECOTRIN LOW STRENGTH) EC tablet 81 mg  81 mg Oral Daily    atorvastatin (LIPITOR) tablet 10 mg  10 mg Oral Daily With Dinner    benztropine (COGENTIN) tablet 1 mg  1 mg Oral Q6H PRN    calcium carbonate (TUMS) chewable tablet 1,000 mg  1,000 mg Oral TID    cholecalciferol (VITAMIN D3) tablet 2,000 Units  2,000 Units Oral Daily    ferrous sulfate tablet 325 mg  325 mg Oral Daily    haloperidol (HALDOL) tablet 1 mg  1 mg Oral Q6H PRN    hydrOXYzine HCL (ATARAX) tablet 25 mg  25 mg Oral Q6H PRN    loperamide (IMODIUM) capsule 2 mg  2 mg Oral Q4H PRN    magnesium hydroxide (MILK OF MAGNESIA) 400 mg/5 mL oral suspension 30 mL  30 mL Oral Daily PRN    metolazone (ZAROXOLYN) tablet 5 mg  5 mg Oral Daily    pramoxine-phenylephrine-glycerin-petrolatum (PREPARATION H MAX) 1-0 25-14 4-15 % rectal cream   Rectal BID PRN    QUEtiapine (SEROquel) tablet 50 mg  50 mg Oral HS    risperiDONE (RisperDAL M-TABS) dispersible tablet 0 5 mg  0 5 mg Oral Q8H PRN    terazosin (HYTRIN) capsule 1 mg  1 mg Oral HS    torsemide (DEMADEX) tablet 20 mg  20 mg Oral Daily    traZODone (DESYREL) tablet 25 mg  25 mg Oral HS PRN       Labs:   Admission on 10/25/2017   Component Date Value Ref Range Status    WBC 10/26/2017 4 68  4 31 - 10 16 Thousand/uL Final    RBC 10/26/2017 2 66* 3 88 - 5 62 Million/uL Final    Hemoglobin 10/26/2017 7 8* 12 0 - 17 0 g/dL Final    Hematocrit 10/26/2017 23 7* 36 5 - 49 3 % Final    MCV 10/26/2017 89  82 - 98 fL Final    MCH 10/26/2017 29 3  26 8 - 34 3 pg Final    MCHC 10/26/2017 32 9  31 4 - 37 4 g/dL Final    RDW 10/26/2017 19 9* 11 6 - 15 1 % Final    Platelets 22/02/5755 63* 149 - 390 Thousands/uL Final    nRBC 10/26/2017 0  /100 WBCs Final    Neutrophils Relative 10/26/2017 67  43 - 75 % Final    Lymphocytes Relative 10/26/2017 23  14 - 44 % Final    Monocytes Relative 10/26/2017 8  4 - 12 % Final    Eosinophils Relative 10/26/2017 1  0 - 6 % Final    Basophils Relative 10/26/2017 1  0 - 1 % Final    Neutrophils Absolute 10/26/2017 3 14  1 85 - 7 62 Thousands/µL Final    Lymphocytes Absolute 10/26/2017 1 06  0 60 - 4 47 Thousands/µL Final    Monocytes Absolute 10/26/2017 0 35  0 17 - 1 22 Thousand/µL Final    Eosinophils Absolute 10/26/2017 0 06  0 00 - 0 61 Thousand/µL Final    Basophils Absolute 10/26/2017 0 03  0 00 - 0 10 Thousands/µL Final    Sodium 10/26/2017 140  136 - 145 mmol/L Final    Potassium 10/26/2017 4 7  3 5 - 5 3 mmol/L Final    Chloride 10/26/2017 112* 100 - 108 mmol/L Final    CO2 10/26/2017 19* 21 - 32 mmol/L Final    Anion Gap 10/26/2017 9  4 - 13 mmol/L Final    BUN 10/26/2017 84* 5 - 25 mg/dL Final    Creatinine 10/26/2017 4 57* 0 60 - 1 30 mg/dL Final    Glucose 10/26/2017 85  65 - 140 mg/dL Final    Calcium 10/26/2017 8 8  8 3 - 10 1 mg/dL Final    AST 10/26/2017 11  5 - 45 U/L Final    ALT 10/26/2017 13  12 - 78 U/L Final    Alkaline Phosphatase 10/26/2017 73  46 - 116 U/L Final    Total Protein 10/26/2017 6 5  6 4 - 8 2 g/dL Final    Albumin 10/26/2017 2 9* 3 5 - 5 0 g/dL Final    Total Bilirubin 10/26/2017 0 97  0 20 - 1 00 mg/dL Final    eGFR 10/26/2017 11  ml/min/1 73sq m Final    Folate 10/26/2017 19 0* 3 1 - 17 5 ng/mL Final    Cholesterol 10/26/2017 65  50 - 200 mg/dL Final    Triglycerides 10/26/2017 35  <=150 mg/dL Final    HDL, Direct 10/26/2017 40  40 - 60 mg/dL Final    LDL Calculated 10/26/2017 18  0 - 100 mg/dL Final    RPR 10/27/2017 Non-Reactive  Non-Reactive Final    Vitamin B-12 10/26/2017 1828* 100 - 900 pg/mL Final    Vit D, 25-Hydroxy 10/26/2017 43 4  30 0 - 100 0 ng/mL Final    WBC 10/27/2017 4 40  4 31 - 10 16 Thousand/uL Final    RBC 10/27/2017 2 66* 3 88 - 5 62 Million/uL Final    Hemoglobin 10/27/2017 7 8* 12 0 - 17 0 g/dL Final    Hematocrit 10/27/2017 23 7* 36 5 - 49 3 % Final    MCV 10/27/2017 89  82 - 98 fL Final    MCH 10/27/2017 29 3  26 8 - 34 3 pg Final    MCHC 10/27/2017 32 9  31 4 - 37 4 g/dL Final    RDW 10/27/2017 19 7* 11 6 - 15 1 % Final    Platelets 23/30/9396 57* 149 - 390 Thousands/uL Final    nRBC 10/27/2017 0  /100 WBCs Final    Neutrophils Relative 10/27/2017 63  43 - 75 % Final    Lymphocytes Relative 10/27/2017 28  14 - 44 % Final    Monocytes Relative 10/27/2017 7  4 - 12 % Final    Eosinophils Relative 10/27/2017 1  0 - 6 % Final    Basophils Relative 10/27/2017 1  0 - 1 % Final    Neutrophils Absolute 10/27/2017 2 75  1 85 - 7 62 Thousands/µL Final    Lymphocytes Absolute 10/27/2017 1 23  0 60 - 4 47 Thousands/µL Final    Monocytes Absolute 10/27/2017 0 31  0 17 - 1 22 Thousand/µL Final    Eosinophils Absolute 10/27/2017 0  04  0 00 - 0 61 Thousand/µL Final    Basophils Absolute 10/27/2017 0 02  0 00 - 0 10 Thousands/µL Final    Sodium 10/27/2017 140  136 - 145 mmol/L Final    Potassium 10/27/2017 4 6  3 5 - 5 3 mmol/L Final    Chloride 10/27/2017 112* 100 - 108 mmol/L Final    CO2 10/27/2017 20* 21 - 32 mmol/L Final    Anion Gap 10/27/2017 8  4 - 13 mmol/L Final    BUN 10/27/2017 87* 5 - 25 mg/dL Final    Creatinine 10/27/2017 4 68* 0 60 - 1 30 mg/dL Final    Glucose 10/27/2017 87  65 - 140 mg/dL Final    Calcium 10/27/2017 8 8  8 3 - 10 1 mg/dL Final    eGFR 10/27/2017 11  ml/min/1 73sq m Final    Protime 10/27/2017 20 4* 12 1 - 14 4 seconds Final    INR 10/27/2017 1 73* 0 86 - 1 16 Final    Protime 10/29/2017 19 2* 12 1 - 14 4 seconds Final    INR 10/29/2017 1 60* 0 86 - 1 16 Final    Protime 10/31/2017 20 6* 12 1 - 14 4 seconds Final    INR 10/31/2017 1 75* 0 86 - 1 16 Final    WBC 10/31/2017 4 68  4 31 - 10 16 Thousand/uL Final    RBC 10/31/2017 2 49* 3 88 - 5 62 Million/uL Final    Hemoglobin 10/31/2017 7 2* 12 0 - 17 0 g/dL Final    Hematocrit 10/31/2017 22 2* 36 5 - 49 3 % Final    MCV 10/31/2017 89  82 - 98 fL Final    MCH 10/31/2017 28 9  26 8 - 34 3 pg Final    MCHC 10/31/2017 32 4  31 4 - 37 4 g/dL Final    RDW 10/31/2017 19 2* 11 6 - 15 1 % Final    Platelets 45/26/0700 57* 149 - 390 Thousands/uL Final    Sodium 10/31/2017 140  136 - 145 mmol/L Final    Potassium 10/31/2017 4 5  3 5 - 5 3 mmol/L Final    Chloride 10/31/2017 110* 100 - 108 mmol/L Final    CO2 10/31/2017 21  21 - 32 mmol/L Final    Anion Gap 10/31/2017 9  4 - 13 mmol/L Final    BUN 10/31/2017 94* 5 - 25 mg/dL Final    Creatinine 10/31/2017 4 89* 0 60 - 1 30 mg/dL Final    Glucose 10/31/2017 86  65 - 140 mg/dL Final    Calcium 10/31/2017 9 2  8 3 - 10 1 mg/dL Final    eGFR 10/31/2017 10  ml/min/1 73sq m Final    T4 TOTAL 10/31/2017 6 3  4 7 - 13 3 ug/dL Final    T3, Free 10/31/2017 1 52* 2 30 - 4 20 pg/mL Final    WBC 11/01/2017 6 04  4 31 - 10 16 Thousand/uL Final    RBC 11/01/2017 2 74* 3 88 - 5 62 Million/uL Final    Hemoglobin 11/01/2017 8 0* 12 0 - 17 0 g/dL Final    Hematocrit 11/01/2017 24 6* 36 5 - 49 3 % Final    MCV 11/01/2017 90  82 - 98 fL Final    MCH 11/01/2017 29 2  26 8 - 34 3 pg Final    MCHC 11/01/2017 32 5  31 4 - 37 4 g/dL Final    RDW 11/01/2017 18 8* 11 6 - 15 1 % Final    Platelets 43/80/8899 72* 149 - 390 Thousands/uL Final    Protime 11/01/2017 21 0* 12 1 - 14 4 seconds Final    INR 11/01/2017 1 80* 0 86 - 1 16 Final    Sodium 11/01/2017 140  136 - 145 mmol/L Final    Potassium 11/01/2017 3 8  3 5 - 5 3 mmol/L Final    Chloride 11/01/2017 107  100 - 108 mmol/L Final    CO2 11/01/2017 22  21 - 32 mmol/L Final    Anion Gap 11/01/2017 11  4 - 13 mmol/L Final    BUN 11/01/2017 95* 5 - 25 mg/dL Final    Creatinine 11/01/2017 4 94* 0 60 - 1 30 mg/dL Final    Glucose 11/01/2017 84  65 - 140 mg/dL Final    Calcium 11/01/2017 9 5  8 3 - 10 1 mg/dL Final    eGFR 11/01/2017 10  ml/min/1 73sq m Final    Sodium 11/02/2017 138  136 - 145 mmol/L Final    Potassium 11/02/2017 4 1  3 5 - 5 3 mmol/L Final    Chloride 11/02/2017 106  100 - 108 mmol/L Final    CO2 11/02/2017 22  21 - 32 mmol/L Final    Anion Gap 11/02/2017 10  4 - 13 mmol/L Final    BUN 11/02/2017 97* 5 - 25 mg/dL Final    Creatinine 11/02/2017 4 85* 0 60 - 1 30 mg/dL Final    Glucose 11/02/2017 93  65 - 140 mg/dL Final    Calcium 11/02/2017 9 3  8 3 - 10 1 mg/dL Final    eGFR 11/02/2017 10  ml/min/1 73sq m Final    Protime 11/05/2017 33 3* 12 1 - 14 4 seconds Final    INR 11/05/2017 3 21* 0 86 - 1 16 Final    Protime 11/06/2017 32 1* 12 1 - 14 4 seconds Final    INR 11/06/2017 3 06* 0 86 - 1 16 Final       Mental Status Evaluation:  Appearance:  age appropriate and casually dressed   Behavior:  Calm and cooperative   Speech:  soft   Mood:  euthymic   Affect:  mood-congruent   Thought Process:  goal directed   Thought Content:  No paranoia or delusions voiced today   Perceptual Disturbances: None   Risk Potential: none   Sensorium:  person, place, month of year and year   Cognition:  Limited   Consciousness:  alert and awake    Attention: attention span and concentration were age appropriate   Insight:  limited   Judgment: fair   Gait/Station: With walker   Motor Activity: no abnormal movements     Progress Toward Goals:  Improving    Assessment/Plan   Principal Problem:    Psychosis, organic  Active Problems:    CKD (chronic kidney disease), stage IV (HCC)    CHF (congestive heart failure) (HCC)    Paroxysmal atrial fibrillation (HCC)    Anemia    CAD (coronary artery disease)    Elevated TSH      Recommended Treatment:   Continue with Seroquel 50 mg p  o  q h s  for psychotic symptoms  Due to concerns for need for 24 hour supervision skilled nursing facility was recommended and Kendy was here to evaluate the patient today  Discharge is pending possibly for tomorrow  Continue with group therapy, milieu therapy and occupational therapy  Risks, benefits and possible side effects of Medications:   Risks, benefits, and possible side effects of medications explained to patient and patient verbalizes understanding  Counseling / Coordination of Care  Total floor / unit time spent today 25 minutes  Greater than 50% of total time was spent with the patient and / or family counseling and / or coordination of care  A description of the counseling / coordination of care:  Medication management, patient interview, chart review

## 2017-11-07 VITALS
BODY MASS INDEX: 22.87 KG/M2 | DIASTOLIC BLOOD PRESSURE: 69 MMHG | OXYGEN SATURATION: 100 % | RESPIRATION RATE: 18 BRPM | HEART RATE: 72 BPM | WEIGHT: 168.87 LBS | SYSTOLIC BLOOD PRESSURE: 125 MMHG | TEMPERATURE: 97 F | HEIGHT: 72 IN

## 2017-11-07 LAB
BASOPHILS # BLD AUTO: 0.04 THOUSANDS/ΜL (ref 0–0.1)
BASOPHILS NFR BLD AUTO: 1 % (ref 0–1)
EOSINOPHIL # BLD AUTO: 0.07 THOUSAND/ΜL (ref 0–0.61)
EOSINOPHIL NFR BLD AUTO: 1 % (ref 0–6)
ERYTHROCYTE [DISTWIDTH] IN BLOOD BY AUTOMATED COUNT: 18.5 % (ref 11.6–15.1)
HCT VFR BLD AUTO: 21.3 % (ref 36.5–49.3)
HGB BLD-MCNC: 7.1 G/DL (ref 12–17)
INR PPP: 2.68 (ref 0.86–1.16)
LYMPHOCYTES # BLD AUTO: 1.34 THOUSANDS/ΜL (ref 0.6–4.47)
LYMPHOCYTES NFR BLD AUTO: 22 % (ref 14–44)
MCH RBC QN AUTO: 29.5 PG (ref 26.8–34.3)
MCHC RBC AUTO-ENTMCNC: 33.3 G/DL (ref 31.4–37.4)
MCV RBC AUTO: 88 FL (ref 82–98)
MONOCYTES # BLD AUTO: 0.52 THOUSAND/ΜL (ref 0.17–1.22)
MONOCYTES NFR BLD AUTO: 9 % (ref 4–12)
NEUTROPHILS # BLD AUTO: 3.96 THOUSANDS/ΜL (ref 1.85–7.62)
NEUTS SEG NFR BLD AUTO: 67 % (ref 43–75)
NRBC BLD AUTO-RTO: 0 /100 WBCS
PLATELET # BLD AUTO: 95 THOUSANDS/UL (ref 149–390)
PROTHROMBIN TIME: 28.9 SECONDS (ref 12.1–14.4)
RBC # BLD AUTO: 2.41 MILLION/UL (ref 3.88–5.62)
WBC # BLD AUTO: 6.03 THOUSAND/UL (ref 4.31–10.16)

## 2017-11-07 PROCEDURE — 85025 COMPLETE CBC W/AUTO DIFF WBC: CPT | Performed by: INTERNAL MEDICINE

## 2017-11-07 PROCEDURE — 85610 PROTHROMBIN TIME: CPT | Performed by: INTERNAL MEDICINE

## 2017-11-07 RX ORDER — QUETIAPINE FUMARATE 50 MG/1
50 TABLET, FILM COATED ORAL
Refills: 0
Start: 2017-11-07

## 2017-11-07 RX ORDER — AMLODIPINE BESYLATE 5 MG/1
5 TABLET ORAL DAILY
Refills: 0
Start: 2017-11-08

## 2017-11-07 RX ADMIN — VITAMIN D, TAB 1000IU (100/BT) 2000 UNITS: 25 TAB at 08:15

## 2017-11-07 RX ADMIN — ASPIRIN 81 MG: 81 TABLET, COATED ORAL at 08:15

## 2017-11-07 RX ADMIN — TORSEMIDE 20 MG: 20 TABLET ORAL at 08:15

## 2017-11-07 RX ADMIN — Medication 325 MG: at 06:02

## 2017-11-07 RX ADMIN — METOLAZONE 5 MG: 5 TABLET ORAL at 08:15

## 2017-11-07 RX ADMIN — Medication 1000 MG: at 08:15

## 2017-11-07 RX ADMIN — AMLODIPINE BESYLATE 5 MG: 5 TABLET ORAL at 08:15

## 2017-11-07 NOTE — CASE MANAGEMENT
Patient is to be Discharged to [place] today at 2pm    IMM letter was mailed to wife to have her sign as patient was found to not have capacity during his stay here  I spoke with florecita about his discharge, he expressed happiness about going home  Nephrology consult faxed to [place]  Giovanni Villanueva is going to [place] today at 1p to sign paperwork  Nursing given nursing report number: Patient will be going [place] [facility] [place]: # [phone]

## 2017-11-07 NOTE — NURSING NOTE
Pt picked up by FERNANDO Montiels sent with patient  Discharge instructions sent with transport  Pt report called to hannah

## 2017-11-07 NOTE — PLAN OF CARE

## 2017-11-07 NOTE — PLAN OF CARE
Alteration in Orientation     Treatment Goal: Demonstrate a reduction of confusion and improved orientation to person, place, time and/or situation upon discharge, according to optimum baseline/ability Adequate for Discharge     Cooperate with recommended testing/procedures Adequate for Discharge     Attend and participate in unit activities, including therapeutic, recreational, and educational groups Adequate for Discharge     Complete daily ADLs, including personal hygiene independently, as able Adequate for Discharge        Alteration in Thoughts and Perception     Verbalize thoughts and feelings Adequate for Discharge     Refrain from acting on delusional thinking/internal stimuli Adequate for Discharge     Attend and participate in unit activities, including therapeutic, recreational, and educational groups Adequate for Discharge     Recognize dysfunctional thoughts, communicate reality-based thoughts at the time of discharge Adequate for Discharge        Rodolfo Sullivan Discharge to home or other facility with appropriate resources Adequate for Discharge        Ineffective Coping     Participates in unit activities Adequate for Discharge        Prexisting or High Potential for Compromised Skin Integrity     Skin integrity is maintained or improved Adequate for Discharge

## 2017-11-07 NOTE — DISCHARGE INSTR - LAB
Contact Information: If you have any questions, concerns, pended studies, tests and/or procedures, or emergencies regarding your inpatient behavioral health visit  Please contact [facility] [phone] and ask to speak to a , nurse or physician  A contact is available 24 hours/ 7 days a week at this number  Summary of Procedures Performed During your Stay:  Below is a list of major procedures performed during your hospital stay and a summary of results:  - No major procedures performed  Pending Studies     Start     Ordered    10/25/17 2249  TSH, 3rd generation  Once      10/25/17 2248        If studies are pending at discharge, follow up with your PCP and/or referring provider

## 2017-11-15 ENCOUNTER — TRANSCRIBE ORDERS (OUTPATIENT)
Dept: ADMINISTRATIVE | Facility: HOSPITAL | Age: 82
End: 2017-11-15

## 2017-11-15 ENCOUNTER — APPOINTMENT (OUTPATIENT)
Dept: LAB | Facility: HOSPITAL | Age: 82
End: 2017-11-15
Payer: MEDICARE

## 2017-11-15 DIAGNOSIS — E83.111 TRANSFUSION (RED BLOOD CELL) ASSOCIATED HEMOCHROMATOSIS: ICD-10-CM

## 2017-11-15 DIAGNOSIS — E83.111 TRANSFUSION (RED BLOOD CELL) ASSOCIATED HEMOCHROMATOSIS: Primary | ICD-10-CM

## 2017-11-15 LAB
ABO GROUP BLD: NORMAL
BLD GP AB SCN SERPL QL: NEGATIVE
RH BLD: POSITIVE
SPECIMEN EXPIRATION DATE: NORMAL

## 2017-11-15 PROCEDURE — 86900 BLOOD TYPING SEROLOGIC ABO: CPT

## 2017-11-15 PROCEDURE — 36415 COLL VENOUS BLD VENIPUNCTURE: CPT

## 2017-11-15 PROCEDURE — 86850 RBC ANTIBODY SCREEN: CPT

## 2017-11-15 PROCEDURE — 86901 BLOOD TYPING SEROLOGIC RH(D): CPT

## 2017-11-15 PROCEDURE — 86920 COMPATIBILITY TEST SPIN: CPT

## 2017-11-16 ENCOUNTER — HOSPITAL ENCOUNTER (OUTPATIENT)
Dept: INFUSION CENTER | Facility: CLINIC | Age: 82
Discharge: HOME | End: 2017-11-16
Payer: COMMERCIAL

## 2017-11-16 VITALS
HEART RATE: 72 BPM | TEMPERATURE: 96 F | RESPIRATION RATE: 18 BRPM | DIASTOLIC BLOOD PRESSURE: 60 MMHG | SYSTOLIC BLOOD PRESSURE: 145 MMHG

## 2017-11-16 PROCEDURE — P9021 RED BLOOD CELLS UNIT: HCPCS

## 2017-11-16 PROCEDURE — 36430 TRANSFUSION BLD/BLD COMPNT: CPT

## 2017-11-16 RX ORDER — SODIUM CHLORIDE 9 MG/ML
20 INJECTION, SOLUTION INTRAVENOUS CONTINUOUS
Status: DISCONTINUED | OUTPATIENT
Start: 2017-11-16 | End: 2017-11-19 | Stop reason: HOSPADM

## 2017-11-16 RX ORDER — WARFARIN SODIUM 4 MG/1
4 TABLET ORAL
COMMUNITY

## 2017-11-16 RX ORDER — DIPHENHYDRAMINE HCL 25 MG
25 TABLET ORAL ONCE
Status: COMPLETED | OUTPATIENT
Start: 2017-11-16 | End: 2017-11-16

## 2017-11-16 RX ORDER — ACETAMINOPHEN 325 MG/1
650 TABLET ORAL ONCE
Status: COMPLETED | OUTPATIENT
Start: 2017-11-16 | End: 2017-11-16

## 2017-11-16 RX ADMIN — SODIUM CHLORIDE 20 ML/HR: 0.9 INJECTION, SOLUTION INTRAVENOUS at 09:12

## 2017-11-16 RX ADMIN — DIPHENHYDRAMINE HCL 25 MG: 25 TABLET ORAL at 09:11

## 2017-11-16 RX ADMIN — ACETAMINOPHEN 650 MG: 325 TABLET, FILM COATED ORAL at 09:20

## 2017-11-16 NOTE — PLAN OF CARE
Problem: Potential for Falls  Goal: Patient will remain free of falls  INTERVENTIONS:  - Assess patient frequently for physical needs  -  Identify cognitive and physical deficits and behaviors that affect risk of falls    -  [place] fall precautions as indicated by assessment   - Educate patient/family on patient safety including physical limitations  - Instruct patient to call for assistance with activity based on assessment  - Modify environment to reduce risk of injury  - Consider OT/PT consult to assist with strengthening/mobility   Outcome: Progressing

## 2017-11-16 NOTE — PROGRESS NOTES
Patient tolerated both units of blood well without adverse reaction  His first iv did infiltrate after he went to the bathroom  Iv was removed and swelling decreased with elevation  Patient denies pain  New IV was placed  Report was called to [facility]  Patient was transferred back to [facility] via [facility]

## 2017-11-17 LAB
ABO GROUP BLD BPU: NORMAL
ABO GROUP BLD BPU: NORMAL
BPU ID: NORMAL
BPU ID: NORMAL
CROSSMATCH: NORMAL
CROSSMATCH: NORMAL
UNIT DISPENSE STATUS: NORMAL
UNIT DISPENSE STATUS: NORMAL
UNIT PRODUCT CODE: NORMAL
UNIT PRODUCT CODE: NORMAL
UNIT RH: NORMAL
UNIT RH: NORMAL

## 2017-11-20 ENCOUNTER — GENERIC CONVERSION - ENCOUNTER (OUTPATIENT)
Dept: OTHER | Facility: OTHER | Age: 82
End: 2017-11-20

## 2017-11-20 ENCOUNTER — HOSPITAL ENCOUNTER (OUTPATIENT)
Dept: NON INVASIVE DIAGNOSTICS | Facility: CLINIC | Age: 82
Discharge: HOME | End: 2017-11-20
Payer: COMMERCIAL

## 2017-11-20 DIAGNOSIS — N18.4 CHRONIC KIDNEY DISEASE, STAGE IV (SEVERE) (HCC): ICD-10-CM

## 2017-11-20 PROCEDURE — 93990 DOPPLER FLOW TESTING: CPT

## 2017-11-28 ENCOUNTER — ALLSCRIPTS OFFICE VISIT (OUTPATIENT)
Dept: OTHER | Facility: OTHER | Age: 82
End: 2017-11-28

## 2017-11-29 ENCOUNTER — GENERIC CONVERSION - ENCOUNTER (OUTPATIENT)
Dept: NEPHROLOGY | Facility: CLINIC | Age: 82
End: 2017-11-29

## 2017-11-29 DIAGNOSIS — D64.9 ANEMIA: ICD-10-CM

## 2017-11-29 DIAGNOSIS — N18.5 CHRONIC KIDNEY DISEASE, STAGE V (HCC): ICD-10-CM

## 2017-11-30 ENCOUNTER — HOSPITAL ENCOUNTER (INPATIENT)
Facility: HOSPITAL | Age: 82
LOS: 7 days | Discharge: NURSING, REHAB OR LONG-TERM CARE | DRG: 291 | End: 2017-12-07
Attending: EMERGENCY MEDICINE | Admitting: GENERAL PRACTICE
Payer: MEDICARE

## 2017-11-30 DIAGNOSIS — N19 RENAL FAILURE: Primary | ICD-10-CM

## 2017-11-30 DIAGNOSIS — D64.9 ANEMIA: ICD-10-CM

## 2017-11-30 DIAGNOSIS — E87.5 HYPERKALEMIA: ICD-10-CM

## 2017-11-30 DIAGNOSIS — E83.42 HYPOMAGNESEMIA: ICD-10-CM

## 2017-11-30 DIAGNOSIS — N18.4 CKD (CHRONIC KIDNEY DISEASE), STAGE IV (HCC): ICD-10-CM

## 2017-11-30 PROBLEM — R89.9 ABNORMAL LABORATORY TEST: Status: ACTIVE | Noted: 2017-11-30

## 2017-11-30 LAB
ABO GROUP BLD: NORMAL
ALBUMIN SERPL BCP-MCNC: 2.8 G/DL (ref 3.5–5)
ALP SERPL-CCNC: 55 U/L (ref 46–116)
ALT SERPL W P-5'-P-CCNC: 21 U/L (ref 12–78)
ANION GAP SERPL CALCULATED.3IONS-SCNC: 14 MMOL/L (ref 4–13)
APTT PPP: 46 SECONDS (ref 23–35)
AST SERPL W P-5'-P-CCNC: 16 U/L (ref 5–45)
ATRIAL RATE: 357 BPM
BASOPHILS # BLD AUTO: 0.05 THOUSANDS/ΜL (ref 0–0.1)
BASOPHILS NFR BLD AUTO: 1 % (ref 0–1)
BILIRUB SERPL-MCNC: 0.6 MG/DL (ref 0.2–1)
BLD GP AB SCN SERPL QL: NEGATIVE
BUN SERPL-MCNC: 111 MG/DL (ref 5–25)
CALCIUM SERPL-MCNC: 7.3 MG/DL (ref 8.3–10.1)
CHLORIDE SERPL-SCNC: 108 MMOL/L (ref 100–108)
CO2 SERPL-SCNC: 18 MMOL/L (ref 21–32)
CREAT SERPL-MCNC: 5.51 MG/DL (ref 0.6–1.3)
EOSINOPHIL # BLD AUTO: 0.02 THOUSAND/ΜL (ref 0–0.61)
EOSINOPHIL NFR BLD AUTO: 0 % (ref 0–6)
ERYTHROCYTE [DISTWIDTH] IN BLOOD BY AUTOMATED COUNT: 17.3 % (ref 11.6–15.1)
GFR SERPL CREATININE-BSD FRML MDRD: 9 ML/MIN/1.73SQ M
GLUCOSE SERPL-MCNC: 100 MG/DL (ref 65–140)
HCT VFR BLD AUTO: 20.4 % (ref 36.5–49.3)
HGB BLD-MCNC: 7 G/DL (ref 12–17)
INR PPP: 1.54 (ref 0.86–1.16)
LYMPHOCYTES # BLD AUTO: 0.88 THOUSANDS/ΜL (ref 0.6–4.47)
LYMPHOCYTES NFR BLD AUTO: 10 % (ref 14–44)
MAGNESIUM SERPL-MCNC: 0.5 MG/DL (ref 1.6–2.6)
MCH RBC QN AUTO: 30.2 PG (ref 26.8–34.3)
MCHC RBC AUTO-ENTMCNC: 34.3 G/DL (ref 31.4–37.4)
MCV RBC AUTO: 88 FL (ref 82–98)
MONOCYTES # BLD AUTO: 0.48 THOUSAND/ΜL (ref 0.17–1.22)
MONOCYTES NFR BLD AUTO: 6 % (ref 4–12)
NEUTROPHILS # BLD AUTO: 6.67 THOUSANDS/ΜL (ref 1.85–7.62)
NEUTS SEG NFR BLD AUTO: 78 % (ref 43–75)
NRBC BLD AUTO-RTO: 0 /100 WBCS
PLATELET # BLD AUTO: 100 THOUSANDS/UL (ref 149–390)
PMV BLD AUTO: 12.6 FL (ref 8.9–12.7)
POTASSIUM SERPL-SCNC: 5.5 MMOL/L (ref 3.5–5.3)
PROT SERPL-MCNC: 6.6 G/DL (ref 6.4–8.2)
PROTHROMBIN TIME: 18.9 SECONDS (ref 12.1–14.4)
QRS AXIS: -60 DEGREES
QRSD INTERVAL: 134 MS
QT INTERVAL: 420 MS
QTC INTERVAL: 436 MS
RBC # BLD AUTO: 2.32 MILLION/UL (ref 3.88–5.62)
RH BLD: POSITIVE
SODIUM SERPL-SCNC: 140 MMOL/L (ref 136–145)
SPECIMEN EXPIRATION DATE: NORMAL
T WAVE AXIS: 94 DEGREES
VENTRICULAR RATE: 65 BPM
WBC # BLD AUTO: 8.54 THOUSAND/UL (ref 4.31–10.16)

## 2017-11-30 PROCEDURE — 30233N1 TRANSFUSION OF NONAUTOLOGOUS RED BLOOD CELLS INTO PERIPHERAL VEIN, PERCUTANEOUS APPROACH: ICD-10-PCS | Performed by: INTERNAL MEDICINE

## 2017-11-30 PROCEDURE — 85730 THROMBOPLASTIN TIME PARTIAL: CPT | Performed by: EMERGENCY MEDICINE

## 2017-11-30 PROCEDURE — 86900 BLOOD TYPING SEROLOGIC ABO: CPT | Performed by: EMERGENCY MEDICINE

## 2017-11-30 PROCEDURE — 82272 OCCULT BLD FECES 1-3 TESTS: CPT | Performed by: NURSE PRACTITIONER

## 2017-11-30 PROCEDURE — 36415 COLL VENOUS BLD VENIPUNCTURE: CPT | Performed by: EMERGENCY MEDICINE

## 2017-11-30 PROCEDURE — 86901 BLOOD TYPING SEROLOGIC RH(D): CPT | Performed by: EMERGENCY MEDICINE

## 2017-11-30 PROCEDURE — 80053 COMPREHEN METABOLIC PANEL: CPT | Performed by: EMERGENCY MEDICINE

## 2017-11-30 PROCEDURE — 86923 COMPATIBILITY TEST ELECTRIC: CPT

## 2017-11-30 PROCEDURE — 85025 COMPLETE CBC W/AUTO DIFF WBC: CPT | Performed by: EMERGENCY MEDICINE

## 2017-11-30 PROCEDURE — 86850 RBC ANTIBODY SCREEN: CPT | Performed by: EMERGENCY MEDICINE

## 2017-11-30 PROCEDURE — 85610 PROTHROMBIN TIME: CPT | Performed by: EMERGENCY MEDICINE

## 2017-11-30 PROCEDURE — P9021 RED BLOOD CELLS UNIT: HCPCS

## 2017-11-30 PROCEDURE — 93005 ELECTROCARDIOGRAM TRACING: CPT

## 2017-11-30 PROCEDURE — 99284 EMERGENCY DEPT VISIT MOD MDM: CPT

## 2017-11-30 PROCEDURE — 83735 ASSAY OF MAGNESIUM: CPT | Performed by: EMERGENCY MEDICINE

## 2017-11-30 RX ORDER — MAGNESIUM SULFATE HEPTAHYDRATE 40 MG/ML
2 INJECTION, SOLUTION INTRAVENOUS ONCE
Status: COMPLETED | OUTPATIENT
Start: 2017-11-30 | End: 2017-12-04

## 2017-11-30 RX ORDER — CALCIUM CARBONATE 200(500)MG
1000 TABLET,CHEWABLE ORAL 2 TIMES DAILY
Status: DISCONTINUED | OUTPATIENT
Start: 2017-11-30 | End: 2017-12-07 | Stop reason: HOSPADM

## 2017-11-30 RX ORDER — QUETIAPINE FUMARATE 25 MG/1
50 TABLET, FILM COATED ORAL
Status: DISCONTINUED | OUTPATIENT
Start: 2017-11-30 | End: 2017-12-07 | Stop reason: HOSPADM

## 2017-11-30 RX ORDER — ATORVASTATIN CALCIUM 10 MG/1
10 TABLET, FILM COATED ORAL DAILY
Status: DISCONTINUED | OUTPATIENT
Start: 2017-12-01 | End: 2017-12-07 | Stop reason: HOSPADM

## 2017-11-30 RX ORDER — SODIUM POLYSTYRENE SULFONATE 15 G/60ML
30 SUSPENSION ORAL; RECTAL ONCE
Status: COMPLETED | OUTPATIENT
Start: 2017-11-30 | End: 2017-11-30

## 2017-11-30 RX ORDER — MELATONIN
2000 DAILY
Status: DISCONTINUED | OUTPATIENT
Start: 2017-12-01 | End: 2017-12-07 | Stop reason: HOSPADM

## 2017-11-30 RX ORDER — AMLODIPINE BESYLATE 5 MG/1
5 TABLET ORAL DAILY
Status: DISCONTINUED | OUTPATIENT
Start: 2017-12-01 | End: 2017-12-01

## 2017-11-30 RX ORDER — FERROUS SULFATE 325(65) MG
325 TABLET ORAL
Status: DISCONTINUED | OUTPATIENT
Start: 2017-12-01 | End: 2017-12-07 | Stop reason: HOSPADM

## 2017-11-30 RX ORDER — TERAZOSIN 1 MG/1
1 CAPSULE ORAL
Status: DISCONTINUED | OUTPATIENT
Start: 2017-11-30 | End: 2017-12-07 | Stop reason: HOSPADM

## 2017-11-30 RX ORDER — ONDANSETRON 2 MG/ML
4 INJECTION INTRAMUSCULAR; INTRAVENOUS EVERY 6 HOURS PRN
Status: DISCONTINUED | OUTPATIENT
Start: 2017-11-30 | End: 2017-12-07 | Stop reason: HOSPADM

## 2017-11-30 RX ORDER — METOLAZONE 5 MG/1
5 TABLET ORAL DAILY
Status: DISCONTINUED | OUTPATIENT
Start: 2017-12-01 | End: 2017-12-01

## 2017-11-30 RX ORDER — ACETAMINOPHEN 325 MG/1
650 TABLET ORAL EVERY 6 HOURS PRN
Status: DISCONTINUED | OUTPATIENT
Start: 2017-11-30 | End: 2017-12-07 | Stop reason: HOSPADM

## 2017-11-30 RX ORDER — TORSEMIDE 20 MG/1
20 TABLET ORAL DAILY
Status: DISCONTINUED | OUTPATIENT
Start: 2017-12-01 | End: 2017-12-01

## 2017-11-30 RX ORDER — PANTOPRAZOLE SODIUM 40 MG/1
40 TABLET, DELAYED RELEASE ORAL
Status: DISCONTINUED | OUTPATIENT
Start: 2017-12-01 | End: 2017-12-07 | Stop reason: HOSPADM

## 2017-11-30 RX ORDER — FUROSEMIDE 10 MG/ML
60 INJECTION INTRAMUSCULAR; INTRAVENOUS ONCE
Status: COMPLETED | OUTPATIENT
Start: 2017-11-30 | End: 2017-11-30

## 2017-11-30 RX ADMIN — SODIUM POLYSTYRENE SULFONATE 30 G: 15 SUSPENSION ORAL; RECTAL at 14:26

## 2017-11-30 RX ADMIN — MAGNESIUM SULFATE HEPTAHYDRATE 2 G: 40 INJECTION, SOLUTION INTRAVENOUS at 14:30

## 2017-11-30 RX ADMIN — QUETIAPINE FUMARATE 50 MG: 25 TABLET, FILM COATED ORAL at 22:27

## 2017-11-30 RX ADMIN — ANTACID TABLETS 1000 MG: 500 TABLET, CHEWABLE ORAL at 22:28

## 2017-11-30 RX ADMIN — FUROSEMIDE 60 MG: 10 INJECTION, SOLUTION INTRAMUSCULAR; INTRAVENOUS at 14:29

## 2017-11-30 RX ADMIN — TERAZOSIN HYDROCHLORIDE 1 MG: 1 CAPSULE ORAL at 22:27

## 2017-11-30 NOTE — SIGNIFICANT EVENT
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, currently defer to outpatient vascular surgery regarding any intervention which might be necessary for further maturation of the fistula  The patient has no urgent indication for renal replacement therapy at this time  Mild hyperkalemia, received Kayexalate in the ED even though he has been having diarrhea, C diff colitis present on arrival, patient was on oral vancomycin, will give him 60 mg IV Lasix repeat labs after transfusion, will give him the diuretic prior to the transfusion because of pre-existing hyperkalemia  Monitor hemoglobin hematocrit, if any further drop will consider GI evaluation  Hold Coumadin today  Low-potassium renal diet    Severe hypomagnesemia in the setting of diarrhea, replete IV and monitor

## 2017-11-30 NOTE — CONSULTS
Consultation - Nephrology   Titray Span 80 y o  male MRN: [id number]  Unit/Bed#: ED 18 Encounter: [id number]    Referring PHYSICIAN: Tresa     REASON FOR THE CONSULTATION:  CKD stage 5    DATE OF CONSULTATION:  11/30/2017    ADMISSION DIAGNOSIS: <principal problem not specified>     CHIEF COMPLAINT     Patient with stage 5 CKD came to emergency room because of abnormal lab tests in form of severe anemia and hypomagnesemia and being admitted for same     HPI     80-year-old gentleman with stage 5 CKD who lives in personal care facility for rehab purpose overall not doing well  I just saw him yesterday in my office he seems quite weak and tired  He had blood work done which shows anemia with hemoglobin significant for 6 8  Nurse from the nursing home top call me as she was worried about it as well as hypomagnesemia so I advised her to send the patient to the hospital for further management    When I saw the patient ER he is feeling weak and tired  Seems quite anxious    Denies any shortness of breath, no chest pain palpitation, no nausea no vomiting no abdominal discomfort, does the leg pain and leg swelling    PAST MEDICAL HISTORY     Past Medical History:   Diagnosis Date    Aortic aneurysm (HCC)     Arthritis     GERD (gastroesophageal reflux disease)     Heart failure (HCC)     Hyperlipidemia     Hypertension     Irregular heart beat     afib    Psychiatric illness     Renal disorder        PAST SURGICAL HISTORY     Past Surgical History:   Procedure Laterality Date    ABDOMINAL AORTIC ANEURYSM REPAIR, OPEN      CARDIAC SURGERY      COLONOSCOPY      HERNIA REPAIR      KS ANASTOMOSIS,AV,ANY SITE Left 10/2/2017    Procedure: UPPER EXTREMITY AV FISTULA CREATION;  Surgeon: Heladio Amaya MD;  Location: [facility] OR;  Service: Vascular    VALVE REPLACEMENT         ALLERGIES     Allergies   Allergen Reactions    Ativan [Lorazepam] Other (See Comments)     "it makes me go crazy"    Penicillins Swelling       SOCIAL HISTORY     History   Alcohol Use No     History   Drug Use No     History   Smoking Status    Former Smoker    Quit date: 1/1/2011   Smokeless Tobacco    Never Used       FAMILY HISTORY     Family History   Problem Relation Age of Onset    Cancer Mother     Anuerysm Father        CURRENT MEDICATIONS       Current Facility-Administered Medications:     magnesium sulfate 2 g/50 mL IVPB (premix) 2 g, 2 g, Intravenous, Once, Melba Holter, MD, Last Rate: 25 mL/hr at 11/30/17 1430, 2 g at 11/30/17 1430    vancomycin (VANCOCIN) oral solution 125 mg, 125 mg, Oral, Q6H [address], Estefany JUVENCIO Sharma    Current Outpatient Prescriptions:     amLODIPine (NORVASC) 5 mg tablet, Take 1 tablet by mouth daily, Disp: , Rfl: 0    aspirin (ECOTRIN LOW STRENGTH) 81 mg EC tablet, Take 81 mg by mouth daily, Disp: , Rfl:     atorvastatin (LIPITOR) 10 mg tablet, Take 10 mg by mouth daily, Disp: , Rfl:     calcium carbonate (TUMS) 500 mg chewable tablet, Chew 2 tablets 3 (three) times a day (Patient taking differently: Chew 1,000 mg 2 (two) times a day  ), Disp: 60 tablet, Rfl: 0    cholecalciferol (VITAMIN D3) 1,000 units tablet, Take 2,000 Units by mouth daily, Disp: , Rfl:     ferrous sulfate 325 (65 Fe) mg tablet, Take 1 tablet by mouth daily with breakfast, Disp: 30 tablet, Rfl: 0    metolazone (ZAROXOLYN) 5 mg tablet, Take 5 mg by mouth daily  , Disp: , Rfl:     Multiple Vitamin (MULTIVITAMIN) capsule, Take 1 capsule by mouth daily, Disp: , Rfl:     omeprazole (PriLOSEC) 20 mg delayed release capsule, Take 20 mg by mouth daily, Disp: , Rfl:     QUEtiapine (SEROquel) 50 mg tablet, Take 1 tablet by mouth daily at bedtime, Disp: , Rfl: 0    terazosin (HYTRIN) 1 mg capsule, Take 1 mg by mouth daily at bedtime, Disp: , Rfl:     torsemide (DEMADEX) 20 mg tablet, Take 20 mg by mouth daily, Disp: , Rfl:     warfarin (COUMADIN) 4 mg tablet, Take 4 mg by mouth daily, Disp: , Rfl:     REVIEW OF SYSTEMS Complete 10 point review of systems were obtained and discussed in length with the patient  Complete review of systems were negative / unremarkable except mentioned in the HPI section  LAB RESULTS          Results from last 7 days  Lab Units 11/30/17  1310   WBC Thousand/uL 8 54   HEMOGLOBIN g/dL 7 0*   HEMATOCRIT % 20 4*   PLATELETS Thousands/uL 100*   SODIUM mmol/L 140   POTASSIUM mmol/L 5 5*   CHLORIDE mmol/L 108   CO2 mmol/L 18*   BUN mg/dL 111*   CREATININE mg/dL 5 51*   CALCIUM mg/dL 7 3*   MAGNESIUM mg/dL 0 5*   ALBUMIN g/dL 2 8*   TOTAL PROTEIN g/dL 6 6   GLUCOSE RANDOM mg/dL 100       I have personally reviewed the old medical records and patient's previously known baseline creatinine level is ~ 5 0    RADIOLOGY RESULTS     Results for orders placed during the hospital encounter of 10/22/15   XR chest portable    Narrative EXAM ROOM = [facility] [employer]moble [facility]   EXAM START = [id number]   EXAM STOP = [id number]   FILMS USED = 1 VIEWS      CHEST       INDICATION-  Status post cardiac surgery      COMPARISON-  10/22/2015 chest x-ray  VIEWS-   AP frontal^  1 image      FINDINGS-  Stable findings status post endovascular repair of ascending   aortic aneurysm  Prosthetic heart valve again seen  Cardiomediastinal silhouette appears unchanged  Atelectatic changes present in the left lung base  Visualized osseous structures appear within normal limits for the   patient's age  IMPRESSION-      Atelectatic changes in the left lung base        Transcribed on- [id number] Griffin MD        Reading Radiologist- NATALYA Ortiz MD        Electronically Signed- NATALYA Ortiz MD        Released Date Time- 10/23/15 1352    ------------------------------------------------------------------------------   READ BY = 80389^I ANG LIU   RELEASED BY = 79113^I ANG LIU      Results for orders placed during the hospital encounter of 10/25/17   XR chest 2 views    Narrative CHEST     INDICATION:  Altered mental status  COMPARISON:  10/19/2017    VIEWS:  Frontal and lateral projections    IMAGES:  3    FINDINGS:         Cardiomediastinal silhouette is stable, including cardiomegaly, aortic valve prosthesis and thoracic aortic stent graft  The lungs are clear  Stable small left pleural effusion       Sternal wires are present  Visualized osseous structures appear otherwise unremarkable for the patient's age  Impression No acute pulmonary disease  Persistent small left pleural effusion  Workstation performed: [id number]       No results found for this or any previous visit  No results found for this or any previous visit  No results found for this or any previous visit  Results for orders placed in visit on 02/21/14   US retroperitoneal complete    Narrative RENAL ULTRASOUND   INDICATION- Acute kidney insufficiency  Chronic kidney disease  COMPARISON- 04/18/2012   TECHNIQUE-   Ultrasound of the retroperitoneum was performed with a   curvilinear transducer utilizing volumetric sweeps and still imaging   techniques  FINDINGS-   KIDNEYS-   Right kidney-  10 1 cm  Left kidney-  10 8 cm  Symmetric and normal size  Normal echogenicity and contour  No suspicious masses detected  Small 1 1 cm cortical cyst in lower   pole of the right kidney   No hydronephrosis  0 5 cm nonobstructing intrarenal calculus in the lower pole of the   right kidney, unchanged the previous study   No perinephric fluid collections  URETERS-   Nondilated  BLADDER-    Normally distended  No focal thickening or mass lesions  IMPRESSION-    1  No hydronephrosis  2   Stable nonobstructing lower pole 5 mm right renal calculus   unchanged the previous study      Transcribed on- NATALYA Talbot MD        Reading Radiologist- NATALYA De La Vega MD        Releasing Radiologist- NATALYA De La Vega MD Released Date Time- 02/24/14 1611      ------------------------------------------------------------------------------   DAYNE RAHMAN [address]        OBJECTIVE     Current Weight: Weight - Scale: 76 kg (167 lb 8 8 oz)  Vitals:    11/30/17 1330   BP: 132/60   Pulse: 78   Resp: (!) 23   Temp:    SpO2: 96%     No intake or output data in the 24 hours ending 11/30/17 1616    PHYSICAL EXAMINATION     Physical Exam:  Eyes:  Pupils equally react to light  ENT:  Moist tongue  Gen:  Seems chronically ill  Neck:  Supple no JVD  Chest:  Clear no rales no rhonchi  Cor:  S1-S2 normal no S3 no S4 metabolic heart sounds  Abdomen:  Soft nontender  Ext:  2+ leg swelling with discolored lower extremity  Neuro:  Awake and alert no focal sign  Skin:  Decreased skin turgor  PLAN / RECOMMENDATIONS      CKD stage 5:  Slowly progressing  He does have a AV fistula which is not ready to use and also has stenosis  Anemia:  He had blood transfusion but hemoglobin is continuously dropping  Will get blood transfusion with diuretic  Will advise GI to see him    Hypomagnesemia:  Etiology unclear may be due to GI loss  Being replaced and will monitor    Hyperkalemia:  Had Kayexalate and better now    I will continue to monitor him  He does not need dialysis on an urgent basis so will continue to monitor    Thank you for the consultation to participate in patient's care  I have personally discussed my plan with the referring physician  Ernesto Beal MD  Nephrology  11/30/2017        Portions of the record may have been created with voice recognition software  Occasional wrong word or "sound a like" substitutions may have occurred due to the inherent limitations of voice recognition software  Read the chart carefully and recognize, using context, where substitutions have occurred

## 2017-11-30 NOTE — H&P
History and Physical - [facility] Internal Medicine    Patient Information: Martina Ramirez 80 y o  male MRN: [id number]  Unit/Bed#: -01 Encounter: [id number]  Admitting Physician: JUVENCIO Suazo  PCP: Ej Keyes MD  Date of Admission:  11/30/17    Assessment/Plan:    Hospital Problem List:     Principal Problem:    Anemia  Active Problems:    CKD (chronic kidney disease), stage IV (HCC)    Edema    CHF (congestive heart failure) (HCC)    Paroxysmal atrial fibrillation (HCC)    Weakness generalized    CAD (coronary artery disease)    Hypocalcemia    Warfarin anticoagulation    Hypomagnesemia    Abnormal laboratory test      Plan for the Primary Problem(s):  · Anemia  · Chronic, likely secondary to chronic kidney disease  · No evidence of acute bleed, will monitor  Baseline seems to be in low 7's to 8  Patient did have large BM prior to me examining him, I did visualize this and there was no blood noted  · Had blood work checked by nephrologist Dr Santo Padilla, Hgb 6 9 so was sent to ED from [facility]  On admit, Hgb 7 0 will transfuse with 2 units  Per Dr Santo Padilla the patient was recently transfused at nursing home, hgb improved to around 8 and then dropped again to 6 9   · Consider GI consult if H&H does not stabilize, will check stool for occult blood  · Repeat H&H after two transfusions  · ALLEN on Chronic Kidney disease Stage 5  · ALLEN versus progressing renal disease  Creatinine 5 51 on admission, baseline seems to 4 6-4 8  · Follows with Dr Santo Padilla   Was seen yesterday  · Patient does have a left upper arm fistula that he follows with Dr Yudi Maguire for  The patient had a recent fistula duplex that showed that there is a >50% stenosis at the anastomosis as well as inadequate volume in the distal upper arm  · The plan was to start patient on dialysis once fistula was working properly per Dr Demetrio Rodriguez note, however may require this sooner  · The patient does make urine  · Consult nephrology  · Creatinine 5 51 on admission, baseline seems to 4 6-4 8  · Avoid hypotension and nephrotoxins    Discussed with Dr Julee Hart, does not feel there is urgent need for dialysis  Dr Julee Hart called Dr Catrina Rojo with vasc sx to inquire about when he feels fistula can be used  Per Dr Catrina Rojo, fistula can be used if absolutely necessary however Dr Julee Hart will monitor for now and decide  Patient does NOT want a permacath  · Hyperkalemia  · Secondary to above  · Will give 60mg of IV lasix prior to start of blood transfusion, patient was also given 30 g of PO kayexalate in the ED  · No EKG changes noted  · Appreciate nephrology    · Hypomagnesemia  · Likely secondary to GI losses and chronic diuretic use  · Patient received 2g IV in ER, will likely need more  Will repeat blood work after blood transfusions  · Clostridium Difficile infection  · Started PO Vanco on 11/22 at [facility], patient states he is still having liquid diarrhea however it has improved since he started treatment  · Continue PO vanco   Will need full 14 day course  · I/O      Entire plan of care discussed with my attending Dr Marv Hernandez  Plan for Additional Problems:   · Atrial Fibrillation  · INR subtherapeutic, 1 54 on admission  · Will continue to hold and will resume as long as Hbg remains stable after transfusions  · Psychosis  · Stable, patient had recent [facility] admission  · Continue Seroquel   · Monitor    · Chronic systolic congestive heart failure  · No evidence of acute exacerbation  · Patient takes torsemide and metolazone at home, continue  · CAD  · Continue statin  · On ASA, but will hold for now  Can resume if hgb stable     · HTN  · Blood pressures controlled  · Continue norvasc  VTE Prophylaxis: Warfarin (Coumadin)  / sequential compression device   Code Status: DNR 3  POLST: POLST form is not discussed and not completed at this time      Anticipated Length of Stay:  Patient will be admitted on an Inpatient basis with an anticipated length of stay of  Greater than 2 midnights  Justification for Hospital Stay: blood transfusion, nephrology consult, monitor labs  Total Time for Visit, including Counseling / Coordination of Care: 1 hour  Greater than 50% of this total time spent on direct patient counseling and coordination of care  Chief Complaint:   Abnormal labs    History of Present Illness:    Ilan Armenta is a 80 y o  male  with a history of CHF, psychosis, anemia, Afib on coumadin, CKD stage 5 presents to the ED after he had some abnormal blood work done at his nursing home  He was seen yesterday by his nephrologist Dr Julee Hart who recommended that he start dialysis however his fistula has stenosis and is not able to be used  He has a history of anemia, baseline 7-8 and today presents with a hemoglobin of 7 0  Per his family, he recently had a Hgb in the 8 and most recently was 6 9 so there was concern for a bleed  His creatinine on admission was 5 51 which does appear to be above his baseline as well  On admission, his vitals were stable  There were no EKG changes  The patient denies any SOB, CP, lightheadedness, dizziness, abdominal pain, nausea, vomiting or bloody stool  The patient does complain of feeling tired  Also states that he has diarrhea however he was recently diagnosed with C diff and was started on PO Vanco on 11/22  He states this has improved since he started treatment  He was also recently admitted at [address] [place] [address] for psychosis  Review of Systems:    Review of Systems   Constitutional: Positive for fatigue  Negative for activity change, appetite change, chills and fever  HENT: Negative for congestion, sore throat and trouble swallowing  Respiratory: Negative for apnea, cough, chest tightness, shortness of breath and wheezing  Cardiovascular: Positive for leg swelling  Negative for chest pain  Gastrointestinal: Positive for diarrhea   Negative for abdominal distention, abdominal pain, anal bleeding, blood in stool, constipation, nausea and vomiting  Genitourinary: Positive for decreased urine volume  Negative for dysuria, flank pain, frequency, hematuria and urgency  Musculoskeletal: Negative for back pain and neck pain  Skin: Negative for color change and wound  Neurological: Positive for weakness  Negative for dizziness, syncope, speech difficulty, light-headedness and headaches  Hematological: Bruises/bleeds easily  Psychiatric/Behavioral: Negative for agitation  The patient is not nervous/anxious  All other systems reviewed and are negative  Past Medical and Surgical History:     Past Medical History:   Diagnosis Date    Aortic aneurysm ([address] )     Arthritis     GERD (gastroesophageal reflux disease)     Heart failure (HCC)     Hyperlipidemia     Hypertension     Irregular heart beat     afib    Psychiatric illness     Renal disorder        Past Surgical History:   Procedure Laterality Date    ABDOMINAL AORTIC ANEURYSM REPAIR, OPEN      CARDIAC SURGERY      COLONOSCOPY      HERNIA REPAIR      MO ANASTOMOSIS,AV,ANY SITE Left 10/2/2017    Procedure: UPPER EXTREMITY AV FISTULA CREATION;  Surgeon: Farrah Powell MD;  Location: [facility] ER;  Service: Vascular    VALVE REPLACEMENT         Meds/Allergies:    Prior to Admission medications    Medication Sig Start Date End Date Taking?  Authorizing Provider   amLODIPine (NORVASC) 5 mg tablet Take 1 tablet by mouth daily 11/8/17   Darryl Ha MD   aspirin (ECOTRIN LOW STRENGTH) 81 mg EC tablet Take 81 mg by mouth daily    Historical Provider, MD   atorvastatin (LIPITOR) 10 mg tablet Take 10 mg by mouth daily    Historical Provider, MD   calcium carbonate (TUMS) 500 mg chewable tablet Chew 2 tablets 3 (three) times a day  Patient taking differently: Chew 1,000 mg 2 (two) times a day   10/7/17   JUVENCIO Cole   cholecalciferol (VITAMIN D3) 1,000 units tablet Take 2,000 Units by mouth daily    Historical Provider, MD   ferrous sulfate 325 (65 Fe) mg tablet Take 1 tablet by mouth daily with breakfast 10/8/17   JUVENCIO Cole   metolazone (ZAROXOLYN) 5 mg tablet Take 5 mg by mouth daily      Historical Provider, MD   Multiple Vitamin (MULTIVITAMIN) capsule Take 1 capsule by mouth daily    Historical Provider, MD   omeprazole (PriLOSEC) 20 mg delayed release capsule Take 20 mg by mouth daily    Historical Provider, MD   QUEtiapine (SEROquel) 50 mg tablet Take 1 tablet by mouth daily at bedtime 11/7/17   Simon Méndez MD   terazosin (HYTRIN) 1 mg capsule Take 1 mg by mouth daily at bedtime    Historical Provider, MD   torsemide (DEMADEX) 20 mg tablet Take 20 mg by mouth daily    Historical Provider, MD   warfarin (COUMADIN) 4 mg tablet Take 4 mg by mouth daily    Historical Provider, MD     I have reveiwed home medications using records provided by [facility]  Allergies:    Allergies   Allergen Reactions    Ativan [Lorazepam] Other (See Comments)     "it makes me go crazy"    Penicillins Swelling       Social History:     Marital Status: /Civil Union   Occupation: Retired  Patient Pre-hospital Living Situation: [place] [address]   Patient Pre-hospital Level of Mobility: Assistance with walker  Patient Pre-hospital Diet Restrictions: Renal diet  Substance Use History:   History   Alcohol Use No     History   Smoking Status    Former Smoker    Quit date: 1/1/2011   Smokeless Tobacco    Former User     History   Drug Use No       Family History:    Family History   Problem Relation Age of Onset    Cancer Mother    Maddie Onaway Father        Physical Exam:     Vitals:   Blood Pressure: 132/60 (11/30/17 1330)  Pulse: 78 (11/30/17 1330)  Temperature: 97 6 °F (36 4 °C) (11/30/17 1151)  Temp Source: Oral (11/30/17 1151)  Respirations: (!) 23 (11/30/17 1330)  Weight - Scale: 76 kg (167 lb 8 8 oz) (11/30/17 1151)  SpO2: 96 % (11/30/17 1330)    Physical Exam   Constitutional: He is oriented to person, place, and time  He appears well-developed and well-nourished  No distress  HENT:   Head: Normocephalic and atraumatic  Mouth/Throat: No oropharyngeal exudate  Eyes: Pupils are equal, round, and reactive to light  Neck: Normal range of motion  No JVD present  No tracheal deviation present  No thyromegaly present  Cardiovascular: Normal rate  No murmur heard  Patient has bilateral lower extremity edema  Pulmonary/Chest: Effort normal and breath sounds normal  No respiratory distress  He has no wheezes  He exhibits no tenderness  Abdominal: Soft  Bowel sounds are normal  He exhibits no distension and no mass  There is no tenderness  Musculoskeletal: Normal range of motion  He exhibits edema (Bilateral lower extremities)  He exhibits no tenderness  Left upper arm fistula positive bruit and thrill  Neurological: He is alert and oriented to person, place, and time  Skin: Skin is warm and dry  Ecchymosis (generalized) noted  Psychiatric: He has a normal mood and affect  Nursing note and vitals reviewed  Additional Data:     Lab Results: I have personally reviewed pertinent reports  As well as records that came from nursing home     Results from last 7 days  Lab Units 11/30/17  1310   WBC Thousand/uL 8 54   HEMOGLOBIN g/dL 7 0*   HEMATOCRIT % 20 4*   PLATELETS Thousands/uL 100*   NEUTROS PCT % 78*   LYMPHS PCT % 10*   MONOS PCT % 6   EOS PCT % 0       Results from last 7 days  Lab Units 11/30/17  1310   SODIUM mmol/L 140   POTASSIUM mmol/L 5 5*   CHLORIDE mmol/L 108   CO2 mmol/L 18*   BUN mg/dL 111*   CREATININE mg/dL 5 51*   CALCIUM mg/dL 7 3*   TOTAL PROTEIN g/dL 6 6   BILIRUBIN TOTAL mg/dL 0 60   ALK PHOS U/L 55   ALT U/L 21   AST U/L 16   GLUCOSE RANDOM mg/dL 100       Results from last 7 days  Lab Units 11/30/17  1310   INR  1 54*       Imaging: I have personally reviewed pertinent reports        Vas Hemodialysis Access Duplex Left Upper Limb Avf    Result Date: 11/22/2017  Narrative:  [facility] REPORT CLINICAL: Indications:  Renal Disease unspecified [N18 9]  Other mechanical complication of surgically created arteriovenous fistula, initial encounter [T82 590A]  Routine surveillance  Patient is s/p left Brachiocephalic AVF weeks  Reports numbness in left digits 1-5 when hands are cold  Reports that swelling of left arm has resolved  Operative History: 2015-10-22 Endovascular graft, thoracic aortic aneurysm without coverage of lt subclavian origin Endoluminal graft, PTFE [place] 2013-10-23 CABG 2013-09-24 Left Subclavian to carotid transposition 2011-01-10 AAA 2011-01-10 Aorto-aortic tube graft AAA    FINDINGS:  Left                 PSV (cm/s)  EDV (cm/s)  Flow Rate  AP (mm)  Depth  Subclavian                  206                                         Afferent artery             129                    556                  AV Anastomosis              521         284                             Axillary                    151                                         Efferent Vein               466         300        831                  Prox  Radial                 32                                         Mid Radial                   31                                         Dist  Radial Artery          46                                         Prox  Ulnar                  24                                         Mid  Ulnar                   22                                         Dist  Ulnar Artery           33                                         Ceph Upper                   77          33                 5 8    1 7  Ceph Mid Arm                 86          41                 5 8    1 1  Cephalic Lower Arm           73          45                 9 8    1 7     CONCLUSION: Impression Acute non-occlusive thrombus is visualized in the internal jugular vein   There is a focal segment of acute non-occlusive thrombus visualized in the cephalic vein at the shoulder  There is a >50% stenosis at the anastomosis of the AVF that extends into the cephalic vein  The cephalic vein in aneurysmal at the [facility] [place] space measuring 9 8 mm  Adequate flow volumes visualized in the outflow cephalic vein, however it drops to inadequate volume in the distal upper arm as the vein caliber becomes smaller  No evidence of a pseudoaneurysm  The brachial artery peripheral to the AVF is retrograde  The radial and ulnar arteries are patent and antegrade  No evidence of a large venous branch  Tech note:  Critical/technical findings were given to [employer] at [employer] at 16:15  SIGNATURE: Electronically Signed by: Mark Glass on 2017-11-22 03:19:59 PM      EKG, Pathology, and Other Studies Reviewed on Admission:   · EKG: Atrial Fibrillation    Allscripts / Epic Records Reviewed: [facility] home records / labs  ** Please Note: This note has been constructed using a voice recognition system   **

## 2017-11-30 NOTE — ED NOTES
Critical value of magnesium 0 5 called from lab, Lefty Champagne  Will inform [facility] Jhaveri Ma, RN  11/30/17 5380

## 2017-11-30 NOTE — ED NOTES
Family at bedside; updated on plan of care     [address] [facility] [address], RN  11/30/17 4567

## 2017-11-30 NOTE — ED PROVIDER NOTES
History  Chief Complaint   Patient presents with    Abnormal Lab     pt asymptomatic; denies CP, SOB  Results from PCP sent with pt ; low H&H, Mg     Patient referred to the emergency department by a local nursing home physician with concerns about anemia and hypomagnesemia  The patient is asymptomatic and offers no complaints  This was routine blood work drawn  Patient does have a history of stomach ulcers and GI bleeding  He also has worsening renal insufficiency and has a dialysis shunt that has not yet been used  Family physician of the nurse come consulted with Dr Akin Jaramillo of Nephrology who suggested the patient be sent to [facility]   Patient denies fever chills chest pain  Prior to Admission Medications   Prescriptions Last Dose Informant Patient Reported? Taking?    Multiple Vitamin (MULTIVITAMIN) capsule   Yes No   Sig: Take 1 capsule by mouth daily   QUEtiapine (SEROquel) 50 mg tablet   No No   Sig: Take 1 tablet by mouth daily at bedtime   amLODIPine (NORVASC) 5 mg tablet   No No   Sig: Take 1 tablet by mouth daily   aspirin (ECOTRIN LOW STRENGTH) 81 mg EC tablet   Yes No   Sig: Take 81 mg by mouth daily   atorvastatin (LIPITOR) 10 mg tablet   Yes No   Sig: Take 10 mg by mouth daily   calcium carbonate (TUMS) 500 mg chewable tablet   No No   Sig: Chew 2 tablets 3 (three) times a day   Patient taking differently: Chew 1,000 mg 2 (two) times a day     cholecalciferol (VITAMIN D3) 1,000 units tablet   Yes No   Sig: Take 2,000 Units by mouth daily   ferrous sulfate 325 (65 Fe) mg tablet   No No   Sig: Take 1 tablet by mouth daily with breakfast   metolazone (ZAROXOLYN) 5 mg tablet   Yes No   Sig: Take 5 mg by mouth daily     omeprazole (PriLOSEC) 20 mg delayed release capsule   Yes No   Sig: Take 20 mg by mouth daily   terazosin (HYTRIN) 1 mg capsule   Yes No   Sig: Take 1 mg by mouth daily at bedtime   torsemide (DEMADEX) 20 mg tablet   Yes No   Sig: Take 20 mg by mouth daily   warfarin (COUMADIN) 4 mg tablet   Yes No   Sig: Take 4 mg by mouth daily      Facility-Administered Medications: None       Past Medical History:   Diagnosis Date    Aortic aneurysm (HCC)     Arthritis     GERD (gastroesophageal reflux disease)     Heart failure (HCC)     Hyperlipidemia     Hypertension     Irregular heart beat     afib    Psychiatric illness     Renal disorder        Past Surgical History:   Procedure Laterality Date    ABDOMINAL AORTIC ANEURYSM REPAIR, OPEN      CARDIAC SURGERY      COLONOSCOPY      HERNIA REPAIR      [place] ANASTOMOSIS,AV,ANY SITE Left 10/2/2017    Procedure: UPPER EXTREMITY AV FISTULA CREATION;  Surgeon: Arabella Saucedo MD;  Location: [facility] OR;  Service: Vascular    VALVE REPLACEMENT         Family History   Problem Relation Age of Onset    Cancer Mother     Anuerysm Father      I have reviewed and agree with the history as documented  Social History   Substance Use Topics    Smoking status: Former Smoker     Quit date: 1/1/2011    Smokeless tobacco: Never Used    Alcohol use No        Review of Systems   Constitutional: Negative  Negative for activity change, appetite change, chills, diaphoresis, fatigue and fever  HENT: Negative  Eyes: Negative  Negative for photophobia and visual disturbance  Respiratory: Negative  Negative for chest tightness, shortness of breath, wheezing and stridor  Cardiovascular: Negative  Negative for chest pain, palpitations and leg swelling  Gastrointestinal: Positive for diarrhea  Negative for abdominal pain, constipation, nausea and vomiting  Endocrine: Negative  Genitourinary: Negative  Negative for dysuria, frequency, hematuria, penile pain and urgency  Musculoskeletal: Negative  Negative for back pain, neck pain and neck stiffness  Skin: Negative  Negative for rash and wound  Allergic/Immunologic: Negative  Neurological: Negative    Negative for dizziness, tremors, seizures, syncope, facial asymmetry, speech difficulty, weakness, light-headedness, numbness and headaches  Hematological: Negative  Does not bruise/bleed easily  Psychiatric/Behavioral: Negative  Negative for agitation, confusion, self-injury, sleep disturbance and suicidal ideas  Physical Exam  ED Triage Vitals [11/30/17 1151]   Temperature Pulse Respirations Blood Pressure SpO2   97 6 °F (36 4 °C) 67 16 136/60 99 %      Temp Source Heart Rate Source Patient Position - Orthostatic VS BP Location FiO2 (%)   Oral Monitor Lying Right arm --      Pain Score       No Pain           Orthostatic Vital Signs  Vitals:    11/30/17 1151 11/30/17 1200 11/30/17 1230 11/30/17 1330   BP: 136/60 136/60 119/58 132/60   Pulse: 67 68 68 78   Patient Position - Orthostatic VS: Lying   Lying       Physical Exam   Constitutional: He is oriented to person, place, and time  He appears well-developed and well-nourished  Nontoxic appearance without respiratory distress  Patient looks comfortable sitting upright in the stretcher  He is able to speak in full sentences and engage in normal conversation and follow simple commands  HENT:   Head: Normocephalic and atraumatic  Right Ear: External ear normal    Left Ear: External ear normal    Mouth/Throat: Oropharynx is clear and moist    Eyes: Conjunctivae and EOM are normal  Pupils are equal, round, and reactive to light  Neck: Normal range of motion  Neck supple  Cardiovascular: Normal rate, regular rhythm, normal heart sounds and intact distal pulses  Pulmonary/Chest: Effort normal and breath sounds normal  No respiratory distress  He has no wheezes  He has no rales  He exhibits no tenderness  Abdominal: Soft  Bowel sounds are normal  He exhibits no distension and no mass  There is no tenderness  There is no rebound and no guarding  No hernia  Musculoskeletal: Normal range of motion  He exhibits no edema, tenderness or deformity  Neurological: He is alert and oriented to person, place, and time  He has normal reflexes  He displays normal reflexes  No cranial nerve deficit or sensory deficit  He exhibits normal muscle tone  Coordination normal    Skin: Skin is warm and dry  No rash noted  Psychiatric: He has a normal mood and affect  His behavior is normal  Judgment and thought content normal    Nursing note and vitals reviewed        ED Medications  Medications   magnesium sulfate 2 g/50 mL IVPB (premix) 2 g (2 g Intravenous New Bag 11/30/17 1430)   sodium polystyrene sulfonate (KAYEXALATE) oral suspension 30 g (30 g Oral Given 11/30/17 1426)   furosemide (LASIX) injection 60 mg (60 mg Intravenous Given 11/30/17 1429)       Diagnostic Studies  Results Reviewed     Procedure Component Value Units Date/Time    Magnesium [[id number]]  (Abnormal) Collected:  11/30/17 1310    Lab Status:  Final result Specimen:  Blood from Arm, Right Updated:  11/30/17 1439     Magnesium 0 5 (LL) mg/dL     Protime-INR [[id number]]  (Abnormal) Collected:  11/30/17 1310    Lab Status:  Final result Specimen:  Blood from Arm, Right Updated:  11/30/17 1425     Protime 18 9 (H) seconds      INR 1 54 (H)    CBC and differential [[id number]]  (Abnormal) Collected:  11/30/17 1310    Lab Status:  Final result Specimen:  Blood from Arm, Right Updated:  11/30/17 1347     WBC 8 54 Thousand/uL      RBC 2 32 (L) Million/uL      Hemoglobin 7 0 (L) g/dL      Hematocrit 20 4 (L) %      MCV 88 fL      MCH 30 2 pg      MCHC 34 3 g/dL      RDW 17 3 (H) %      MPV 12 6 fL      Platelets 104 (L) Thousands/uL      nRBC 0 /100 WBCs      Neutrophils Relative 78 (H) %      Lymphocytes Relative 10 (L) %      Monocytes Relative 6 %      Eosinophils Relative 0 %      Basophils Relative 1 %      Neutrophils Absolute 6 67 Thousands/µL      Lymphocytes Absolute 0 88 Thousands/µL      Monocytes Absolute 0 48 Thousand/µL      Eosinophils Absolute 0 02 Thousand/µL      Basophils Absolute 0 05 Thousands/µL     Comprehensive metabolic panel [[id number]]  (Abnormal) Collected:  11/30/17 1310    Lab Status:  Final result Specimen:  Blood from Arm, Right Updated:  11/30/17 1342     Sodium 140 mmol/L      Potassium 5 5 (H) mmol/L      Chloride 108 mmol/L      CO2 18 (L) mmol/L      Anion Gap 14 (H) mmol/L       (H) mg/dL      Creatinine 5 51 (H) mg/dL      Glucose 100 mg/dL      Calcium 7 3 (L) mg/dL      AST 16 U/L      ALT 21 U/L      Alkaline Phosphatase 55 U/L      Total Protein 6 6 g/dL      Albumin 2 8 (L) g/dL      Total Bilirubin 0 60 mg/dL      eGFR 9 ml/min/1 73sq m     Narrative:         National Kidney Disease Education Program recommendations are as follows:  GFR calculation is accurate only with a steady state creatinine  Chronic Kidney disease less than 60 ml/min/1 73 sq  meters  Kidney failure less than 15 ml/min/1 73 sq  meters  APTT [[id number]]  (Abnormal) Collected:  11/30/17 1310    Lab Status:  Final result Specimen:  Blood from Arm, Right Updated:  11/30/17 1338     PTT 46 (H) seconds     Narrative: Therapeutic Heparin Range = 60-90 seconds                 No orders to display              Procedures  ECG 12 Lead Documentation  Date/Time: 11/30/2017 2:23 PM  Performed by: Jeremi Roque by: Don Diaz     ECG reviewed by me, the ED Provider: yes    Patient location:  ED  Previous ECG:     Previous ECG:  Compared to current    Similarity:  No change  Interpretation:     Interpretation: abnormal    Rate:     ECG rate:  65    ECG rate assessment: normal    Rhythm:     Rhythm: atrial fibrillation    QRS:     QRS axis:  Left    QRS intervals:   Wide  Conduction:     Conduction: abnormal      Abnormal conduction: non-specific intraventricular conduction delay    ST segments:     ST segments:  Non-specific  T waves:     T waves: non-specific             Phone Contacts  ED Phone Contact    ED Course  ED Course                                [facility] Time    Disposition  Final diagnoses:   Renal failure   Hypomagnesemia Anemia   Hyperkalemia     Time reflects when diagnosis was documented in both MDM as applicable and the Disposition within this note     Time User Action Codes Description Comment    11/30/2017  1:50 PM Estil Cassis Add [N19] Renal failure     11/30/2017  1:50 PM Estil Cassis Add [E83 42] Hypomagnesemia     11/30/2017  1:50 PM Estil Cassis Add [D64 9] Anemia     11/30/2017  1:52 PM Ravin Pratt Add [N18 4] CKD (chronic kidney disease), stage IV ([facility] [address] )     11/30/2017  1:52 PM Estefany Pratt Modify [N18 4] CKD (chronic kidney disease), stage IV ([facility] [address] )     11/30/2017  1:52 PM Xiomara Babin 56 [E87 5] Hyperkalemia       ED Disposition     ED Disposition Condition Comment    Admit  Case was discussed with Dr Skeeter Cushing and the patient's admission status was agreed to be Admission Status: inpatient status to the service of Dr Manfred Triplett    None       Patient's Medications   Discharge Prescriptions    No medications on file     No discharge procedures on file      ED Provider  Electronically Signed by           Rocío Allen MD  11/30/17 [address] Lorraine Moulton MD  11/30/17 5255

## 2017-12-01 LAB
ABO GROUP BLD BPU: NORMAL
ABO GROUP BLD BPU: NORMAL
ALBUMIN SERPL BCP-MCNC: 2.5 G/DL (ref 3.5–5)
ALP SERPL-CCNC: 52 U/L (ref 46–116)
ALT SERPL W P-5'-P-CCNC: 19 U/L (ref 12–78)
ANION GAP SERPL CALCULATED.3IONS-SCNC: 16 MMOL/L (ref 4–13)
AST SERPL W P-5'-P-CCNC: 20 U/L (ref 5–45)
BILIRUB SERPL-MCNC: 1.7 MG/DL (ref 0.2–1)
BPU ID: NORMAL
BPU ID: NORMAL
BUN SERPL-MCNC: 114 MG/DL (ref 5–25)
CALCIUM SERPL-MCNC: 7.1 MG/DL (ref 8.3–10.1)
CHLORIDE SERPL-SCNC: 106 MMOL/L (ref 100–108)
CO2 SERPL-SCNC: 16 MMOL/L (ref 21–32)
CREAT SERPL-MCNC: 5.14 MG/DL (ref 0.6–1.3)
GFR SERPL CREATININE-BSD FRML MDRD: 10 ML/MIN/1.73SQ M
GLUCOSE SERPL-MCNC: 113 MG/DL (ref 65–140)
HEMOCCULT STL QL: NEGATIVE
MAGNESIUM SERPL-MCNC: 0.6 MG/DL (ref 1.6–2.6)
MAGNESIUM SERPL-MCNC: 1.7 MG/DL (ref 1.6–2.6)
POTASSIUM SERPL-SCNC: 4.5 MMOL/L (ref 3.5–5.3)
PROT SERPL-MCNC: 6.3 G/DL (ref 6.4–8.2)
SODIUM SERPL-SCNC: 138 MMOL/L (ref 136–145)
UNIT DISPENSE STATUS: NORMAL
UNIT DISPENSE STATUS: NORMAL
UNIT PRODUCT CODE: NORMAL
UNIT PRODUCT CODE: NORMAL
UNIT RH: NORMAL
UNIT RH: NORMAL

## 2017-12-01 PROCEDURE — P9021 RED BLOOD CELLS UNIT: HCPCS

## 2017-12-01 PROCEDURE — 83735 ASSAY OF MAGNESIUM: CPT | Performed by: NURSE PRACTITIONER

## 2017-12-01 PROCEDURE — 83735 ASSAY OF MAGNESIUM: CPT | Performed by: PHYSICIAN ASSISTANT

## 2017-12-01 PROCEDURE — 80053 COMPREHEN METABOLIC PANEL: CPT | Performed by: NURSE PRACTITIONER

## 2017-12-01 RX ORDER — MAGNESIUM SULFATE HEPTAHYDRATE 40 MG/ML
2 INJECTION, SOLUTION INTRAVENOUS ONCE
Status: COMPLETED | OUTPATIENT
Start: 2017-12-01 | End: 2017-12-04

## 2017-12-01 RX ORDER — HEPARIN SODIUM 5000 [USP'U]/ML
5000 INJECTION, SOLUTION INTRAVENOUS; SUBCUTANEOUS EVERY 8 HOURS SCHEDULED
Status: DISCONTINUED | OUTPATIENT
Start: 2017-12-01 | End: 2017-12-07 | Stop reason: HOSPADM

## 2017-12-01 RX ORDER — MAGNESIUM SULFATE HEPTAHYDRATE 40 MG/ML
2 INJECTION, SOLUTION INTRAVENOUS 2 TIMES DAILY
Status: COMPLETED | OUTPATIENT
Start: 2017-12-01 | End: 2017-12-04

## 2017-12-01 RX ADMIN — Medication 325 MG: at 08:24

## 2017-12-01 RX ADMIN — HEPARIN SODIUM 5000 UNITS: 5000 INJECTION, SOLUTION INTRAVENOUS; SUBCUTANEOUS at 22:11

## 2017-12-01 RX ADMIN — VANCOMYCIN 125 MG: KIT at 23:10

## 2017-12-01 RX ADMIN — PANTOPRAZOLE SODIUM 40 MG: 40 TABLET, DELAYED RELEASE ORAL at 06:24

## 2017-12-01 RX ADMIN — TERAZOSIN HYDROCHLORIDE 1 MG: 1 CAPSULE ORAL at 22:11

## 2017-12-01 RX ADMIN — MAGNESIUM SULFATE HEPTAHYDRATE 2 G: 40 INJECTION, SOLUTION INTRAVENOUS at 06:40

## 2017-12-01 RX ADMIN — ANTACID TABLETS 1000 MG: 500 TABLET, CHEWABLE ORAL at 08:22

## 2017-12-01 RX ADMIN — MAGNESIUM SULFATE HEPTAHYDRATE 2 G: 40 INJECTION, SOLUTION INTRAVENOUS at 09:29

## 2017-12-01 RX ADMIN — METOLAZONE 5 MG: 5 TABLET ORAL at 08:23

## 2017-12-01 RX ADMIN — VANCOMYCIN 125 MG: KIT at 00:20

## 2017-12-01 RX ADMIN — VANCOMYCIN 125 MG: KIT at 06:23

## 2017-12-01 RX ADMIN — VANCOMYCIN 125 MG: KIT at 12:11

## 2017-12-01 RX ADMIN — QUETIAPINE FUMARATE 50 MG: 25 TABLET, FILM COATED ORAL at 22:11

## 2017-12-01 RX ADMIN — ATORVASTATIN CALCIUM 10 MG: 10 TABLET, FILM COATED ORAL at 08:25

## 2017-12-01 RX ADMIN — VITAMIN D, TAB 1000IU (100/BT) 2000 UNITS: 25 TAB at 08:23

## 2017-12-01 RX ADMIN — ANTACID TABLETS 1000 MG: 500 TABLET, CHEWABLE ORAL at 17:09

## 2017-12-01 RX ADMIN — MAGNESIUM SULFATE HEPTAHYDRATE 2 G: 40 INJECTION, SOLUTION INTRAVENOUS at 17:09

## 2017-12-01 RX ADMIN — VANCOMYCIN 125 MG: KIT at 17:09

## 2017-12-01 RX ADMIN — EPOETIN ALFA 10000 UNITS: 10000 SOLUTION INTRAVENOUS; SUBCUTANEOUS at 14:27

## 2017-12-01 RX ADMIN — TORSEMIDE 20 MG: 20 TABLET ORAL at 08:24

## 2017-12-01 RX ADMIN — HEPARIN SODIUM 5000 UNITS: 5000 INJECTION, SOLUTION INTRAVENOUS; SUBCUTANEOUS at 09:28

## 2017-12-01 NOTE — PROGRESS NOTES
[facility] Medicine Progress Note  Patient: Ilan Armenta 80 y o  male   MRN: [id number]  PCP: Dalia Koch MD  Unit/Bed#: -01 Encounter: [id number]  Date Of Visit: 12/01/17    Assessment:    Principal Problem:    Anemia  Active Problems:    CKD (chronic kidney disease), stage IV (HCC)    Edema    CHF (congestive heart failure) (HCC)    Paroxysmal atrial fibrillation (HCC)    Weakness generalized    CAD (coronary artery disease)    Hypocalcemia    Warfarin anticoagulation    Hypomagnesemia    Abnormal laboratory test      Plan:    1  Anemia, most likely secondary chronic disease, occult blood is negative, the patient as per outpatient records did receive transfusion hemoglobin had not stabilized which was the reason he was admitted received 2 units of PRBC CBC this morning is pending  2  CKD stage 5, 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, vascular has been consulted by Renal, this time no urgent indication for renal replacement therapy  3  Hyperkalemia setting of CKD stage 5, now has normalized, with Kayexalate and Lasix  4  Paroxysmal atrial fibrillation continue to hold Coumadin blood time hemoglobin stabilizes will put him on DVT prophylaxis toe   5  Hypo magnesemia setting of diarrhea, intractable, total of 4 g of magnesium IV today, would ideally want to put him on oral magnesium but in the setting of diarrhea that wouldn't be feasible  6  C diff, present on arrival, started on vancomycin oral, will continue a total of 14 day course as per report started November 22 7  Benign essential hypertension blood pressure systolic in the 21V hold Lasix metolazone in the setting of diarrhea, also will hold amlodipine          VTE Pharmacologic Prophylaxis:   Pharmacologic: Heparin  Mechanical VTE Prophylaxis in Place: Yes    Patient Centered Rounds: I have performed bedside rounds with nursing staff today      Discussions with Specialists or Other Care Team Provider: y    Education and Discussions with Family / Patient: y    Time Spent for Care: 20 minutes  More than 50% of total time spent on counseling and coordination of care as described above  Current Length of Stay: 1 day(s)    Current Patient Status: Inpatient   Certification Statement: The patient will continue to require additional inpatient hospital stay due to anemia        Code Status: Level 3 - DNAR and DNI      Subjective:   Patient has no new complaints discussed the plan in detail with him    Objective:     Vitals:   Temp (24hrs), Av °F (36 7 °C), Min:97 5 °F (36 4 °C), Max:98 5 °F (36 9 °C)    HR:  [] 83  Resp:  [16-23] 18  BP: ()/(52-67) 92/52  SpO2:  [93 %-100 %] 97 %  Body mass index is 23 41 kg/m²  Input and Output Summary (last 24 hours): Intake/Output Summary (Last 24 hours) at 17 0833  Last data filed at 17 3151   Gross per 24 hour   Intake              865 ml   Output              500 ml   Net              365 ml       Physical Exam:     Physical Exam   Constitutional: No distress  HENT:   Head: Normocephalic  Eyes: Pupils are equal, round, and reactive to light  Neck: No tracheal deviation present  Cardiovascular: Normal rate  No murmur heard  Pulmonary/Chest: No respiratory distress  Abdominal: He exhibits no distension  Musculoskeletal: He exhibits no edema  Neurological: He is alert  Skin: He is not diaphoretic         Additional Data:     Labs:      Results from last 7 days  Lab Units 17  1310   WBC Thousand/uL 8 54   HEMOGLOBIN g/dL 7 0*   HEMATOCRIT % 20 4*   PLATELETS Thousands/uL 100*   NEUTROS PCT % 78*   LYMPHS PCT % 10*   MONOS PCT % 6   EOS PCT % 0       Results from last 7 days  Lab Units 17  0531   SODIUM mmol/L 138   POTASSIUM mmol/L 4 5   CHLORIDE mmol/L 106   CO2 mmol/L 16*   BUN mg/dL 114*   CREATININE mg/dL 5 14*   CALCIUM mg/dL 7 1*   TOTAL PROTEIN g/dL 6 3*   BILIRUBIN TOTAL mg/dL 1 70*   ALK PHOS U/L 52   ALT U/L 19   AST U/L 20 GLUCOSE RANDOM mg/dL 113       Results from last 7 days  Lab Units 11/30/17  1310   INR  1 54*       * I Have Reviewed All Lab Data Listed Above  * Additional Pertinent Lab Tests Reviewed: [facility] Admission Reviewed    Imaging:    Recent Cultures (last 7 days):           Last 24 Hours Medication List:     atorvastatin 10 mg Oral Daily   calcium carbonate 1,000 mg Oral BID   cholecalciferol 2,000 Units Oral Daily   ferrous sulfate 325 mg Oral Daily With Breakfast   magnesium sulfate 2 g Intravenous Once   magnesium sulfate 2 g Intravenous BID   pantoprazole 40 mg Oral Early Morning   QUEtiapine 50 mg Oral HS   terazosin 1 mg Oral HS   vancomycin 125 mg Oral Q6H [address]        Today, Patient Was Seen By: Sabiha Crump MD    ** Please Note: Dragon 360 Dictation voice to text software may have been used in the creation of this document   **

## 2017-12-01 NOTE — PLAN OF CARE
Problem: Potential for Falls  Goal: Patient will remain free of falls  INTERVENTIONS:  - Assess patient frequently for physical needs  -  Identify cognitive and physical deficits and behaviors that affect risk of falls    -  [place] fall precautions as indicated by assessment   - Educate patient/family on patient safety including physical limitations  - Instruct patient to call for assistance with activity based on assessment  - Modify environment to reduce risk of injury  - Consider OT/PT consult to assist with strengthening/mobility   Outcome: Progressing      Problem: Prexisting or High Potential for Compromised Skin Integrity  Goal: Skin integrity is maintained or improved  INTERVENTIONS:  - Identify patients at risk for skin breakdown  - Assess and monitor skin integrity  - Assess and monitor nutrition and hydration status  - Monitor labs (i e  albumin)  - Assess for incontinence   - Turn and reposition patient  - Assist with mobility/ambulation  - Relieve pressure over bony prominences  - Avoid friction and shearing  - Provide appropriate hygiene as needed including keeping skin clean and dry  - Evaluate need for skin moisturizer/barrier cream  - Collaborate with interdisciplinary team (i e  Nutrition, Rehabilitation, etc )   - Patient/family teaching   Outcome: Progressing

## 2017-12-01 NOTE — PROGRESS NOTES
NEPHROLOGY PROGRESS NOTE    Patient: Devin Lane               Sex: male          DOA: 11/30/2017 11:41 AM   YOB: 1935        Age:  80 y o         LOS:  LOS: 1 day       HPI     Patient with stage 5 CKD admitted with anemia and hypomagnesemia    SUBJECTIVE     Feeling well denies any complaint but does appear chronically ill    CURRENT MEDICATIONS       Current Facility-Administered Medications:     acetaminophen (TYLENOL) tablet 650 mg, 650 mg, Oral, Q6H PRN, Estefany M Terence, CRNP    atorvastatin (LIPITOR) tablet 10 mg, 10 mg, Oral, Daily, Estefany M Terence, CRNP, 10 mg at 12/01/17 0825    calcium carbonate (TUMS) chewable tablet 1,000 mg, 1,000 mg, Oral, BID, Estefany M Terence, CRNP, 1,000 mg at 12/01/17 1662    cholecalciferol (VITAMIN D3) tablet 2,000 Units, 2,000 Units, Oral, Daily, Yesi Tire, CRNP, 2,000 Units at 12/01/17 7749    ferrous sulfate tablet 325 mg, 325 mg, Oral, Daily With Breakfast, Marion Tire, CRNP, 325 mg at 12/01/17 0824    heparin (porcine) subcutaneous injection 5,000 Units, 5,000 Units, Subcutaneous, Q8H Sidra Sy MD, 5,000 Units at 12/01/17 0928    magnesium sulfate 2 g/50 mL IVPB (premix) 2 g, 2 g, Intravenous, BID, Tushar Medrano MD, 2 g at 12/01/17 0929    ondansetron (ZOFRAN) injection 4 mg, 4 mg, Intravenous, Q6H PRN, Estefany M Terence, CRNP    pantoprazole (PROTONIX) EC tablet 40 mg, 40 mg, Oral, Early Morning, Estefany M Terence, CRNP, 40 mg at 12/01/17 0598    QUEtiapine (SEROquel) tablet 50 mg, 50 mg, Oral, HS, Estefany M Terence, CRNP, 50 mg at 11/30/17 2227    terazosin (HYTRIN) capsule 1 mg, 1 mg, Oral, HS, Estefany M Terence, CRNP, 1 mg at 11/30/17 2227    vancomycin (VANCOCIN) oral solution 125 mg, 125 mg, Oral, Q6H [address], JUVENCIO Grewal, 125 mg at 12/01/17 3902    OBJECTIVE     Current Weight: Weight - Scale: 78 3 kg (172 lb 9 9 oz)  Vitals:    12/01/17 0742   BP: 92/52   Pulse: 83   Resp: 18   Temp: 97 8 °F (36 6 °C)   SpO2: 97% Intake/Output Summary (Last 24 hours) at 12/01/17 1211  Last data filed at 12/01/17 0900   Gross per 24 hour   Intake             1105 ml   Output              700 ml   Net              405 ml       PHYSICAL EXAMINATION     Physical Exam:   Eyes:  Pupils equally react to light  ENT:  Moist tongue  Gen:  Not in any acute distress  Neck:  Supple  Chest:  Clear  Cor:  P8-X2 normal mettalic heart sounds  Abdomen:  Soft nontender  Ext:  No swelling  Neuro:  Awake and alert  Skin:  Decreased skin turgor      LAB RESULTS       Results from last 7 days  Lab Units 12/01/17  0531 11/30/17  1310   WBC Thousand/uL  --  8 54   HEMOGLOBIN g/dL  --  7 0*   HEMATOCRIT %  --  20 4*   PLATELETS Thousands/uL  --  100*   SODIUM mmol/L 138 140   POTASSIUM mmol/L 4 5 5 5*   CHLORIDE mmol/L 106 108   CO2 mmol/L 16* 18*   BUN mg/dL 114* 111*   CREATININE mg/dL 5 14* 5 51*   CALCIUM mg/dL 7 1* 7 3*   MAGNESIUM mg/dL 0 6* 0 5*   ALBUMIN g/dL 2 5* 2 8*   TOTAL PROTEIN g/dL 6 3* 6 6   GLUCOSE RANDOM mg/dL 113 100       RADIOLOGY RESULTS      Results for orders placed during the hospital encounter of 10/22/15   XR chest portable    Narrative EXAM ROOM = [facility] [place] [facility])   EXAM START = [id number]   EXAM STOP = [id number]   FILMS USED = 1 VIEWS      CHEST       INDICATION-  Status post cardiac surgery      COMPARISON-  10/22/2015 chest x-ray  VIEWS-   AP frontal^  1 image      FINDINGS-  Stable findings status post endovascular repair of ascending   aortic aneurysm  Prosthetic heart valve again seen  Cardiomediastinal silhouette appears unchanged  Atelectatic changes present in the left lung base  Visualized osseous structures appear within normal limits for the   patient's age  IMPRESSION-      Atelectatic changes in the left lung base        Transcribed on- [id number]            NATALYA Martinez MD        Reading Radiologist- NATALYA Larsen MD        Electronically Signed- MORAIMA FRY NATALYA Parnell MD        Released Date Time- 10/23/15 1352    ------------------------------------------------------------------------------   READ BY = [id number]^I ANG LIU   RELEASED BY = [id number]^I ANG LIU      Results for orders placed during the hospital encounter of 10/25/17   XR chest 2 views    Narrative CHEST     INDICATION:  Altered mental status  COMPARISON:  10/19/2017    VIEWS:  Frontal and lateral projections    IMAGES:  3    FINDINGS:         Cardiomediastinal silhouette is stable, including cardiomegaly, aortic valve prosthesis and thoracic aortic stent graft  The lungs are clear  Stable small left pleural effusion       Sternal wires are present  Visualized osseous structures appear otherwise unremarkable for the patient's age  Impression No acute pulmonary disease  Persistent small left pleural effusion  Workstation performed: [id number]       No results found for this or any previous visit  No results found for this or any previous visit  No results found for this or any previous visit  Results for orders placed in visit on 02/21/14   US retroperitoneal complete    Narrative RENAL ULTRASOUND   INDICATION- Acute kidney insufficiency  Chronic kidney disease  COMPARISON- 04/18/2012   TECHNIQUE-   Ultrasound of the retroperitoneum was performed with a   curvilinear transducer utilizing volumetric sweeps and still imaging   techniques  FINDINGS-   KIDNEYS-   Right kidney-  10 1 cm  Left kidney-  10 8 cm  Symmetric and normal size  Normal echogenicity and contour  No suspicious masses detected  Small 1 1 cm cortical cyst in lower   pole of the right kidney   No hydronephrosis  0 5 cm nonobstructing intrarenal calculus in the lower pole of the   right kidney, unchanged the previous study   No perinephric fluid collections  URETERS-   Nondilated  BLADDER-    Normally distended  No focal thickening or mass lesions  IMPRESSION-    1  No hydronephrosis     2  Stable nonobstructing lower pole 5 mm right renal calculus   unchanged the previous study  Transcribed on- NATALYA Sheriff MD        Reading Radiologist- NATALYA Siddiqui MD        Releasing Radiologist- NATALYA Siddiqui MD        Released Date Time- 02/24/14 1611      ------------------------------------------------------------------------------   Luis Gonsales / RECOMMENDATIONS      CKD stage 5:  Stable at creatinine of 5  No acute indication for dialysis yet    Anemia:  Status post blood transfusion repeat CBC pending    Will check iron study and start him on Procrit    Hyperkalemia:  Resolved     Hypomagnesemia:  Being replaced likely GI loss    Will continue to monitor        Ilan Gonzalez MD  Nephrology  12/1/2017        Portions of the record may have been created with voice recognition software  Occasional wrong word or "sound a like" substitutions may have occurred due to the inherent limitations of voice recognition software  Read the chart carefully and recognize, using context, where substitutions have occurred

## 2017-12-01 NOTE — PLAN OF CARE
Problem: DISCHARGE PLANNING - CARE MANAGEMENT  Goal: Discharge to post-acute care or home with appropriate resources  INTERVENTIONS:  - Conduct assessment to determine patient/family and health care team treatment goals, and need for post-acute services based on payer coverage, community resources, and patient preferences, and barriers to discharge  - Address psychosocial, clinical, and financial barriers to discharge as identified in assessment in conjunction with the patient/family and health care team  - Arrange appropriate level of post-acute services according to patient's   needs and preference and payer coverage in collaboration with the physician and health care team  - Communicate with and update the patient/family, physician, and health care team regarding progress on the discharge plan  - Arrange appropriate transportation to post-acute venues  Outcome: Progressing  CM met with patient at bedside  Patient states he wants to eventually go home  Patient states that his daugther [phone] has [facility]  CM phoned [facility] who informed that they would like patient to return to [address] for STR  After the STR stay the family will decide and make plans for safe living environment for patient  Coco Apodaca states patient cannot come home at this time  Patient does have a wife-Heather who is battling her own health complications and cannot care for patient at home  Mariola states he fills his Rx at [facility]  Patient declines substance abuse Hx  CM sent referral to [facility] and awaits response on acceptance  CM to follow up needs

## 2017-12-01 NOTE — SOCIAL WORK
CM met with patient at bedside  Patient states he wants to eventually go home  Patient states that his daugther [phone] has [facility]  CM phoned Mariaelena who informed that they would like patient to return to [address] for STR  After the STR stay the family will decide and make plans for safe living environment for patient  Angeline Queen states patient cannot come home at this time  Patient does have a wife-Heather who is battling her own health complications and cannot care for patient at home  Paitnet states he fills his Rx at [facility]  Patient declines substance abuse Hx  CM sent referral to [facility] and awaits response on acceptance  CM to follow up needs

## 2017-12-01 NOTE — CASE MANAGEMENT
Initial Clinical Review    Admission: Date/Time/Statement: 11/30/17 @ 1352     Orders Placed This Encounter   Procedures    Inpatient Admission (expected length of stay for this patient is greater than two midnights)     Standing Status:   Standing     Number of Occurrences:   1     Order Specific Question:   Admitting Physician     Answer:   Halima Diego [[id number]]     Order Specific Question:   Level of Care     Answer:   Med Surg [[id number]]     Order Specific Question:   Estimated length of stay     Answer:   More than 2 Midnights     Order Specific Question:   Certification     Answer:   I certify that inpatient services are medically necessary for this patient for a duration of greater than two midnights  See H&P and MD Progress Notes for additional information about the patient's course of treatment  ED: Date/Time/Mode of Arrival:   ED Arrival Information     Expected Arrival Acuity Means of Arrival Escorted By Service Admission Type    11/30/2017 10:33 11/30/2017 11:40 Urgent Ambulance [address] Urgent    Arrival Complaint    GI BLEED, END STAGE RENAL          Chief Complaint:   Chief Complaint   Patient presents with    Abnormal Lab     pt asymptomatic; denies CP, SOB  Results from PCP sent with pt ; low H&H, Mg       History of Illness:    Tressa Brandon is a 80 y o  male  with a history of CHF, psychosis, anemia, Afib on coumadin, CKD stage 5 presents to the ED after he had some abnormal blood work done at his nursing home  He was seen yesterday by his nephrologist Dr Kyle Lopez who recommended that he start dialysis however his fistula has stenosis and is not able to be used  He has a history of anemia, baseline 7-8 and today presents with a hemoglobin of 7 0  Per his family, he recently had a Hgb in the 8 and most recently was 6 9 so there was concern for a bleed   His creatinine on admission was 5 51 which does appear to be above his baseline as well       On admission, his vitals were stable  There were no EKG changes  The patient denies any SOB, CP, lightheadedness, dizziness, abdominal pain, nausea, vomiting or bloody stool  The patient does complain of feeling tired  Also states that he has diarrhea however he was recently diagnosed with C diff and was started on PO Vanco on 11/22  He states this has improved since he started treatment  He was also recently admitted at [facility] for psychosis  ED Vital Signs:   ED Triage Vitals [11/30/17 1151]   Temperature Pulse Respirations Blood Pressure SpO2   97 6 °F (36 4 °C) 67 16 136/60 99 %      Temp Source Heart Rate Source Patient Position - Orthostatic VS BP Location FiO2 (%)   Oral Monitor Lying Right arm --      Pain Score       No Pain        Wt Readings from Last 1 Encounters:   12/01/17 78 3 kg (172 lb 9 9 oz)       Vital Signs (abnormal): wnl     Abnormal Labs/Diagnostic Test Results: mg   0 5, pt inr  18 9  1 54, H&H   7 0  20 4, plt ct  100, K  5 5, Co2  18, an gap  14, BUN creat  111   5 51, ibeth  7 3, alb  2 8, ptt  46  EKG- a-fib     ED Treatment:   Medication Administration from 11/30/2017 1033 to 11/30/2017 1643       Date/Time Order Dose Route Action Action by Comments     11/30/2017 1430 magnesium sulfate 2 g/50 mL IVPB (premix) 2 g 2 g Intravenous New Bag Genet Renner RN      11/30/2017 1426 sodium polystyrene sulfonate (KAYEXALATE) oral suspension 30 g 30 g Oral Given Genet Renner RN      11/30/2017 1429 furosemide (LASIX) injection 60 mg 60 mg Intravenous Given Genet Renner RN           Past Medical/Surgical History:    Active Ambulatory Problems     Diagnosis Date Noted    CKD (chronic kidney disease), stage IV ([facility] ) 04/24/2017    Edema 04/24/2017    CHF (congestive heart failure) ([facility] ) 04/24/2017    Paroxysmal atrial fibrillation ([facility] ) 04/28/2017    Anemia 04/28/2017    Cellulitis of extremity 04/28/2017    ALLEN (acute kidney injury) ([facility] ) 05/01/2017    Azotemia 05/01/2017    Hypertensive CKD (chronic kidney disease) 05/01/2017    Weakness generalized 07/02/2017    CAD (coronary artery disease) 07/02/2017    Hypocalcemia 07/02/2017    Warfarin anticoagulation 07/02/2017    Hand swelling 10/05/2017    Leukocytosis 10/05/2017    Left upper extremity swelling 10/05/2017    ALLEN (acute kidney injury) ([facility] ) 10/05/2017    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 ([facility] ) 10/05/2017    Hypomagnesemia 10/06/2017    NSVT (nonsustained ventricular tachycardia) ([facility] ) 10/06/2017    Psychosis, organic 10/26/2017    Elevated TSH 10/30/2017     Resolved Ambulatory Problems     Diagnosis Date Noted    Respiratory distress 04/24/2017     Past Medical History:   Diagnosis Date    Aortic aneurysm (HCC)     Arthritis     GERD (gastroesophageal reflux disease)     Heart failure (HCC)     Hyperlipidemia     Hypertension     Irregular heart beat     Psychiatric illness     Renal disorder        Admitting Diagnosis: Hyperkalemia [E87 5]  Hypomagnesemia [E83 42]  Renal failure [N19]  Anemia [D64 9]  CKD (chronic kidney disease), stage IV (HCC) [N18 4]  Abnormal laboratory test [R89 9]    Age/Sex: 80 y o  male    Assessment/Plan:    Hospital Problem List:      Principal Problem:    Anemia  Active Problems:    CKD (chronic kidney disease), stage IV (HCC)    Edema    CHF (congestive heart failure) (HCC)    Paroxysmal atrial fibrillation (HCC)    Weakness generalized    CAD (coronary artery disease)    Hypocalcemia    Warfarin anticoagulation    Hypomagnesemia    Abnormal laboratory test        Plan for the Primary Problem(s):  · Anemia  · Chronic, likely secondary to chronic kidney disease  · No evidence of acute bleed, will monitor  Baseline seems to be in low 7's to 8  Patient did have large BM prior to me examining him, I did visualize this and there was no blood noted     · Had blood work checked by nephrologist Dr Nidhi Mccabe, Hgb 6 9 so was sent to ED from [facility]  On admit, Hgb 7 0 will transfuse with 2 units  Per Dr Val Leo the patient was recently transfused at nursing home, hgb improved to around 8 and then dropped again to 6 9   · Consider GI consult if H&H does not stabilize, will check stool for occult blood  · Repeat H&H after two transfusions       · ALLEN on Chronic Kidney disease Stage 5  ? ALLEN versus progressing renal disease  Creatinine 5 51 on admission, baseline seems to 4 6-4 8   ? Follows with Dr Val Leo   Was seen yesterday  ? Patient does have a left upper arm fistula that he follows with Dr Bibi Ferrer for  The patient had a recent fistula duplex that showed that there is a >50% stenosis at the anastomosis as well as inadequate volume in the distal upper arm  ? The plan was to start patient on dialysis once fistula was working properly per Dr Emery School note, however may require this sooner  ? The patient does make urine  ? Consult nephrology  ? Creatinine 5 51 on admission, baseline seems to 4 6-4 8  ? Avoid hypotension and nephrotoxins     Discussed with Dr Val Leo, does not feel there is urgent need for dialysis  Dr Val Leo called Dr Bibi Ferrer with [facility] sx to inquire about when he feels fistula can be used  Per Dr Bibi Ferrer, fistula can be used if absolutely necessary however Dr Val Leo will monitor for now and decide  Patient does NOT want a permacath          · Hyperkalemia  ? Secondary to above  ? Will give 60mg of IV lasix prior to start of blood transfusion, patient was also given 30 g of PO kayexalate in the ED  ? No EKG changes noted  ? Appreciate nephrology     · Hypomagnesemia  ? Likely secondary to GI losses and chronic diuretic use  ? Patient received 2g IV in ER, will likely need more  Will repeat blood work after blood transfusions      · Clostridium Difficile infection  ? Started PO Vanco on 11/[facility] at [facility], patient states he is still having liquid diarrhea however it has improved since he started treatment  ?  Continue PO vanco   Will need full 14 day course  ? I/O   Entire plan of care discussed with my attending Dr Horacio He for Additional Problems:   · Atrial Fibrillation  ? INR subtherapeutic, 1 54 on admission  ? Will continue to hold and will resume as long as Hbg remains stable after transfusions       · Psychosis  ? Stable, patient had recent [facility] admission  ? Continue Seroquel   ? Monitor     · Chronic systolic congestive heart failure  ? No evidence of acute exacerbation  ? Patient takes torsemide and metolazone at home, continue       · CAD  ? Continue statin  ? On ASA, but will hold for now  Can resume if hgb stable      · HTN  ? Blood pressures controlled  ? Continue norvasc           VTE Prophylaxis: Warfarin (Coumadin)  / sequential compression device   Code Status: DNR 3  POLST: POLST form is not discussed and not completed at this time      Anticipated Length of Stay:  Patient will be admitted on an Inpatient basis with an anticipated length of stay of  Greater than 2 midnights  Justification for Hospital Stay: blood transfusion, nephrology consult, monitor labs         Admission Orders:  Scheduled Meds:   atorvastatin 10 mg Oral Daily   calcium carbonate 1,000 mg Oral BID   cholecalciferol 2,000 Units Oral Daily   epoetin sunday 10,000 Units Intravenous Weekly   ferrous sulfate 325 mg Oral Daily With Breakfast   heparin (porcine) 5,000 Units Subcutaneous Q8H [address]   magnesium sulfate 2 g Intravenous BID   pantoprazole 40 mg Oral Early Morning   QUEtiapine 50 mg Oral HS   terazosin 1 mg Oral HS   vancomycin 125 mg Oral Q6H [address]     Continuous Infusions:    PRN Meds:   acetaminophen    ondansetron     Reg diet   Up and OOB as jessika   Daily weight   I&O   12/1 cbc   12/2  Cbc , ferritin, folate , cmp , Fe asat% , phos, mg ,PTH  Vit B12, Vit D   Isolation   SCD  Tele     Nephrology consult 11/30  CKD stage 5:  Slowly progressing    He does have a AV fistula which is not ready to use and also has stenosis    Anemia:  He had blood transfusion but hemoglobin is continuously dropping  Will get blood transfusion with diuretic  Will advise GI to see him   Hypomagnesemia:  Etiology unclear may be due to GI loss  Being replaced and will monitor   Hyperkalemia:  Had Kayexalate and better now   I will continue to monitor him  He does not need dialysis on an urgent basis so will continue to monitor    Internal med progress note  12/1   Assessment:   Principal Problem:    Anemia  Active Problems:    CKD (chronic kidney disease), stage IV (HCC)    Edema    CHF (congestive heart failure) (HCC)    Paroxysmal atrial fibrillation (HCC)    Weakness generalized    CAD (coronary artery disease)    Hypocalcemia    Warfarin anticoagulation    Hypomagnesemia    Abnormal laboratory test   Plan:   1  Anemia, most likely secondary chronic disease, occult blood is negative, the patient as per outpatient records did receive transfusion hemoglobin had not stabilized which was the reason he was admitted received 2 units of PRBC CBC this morning is pending  2  CKD stage 5, 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, vascular has been consulted by Renal, this time no urgent indication for renal replacement therapy  3  Hyperkalemia setting of CKD stage 5, now has normalized, with Kayexalate and Lasix  4  Paroxysmal atrial fibrillation continue to hold Coumadin blood time hemoglobin stabilizes will put him on DVT prophylaxis toe   5  Hypo magnesemia setting of diarrhea, intractable, total of 4 g of magnesium IV today, would ideally want to put him on oral magnesium but in the setting of diarrhea that wouldn't be feasible  6  C diff, present on arrival, started on vancomycin oral, will continue a total of 14 day course as per report started November 22  7   Benign essential hypertension blood pressure systolic in the 95B hold Lasix metolazone in the setting of diarrhea, also will hold amlodipine    VTE Pharmacologic Prophylaxis: Pharmacologic: Heparin  Mechanical VTE Prophylaxis in Place: Yes   Patient Centered Rounds: I have performed bedside rounds with nursing staff today    Discussions with Specialists or Other Care Team Provider: y   Education and Discussions with Family / Patient: y   Time Spent for Care: 20 minutes  More than 50% of total time spent on counseling and coordination of care as described above  Current Length of Stay: 1 day(s)   Current Patient Status: Inpatient   Certification Statement: The patient will continue to require additional inpatient hospital stay due to anemia      Nephrology progress note 12/1   CKD stage 5:  Stable at creatinine of 5    No acute indication for dialysis yet   Anemia:  Status post blood transfusion repeat CBC pending   Will check iron study and start him on Procrit   Hyperkalemia:  Resolved    Hypomagnesemia:  Being replaced likely GI loss

## 2017-12-02 LAB
25(OH)D3 SERPL-MCNC: 32.6 NG/ML (ref 30–100)
ALBUMIN SERPL BCP-MCNC: 2.8 G/DL (ref 3.5–5)
ALP SERPL-CCNC: 60 U/L (ref 46–116)
ALT SERPL W P-5'-P-CCNC: 19 U/L (ref 12–78)
ANION GAP SERPL CALCULATED.3IONS-SCNC: 18 MMOL/L (ref 4–13)
AST SERPL W P-5'-P-CCNC: 17 U/L (ref 5–45)
BILIRUB SERPL-MCNC: 0.7 MG/DL (ref 0.2–1)
BUN SERPL-MCNC: 110 MG/DL (ref 5–25)
CALCIUM SERPL-MCNC: 7.9 MG/DL (ref 8.3–10.1)
CHLORIDE SERPL-SCNC: 104 MMOL/L (ref 100–108)
CO2 SERPL-SCNC: 17 MMOL/L (ref 21–32)
CREAT SERPL-MCNC: 4.97 MG/DL (ref 0.6–1.3)
ERYTHROCYTE [DISTWIDTH] IN BLOOD BY AUTOMATED COUNT: 17.5 % (ref 11.6–15.1)
FERRITIN SERPL-MCNC: 1304 NG/ML (ref 8–388)
FOLATE SERPL-MCNC: >20 NG/ML (ref 3.1–17.5)
GFR SERPL CREATININE-BSD FRML MDRD: 10 ML/MIN/1.73SQ M
GLUCOSE SERPL-MCNC: 96 MG/DL (ref 65–140)
HCT VFR BLD AUTO: 25.7 % (ref 36.5–49.3)
HGB BLD-MCNC: 8.7 G/DL (ref 12–17)
IRON SATN MFR SERPL: 14 %
IRON SERPL-MCNC: 27 UG/DL (ref 65–175)
MAGNESIUM SERPL-MCNC: 1.8 MG/DL (ref 1.6–2.6)
MCH RBC QN AUTO: 29.4 PG (ref 26.8–34.3)
MCHC RBC AUTO-ENTMCNC: 33.9 G/DL (ref 31.4–37.4)
MCV RBC AUTO: 87 FL (ref 82–98)
PHOSPHATE SERPL-MCNC: 3.6 MG/DL (ref 2.3–4.1)
PLATELET # BLD AUTO: 86 THOUSANDS/UL (ref 149–390)
POTASSIUM SERPL-SCNC: 4.1 MMOL/L (ref 3.5–5.3)
PROT SERPL-MCNC: 7 G/DL (ref 6.4–8.2)
PTH-INTACT SERPL-MCNC: 185.5 PG/ML (ref 14–72)
RBC # BLD AUTO: 2.96 MILLION/UL (ref 3.88–5.62)
SODIUM SERPL-SCNC: 139 MMOL/L (ref 136–145)
TIBC SERPL-MCNC: 195 UG/DL (ref 250–450)
VIT B12 SERPL-MCNC: 2451 PG/ML (ref 100–900)
WBC # BLD AUTO: 10.27 THOUSAND/UL (ref 4.31–10.16)

## 2017-12-02 PROCEDURE — 83010 ASSAY OF HAPTOGLOBIN QUANT: CPT | Performed by: INTERNAL MEDICINE

## 2017-12-02 PROCEDURE — 83970 ASSAY OF PARATHORMONE: CPT | Performed by: INTERNAL MEDICINE

## 2017-12-02 PROCEDURE — 84100 ASSAY OF PHOSPHORUS: CPT | Performed by: INTERNAL MEDICINE

## 2017-12-02 PROCEDURE — 83550 IRON BINDING TEST: CPT | Performed by: INTERNAL MEDICINE

## 2017-12-02 PROCEDURE — 85027 COMPLETE CBC AUTOMATED: CPT | Performed by: INTERNAL MEDICINE

## 2017-12-02 PROCEDURE — 82607 VITAMIN B-12: CPT | Performed by: INTERNAL MEDICINE

## 2017-12-02 PROCEDURE — 80053 COMPREHEN METABOLIC PANEL: CPT | Performed by: INTERNAL MEDICINE

## 2017-12-02 PROCEDURE — 83540 ASSAY OF IRON: CPT | Performed by: INTERNAL MEDICINE

## 2017-12-02 PROCEDURE — 82306 VITAMIN D 25 HYDROXY: CPT | Performed by: INTERNAL MEDICINE

## 2017-12-02 PROCEDURE — 82746 ASSAY OF FOLIC ACID SERUM: CPT | Performed by: INTERNAL MEDICINE

## 2017-12-02 PROCEDURE — 83735 ASSAY OF MAGNESIUM: CPT | Performed by: PHYSICIAN ASSISTANT

## 2017-12-02 PROCEDURE — 82728 ASSAY OF FERRITIN: CPT | Performed by: INTERNAL MEDICINE

## 2017-12-02 RX ORDER — WARFARIN SODIUM 5 MG/1
5 TABLET ORAL
Status: DISCONTINUED | OUTPATIENT
Start: 2017-12-02 | End: 2017-12-07 | Stop reason: HOSPADM

## 2017-12-02 RX ORDER — MAGNESIUM SULFATE HEPTAHYDRATE 40 MG/ML
2 INJECTION, SOLUTION INTRAVENOUS ONCE
Status: COMPLETED | OUTPATIENT
Start: 2017-12-02 | End: 2017-12-04

## 2017-12-02 RX ADMIN — TERAZOSIN HYDROCHLORIDE 1 MG: 1 CAPSULE ORAL at 21:48

## 2017-12-02 RX ADMIN — PANTOPRAZOLE SODIUM 40 MG: 40 TABLET, DELAYED RELEASE ORAL at 06:00

## 2017-12-02 RX ADMIN — VANCOMYCIN 125 MG: KIT at 23:51

## 2017-12-02 RX ADMIN — ANTACID TABLETS 1000 MG: 500 TABLET, CHEWABLE ORAL at 17:25

## 2017-12-02 RX ADMIN — VITAMIN D, TAB 1000IU (100/BT) 2000 UNITS: 25 TAB at 08:53

## 2017-12-02 RX ADMIN — ANTACID TABLETS 1000 MG: 500 TABLET, CHEWABLE ORAL at 08:53

## 2017-12-02 RX ADMIN — VANCOMYCIN 125 MG: KIT at 06:00

## 2017-12-02 RX ADMIN — QUETIAPINE FUMARATE 50 MG: 25 TABLET, FILM COATED ORAL at 21:48

## 2017-12-02 RX ADMIN — WARFARIN SODIUM 5 MG: 5 TABLET ORAL at 17:24

## 2017-12-02 RX ADMIN — VANCOMYCIN 125 MG: KIT at 11:19

## 2017-12-02 RX ADMIN — MAGNESIUM SULFATE HEPTAHYDRATE 2 G: 40 INJECTION, SOLUTION INTRAVENOUS at 09:37

## 2017-12-02 RX ADMIN — VANCOMYCIN 125 MG: KIT at 17:25

## 2017-12-02 RX ADMIN — HEPARIN SODIUM 5000 UNITS: 5000 INJECTION, SOLUTION INTRAVENOUS; SUBCUTANEOUS at 21:48

## 2017-12-02 RX ADMIN — Medication 325 MG: at 08:53

## 2017-12-02 RX ADMIN — HEPARIN SODIUM 5000 UNITS: 5000 INJECTION, SOLUTION INTRAVENOUS; SUBCUTANEOUS at 13:36

## 2017-12-02 RX ADMIN — ATORVASTATIN CALCIUM 10 MG: 10 TABLET, FILM COATED ORAL at 08:52

## 2017-12-02 RX ADMIN — HEPARIN SODIUM 5000 UNITS: 5000 INJECTION, SOLUTION INTRAVENOUS; SUBCUTANEOUS at 06:00

## 2017-12-02 NOTE — PROGRESS NOTES
[facility] Internal Medicine Progress Note  Patient: Ian Light 80 y o  male   MRN: [id number]  PCP: Senia Workman MD  Unit/Bed#: MS 324Kat Encounter: [id number]  Date Of Visit: 12/02/17    Assessment:    Principal Problem:    Anemia  Active Problems:    CKD (chronic kidney disease), stage IV (HCC)    Edema    CHF (congestive heart failure) (HCC)    Paroxysmal atrial fibrillation (HCC)    Weakness generalized    CAD (coronary artery disease)    Hypocalcemia    Warfarin anticoagulation    Hypomagnesemia    Abnormal laboratory test      Plan:  1  Anemia, secondary to chronic disease, occult blood negative, hemoglobin has now picked up to 8 7 monitor CBC, no further transfusions today, hold off on IV iron since he received blood, oral iron supplementation  2   Chronic kidney disease stage 5, will discuss with renal regarding vascular input for AV fistula anastomosis stenosis  3   Cardiomyopathy, EF in April was 45%, his not on a beta-blocker due to history of bradycardia  4   Bioprosthetic aortic valve, echocardiogram reviewed  Moderate to severe MR   5   Paroxysmal atrial fibrillation, will restart Coumadin today since hemoglobin stable, INR goal 2-3 continue heparin for DVT prophylaxis dose to the time INR up trends  6  Chronic systolic congestive heart failure currently compensated, continue to monitor  7   C diff, present on arrival continue vancomycin total of 14 day course, started on November 22nd  8  Benign essential hypertension blood pressure systolic is now stable, seems euvolemic, will hold diuretics today  9  Hypomagnesemia setting of diarrhea, telemetry events noted, replete magnesium up trending now since diarrhea resolving, give 2 more g today    10   Hyperkalemia setting of CKD stage 5 and resolved       VTE Pharmacologic Prophylaxis:   Pharmacologic: Heparin  Mechanical VTE Prophylaxis in Place: Yes    Patient Centered Rounds: I have performed bedside rounds with nursing staff today     Discussions with Specialists or Other Care Team Provider: y    Education and Discussions with Family / Patient: y    Time Spent for Care: 20 minutes  More than 50% of total time spent on counseling and coordination of care as described above  Current Length of Stay: 2 day(s)    Current Patient Status: Inpatient   Certification Statement: The patient will continue to require additional inpatient hospital stay due to anemia        Code Status: Level 3 - DNAR and DNI      Subjective:   Very pleasant elderly gentleman comfortably sitting on the chair no acute distress    Objective:     Vitals:   Temp (24hrs), Av 1 °F (36 7 °C), Min:97 5 °F (36 4 °C), Max:98 7 °F (37 1 °C)    HR:  [74-82] 77  Resp:  [18] 18  BP: (128-141)/(58-63) 139/63  SpO2:  [97 %-99 %] 99 %  Body mass index is 23 41 kg/m²  Input and Output Summary (last 24 hours): Intake/Output Summary (Last 24 hours) at 17 0858  Last data filed at 17 1800   Gross per 24 hour   Intake              240 ml   Output              500 ml   Net             -260 ml       Physical Exam:     Physical Exam   Constitutional: He is oriented to person, place, and time  No distress  HENT:   Head: Normocephalic  Eyes: Pupils are equal, round, and reactive to light  Neck: No tracheal deviation present  Cardiovascular: Normal rate  No murmur heard  Pulmonary/Chest: No respiratory distress  Abdominal: He exhibits no distension  Musculoskeletal: He exhibits no edema  Neurological: He is alert and oriented to person, place, and time  Skin: He is not diaphoretic         Additional Data:     Labs:      Results from last 7 days  Lab Units 17  0505 17  1310   WBC Thousand/uL 10 27* 8 54   HEMOGLOBIN g/dL 8 7* 7 0*   HEMATOCRIT % 25 7* 20 4*   PLATELETS Thousands/uL 86* 100*   NEUTROS PCT %  --  78*   LYMPHS PCT %  --  10*   MONOS PCT %  --  6   EOS PCT %  --  0       Results from last 7 days  Lab Units 17  0505 SODIUM mmol/L 139   POTASSIUM mmol/L 4 1   CHLORIDE mmol/L 104   CO2 mmol/L 17*   BUN mg/dL 110*   CREATININE mg/dL 4 97*   CALCIUM mg/dL 7 9*   TOTAL PROTEIN g/dL 7 0   BILIRUBIN TOTAL mg/dL 0 70   ALK PHOS U/L 60   ALT U/L 19   AST U/L 17   GLUCOSE RANDOM mg/dL 96       Results from last 7 days  Lab Units 11/30/17  1310   INR  1 54*       * I Have Reviewed All Lab Data Listed Above  * Additional Pertinent Lab Tests Reviewed: [facility] Admission Reviewed    Imaging:    Recent Cultures (last 7 days):           Last 24 Hours Medication List:     atorvastatin 10 mg Oral Daily   calcium carbonate 1,000 mg Oral BID   cholecalciferol 2,000 Units Oral Daily   epoetin sunday 10,000 Units Intravenous Weekly   ferrous sulfate 325 mg Oral Daily With Breakfast   heparin (porcine) 5,000 Units Subcutaneous Q8H [address]   magnesium sulfate 2 g Intravenous Once   pantoprazole 40 mg Oral Early Morning   QUEtiapine 50 mg Oral HS   terazosin 1 mg Oral HS   vancomycin 125 mg Oral Q6H [address]   warfarin 5 mg Oral Daily (warfarin)        Today, Patient Was Seen By: Adrian Means MD    ** Please Note: Dragon 360 Dictation voice to text software may have been used in the creation of this document   **

## 2017-12-02 NOTE — PHYSICIAN ADVISOR
This patient is a 80 y o  y/o male who is admitted to the hospital for Abnormal Lab (pt asymptomatic; denies CP, SOB  Results from PCP sent with pt ; low H&H, Mg)   The patient presented to the ED on 11/30/17 at 1140 and was admitted to the hospital on 11/30/2017 1141  History of Present Illness includes: The patient had a recent admission at [facility] on October 25  The patient presented to the hospital secondary to abnormal labs  He has a history of stage V chronic kidney disease  He presented with a hemoglobin of seven as well as a creatinine of 5 5  The patient did complain of fatigue  Vital signs in the ER are as follows   ED Triage Vitals [11/30/17 1151]   Temperature Pulse Respirations Blood Pressure SpO2   97 6 °F (36 4 °C) 67 16 136/60 99 %      Temp Source Heart Rate Source Patient Position - Orthostatic VS BP Location FiO2 (%)   Oral Monitor Lying Right arm --      Pain Score       No Pain         On exam, the patient is bilateral lower extremity edema  There is generalized ecchymosis  He has a left upper arm fistula  Hemoglobin seven, potassium is 5 5, creatinine is 5 5 and BUN is 111  This patient is being admitted secondary to anemia as well as acute kidney injury on stage V chronic kidney disease  The plan of care includes transfusion, G I  consultation of hemoglobin does not stabilize, repeat hemoglobin level, repeat creatinine level  The patient may require initiation of dialysis depending on the patient's status in the patient's labs  Nephrology will see the patient in consultation  This patient is appropriate for inpatient admission as his length of stay is expected to be a least two midnights  Continued hospitalization is necessary to ensure stabilization of his clinical status this patient of advanced age and significant comorbidities including acute on chronic anemia as well as acute kidney injury and stage V chronic kidney disease   This admission is unrelated to his prior admission for which he was hospitalized in [facility]  These two admissions are completely unrelated to one another

## 2017-12-02 NOTE — PROGRESS NOTES
NEPHROLOGY PROGRESS NOTE    Patient: Tressa Brandon               Sex: male          DOA: 11/30/2017 11:41 AM   YOB: 1935        Age:  80 y o         LOS:  LOS: 2 days       HPI     Patient with stage 5 CKD admitted the hospital with anemia and hypomagnesemia    SUBJECTIVE     Feeling better  Had episode of ventricular tachycardia last night    Feeling quite better    CURRENT MEDICATIONS       Current Facility-Administered Medications:     acetaminophen (TYLENOL) tablet 650 mg, 650 mg, Oral, Q6H PRN, Estefany M Terence, CRNP    atorvastatin (LIPITOR) tablet 10 mg, 10 mg, Oral, Daily, Estefany M Terence, CRNP, 10 mg at 12/02/17 7748    calcium carbonate (TUMS) chewable tablet 1,000 mg, 1,000 mg, Oral, BID, Estefany M Terence, CRNP, 1,000 mg at 12/02/17 8549    cholecalciferol (VITAMIN D3) tablet 2,000 Units, 2,000 Units, Oral, Daily, [employer], CRNP, 2,000 Units at 12/02/17 0880    epoetin sunday (EPOGEN,PROCRIT) injection 10,000 Units, 10,000 Units, Intravenous, Weekly, Gagandeep Torres MD, 10,000 Units at 12/01/17 1427    ferrous sulfate tablet 325 mg, 325 mg, Oral, Daily With Breakfast, Yesi Chaveze, CRNP, 325 mg at 12/02/17 0853    heparin (porcine) subcutaneous injection 5,000 Units, 5,000 Units, Subcutaneous, Q8H [address], Arlen Ford MD, 5,000 Units at 12/02/17 0600    magnesium sulfate 2 g/50 mL IVPB (premix) 2 g, 2 g, Intravenous, Once, Arlen Ford MD, 2 g at 12/02/17 0937    ondansetron (ZOFRAN) injection 4 mg, 4 mg, Intravenous, Q6H PRN, Estefany M Terence, CRNP    pantoprazole (PROTONIX) EC tablet 40 mg, 40 mg, Oral, Early Morning, Estefany M Terence, CRNP, 40 mg at 12/02/17 0600    QUEtiapine (SEROquel) tablet 50 mg, 50 mg, Oral, HS, Estefany M Terence, CRNP, 50 mg at 12/01/17 2211    terazosin (HYTRIN) capsule 1 mg, 1 mg, Oral, HS, JUVENCIO Grewal, 1 mg at 12/01/17 2211    vancomycin (VANCOCIN) oral solution 125 mg, 125 mg, Oral, Q6H NELIDA, JUVENCIO Grewal, 125 mg at 12/02/17 0600   warfarin (COUMADIN) tablet 5 mg, 5 mg, Oral, Daily (warfarin), Charlee Lucas MD    OBJECTIVE     Current Weight: Weight - Scale: 78 3 kg (172 lb 9 9 oz)  Vitals:    12/02/17 0700   BP: 139/63   Pulse: 77   Resp: 18   Temp: 97 5 °F (36 4 °C)   SpO2: 99%       Intake/Output Summary (Last 24 hours) at 12/02/17 1047  Last data filed at 12/01/17 1800   Gross per 24 hour   Intake              240 ml   Output              300 ml   Net              -60 ml       PHYSICAL EXAMINATION     Physical Exam:   Eyes:  Pupils equally react to light  ENT:  Moist tongue  Gen:  Not in any acute distress  Neck:  Supple  Chest:  Clear  Cor:  S1-S2 normal  Abdomen:  Soft nontender  Ext:  No swelling  Neuro:  Awake and alert  Skin:  Decreased skin turgor      LAB RESULTS       Results from last 7 days  Lab Units 12/02/17  0505 12/01/17  1730 12/01/17  0531 11/30/17  1310   WBC Thousand/uL 10 27*  --   --  8 54   HEMOGLOBIN g/dL 8 7*  --   --  7 0*   HEMATOCRIT % 25 7*  --   --  20 4*   PLATELETS Thousands/uL 86*  --   --  100*   SODIUM mmol/L 139  --  138 140   POTASSIUM mmol/L 4 1  --  4 5 5 5*   CHLORIDE mmol/L 104  --  106 108   CO2 mmol/L 17*  --  16* 18*   BUN mg/dL 110*  --  114* 111*   CREATININE mg/dL 4 97*  --  5 14* 5 51*   CALCIUM mg/dL 7 9*  --  7 1* 7 3*   MAGNESIUM mg/dL 1 8 1 7 0 6* 0 5*   PHOSPHORUS mg/dL 3 6  --   --   --    ALBUMIN g/dL 2 8*  --  2 5* 2 8*   TOTAL PROTEIN g/dL 7 0  --  6 3* 6 6   GLUCOSE RANDOM mg/dL 96  --  113 100       RADIOLOGY RESULTS      Results for orders placed during the hospital encounter of 10/22/15   XR chest portable    Narrative EXAM ROOM = [facility] [place] [facility])   EXAM START = [id number]   EXAM STOP = [id number]   FILMS USED = 1 VIEWS      CHEST       INDICATION-  Status post cardiac surgery      COMPARISON-  10/22/2015 chest x-ray  VIEWS-   AP frontal^  1 image      FINDINGS-  Stable findings status post endovascular repair of ascending   aortic aneurysm    Prosthetic heart valve again seen       Cardiomediastinal silhouette appears unchanged  Atelectatic changes present in the left lung base  Visualized osseous structures appear within normal limits for the   patient's age  IMPRESSION-      Atelectatic changes in the left lung base  Transcribed on- [id number]            NATALYA Dos Santos MD        Reading Radiologist- NATALYA Young MD        Electronically Signed- NATALYA Young MD        Released Date Time- 10/23/15 1352    ------------------------------------------------------------------------------   READ BY = [id number]^MORAIMA LIU   RELEASED BY = [id number]^MORAIMA LIU      Results for orders placed during the hospital encounter of 10/25/17   XR chest 2 views    Narrative CHEST     INDICATION:  Altered mental status  COMPARISON:  10/19/2017    VIEWS:  Frontal and lateral projections    IMAGES:  3    FINDINGS:         Cardiomediastinal silhouette is stable, including cardiomegaly, aortic valve prosthesis and thoracic aortic stent graft  The lungs are clear  Stable small left pleural effusion       Sternal wires are present  Visualized osseous structures appear otherwise unremarkable for the patient's age  Impression No acute pulmonary disease  Persistent small left pleural effusion  Workstation performed: [id number]       No results found for this or any previous visit  No results found for this or any previous visit  No results found for this or any previous visit  Results for orders placed in visit on 02/21/14   US retroperitoneal complete    Narrative RENAL ULTRASOUND   INDICATION- Acute kidney insufficiency  Chronic kidney disease  COMPARISON- 04/18/2012   TECHNIQUE-   Ultrasound of the retroperitoneum was performed with a   curvilinear transducer utilizing volumetric sweeps and still imaging   techniques  FINDINGS-   KIDNEYS-   Right kidney-  10 1 cm  Left kidney-  10 8 cm  Symmetric and normal size     Normal echogenicity and contour  No suspicious masses detected  Small 1 1 cm cortical cyst in lower   pole of the right kidney   No hydronephrosis  0 5 cm nonobstructing intrarenal calculus in the lower pole of the   right kidney, unchanged the previous study   No perinephric fluid collections  URETERS-   Nondilated  BLADDER-    Normally distended  No focal thickening or mass lesions  IMPRESSION-    1  No hydronephrosis  2   Stable nonobstructing lower pole 5 mm right renal calculus   unchanged the previous study  Transcribed on- NATALYA Nath MD        Reading Radiologist- NATALYA Baltazar MD        Releasing Radiologist- NATALYA Baltazar MD        Released Date Time- 02/24/14 1611      ------------------------------------------------------------------------------   Luis Gonsales / RECOMMENDATIONS      CKD stage 5:  Stable and asymptomatic  Has a fistula which is not ready yet    Anemia:  Status post blood transfusion  Also give him Procrit    Hypomagnesemia:  Normal at this point with replacement will continue to monitor    Will continue to follow    Ernesto Beal MD  Nephrology  12/2/2017        Portions of the record may have been created with voice recognition software  Occasional wrong word or "sound a like" substitutions may have occurred due to the inherent limitations of voice recognition software  Read the chart carefully and recognize, using context, where substitutions have occurred

## 2017-12-03 LAB
ANION GAP SERPL CALCULATED.3IONS-SCNC: 15 MMOL/L (ref 4–13)
BUN SERPL-MCNC: 104 MG/DL (ref 5–25)
CALCIUM SERPL-MCNC: 8.4 MG/DL (ref 8.3–10.1)
CHLORIDE SERPL-SCNC: 105 MMOL/L (ref 100–108)
CO2 SERPL-SCNC: 18 MMOL/L (ref 21–32)
CREAT SERPL-MCNC: 4.55 MG/DL (ref 0.6–1.3)
ERYTHROCYTE [DISTWIDTH] IN BLOOD BY AUTOMATED COUNT: 17.5 % (ref 11.6–15.1)
GFR SERPL CREATININE-BSD FRML MDRD: 11 ML/MIN/1.73SQ M
GLUCOSE SERPL-MCNC: 100 MG/DL (ref 65–140)
HAPTOGLOB SERPL-MCNC: 74 MG/DL (ref 34–200)
HCT VFR BLD AUTO: 22.4 % (ref 36.5–49.3)
HGB BLD-MCNC: 7.7 G/DL (ref 12–17)
MAGNESIUM SERPL-MCNC: 1.9 MG/DL (ref 1.6–2.6)
MCH RBC QN AUTO: 29.3 PG (ref 26.8–34.3)
MCHC RBC AUTO-ENTMCNC: 33.8 G/DL (ref 31.4–37.4)
MCV RBC AUTO: 87 FL (ref 82–98)
PLATELET # BLD AUTO: 75 THOUSANDS/UL (ref 149–390)
POTASSIUM SERPL-SCNC: 3.5 MMOL/L (ref 3.5–5.3)
RBC # BLD AUTO: 2.59 MILLION/UL (ref 3.88–5.62)
SODIUM SERPL-SCNC: 138 MMOL/L (ref 136–145)
WBC # BLD AUTO: 6.64 THOUSAND/UL (ref 4.31–10.16)

## 2017-12-03 PROCEDURE — G8979 MOBILITY GOAL STATUS: HCPCS

## 2017-12-03 PROCEDURE — 83735 ASSAY OF MAGNESIUM: CPT | Performed by: INTERNAL MEDICINE

## 2017-12-03 PROCEDURE — 85027 COMPLETE CBC AUTOMATED: CPT | Performed by: INTERNAL MEDICINE

## 2017-12-03 PROCEDURE — G8978 MOBILITY CURRENT STATUS: HCPCS

## 2017-12-03 PROCEDURE — 80048 BASIC METABOLIC PNL TOTAL CA: CPT | Performed by: INTERNAL MEDICINE

## 2017-12-03 PROCEDURE — 97163 PT EVAL HIGH COMPLEX 45 MIN: CPT

## 2017-12-03 RX ADMIN — ANTACID TABLETS 1000 MG: 500 TABLET, CHEWABLE ORAL at 08:25

## 2017-12-03 RX ADMIN — HEPARIN SODIUM 5000 UNITS: 5000 INJECTION, SOLUTION INTRAVENOUS; SUBCUTANEOUS at 06:51

## 2017-12-03 RX ADMIN — VANCOMYCIN 125 MG: KIT at 12:28

## 2017-12-03 RX ADMIN — Medication 325 MG: at 08:25

## 2017-12-03 RX ADMIN — WARFARIN SODIUM 5 MG: 5 TABLET ORAL at 17:15

## 2017-12-03 RX ADMIN — HEPARIN SODIUM 5000 UNITS: 5000 INJECTION, SOLUTION INTRAVENOUS; SUBCUTANEOUS at 14:16

## 2017-12-03 RX ADMIN — ANTACID TABLETS 1000 MG: 500 TABLET, CHEWABLE ORAL at 17:15

## 2017-12-03 RX ADMIN — VANCOMYCIN 125 MG: KIT at 17:15

## 2017-12-03 RX ADMIN — ATORVASTATIN CALCIUM 10 MG: 10 TABLET, FILM COATED ORAL at 08:25

## 2017-12-03 RX ADMIN — VANCOMYCIN 125 MG: KIT at 23:29

## 2017-12-03 RX ADMIN — PANTOPRAZOLE SODIUM 40 MG: 40 TABLET, DELAYED RELEASE ORAL at 06:51

## 2017-12-03 RX ADMIN — VITAMIN D, TAB 1000IU (100/BT) 2000 UNITS: 25 TAB at 08:25

## 2017-12-03 RX ADMIN — VANCOMYCIN 125 MG: KIT at 06:51

## 2017-12-03 RX ADMIN — TERAZOSIN HYDROCHLORIDE 1 MG: 1 CAPSULE ORAL at 21:48

## 2017-12-03 RX ADMIN — QUETIAPINE FUMARATE 50 MG: 25 TABLET, FILM COATED ORAL at 21:48

## 2017-12-03 RX ADMIN — HEPARIN SODIUM 5000 UNITS: 5000 INJECTION, SOLUTION INTRAVENOUS; SUBCUTANEOUS at 21:47

## 2017-12-03 NOTE — PLAN OF CARE
Problem: PHYSICAL THERAPY ADULT  Goal: Performs mobility at highest level of function for planned discharge setting  See evaluation for individualized goals  Treatment/Interventions: Functional transfer training, LE strengthening/ROM, Elevations, Therapeutic exercise, Endurance training, Patient/family training, Equipment eval/education, Bed mobility, Gait training, Spoke to nursing, Spoke to case management  Equipment Recommended: Other (Comment) (trial RW next session)       See flowsheet documentation for full assessment, interventions and recommendations  Prognosis: Good  Problem List: Decreased strength, Decreased endurance, Impaired balance, Decreased mobility, Decreased cognition, Decreased safety awareness  Assessment: Pt is 80 y o  male seen for PT evaluation on 12/3/2017 s/p admit to [facility] on 11/30/2017 w/ Anemia  Note pt was admitted to [facility] for AMS and was admitted to [facility] s/p disorientation, hallucinating, agitation- was d/c'ed to [facility] on 11/7/17  PT consulted to assess pt's functional mobility and d/c needs  Order placed for PT eval and tx, w/ up and OOB as tolerated order  Comorbidities affecting pt's physical performance at time of assessment include: CHF, psychosis, anemia, A fib on coumadin, CKD stage 5, arthritis, GERD, HLD, HTN, renal disorder  PTA, pt was independent w/ all functional mobility w/ RW prn, ambulates community distances and elevations, has 1 BRAYDEN, lives w/ wife in 2 (1st floor set up) level home and retired  Personal factors affecting pt at time of IE include: inaccessible home environment, lives in 2 story house, stairs to enter home, inability to navigate community distances, inability to navigate level surfaces w/o external assistance, limited home support, positive fall history, decreased initiation and engagement, compliance and limited insight into impairments   Please find objective findings from PT assessment regarding body systems outlined above with impairments and limitations including weakness, impaired balance, decreased endurance, gait deviations, decreased activity tolerance, decreased safety awareness and fall risk, as well as mobility assessment (need for CGA assist w/ all phases of mobility when usually ambulating independently and need for cueing for mobility technique)  The following objective measures performed on IE also reveal limitations: Barthel Index: 50/100 and Modified [place]: 4 (moderate/severe disability)  Pt's clinical presentation is currently unstable/unpredictable seen in pt's presentation of need for input for task focus and mobility technique, need for CGA assist w/ all phases of mobility when usually mobilizing independently, on telemetry monitoring, unstable medical status and anemia  Pt to benefit from continued PT tx to address deficits as defined above and maximize level of functional independent mobility and consistency  From PT/mobility standpoint, recommendation at time of d/c would be STR pending progress in order to facilitate return to PLOF  Barriers to Discharge: Decreased caregiver support, Inaccessible home environment     Recommendation: Short-term skilled PT, Post acute IP rehab     PT - OK to Discharge: Yes (when medically cleared if to STR)    See flowsheet documentation for full assessment

## 2017-12-03 NOTE — PHYSICAL THERAPY NOTE
Physical Therapy Evaluation    Patient's Name: Anayeli Rizo    Admitting Diagnosis  Hyperkalemia [E87 5]  Hypomagnesemia [E83 42]  Renal failure [N19]  Anemia [D64 9]  CKD (chronic kidney disease), stage IV (HCC) [N18 4]  Abnormal laboratory test [R89 9]    Problem List  Patient Active Problem List   Diagnosis    CKD (chronic kidney disease), stage IV (HCC)    Edema    CHF (congestive heart failure) (HCC)    Paroxysmal atrial fibrillation (HCC)    Anemia    Cellulitis of extremity    ALLEN [facility]acute [facility]) ([facility] [address] [facility]    Azotemia    Hypertensive CKD (chronic kidney disease)    Weakness generalized    CAD (coronary artery disease)    Hypocalcemia    Warfarin anticoagulation    Hand swelling    Leukocytosis    Left upper extremity swelling    ALLEN (acute kidney injury) ([facility] [address] )    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 ([facility] [address] )    Hypomagnesemia    NSVT (nonsustained ventricular tachycardia) (HCC)    Psychosis, organic    Elevated TSH    Abnormal laboratory test       Past Medical History  Past Medical History:   Diagnosis Date    Aortic aneurysm (HCC)     Arthritis     GERD (gastroesophageal reflux disease)     Heart failure (HCC)     Hyperlipidemia     Hypertension     Irregular heart beat     afib    Psychiatric illness     Renal disorder        Past Surgical History  Past Surgical History:   Procedure Laterality Date    ABDOMINAL AORTIC ANEURYSM REPAIR, OPEN      CARDIAC SURGERY      COLONOSCOPY      HERNIA REPAIR      PA ANASTOMOSIS,AV,ANY SITE Left 10/2/2017    Procedure: UPPER EXTREMITY AV FISTULA CREATION;  Surgeon: Terrance Sandhu MD;  Location: [facility];  Service: Vascular    VALVE REPLACEMENT          12/03/17 0841   Note Type   Note type Eval/Treat   Pain Assessment   Pain Assessment No/denies pain   Pain Score No Pain   Home Living   Type of Home House   Home Layout Two level;Stairs to enter with rails; Performs ADLs on one level; Able to live on main level with bedroom/bathroom  (1st floor set up, 1 BRAYDEN)   Bathroom Shower/Tub Walk-in shower   Bathroom Toilet Raised   Bathroom Equipment Commode; Toilet raiser   [address]  (RW prn)   Prior Function   Level of [place] Independent with ADLs and functional mobility; Needs assistance with IADLs   Lives With Spouse   Receives Help From Family   ADL Assistance Independent  (per pt)   IADLs Needs assistance  (wife A w/ medications, cooking, cleaning)   Falls in the last 6 months 1 to 4   Vocational Retired   Comments Wife mostly A w/ transportation, pt can drive but he "doesn't care to"   Restrictions/Precautions   Weight Bearing Precautions Per Order No   Braces or Orthoses (none per pt)   Other Precautions Limb alert;Multiple lines;Contact/isolation;Cognitive   General   Additional Pertinent History [facility] undergoing IP rehab for ~3 weeks   Family/Caregiver Present No   Cognition   Overall Cognitive Status Impaired   Arousal/Participation Alert   Orientation Level Oriented X4   Following Commands Follows one step commands without difficulty   Comments pt agreeable to PT IE upon arrival   RUE Assessment   RUE Assessment WFL   RUE Strength   RUE Overall Strength Within Functional Limits - able to perform ADL tasks with strength   LUE Assessment   LUE Assessment WFL   LUE Strength   LUE Overall Strength Within Functional Limits - able to perform ADL tasks with strength   RLE Assessment   RLE Assessment WFL   Strength RLE   R Knee Extension 4-/5   R Hip Flexion 4-/5   LLE Assessment   LLE Assessment WFL   Strength LLE   L Hip Flexion 4-/5   L Knee Extension 4-/5   Coordination   Movements are Fluid and Coordinated 1   Sensation WFL   Light Touch   RLE Light Touch Grossly intact   LLE Light Touch Grossly intact   Bed Mobility   Additional Comments pt received seated at EOB upon arrival   Transfers   Sit to Stand 4  Minimal assistance  (CGA)   Additional items Assist x 1; Increased time required;Verbal cues   Stand to Sit 4  Minimal assistance  (CGA)   Additional items Assist x 1; Increased time required;Verbal cues   Ambulation/Elevation   Gait pattern Forward Flexion;Decreased foot clearance;Shuffling; Short stride; Step to  (lateral sway, veering, unsteady)   Gait Assistance 4  Minimal assist   Additional items Assist x 1;Verbal cues; Tactile cues   Assistive Device None   Distance 25'   Balance   Static Sitting Good   Dynamic Sitting Fair -   Static Standing Fair   Dynamic Standing Fair -   Ambulatory Poor +   Endurance Deficit   Endurance Deficit Yes   Activity Tolerance   Activity Tolerance Patient limited by fatigue   Medical Staff Made Aware yes spoke to [address] [place], [place] [address]yady Cadena concerning PT recs   Nurse Made Aware yes, RN Stewart verbalized pt appropriate to see, made aware of session outcome/recs   Assessment   Prognosis Good   Problem List Decreased strength;Decreased endurance; Impaired balance;Decreased mobility; Decreased cognition;Decreased safety awareness   Assessment Pt is 80 y o  male seen for PT evaluation on 12/3/2017 s/p [facility] on 11/30/2017 w/ Anemia  Note pt was admitted to [facility] for AMS and was admitted to [facility] s/p disorientation, hallucinating, agitation- was d/c'ed to [facility] on 11/7/17  PT consulted to assess pt's functional mobility and d/c needs  Order placed for PT eval and tx, w/ up and OOB as tolerated order  Comorbidities affecting pt's physical performance at time of assessment include: CHF, psychosis, anemia, A fib on coumadin, CKD stage 5, arthritis, GERD, HLD, HTN, renal disorder  PTA, pt was independent w/ all functional mobility w/ RW prn, ambulates community distances and elevations, has 1 BRAYDEN, lives w/ wife in 2 (1st floor set up) level home and retired   Personal factors affecting pt at time of IE include: inaccessible home environment, lives in 2 story house, stairs to enter home, inability to navigate community distances, inability to navigate level surfaces w/o external assistance, limited home support, positive fall history, decreased initiation and engagement, compliance and limited insight into impairments  Please find objective findings from PT assessment regarding body systems outlined above with impairments and limitations including weakness, impaired balance, decreased endurance, gait deviations, decreased activity tolerance, decreased safety awareness and fall risk, as well as mobility assessment (need for CGA assist w/ all phases of mobility when usually ambulating independently and need for cueing for mobility technique)  The following objective measures performed on IE also reveal limitations: Barthel Index: 50/100 and Modified [place]: 4 (moderate/severe disability)  Pt's clinical presentation is currently unstable/unpredictable seen in pt's presentation of need for input for task focus and mobility technique, need for CGA assist w/ all phases of mobility when usually mobilizing independently, on telemetry monitoring, unstable medical status and anemia  Pt to benefit from continued PT tx to address deficits as defined above and maximize level of functional independent mobility and consistency  From PT/mobility standpoint, recommendation at time of d/c would be STR pending progress in order to facilitate return to PLOF  Barriers to Discharge Decreased caregiver support; Inaccessible home environment   Goals   Patient Goals to return home   STG Expiration Date 12/13/17   Short Term Goal #1 In 7-10 days: Increase bilateral LE strength 1/2 grade to facilitate independent mobility, Perform all bed mobility tasks modified independent to decrease caregiver burden, Perform all transfers modified independent to improve independence, Ambulate > 150 ft  with least restrictive assistive device modified independent w/o LOB and w/ normalized gait pattern 100% of the time, Navigate 1 stairs modified independent with unilateral handrail to facilitate return to previous living environment, Increase all balance 1/2 grade to decrease risk for falls, Complete exercise program independently, Tolerate 4 hr OOB to faciliate upright tolerance and Improve Barthel Index score to 65 or greater to facilitate independence   Treatment Day 0   Plan   Treatment/Interventions Functional transfer training;LE strengthening/ROM; Elevations; Therapeutic exercise; Endurance training;Patient/family training;Equipment eval/education; Bed mobility;Gait training;Spoke to nursing;Spoke to case management   PT Frequency 5x/wk   Recommendation   Recommendation Short-term skilled PT;Post acute IP rehab   Equipment Recommended Other (Comment)  (trial RW next session)   PT - OK to Discharge Yes  (when medically cleared if to STR)   Modified [place] Scale   Modified [place] Scale 4   Barthel Index   Feeding 10   Bathing 0   Grooming Score 0   Dressing Score 5   Bladder Score 10   Bowels Score 10   Toilet Use Score 5   Transfers (Bed/Chair) Score 10   Mobility (Level Surface) Score 0   Stairs Score 0   Barthel Index Score 50         Clara Mallory, PT, DPT

## 2017-12-03 NOTE — PROGRESS NOTES
[facility] Progress Note  Patient: Kenyatta Arvizu 80 y o  male   MRN: [id number]  PCP: Juliet Nyhan, MD  Unit/Bed#: MS 324Kat Encounter: [id number]  Date Of Visit: 12/03/17    Assessment:    Principal Problem:    Anemia  Active Problems:    CKD (chronic kidney disease), stage IV (HCC)    Edema    CHF (congestive heart failure) (HCC)    Paroxysmal atrial fibrillation (HCC)    Weakness generalized    CAD (coronary artery disease)    Hypocalcemia    Warfarin anticoagulation    Hypomagnesemia    Abnormal laboratory test      Plan:-    1   Anemia secondary chronic disease most likely, occult blood negative drop in hemoglobin noted, this was happening outpatient as well discussed with his outpatient nephrologist, this time Hematology evaluation has been requested, discussed this in detail with the patient    2  Chronic kidney disease stage 5, AV fistula anastomosis stenosis, as per discussion the fistula could still be accessed if necessary, currently no urgent indication for RRT this can be done outpatient    3  Paroxysmal AFib Coumadin restarted, follow INR DVT prophylaxis dose of heparin in the meanwhile, occult blood was negative, check hemoglobin hematocrit in the evening    4  Chronic systolic heart failure compensated continue to monitor    5   C diff present on arrival continue vancomycin to complete the course started November 22nd    6  Benign essential hypertension systolic blood pressure now stable seems euvolemic I will hold diuretics today    7  Mild hypokalemia, will hold of potassium supplementation because of propensity to become hyperkalemic, monitor labs    8  Severe hypomagnesemia in the setting of diarrhea diarrhea is resolving magnesium has now stayed up close to 1 9 no supplementation today no oral supplementation because of presentation with diarrhea monitor labs tomorrow    9  Bioprosthetic aortic valve echocardiogram reviewed moderate to severe MR    10    Cardiomyopathy EF in April 45% not on beta-blocker due to history of bradycardia as per documentation           VTE Pharmacologic Prophylaxis:   Pharmacologic: Heparin  Mechanical VTE Prophylaxis in Place: Yes    Patient Centered Rounds: I have performed bedside rounds with nursing staff today  Discussions with Specialists or Other Care Team Provider: y    Education and Discussions with Family / Patient: y    Time Spent for Care:  18 minutes  More than 50% of total time spent on counseling and coordination of care as described above  Current Length of Stay: 3 day(s)    Current Patient Status: Inpatient   Certification Statement: The patient will continue to require additional inpatient hospital stay due to anemia        Code Status: Level 3 - DNAR and DNI      Subjective:  Very pleasant elderly male no acute distress at this time  Objective:     Vitals:   Temp (24hrs), Av 6 °F (36 4 °C), Min:97 3 °F (36 3 °C), Max:97 8 °F (36 6 °C)    HR:  [68-81] 71  Resp:  [17-18] 17  BP: (110-170)/(46-72) 170/72  SpO2:  [95 %-100 %] 95 %  Body mass index is 23 86 kg/m²  Input and Output Summary (last 24 hours):     No intake or output data in the 24 hours ending 17 0956    Physical Exam:     Physical Exam   Constitutional: He is oriented to person, place, and time  No distress  HENT:   Head: Normocephalic  Eyes: Pupils are equal, round, and reactive to light  Neck: No tracheal deviation present  Cardiovascular: Normal rate  No murmur heard  Pulmonary/Chest: No respiratory distress  Abdominal: He exhibits no distension  Musculoskeletal: He exhibits no edema  Neurological: He is alert and oriented to person, place, and time  Skin: He is not diaphoretic         Additional Data:     Labs:      Results from last 7 days  Lab Units 17  0510  17  1310   WBC Thousand/uL 6 64  < > 8 54   HEMOGLOBIN g/dL 7 7*  < > 7 0*   HEMATOCRIT % 22 4*  < > 20 4*   PLATELETS Thousands/uL 75*  < > 100*   NEUTROS PCT % --   --  78*   LYMPHS PCT %  --   --  10*   MONOS PCT %  --   --  6   EOS PCT %  --   --  0   < > = values in this interval not displayed  Results from last 7 days  Lab Units 12/03/17  0510 12/02/17  0505   SODIUM mmol/L 138 139   POTASSIUM mmol/L 3 5 4 1   CHLORIDE mmol/L 105 104   CO2 mmol/L 18* 17*   BUN mg/dL 104* 110*   CREATININE mg/dL 4 55* 4 97*   CALCIUM mg/dL 8 4 7 9*   TOTAL PROTEIN g/dL  --  7 0   BILIRUBIN TOTAL mg/dL  --  0 70   ALK PHOS U/L  --  60   ALT U/L  --  19   AST U/L  --  17   GLUCOSE RANDOM mg/dL 100 96       Results from last 7 days  Lab Units 11/30/17  1310   INR  1 54*       * I Have Reviewed All Lab Data Listed Above  * Additional Pertinent Lab Tests Reviewed: [facility] Admission Reviewed    Imaging:    Recent Cultures (last 7 days):           Last 24 Hours Medication List:     atorvastatin 10 mg Oral Daily   calcium carbonate 1,000 mg Oral BID   cholecalciferol 2,000 Units Oral Daily   epoetin sunday 10,000 Units Intravenous Weekly   ferrous sulfate 325 mg Oral Daily With Breakfast   heparin (porcine) 5,000 Units Subcutaneous Q8H [address]   pantoprazole 40 mg Oral Early Morning   QUEtiapine 50 mg Oral HS   terazosin 1 mg Oral HS   vancomycin 125 mg Oral Q6H [address]   warfarin 5 mg Oral Daily (warfarin)        Today, Patient Was Seen By: Scott Hawthorne MD    ** Please Note: Dragon 360 Dictation voice to text software may have been used in the creation of this document   **

## 2017-12-03 NOTE — PROGRESS NOTES
NEPHROLOGY PROGRESS NOTE    Patient: Shelbie Avina               Sex: male          DOA: 11/30/2017 11:41 AM   YOB: 1935        Age:  80 y o         LOS:  LOS: 3 days       HPI     Patient with stage 5 CKD and anemia    SUBJECTIVE     Awake and alert  Denies any complaint      CURRENT MEDICATIONS       Current Facility-Administered Medications:     acetaminophen (TYLENOL) tablet 650 mg, 650 mg, Oral, Q6H PRN, Estefany M Terence, CRNP    atorvastatin (LIPITOR) tablet 10 mg, 10 mg, Oral, Daily, Estefany M Terence, CRNP, 10 mg at 12/03/17 0825    calcium carbonate (TUMS) chewable tablet 1,000 mg, 1,000 mg, Oral, BID, Estefany M Terence, CRNP, 1,000 mg at 12/03/17 0825    cholecalciferol (VITAMIN D3) tablet 2,000 Units, 2,000 Units, Oral, Daily, Yesi Tire, CRNP, 2,000 Units at 12/03/17 0825    epoetin sunday (EPOGEN,PROCRIT) injection 10,000 Units, 10,000 Units, Intravenous, Weekly, Eileen Deal MD, 10,000 Units at 12/01/17 1427    ferrous sulfate tablet 325 mg, 325 mg, Oral, Daily With Breakfast, Estefany M Terence, CRNP, 325 mg at 12/03/17 0825    heparin (porcine) subcutaneous injection 5,000 Units, 5,000 Units, Subcutaneous, Q8H Latisha Black MD, 5,000 Units at 12/03/17 0651    ondansetron (ZOFRAN) injection 4 mg, 4 mg, Intravenous, Q6H PRN, Estefany M Terence, CRNP    pantoprazole (PROTONIX) EC tablet 40 mg, 40 mg, Oral, Early Morning, Estefany M Terence, CRNP, 40 mg at 12/03/17 0651    QUEtiapine (SEROquel) tablet 50 mg, 50 mg, Oral, HS, Estefany M Terence, CRNP, 50 mg at 12/02/17 2148    terazosin (HYTRIN) capsule 1 mg, 1 mg, Oral, HS, Estefany M Terence, CRNP, 1 mg at 12/02/17 2148    vancomycin (VANCOCIN) oral solution 125 mg, 125 mg, Oral, Q6H [address], Estefany M Terence, CRNP, 125 mg at 12/03/17 5764    warfarin (COUMADIN) tablet 5 mg, 5 mg, Oral, Daily (warfarin), Jude Zelaya MD, 5 mg at 12/02/17 1724    OBJECTIVE     Current Weight: Weight - Scale: 79 8 kg (175 lb 14 8 oz)  Vitals:    12/03/17 0700 BP: 170/72   Pulse: 71   Resp: 17   Temp: 97 8 °F (36 6 °C)   SpO2: 95%     No intake or output data in the 24 hours ending 12/03/17 0950    PHYSICAL EXAMINATION     Physical Exam:   Eyes:  Pupils equally react to light  ENT:  Moist tongue  Gen:  Not in any acute distress  Neck:  Supple  Chest:  Clear  Cor:  S1-S2 normal  Abdomen:  Soft nontender  Ext:  No swelling  Neuro:  Awake and alert  Skin:  Decreased skin turgor      LAB RESULTS       Results from last 7 days  Lab Units 12/03/17  0510 12/02/17  0505 12/01/17  1730 12/01/17  0531 11/30/17  1310   WBC Thousand/uL 6 64 10 27*  --   --  8 54   HEMOGLOBIN g/dL 7 7* 8 7*  --   --  7 0*   HEMATOCRIT % 22 4* 25 7*  --   --  20 4*   PLATELETS Thousands/uL 75* 86*  --   --  100*   SODIUM mmol/L 138 139  --  138 140   POTASSIUM mmol/L 3 5 4 1  --  4 5 5 5*   CHLORIDE mmol/L 105 104  --  106 108   CO2 mmol/L 18* 17*  --  16* 18*   BUN mg/dL 104* 110*  --  114* 111*   CREATININE mg/dL 4 55* 4 97*  --  5 14* 5 51*   CALCIUM mg/dL 8 4 7 9*  --  7 1* 7 3*   MAGNESIUM mg/dL 1 9 1 8 1 7 0 6* 0 5*   PHOSPHORUS mg/dL  --  3 6  --   --   --    ALBUMIN g/dL  --  2 8*  --  2 5* 2 8*   TOTAL PROTEIN g/dL  --  7 0  --  6 3* 6 6   GLUCOSE RANDOM mg/dL 100 96  --  113 100       RADIOLOGY RESULTS      Results for orders placed during the hospital encounter of 10/22/15   XR chest portable    Narrative EXAM ROOM = [facility]([place] [address])   EXAM START = [id number]   EXAM STOP = [id number]   FILMS USED = 1 VIEWS      CHEST       INDICATION-  Status post cardiac surgery      COMPARISON-  10/22/2015 chest x-ray  VIEWS-   AP frontal^  1 image      FINDINGS-  Stable findings status post endovascular repair of ascending   aortic aneurysm  Prosthetic heart valve again seen  Cardiomediastinal silhouette appears unchanged  Atelectatic changes present in the left lung base  Visualized osseous structures appear within normal limits for the   patient's age        IMPRESSION- Atelectatic changes in the left lung base  Transcribed on- [id number]            NATALYA Lion MD        Reading Radiologist- NATALYA Grace MD        Electronically Signed- NATALYA Grace MD        Released Date Time- 10/23/15 1352    ------------------------------------------------------------------------------   READ BY = [id number]^I ANG LIU   RELEASED BY = [id number]^I ANG LIU      Results for orders placed during the hospital encounter of 10/25/17   XR chest 2 views    Narrative CHEST     INDICATION:  Altered mental status  COMPARISON:  10/19/2017    VIEWS:  Frontal and lateral projections    IMAGES:  3    FINDINGS:         Cardiomediastinal silhouette is stable, including cardiomegaly, aortic valve prosthesis and thoracic aortic stent graft  The lungs are clear  Stable small left pleural effusion       Sternal wires are present  Visualized osseous structures appear otherwise unremarkable for the patient's age  Impression No acute pulmonary disease  Persistent small left pleural effusion  Workstation performed: [id number]       No results found for this or any previous visit  No results found for this or any previous visit  No results found for this or any previous visit  Results for orders placed in visit on 02/21/14   US retroperitoneal complete    Narrative RENAL ULTRASOUND   INDICATION- Acute kidney insufficiency  Chronic kidney disease  COMPARISON- 04/18/2012   TECHNIQUE-   Ultrasound of the retroperitoneum was performed with a   curvilinear transducer utilizing volumetric sweeps and still imaging   techniques  FINDINGS-   KIDNEYS-   Right kidney-  10 1 cm  Left kidney-  10 8 cm  Symmetric and normal size  Normal echogenicity and contour  No suspicious masses detected  Small 1 1 cm cortical cyst in lower   pole of the right kidney   No hydronephrosis     0 5 cm nonobstructing intrarenal calculus in the lower pole of the   right kidney, unchanged the previous study   No perinephric fluid collections  URETERS-   Nondilated  BLADDER-    Normally distended  No focal thickening or mass lesions  IMPRESSION-    1  No hydronephrosis  2   Stable nonobstructing lower pole 5 mm right renal calculus   unchanged the previous study  Transcribed on- NATALYA Snow MD        Reading Radiologist- NATALYA Goldman MD        Releasing Radiologist- NATALYA Goldman MD        Released Date Time- 02/24/14 1611      ------------------------------------------------------------------------------   Luis Gonsales / RECOMMENDATIONS      CKD stage 5:  Seems to be stable no need for acute dialysis yet    Anemia with thrombocytopenia:  Patient got blood transfusion but hemoglobin is dropping again  Platelet count is also going down  Etiology seems to be unclear may be chronic disease  Will need hematological evaluation    Hypomagnesemia:  Corrected    Will follow his    Logan Chris MD  Nephrology  12/3/2017        Portions of the record may have been created with voice recognition software  Occasional wrong word or "sound a like" substitutions may have occurred due to the inherent limitations of voice recognition software  Read the chart carefully and recognize, using context, where substitutions have occurred

## 2017-12-03 NOTE — PLAN OF CARE
Problem: Potential for Falls  Goal: Patient will remain free of falls  INTERVENTIONS:  - Assess patient frequently for physical needs  -  Identify cognitive and physical deficits and behaviors that affect risk of falls    -  [place] fall precautions as indicated by assessment   - Educate patient/family on patient safety including physical limitations  - Instruct patient to call for assistance with activity based on assessment  - Modify environment to reduce risk of injury  - Consider OT/PT consult to assist with strengthening/mobility   Outcome: Progressing

## 2017-12-04 ENCOUNTER — GENERIC CONVERSION - ENCOUNTER (OUTPATIENT)
Dept: OTHER | Facility: OTHER | Age: 82
End: 2017-12-04

## 2017-12-04 PROBLEM — N18.5 STAGE 5 CHRONIC KIDNEY DISEASE NOT ON CHRONIC DIALYSIS (HCC): Status: ACTIVE | Noted: 2017-12-04

## 2017-12-04 LAB
ANION GAP SERPL CALCULATED.3IONS-SCNC: 13 MMOL/L (ref 4–13)
BUN SERPL-MCNC: 100 MG/DL (ref 5–25)
CALCIUM SERPL-MCNC: 8.7 MG/DL (ref 8.3–10.1)
CHLORIDE SERPL-SCNC: 106 MMOL/L (ref 100–108)
CO2 SERPL-SCNC: 20 MMOL/L (ref 21–32)
CREAT SERPL-MCNC: 4.55 MG/DL (ref 0.6–1.3)
ERYTHROCYTE [DISTWIDTH] IN BLOOD BY AUTOMATED COUNT: 17.4 % (ref 11.6–15.1)
GFR SERPL CREATININE-BSD FRML MDRD: 11 ML/MIN/1.73SQ M
GLUCOSE SERPL-MCNC: 92 MG/DL (ref 65–140)
HCT VFR BLD AUTO: 22.3 % (ref 36.5–49.3)
HGB BLD-MCNC: 7.5 G/DL (ref 12–17)
INR PPP: 1.39 (ref 0.86–1.16)
MAGNESIUM SERPL-MCNC: 1.7 MG/DL (ref 1.6–2.6)
MCH RBC QN AUTO: 29.5 PG (ref 26.8–34.3)
MCHC RBC AUTO-ENTMCNC: 33.6 G/DL (ref 31.4–37.4)
MCV RBC AUTO: 88 FL (ref 82–98)
PLATELET # BLD AUTO: 84 THOUSANDS/UL (ref 149–390)
POTASSIUM SERPL-SCNC: 3.9 MMOL/L (ref 3.5–5.3)
PROTHROMBIN TIME: 17.4 SECONDS (ref 12.1–14.4)
RBC # BLD AUTO: 2.54 MILLION/UL (ref 3.88–5.62)
SODIUM SERPL-SCNC: 139 MMOL/L (ref 136–145)
WBC # BLD AUTO: 5.86 THOUSAND/UL (ref 4.31–10.16)

## 2017-12-04 PROCEDURE — 97166 OT EVAL MOD COMPLEX 45 MIN: CPT

## 2017-12-04 PROCEDURE — 85610 PROTHROMBIN TIME: CPT | Performed by: INTERNAL MEDICINE

## 2017-12-04 PROCEDURE — G8987 SELF CARE CURRENT STATUS: HCPCS

## 2017-12-04 PROCEDURE — G8988 SELF CARE GOAL STATUS: HCPCS

## 2017-12-04 PROCEDURE — 83735 ASSAY OF MAGNESIUM: CPT | Performed by: INTERNAL MEDICINE

## 2017-12-04 PROCEDURE — 80048 BASIC METABOLIC PNL TOTAL CA: CPT | Performed by: INTERNAL MEDICINE

## 2017-12-04 PROCEDURE — 85027 COMPLETE CBC AUTOMATED: CPT | Performed by: INTERNAL MEDICINE

## 2017-12-04 RX ADMIN — PANTOPRAZOLE SODIUM 40 MG: 40 TABLET, DELAYED RELEASE ORAL at 05:47

## 2017-12-04 RX ADMIN — HEPARIN SODIUM 5000 UNITS: 5000 INJECTION, SOLUTION INTRAVENOUS; SUBCUTANEOUS at 23:35

## 2017-12-04 RX ADMIN — VITAMIN D, TAB 1000IU (100/BT) 2000 UNITS: 25 TAB at 09:00

## 2017-12-04 RX ADMIN — VANCOMYCIN 125 MG: KIT at 05:47

## 2017-12-04 RX ADMIN — ANTACID TABLETS 1000 MG: 500 TABLET, CHEWABLE ORAL at 17:14

## 2017-12-04 RX ADMIN — Medication 400 MG: at 17:14

## 2017-12-04 RX ADMIN — Medication 325 MG: at 08:00

## 2017-12-04 RX ADMIN — VANCOMYCIN 125 MG: KIT at 17:14

## 2017-12-04 RX ADMIN — Medication 400 MG: at 12:00

## 2017-12-04 RX ADMIN — VANCOMYCIN 125 MG: KIT at 11:18

## 2017-12-04 RX ADMIN — HEPARIN SODIUM 5000 UNITS: 5000 INJECTION, SOLUTION INTRAVENOUS; SUBCUTANEOUS at 13:53

## 2017-12-04 RX ADMIN — TERAZOSIN HYDROCHLORIDE 1 MG: 1 CAPSULE ORAL at 23:34

## 2017-12-04 RX ADMIN — ATORVASTATIN CALCIUM 10 MG: 10 TABLET, FILM COATED ORAL at 09:00

## 2017-12-04 RX ADMIN — ANTACID TABLETS 1000 MG: 500 TABLET, CHEWABLE ORAL at 09:00

## 2017-12-04 RX ADMIN — QUETIAPINE FUMARATE 50 MG: 25 TABLET, FILM COATED ORAL at 23:35

## 2017-12-04 RX ADMIN — WARFARIN SODIUM 5 MG: 5 TABLET ORAL at 17:14

## 2017-12-04 RX ADMIN — HEPARIN SODIUM 5000 UNITS: 5000 INJECTION, SOLUTION INTRAVENOUS; SUBCUTANEOUS at 05:47

## 2017-12-04 RX ADMIN — EPOETIN ALFA 10000 UNITS: 10000 SOLUTION INTRAVENOUS; SUBCUTANEOUS at 11:19

## 2017-12-04 NOTE — CASE MANAGEMENT
Continued Stay Review    Date: 12/4/17    Vital Signs: /62   Pulse 69   Temp 97 9 °F (36 6 °C) (Oral)   Resp 18   Ht 6' (1 829 m)   Wt 80 4 kg (177 lb 4 oz)   SpO2 99%   BMI 24 04 kg/m²     Medications:   Scheduled Meds:   atorvastatin 10 mg Oral Daily   calcium carbonate 1,000 mg Oral BID   cholecalciferol 2,000 Units Oral Daily   epoetin sunday 10,000 Units Subcutaneous Once per day on Mon Wed Fri   ferrous sulfate 325 mg Oral Daily With Breakfast   heparin (porcine) 5,000 Units Subcutaneous Q8H [address]   magnesium oxide 400 mg Oral BID   pantoprazole 40 mg Oral Early Morning   QUEtiapine 50 mg Oral HS   terazosin 1 mg Oral HS   vancomycin 125 mg Oral Q6H [address]   warfarin 5 mg Oral Daily (warfarin)     Continuous Infusions:    PRN Meds:   acetaminophen    ondansetron    Abnormal Labs/Diagnostic Results:   Co2 20  CR 4 55 GFR 11 HGB 7 5 PLT 84 PT 17 4 INR 1 39     Age/Sex: 80 y o  male     Assessment/Plan:   Principal Problem:    Anemia  Active Problems:    CKD (chronic kidney disease), stage IV ([facility])    Edema    CHF (congestive heart failure) ([facility])    Paroxysmal atrial fibrillation ([facility])    Azotemia    Weakness generalized    CAD (coronary artery disease)    Hypocalcemia    Warfarin anticoagulation    Hypomagnesemia    Abnormal laboratory test    Stage 5 chronic kidney disease not on chronic dialysis ([facility])        Plan:-  1   Anemia secondary chronic disease most likely, occult blood was negative, drop in hemoglobin even though relatively stable since yesterday, as per his outpatient nephrologist, this time Hematology inpatient evaluation is warranted, Hematology input has been requested, awaited  Discussed with the patient and his wife that we need Hematology input before discharge  Also the patient would like to consider options even though hannah knows him very well    Discussed with case management      2   C diff colitis, present on arrival, diarrhea has resolved will start oral magnesium because of electrolyte abnormalities      3   Paroxysmal AFib, Coumadin restarted INR still subtherapeutic, will continue DVT prophylaxis dose of heparin, occult blood was negative, also platelets have been stable  Received 5 mg dose last night, will check INR tomorrow      4  Chronic systolic heart failure, compensated continue to monitor     5  Chronic kidney disease stage 5, age the fistula anastomosis stenosis, as per discussion fistula could still be accessed if necessary, no indication for RRT acutely     6    Severe hypomagnesemia setting of diarrhea, has resolved, oral supplementation, monitor labs     7   Cardiomyopathy EF 45% last, on no beta-blocker because of bradycardia      8   Bio prosthetic aortic valve echocardiogram reviewed moderate to severe MR    Discharge Plan: TBD

## 2017-12-04 NOTE — PROGRESS NOTES
Marzette Areas, the pts daughter would like to be contacted with any changes or updates  Patients wife is going in for surgery on Wednesday and asks that all questions and updates be directed to her daughter   Mariaelena's phone number is [phone]

## 2017-12-04 NOTE — PLAN OF CARE
Problem: OCCUPATIONAL THERAPY ADULT  Goal: Performs self-care activities at highest level of function for planned discharge setting  See evaluation for individualized goals  Treatment Interventions: ADL retraining, UE strengthening/ROM, Endurance training, Cognitive reorientation, Energy conservation, Activityengagement, Compensatory technique education, Functional transfer training          See flowsheet documentation for full assessment, interventions and recommendations  Limitation: Decreased ADL status, Decreased UE strength, Decreased Safe judgement during ADL, Decreased cognition, Decreased endurance, Decreased self-care trans, Decreased high-level ADLs  Prognosis: Good  Assessment: Pt is a 80 y o  male seen for OT evaluation s/p admit to [facility] on 11/30/2017 w/ Anemia  Comorbidities affecting pt's functional performance at time of assessment include: CKD, Edema, CHF, A-fib, cellulitis of extremity, ALLEN, HTN, CAD, Psychosis,   Personal factors affecting pt at time of IE include:limited home support, difficulty performing ADLS, difficulty performing IADLS , limited insight into deficits, compliance, decreased initiation and engagement , health management  and environment  Prior to admission, pt was living home with wife in a two level home with no BRAYDEN to enter and resides on first level  He was MI with BADLs with occ assistance from wife  Wife assisted with IADLs  Pt used a RW during functional mobility but was not always compliant according to dtr's  Upon evaluation: Pt requires min/cga with ADL transfers with verbal cues for safety and technique  He demonstrate poor safety awareness during ADLs  UB/LB ADLs requiring min/cga with verbal cues  Pt tolerated standing up to 1 min during toileting tasks  Pt appears to be very impulsive and irritable during IE  According to the Barthel Index, pt scoring a 60/100 indicating moderate deficits in occupational performance   The following deficits impacting occupational performance are  weakness, decreased strength, decreased balance, decreased tolerance, impaired initiation, impaired memory, impaired problem solving, decreased safety awareness, impaired interpersonal skills and decreased coping skills  Pt to benefit from continued skilled OT tx while in the hospital to address deficits as defined above and maximize level of functional independence w ADL's and functional mobility  Occupational Performance areas to address include: grooming, bathing/shower, toilet hygiene, dressing, medication management, health maintenance, functional mobility, community mobility, clothing management, cleaning, meal prep, money management and household maintenance  From OT standpoint, recommendation at time of d/c would be STR pending progress for safe return to PLOF        OT Discharge Recommendation: Short Term Rehab  OT - OK to Discharge: Yes (STR when medically cleared )

## 2017-12-04 NOTE — PROGRESS NOTES
NEPHROLOGY PROGRESS NOTE    Patient: Jaime Villalba               Sex: male          DOA: 11/30/2017 11:41 AM   YOB: 1935        Age:  80 y o         LOS:  LOS: 4 days     REASON FOR THE CONSULTATION:  Further management of CKD stage 5    HPI     This is a 80-year-old male with past medical history of CKD stage 5 admitted for further management of anemia  Patient has also received blood transfusion on admission  SUBJECTIVE     - breathing has remained stable overnight and also found to be afebrile and nonoliguric  Pending hematology evaluation  Patient denies nausea / vomiting / headache / dizziness / SOB / chest pain today    - Reviewed last 24 hrs events     CURRENT MEDICATIONS       Current Facility-Administered Medications:     acetaminophen (TYLENOL) tablet 650 mg, 650 mg, Oral, Q6H PRN, COLE GrewalNP    atorvastatin (LIPITOR) tablet 10 mg, 10 mg, Oral, Daily, Estefany LACEY Godoyin, CRNP, 10 mg at 12/04/17 0900    calcium carbonate (TUMS) chewable tablet 1,000 mg, 1,000 mg, Oral, BID, Estefany Pratt CRNP, 1,000 mg at 12/04/17 0900    cholecalciferol (VITAMIN D3) tablet 2,000 Units, 2,000 Units, Oral, Daily, Estefany Pratt CRNP, 2,000 Units at 12/04/17 0900    epoetin sunday (EPOGEN,PROCRIT) injection 10,000 Units, 10,000 Units, Intravenous, Weekly, Henrietta Valerio MD, 10,000 Units at 12/01/17 1427    ferrous sulfate tablet 325 mg, 325 mg, Oral, Daily With Breakfast, Estefany Pratt CRNP, 325 mg at 12/04/17 0800    heparin (porcine) subcutaneous injection 5,000 Units, 5,000 Units, Subcutaneous, Q8H [address], Lit Kingsley MD, 5,000 Units at 12/04/17 0547    ondansetron (ZOFRAN) injection 4 mg, 4 mg, Intravenous, Q6H PRN, Estefany Godoyin, CRNP    pantoprazole (PROTONIX) EC tablet 40 mg, 40 mg, Oral, Early Morning, Estefany Pratt, CRNP, 40 mg at 12/04/17 0547    QUEtiapine (SEROquel) tablet 50 mg, 50 mg, Oral, HS, Estefany LACEY Godoyin, CRNP, 50 mg at 12/03/17 6579    terazosin (HYTRIN) capsule 1 mg, 1 mg, Oral, HS, Estefany RAHMAN JUVENCIO Pratt, 1 mg at 12/03/17 2148    vancomycin (VANCOCIN) oral solution 125 mg, 125 mg, Oral, Q6H Estefany KRAUSE JUVENCIO Pratt, 125 mg at 12/04/17 0547    warfarin (COUMADIN) tablet 5 mg, 5 mg, Oral, Daily (warfarin), James Stewart MD, 5 mg at 12/03/17 1715    OBJECTIVE     Current Weight: Weight - Scale: 80 4 kg (177 lb 4 oz)  Vitals:    12/04/17 0700   BP: 144/62   Pulse: 69   Resp: 18   Temp: 97 9 °F (36 6 °C)   SpO2:        Intake/Output Summary (Last 24 hours) at 12/04/17 1026  Last data filed at 12/04/17 0001   Gross per 24 hour   Intake              240 ml   Output              301 ml   Net              -61 ml       PHYSICAL EXAMINATION     GEN:  Awake and not in acute distress  RS:  Bilateral equal air entry, no wheezing  CVS:  S1-S2 heard  Abdomen:  Soft, nontender, nondistended, positive bowel sounds  Extremities:  No edema, skin is warm on touch  CNS:  Awake and follows commands  HEENT:  Pale conjunctiva, no JVD, head is atraumatic  Psychiatric:  Normal mood and affect, not suicidal  Musculoskeletal:  No gross bony deformity seen in hands  Lymph Node:  No palpable cervical lymphadenopathy    LAB RESULTS       Results from last 7 days  Lab Units 12/04/17  0545 12/03/17  0510 12/02/17  0505 12/01/17  1730 12/01/17  0531 11/30/17  1310   WBC Thousand/uL 5 86 6 64 10 27*  --   --  8 54   HEMOGLOBIN g/dL 7 5* 7 7* 8 7*  --   --  7 0*   HEMATOCRIT % 22 3* 22 4* 25 7*  --   --  20 4*   PLATELETS Thousands/uL 84* 75* 86*  --   --  100*   SODIUM mmol/L 139 138 139  --  138 140   POTASSIUM mmol/L 3 9 3 5 4 1  --  4 5 5 5*   CHLORIDE mmol/L 106 105 104  --  106 108   CO2 mmol/L 20* 18* 17*  --  16* 18*   BUN mg/dL 100* 104* 110*  --  114* 111*   CREATININE mg/dL 4 55* 4 55* 4 97*  --  5 14* 5 51*   EGFR ml/min/1 73sq m 11 11 10  --  10 9   CALCIUM mg/dL 8 7 8 4 7 9*  --  7 1* 7 3*   MAGNESIUM mg/dL 1 7 1 9 1 8 1 7 0 6* 0 5*   PHOSPHORUS mg/dL  --   --  3 6  --   --   --    ALBUMIN g/dL  --   --  2 8*  --  2 5* 2 8*   TOTAL PROTEIN g/dL  --   --  7 0  --  6 3* 6 6   GLUCOSE RANDOM mg/dL 92 100 96  --  113 100       I have personally reviewed the old medical records and patient's previously known baseline creatinine level is ~ 4 5-5 0    RADIOLOGY RESULTS      Doppler left upper extremities at AV fistula done on 11/28/2017 showed > 50% stenosis at anastomosis of AV fistula that extends into cephalic vein  PLAN / RECOMMENDATIONS      1  CKD stage 5  Multifactorial    Upon review of old medical record patient's baseline creatinine is between 4 5-5  Currently patient's renal function is at baseline with current creatinine of 4 55  Currently patient is not experiencing any uremic symptoms and no urgent need to initiate dialysis at this point  Patient has also underwent Doppler AV fistula earlier which also showed > 50% stenosis at anastomosis site and may need further surgical intervention in the future which can be done as outpatient     Will continue monitor renal function while in the hospital     2   Anemia  Multifactorial, patient received blood transfusion on admission and also started on Epogen 70371 Units IV weekly    Patient's hemoglobin level has continued to worsen for last few days with current hemoglobin of 7 5 and will plan to change Epogen to 07645 Units SubQ 3 times a week    pending hematology evaluation    Patient has low iron saturation but high ferritin and would not consider use of venofer for time being (patient is on oral iron)    3  Azotemia  Multifactorial and suspect secondary to underlying chronic kidney disease  BUN level has been slowly improving for last few days with current BUN of 100  Currently patient is asymptomatic and no urgent need to initiate dialysis for time being    Continue monitor BUN level while in the hospital     Disposition:  Depending on hematology evaluation    Plan was also d/w [facility] MD  Nephrology  12/4/2017        Portions of the record may have been created with voice recognition software  Occasional wrong word or "sound a like" substitutions may have occurred due to the inherent limitations of voice recognition software  Read the chart carefully and recognize, using context, where substitutions have occurred

## 2017-12-04 NOTE — PHYSICAL THERAPY NOTE
Physical Therapy Cancellation Note        Chart review performed  KVNG Dennis cleared patient for PT session  Attempted PT twice this day  Patient refused PT on both attempts/trials and wished to rest in bed  Will continue to follow and provide PT interventions as appropriate       Reba Rivera, PT

## 2017-12-04 NOTE — PROGRESS NOTES
[facility] Internal Medicine Progress Note  Patient: Anayeli Rizo 80 y o  male   MRN: [id number]  PCP: Mayra Valles MD  Unit/Bed#: -01 Encounter: [id number]  Date Of Visit: 12/04/17    Assessment:    Principal Problem:    Anemia  Active Problems:    CKD (chronic kidney disease), stage IV (HCC)    Edema    CHF (congestive heart failure) (HCC)    Paroxysmal atrial fibrillation (HCC)    Azotemia    Weakness generalized    CAD (coronary artery disease)    Hypocalcemia    Warfarin anticoagulation    Hypomagnesemia    Abnormal laboratory test    Stage 5 chronic kidney disease not on chronic dialysis ([facility] [address] )      Plan:-  1   Anemia secondary chronic disease most likely, occult blood was negative, drop in hemoglobin even though relatively stable since yesterday, as per his outpatient nephrologist, this time Hematology inpatient evaluation is warranted, Hematology input has been requested, awaited  Discussed with the patient and his wife that we need Hematology input before discharge  Also the patient would like to consider options even though mushtaqbarber knows him very well  Discussed with case management  2   C diff colitis, present on arrival, diarrhea has resolved will start oral magnesium because of electrolyte abnormalities  3   Paroxysmal AFib, Coumadin restarted INR still subtherapeutic, will continue DVT prophylaxis dose of heparin, occult blood was negative, also platelets have been stable  Received 5 mg dose last night, will check INR tomorrow  4   Chronic systolic heart failure, compensated continue to monitor    5  Chronic kidney disease stage 5, age the fistula anastomosis stenosis, as per discussion fistula could still be accessed if necessary, no indication for RRT acutely    6  Severe hypomagnesemia setting of diarrhea, has resolved, oral supplementation, monitor labs    7  Cardiomyopathy EF 45% last, on no beta-blocker because of bradycardia      8   Bio prosthetic aortic valve echocardiogram reviewed moderate to severe MR             VTE Pharmacologic Prophylaxis:   Pharmacologic: Heparin  Mechanical VTE Prophylaxis in Place: Yes    Patient Centered Rounds: I have performed bedside rounds with nursing staff today  Discussions with Specialists or Other Care Team Provider: y    Education and Discussions with Family / Patient: y    Time Spent for Care:  18 minutes  More than 50% of total time spent on counseling and coordination of care as described above  Current Length of Stay: 4 day(s)    Current Patient Status: Inpatient   Certification Statement: The patient will continue to require additional inpatient hospital stay due to anemia        Code Status: Level 3 - DNAR and DNI      Subjective:  Very pleasant elderly male at this time no acute distress  Objective:     Vitals:   Temp (24hrs), Av °F (36 7 °C), Min:97 9 °F (36 6 °C), Max:98 2 °F (36 8 °C)    HR:  [69-71] 69  Resp:  [18] 18  BP: (135-164)/(59-89) 144/62  SpO2:  [94 %-99 %] 99 %  Body mass index is 24 04 kg/m²  Input and Output Summary (last 24 hours): Intake/Output Summary (Last 24 hours) at 17 1136  Last data filed at 17 0001   Gross per 24 hour   Intake              240 ml   Output              301 ml   Net              -61 ml       Physical Exam:     Physical Exam   Constitutional: He is oriented to person, place, and time  No distress  HENT:   Head: Normocephalic  Eyes: Pupils are equal, round, and reactive to light  Neck: No tracheal deviation present  Cardiovascular: Normal rate  No murmur heard  Pulmonary/Chest: No respiratory distress  Abdominal: He exhibits no distension  Musculoskeletal: He exhibits no edema  Neurological: He is alert and oriented to person, place, and time  Skin: He is not diaphoretic         Additional Data:     Labs:      Results from last 7 days  Lab Units 17  0545  17  1310   WBC Thousand/uL 5 86  < > 8 54   HEMOGLOBIN g/dL 7 5*  < > 7 0*   HEMATOCRIT % 22 3*  < > 20 4*   PLATELETS Thousands/uL 84*  < > 100*   NEUTROS PCT %  --   --  78*   LYMPHS PCT %  --   --  10*   MONOS PCT %  --   --  6   EOS PCT %  --   --  0   < > = values in this interval not displayed  Results from last 7 days  Lab Units 12/04/17  0545  12/02/17  0505   SODIUM mmol/L 139  < > 139   POTASSIUM mmol/L 3 9  < > 4 1   CHLORIDE mmol/L 106  < > 104   CO2 mmol/L 20*  < > 17*   BUN mg/dL 100*  < > 110*   CREATININE mg/dL 4 55*  < > 4 97*   CALCIUM mg/dL 8 7  < > 7 9*   TOTAL PROTEIN g/dL  --   --  7 0   BILIRUBIN TOTAL mg/dL  --   --  0 70   ALK PHOS U/L  --   --  60   ALT U/L  --   --  19   AST U/L  --   --  17   GLUCOSE RANDOM mg/dL 92  < > 96   < > = values in this interval not displayed  Results from last 7 days  Lab Units 12/04/17  0545   INR  1 39*       * I Have Reviewed All Lab Data Listed Above  * Additional Pertinent Lab Tests Reviewed: [facility] Admission Reviewed    Imaging:    Recent Cultures (last 7 days):           Last 24 Hours Medication List:     atorvastatin 10 mg Oral Daily   calcium carbonate 1,000 mg Oral BID   cholecalciferol 2,000 Units Oral Daily   epoetin sunday 10,000 Units Subcutaneous Once per day on Mon Wed Fri   ferrous sulfate 325 mg Oral Daily With Breakfast   heparin (porcine) 5,000 Units Subcutaneous Q8H [address]   magnesium oxide 400 mg Oral BID   pantoprazole 40 mg Oral Early Morning   QUEtiapine 50 mg Oral HS   terazosin 1 mg Oral HS   vancomycin 125 mg Oral Q6H [address]   warfarin 5 mg Oral Daily (warfarin)        Today, Patient Was Seen By: Scott Hawthorne MD    ** Please Note: Dragon 360 Dictation voice to text software may have been used in the creation of this document   **

## 2017-12-04 NOTE — OCCUPATIONAL THERAPY NOTE
Occupational Therapy Evaluation         Patient Name: Anusha Oakes  TDTND'I Date: 12/4/2017 12/04/17 1500   Note Type   Note type Eval/Treat   Restrictions/Precautions   Weight Bearing Precautions Per Order No   Other Precautions Limb alert;Multiple lines;Contact/isolation;Combative   Pain Assessment   Pain Assessment No/denies pain   Pain Score No Pain   Home Living   Type of [address] Two level; Able to live on main level with bedroom/bathroom; Performs ADLs on one level  (1st floor set up, 1 BRAYDEN))   Bathroom Shower/Tub Walk-in shower   Bathroom Toilet Raised   Bathroom Equipment Commode; Toilet raiser   [facility]   Prior Function   Level of [place] Independent with ADLs and functional mobility   Lives With Dasha Help From Family   ADL Assistance Independent   IADLs ((wife A w/ medications, cooking, cleaning))   Falls in the last 6 months 1 to 4   Vocational Retired   Comments Wife mostly A w/ transportation, pt can drive but he "doesn't care to"   Lifestyle   Autonomy Pt reports being MI/BADLS and functional mobility   Reciprocal Relationships supportive family   Psychosocial   Psychosocial (WDL) X   Patient Behaviors/Mood Irritable   ADL   Where Assessed Edge of bed   Eating Assistance 7  Independent   Grooming Assistance 4  Minimal Assistance   UB Bathing Assistance 5  Supervision/Setup   LB Bathing Assistance 4  Minimal Assistance  (cga)   UB Dressing Assistance 5  Supervision/Setup   LB Dressing Assistance 4  Minimal Assistance  (cga)   [address]  4  Minimal Assistance  (cga)   Functional Assistance 4  Minimal Assistance  (cga)   Bed Mobility   Rolling R 5  Supervision   Rolling L 5  Supervision   Supine to Sit 5  Supervision   Transfers   Sit to Stand 4  Minimal assistance  (cga)   Additional items Assist x 1; Increased time required;Verbal cues   Stand to Sit 4  Minimal assistance  (cga)   Balance Static Sitting Good   Dynamic Sitting Fair -   Static Standing Fair   Dynamic Standing Fair -   Activity Tolerance   Activity Tolerance Patient limited by fatigue   Nurse Made Aware RNAugustine verbalized pt appropriate to see, made aware of outcomes and recs    RUE Assessment   RUE Assessment WFL   RUE Strength   RUE Overall Strength Within Functional Limits - able to perform ADL tasks with strength   LUE Assessment   LUE Assessment WFL   LUE Strength   LUE Overall Strength Within Functional Limits - able to perform ADL tasks with strength   Hand Function   Gross Motor Coordination Functional   Cognition   Overall Cognitive Status Impaired   Arousal/Participation Responsive; Uncooperative; Alert   Attention Within functional limits   Orientation Level Oriented X4   Memory Decreased short term memory   Following Commands Follows one step commands without difficulty   Assessment   Limitation Decreased ADL status; Decreased UE strength;Decreased Safe judgement during ADL;Decreased cognition;Decreased endurance;Decreased self-care trans;Decreased high-level ADLs   Prognosis Good   Assessment Pt is a 80 y o  male seen for OT evaluation s/p admit to [facility] on 11/30/2017 w/ Anemia  Comorbidities affecting pt's functional performance at time of assessment include: CKD, Edema, CHF, A-fib, cellulitis of extremity, ALLEN, HTN, CAD, Psychosis,   Personal factors affecting pt at time of IE include:limited home support, difficulty performing ADLS, difficulty performing IADLS , limited insight into deficits, compliance, decreased initiation and engagement , health management  and environment  Prior to admission, pt was living home with wife in a two level home with no BRAYDEN to enter and resides on first level  He was MI with BADLs with occ assistance from wife  Wife assisted with IADLs  Pt used a RW during functional mobility but was not always compliant according to dtr's     Upon evaluation: Pt requires min/cga with ADL transfers with verbal cues for safety and technique  He demonstrate poor safety awareness during ADLs  UB/LB ADLs requiring min/cga with verbal cues  Pt tolerated standing up to 1 min during toileting tasks  Pt appears to be very impulsive and irritable during IE  According to the Barthel Index, pt scoring a 60/100 indicating moderate deficits in occupational performance  The following deficits impacting occupational performance are  weakness, decreased strength, decreased balance, decreased tolerance, impaired initiation, impaired memory, impaired problem solving, decreased safety awareness, impaired interpersonal skills and decreased coping skills  Pt to benefit from continued skilled OT tx while in the hospital to address deficits as defined above and maximize level of functional independence w ADL's and functional mobility  Occupational Performance areas to address include: grooming, bathing/shower, toilet hygiene, dressing, medication management, health maintenance, functional mobility, community mobility, clothing management, cleaning, meal prep, money management and household maintenance  From OT standpoint, recommendation at time of d/c would be STR pending progress for safe return to PLOF  Goals   Patient Goals to return home   Plan   Treatment Interventions ADL retraining;UE strengthening/ROM; Endurance training;Cognitive reorientation; Energy conservation; Activityengagement; Compensatory technique education; Functional transfer training   Goal Expiration Date 12/19/17   OT Frequency 3-5x/wk   Recommendation   OT Discharge Recommendation Short Term Rehab   OT - OK to Discharge Yes  (STR when medically cleared )   Barthel Index   Feeding 10   Bathing 0   Grooming Score 0   Dressing Score 5   Bladder Score 10   Bowels Score 10   Toilet Use Score 5   Transfers (Bed/Chair) Score 10   Mobility (Level Surface) Score 10   Stairs Score 0   Barthel Index Score 60   Modified [place] Scale   Modified [place] Scale 3 Occupational Therapy goals: In 7-14 days:     1- Patient will verbalize and demonstrate use of energy conservation/ deep breathing technique and work simplification skills during functional activity with no verbal cues  2-Patient will verbalize and demonstrate good body mechanics and joint protection techniques during  ADLs/ IADLs with no verbal cues    3-Patient will increase standing tolerance time to 5  minutes with unilateral UE support to complete sink level ADLs@ mod I level    level    4- Patient will transfer bed to Chair / toilet with  MI  5- Patient will complete UB/LB ADLs with set up assist  6- Patient will complete toileting hygiene sba  7-Patient/ Family  will demonstrate competency with UE Home Exercise Program

## 2017-12-05 LAB
ANION GAP SERPL CALCULATED.3IONS-SCNC: 14 MMOL/L (ref 4–13)
BUN SERPL-MCNC: 96 MG/DL (ref 5–25)
CALCIUM SERPL-MCNC: 9.1 MG/DL (ref 8.3–10.1)
CHLORIDE SERPL-SCNC: 107 MMOL/L (ref 100–108)
CO2 SERPL-SCNC: 20 MMOL/L (ref 21–32)
CREAT SERPL-MCNC: 4.49 MG/DL (ref 0.6–1.3)
GFR SERPL CREATININE-BSD FRML MDRD: 11 ML/MIN/1.73SQ M
GLUCOSE SERPL-MCNC: 86 MG/DL (ref 65–140)
HCT VFR BLD AUTO: 22.8 % (ref 36.5–49.3)
HGB BLD-MCNC: 7.7 G/DL (ref 12–17)
INR PPP: 1.39 (ref 0.86–1.16)
MAGNESIUM SERPL-MCNC: 1.6 MG/DL (ref 1.6–2.6)
POTASSIUM SERPL-SCNC: 3.9 MMOL/L (ref 3.5–5.3)
PROTHROMBIN TIME: 17.4 SECONDS (ref 12.1–14.4)
SODIUM SERPL-SCNC: 141 MMOL/L (ref 136–145)

## 2017-12-05 PROCEDURE — 80048 BASIC METABOLIC PNL TOTAL CA: CPT | Performed by: INTERNAL MEDICINE

## 2017-12-05 PROCEDURE — 85610 PROTHROMBIN TIME: CPT | Performed by: INTERNAL MEDICINE

## 2017-12-05 PROCEDURE — 83735 ASSAY OF MAGNESIUM: CPT | Performed by: INTERNAL MEDICINE

## 2017-12-05 PROCEDURE — 85014 HEMATOCRIT: CPT | Performed by: INTERNAL MEDICINE

## 2017-12-05 PROCEDURE — 85018 HEMOGLOBIN: CPT | Performed by: INTERNAL MEDICINE

## 2017-12-05 RX ADMIN — TERAZOSIN HYDROCHLORIDE 1 MG: 1 CAPSULE ORAL at 23:11

## 2017-12-05 RX ADMIN — HEPARIN SODIUM 5000 UNITS: 5000 INJECTION, SOLUTION INTRAVENOUS; SUBCUTANEOUS at 23:10

## 2017-12-05 RX ADMIN — VANCOMYCIN 125 MG: KIT at 11:36

## 2017-12-05 RX ADMIN — VANCOMYCIN 125 MG: KIT at 01:49

## 2017-12-05 RX ADMIN — ANTACID TABLETS 1000 MG: 500 TABLET, CHEWABLE ORAL at 17:56

## 2017-12-05 RX ADMIN — QUETIAPINE FUMARATE 50 MG: 25 TABLET, FILM COATED ORAL at 23:10

## 2017-12-05 RX ADMIN — HEPARIN SODIUM 5000 UNITS: 5000 INJECTION, SOLUTION INTRAVENOUS; SUBCUTANEOUS at 06:20

## 2017-12-05 RX ADMIN — VANCOMYCIN 125 MG: KIT at 23:10

## 2017-12-05 RX ADMIN — ANTACID TABLETS 1000 MG: 500 TABLET, CHEWABLE ORAL at 08:31

## 2017-12-05 RX ADMIN — VANCOMYCIN 125 MG: KIT at 17:55

## 2017-12-05 RX ADMIN — Medication 400 MG: at 08:30

## 2017-12-05 RX ADMIN — VANCOMYCIN 125 MG: KIT at 06:23

## 2017-12-05 RX ADMIN — HEPARIN SODIUM 5000 UNITS: 5000 INJECTION, SOLUTION INTRAVENOUS; SUBCUTANEOUS at 14:42

## 2017-12-05 RX ADMIN — VITAMIN D, TAB 1000IU (100/BT) 2000 UNITS: 25 TAB at 08:30

## 2017-12-05 RX ADMIN — ATORVASTATIN CALCIUM 10 MG: 10 TABLET, FILM COATED ORAL at 08:30

## 2017-12-05 RX ADMIN — PANTOPRAZOLE SODIUM 40 MG: 40 TABLET, DELAYED RELEASE ORAL at 06:20

## 2017-12-05 RX ADMIN — WARFARIN SODIUM 5 MG: 5 TABLET ORAL at 17:55

## 2017-12-05 RX ADMIN — Medication 400 MG: at 17:55

## 2017-12-05 RX ADMIN — Medication 325 MG: at 08:30

## 2017-12-05 NOTE — SOCIAL WORK
SHERRY spoke to william-Mariaelena [phone] who states that the plan for discharge is for patient to go back to [facility] in [place]  Mariaelena asked if [facility] can accept patient it would be convenient for the family  If [facility] cannot accept patient, Patient will go back to [facility]  SHERRY sent referral to [facility] [address] and is awaiting a response for admission  [facility] has already accepted patient back

## 2017-12-05 NOTE — PROGRESS NOTES
Spoke with daughter who in inquiring about hematology consult  No note found from hematology    Spoke with  who will repage hematology

## 2017-12-05 NOTE — PROGRESS NOTES
[facility] Internal Medicine Progress Note  Patient: Devin Lane 80 y o  male   MRN: [id number]  PCP: Steph Foley MD  Unit/Bed#: -01 Encounter: [id number]  Date Of Visit: 12/05/17    Assessment/Plan:      · Anemia acute on chronic  A hematology consult pending  ? Need for GI workup  Hemoglobin is currently stable  · C diff colitis  Continue with current treatment  He is also on PPI  ? Need for PPI as PPI also associated with C diff diarrhea/colitis  · Paroxysmal atrial fibrillation  Back on Coumadin  Continue with rate control  · Chronic systolic heart failure  Currently compensated and does not appear to be fluid overloaded  · Chronic kidney disease stage 5  Has an AV fistula  Nephrology following  · History of bioprosthetic aortic valve replacement  He has moderate to severe MR  Currently stable  · Encephalopathy  I am not sure if he is very much clear about the fact why he is in the hospital ? Need for higher level of care  Patient is from [facility]---ECF/NH--skilled  · Chronic anticoagulation-patient back on Coumadin  Continue subcutaneous heparin for DVT prophylaxis until has INR is close to 2  VTE Pharmacologic Prophylaxis:   Pharmacologic: Heparin  Mechanical VTE Prophylaxis in Place: Yes    Patient Centered Rounds: I have performed bedside rounds with nursing staff today  Discussions with Specialists or Other Care Team Provider:  Yes    Education and Discussions with Family / Patient:  Yes      Current Length of Stay: 5 day(s)    Current Patient Status: Inpatient   Certification Statement: The patient will continue to require additional inpatient hospital stay due to Monitoring of his hemoglobin    Discharge Plan:  Nursing Home-where he was living before hopefully in the next day or so    Code Status: Level 3 - DNAR and DNI      Subjective:   Patient himself feels fine    He is not really very much clear why he is in the hospital   He mentioned that time his daughter called for him to come to the hospital because he said something where he lives as he owns guns  He knows that his wife is going to have some surgical procedure today or tomorrow  He denies any chest pain to me  He denies any nausea or vomiting  He denies any abdominal pain  Denies any fever or chills    Objective:     Vitals:   Temp (24hrs), Av °F (36 7 °C), Min:97 6 °F (36 4 °C), Max:98 5 °F (36 9 °C)    HR:  [75-91] 75  Resp:  [16-18] 16  BP: (149-179)/(59-77) 179/77  SpO2:  [98 %-100 %] 98 %  Body mass index is 24 04 kg/m²  Input and Output Summary (last 24 hours): Intake/Output Summary (Last 24 hours) at 17 1416  Last data filed at 17 1347   Gross per 24 hour   Intake              240 ml   Output              780 ml   Net             -540 ml           Physical Exam:     Vital signs are reviewed as above  Constitutional:   Elderly 80years old male who looks skinny  He is up in the bed  He is not in any distress, however, he gets very excited about his house/wife and why he is here   Eyes: EOM grossly intact  Conjunctivae slightly pale  HENT: Oropharynx are slightly dry   Neck: Neck is supple  Cardiac: I did not hear any rubs or gallop  Patient has stable rhythm  Respiratory: Patient not in significant respiratory distress  Air entry in general is fair  GI: Abdomen is soft  It is grossly nontender  Bowel sounds are adequate  I was not able to appreciate any hepatosplenomegaly  Neurologic:  Patient is awake and alert  He is not really oriented to the place or person  Skin: Skin is warm and dry  Has dry skin of his lower legs  Psychiatric: Mood and affect are pleasant in general, however, he would be very anxious why he is here  Musculoskeletal   He is moving his arms  He has chronic arthritic joints      Extremities: Patient has no significant cyanosis, clubbing, or lower extremity edema       Additional Data:     Labs:      Results from last 7 days  Lab Units 12/05/17  0455 12/04/17  0545  11/30/17  1310   WBC Thousand/uL  --  5 86  < > 8 54   HEMOGLOBIN g/dL 7 7* 7 5*  < > 7 0*   HEMATOCRIT % 22 8* 22 3*  < > 20 4*   PLATELETS Thousands/uL  --  84*  < > 100*   NEUTROS PCT %  --   --   --  78*   LYMPHS PCT %  --   --   --  10*   MONOS PCT %  --   --   --  6   EOS PCT %  --   --   --  0   < > = values in this interval not displayed  Results from last 7 days  Lab Units 12/05/17  0455  12/02/17  0505   SODIUM mmol/L 141  < > 139   POTASSIUM mmol/L 3 9  < > 4 1   CHLORIDE mmol/L 107  < > 104   CO2 mmol/L 20*  < > 17*   BUN mg/dL 96*  < > 110*   CREATININE mg/dL 4 49*  < > 4 97*   CALCIUM mg/dL 9 1  < > 7 9*   TOTAL PROTEIN g/dL  --   --  7 0   BILIRUBIN TOTAL mg/dL  --   --  0 70   ALK PHOS U/L  --   --  60   ALT U/L  --   --  19   AST U/L  --   --  17   GLUCOSE RANDOM mg/dL 86  < > 96   < > = values in this interval not displayed  Results from last 7 days  Lab Units 12/05/17  0455   INR  1 39*       * I Have Reviewed All Lab Data Listed Above  * Additional Pertinent Lab Tests Reviewed: All Labs Within Last 24 Hours Reviewed         Recent Cultures (last 7 days):           Last 24 Hours Medication List:     atorvastatin 10 mg Oral Daily   calcium carbonate 1,000 mg Oral BID   cholecalciferol 2,000 Units Oral Daily   epoetin sunday 10,000 Units Subcutaneous Once per day on Mon Wed Fri   ferrous sulfate 325 mg Oral Daily With Breakfast   heparin (porcine) 5,000 Units Subcutaneous Q8H [address]   magnesium oxide 400 mg Oral BID   pantoprazole 40 mg Oral Early Morning   QUEtiapine 50 mg Oral HS   terazosin 1 mg Oral HS   vancomycin 125 mg Oral Q6H [address]   warfarin 5 mg Oral Daily (warfarin)        Today, Patient Was Seen By: Roxanne Arriaga MD    ** Please Note: Dragon 360 Dictation voice to text software may have been used in the creation of this document   **

## 2017-12-05 NOTE — PROGRESS NOTES
NEPHROLOGY PROGRESS NOTE    Patient: Marilu Whyte               Sex: male          DOA: 11/30/2017 11:41 AM   YOB: 1935        Age:  80 y o         LOS:  LOS: 5 days     REASON FOR THE CONSULTATION:  Further management of CKD stage 5    HPI     This is a 80-year-old male with a past medical history of CKD stage 5 admitted for further management of anemia and also received blood transfusion on admission  SUBJECTIVE     - pending hematology evaluation   Overnight breathing has remained stable  Patient was confused this morning  Patient denies nausea / vomiting / headache / dizziness / SOB / chest pain today    - Reviewed last 24 hrs events     CURRENT MEDICATIONS       Current Facility-Administered Medications:     acetaminophen (TYLENOL) tablet 650 mg, 650 mg, Oral, Q6H PRN, Estefany M Terence, CRNP    atorvastatin (LIPITOR) tablet 10 mg, 10 mg, Oral, Daily, Estefany M Terence, CRNP, 10 mg at 12/05/17 0830    calcium carbonate (TUMS) chewable tablet 1,000 mg, 1,000 mg, Oral, BID, Estefany M Terence, CRNP, 1,000 mg at 12/05/17 0831    cholecalciferol (VITAMIN D3) tablet 2,000 Units, 2,000 Units, Oral, Daily, Estefany M Terence, CRNP, 2,000 Units at 12/05/17 0830    epoetin sunday (EPOGEN,PROCRIT) injection 10,000 Units, 10,000 Units, Subcutaneous, Once per day on Mon Wed Fri, Sunny Barajas MD, 10,000 Units at 12/04/17 1119    ferrous sulfate tablet 325 mg, 325 mg, Oral, Daily With Breakfast, Estefany M Terence, CRNP, 325 mg at 12/05/17 0830    heparin (porcine) subcutaneous injection 5,000 Units, 5,000 Units, Subcutaneous, Q8H Felix Knox MD, 5,000 Units at 12/05/17 0620    magnesium oxide (MAG-OX) tablet 400 mg, 400 mg, Oral, BID, Lit Kingsley MD, 400 mg at 12/05/17 0830    ondansetron (ZOFRAN) injection 4 mg, 4 mg, Intravenous, Q6H PRN, Estefany M Terence, CRNP    pantoprazole (PROTONIX) EC tablet 40 mg, 40 mg, Oral, Early Morning, Estefany M Terence, CRNP, 40 mg at 12/05/17 1597    QUEtiapine (SEROquel) tablet 50 mg, 50 mg, Oral, HS, Estefany M Terence, CRNP, 50 mg at 12/04/17 2335    terazosin (HYTRIN) capsule 1 mg, 1 mg, Oral, HS, Estefany M Terence, CRNP, 1 mg at 12/04/17 2334    vancomycin (VANCOCIN) oral solution 125 mg, 125 mg, Oral, Q6H [address], Estefany M Terence, CRNP, 125 mg at 12/05/17 1136    warfarin (COUMADIN) tablet 5 mg, 5 mg, Oral, Daily (warfarin), Esteban Crawford MD, 5 mg at 12/04/17 1714    OBJECTIVE     Current Weight: Weight - Scale: 80 4 kg (177 lb 4 oz)  Vitals:    12/05/17 0700   BP: (!) 179/77   Pulse: 75   Resp: 16   Temp: 97 8 °F (36 6 °C)   SpO2: 98%       Intake/Output Summary (Last 24 hours) at 12/05/17 1337  Last data filed at 12/05/17 4356   Gross per 24 hour   Intake              240 ml   Output              750 ml   Net             -510 ml       PHYSICAL EXAMINATION     GEN:  Awake and not in acute distress  RS:  Bilateral equal air entry, no wheezing  CVS:  S1-S2 heard  Abdomen:  Soft, nontender, positive bowel sounds  Extremities:  No edema, skin is warm on touch  CNS:  Awake and follows commands  HEENT:  Pale conjunctiva, no JVD, head is atraumatic  Psychiatric:  Confused but not suicidal  Musculoskeletal:  No gross bony deformity seen in hands  Lymph Node:  No palpable cervical lymphadenopathy     LAB RESULTS       Results from last 7 days  Lab Units 12/05/17  0455 12/04/17  0545 12/03/17  0510 12/02/17  0505 12/01/17  1730 12/01/17  0531 11/30/17  1310   WBC Thousand/uL  --  5 86 6 64 10 27*  --   --  8 54   HEMOGLOBIN g/dL 7 7* 7 5* 7 7* 8 7*  --   --  7 0*   HEMATOCRIT % 22 8* 22 3* 22 4* 25 7*  --   --  20 4*   PLATELETS Thousands/uL  --  84* 75* 86*  --   --  100*   SODIUM mmol/L 141 139 138 139  --  138 140   POTASSIUM mmol/L 3 9 3 9 3 5 4 1  --  4 5 5 5*   CHLORIDE mmol/L 107 106 105 104  --  106 108   CO2 mmol/L 20* 20* 18* 17*  --  16* 18*   BUN mg/dL 96* 100* 104* 110*  --  114* 111*   CREATININE mg/dL 4 49* 4 55* 4 55* 4 97*  --  5 14* 5 51*   EGFR ml/min/1 73sq m 11 11 11 10  --  10 9   CALCIUM mg/dL 9 1 8 7 8 4 7 9*  --  7 1* 7 3*   MAGNESIUM mg/dL 1 6 1 7 1 9 1 8 1 7 0 6* 0 5*   PHOSPHORUS mg/dL  --   --   --  3 6  --   --   --    ALBUMIN g/dL  --   --   --  2 8*  --  2 5* 2 8*   TOTAL PROTEIN g/dL  --   --   --  7 0  --  6 3* 6 6   GLUCOSE RANDOM mg/dL 86 92 100 96  --  113 100       I have personally reviewed the old medical records and patient's previously known baseline creatinine level is ~ 4 5-5 0    RADIOLOGY RESULTS      Results for orders placed during the hospital encounter of 10/22/15   XR chest portable    Narrative EXAM ROOM = [facility]   EXAM START = [id number]   EXAM STOP = [id number]   FILMS USED = 1 VIEWS      CHEST       INDICATION-  Status post cardiac surgery      COMPARISON-  10/22/2015 chest x-ray  VIEWS-   AP frontal^  1 image      FINDINGS-  Stable findings status post endovascular repair of ascending   aortic aneurysm  Prosthetic heart valve again seen  Cardiomediastinal silhouette appears unchanged  Atelectatic changes present in the left lung base  Visualized osseous structures appear within normal limits for the   patient's age  IMPRESSION-      Atelectatic changes in the left lung base  Transcribed on- [id number]            Darylene Majestic, RAD MD        Reading Radiologist- NATALYA Warren MD        Electronically Signed- NATALYA Warren MD        Released Date Time- 10/23/15 1352    ------------------------------------------------------------------------------   READ BY = [id number] ANG LIU   RELEASED BY = [id number] LIU      Results for orders placed during the hospital encounter of 10/25/17   XR chest 2 views    Narrative CHEST     INDICATION:  Altered mental status      COMPARISON:  10/19/2017    VIEWS:  Frontal and lateral projections    IMAGES:  3    FINDINGS:         Cardiomediastinal silhouette is stable, including cardiomegaly, aortic valve prosthesis and thoracic aortic stent graft  The lungs are clear  Stable small left pleural effusion       Sternal wires are present  Visualized osseous structures appear otherwise unremarkable for the patient's age  Impression No acute pulmonary disease  Persistent small left pleural effusion  Workstation performed: [id number]       Results for orders placed in visit on 02/21/14   US retroperitoneal complete    Narrative RENAL ULTRASOUND   INDICATION- Acute kidney insufficiency  Chronic kidney disease  COMPARISON- 04/18/2012   TECHNIQUE-   Ultrasound of the retroperitoneum was performed with a   curvilinear transducer utilizing volumetric sweeps and still imaging   techniques  FINDINGS-   KIDNEYS-   Right kidney-  10 1 cm  Left kidney-  10 8 cm  Symmetric and normal size  Normal echogenicity and contour  No suspicious masses detected  Small 1 1 cm cortical cyst in lower   pole of the right kidney   No hydronephrosis  0 5 cm nonobstructing intrarenal calculus in the lower pole of the   right kidney, unchanged the previous study   No perinephric fluid collections  URETERS-   Nondilated  BLADDER-    Normally distended  No focal thickening or mass lesions  IMPRESSION-    1  No hydronephrosis  2   Stable nonobstructing lower pole 5 mm right renal calculus   unchanged the previous study  Transcribed on- NATALYA Shirley MD        Reading Radiologist- NATALYA Otero MD        Releasing Radiologist- NATALYA Otero MD        Released Date Time- 02/24/14 1611      ------------------------------------------------------------------------------   Peyton Malagon   [facility]        PLAN / RECOMMENDATIONS      1  CKD stage 5  Multifactorial    Upon review of old medical records patient's baseline creatinine is between 4 5-5 0  Overnight patient's renal function has remained at baseline with current creatinine of 4 49    Currently patient is nonoliguric also  No urgent need to initiate dialysis at this point  Patient has left upper extremity AV fistula in place but recent Doppler also showed > 50% stenosis at anastomosis site and may need further surgical intervention which can be done as outpatient  Plan to monitor renal function while in the hospital     2   Azotemia  Multifactorial, patient was confused this morning but was answering questions appropriately  I doubt that patient is symptomatic and experiencing any uremic symptoms  Overnight BUN level has also improved to 96  Would not consider initiation of dialysis for time being  Plan to monitor BUN level while in the hospital     3   Anemia  Multifactorial, received blood transfusion on admission and currently also on Epogen 08126 Units SubQ 3 X a week  Pending hematology evaluation  Stable overnight hemoglobin level at 7 7  No active signs of bleeding  Plan to recheck hemoglobin level again tomorrow and consider blood transfusion if hemoglobin level drops below 7  Plan was also d/w José Miguel Choi MD  Nephrology  12/5/2017        Portions of the record may have been created with voice recognition software  Occasional wrong word or "sound a like" substitutions may have occurred due to the inherent limitations of voice recognition software  Read the chart carefully and recognize, using context, where substitutions have occurred

## 2017-12-06 LAB
ANION GAP SERPL CALCULATED.3IONS-SCNC: 12 MMOL/L (ref 4–13)
BUN SERPL-MCNC: 93 MG/DL (ref 5–25)
CALCIUM SERPL-MCNC: 9.4 MG/DL (ref 8.3–10.1)
CHLORIDE SERPL-SCNC: 108 MMOL/L (ref 100–108)
CO2 SERPL-SCNC: 20 MMOL/L (ref 21–32)
CREAT SERPL-MCNC: 4.16 MG/DL (ref 0.6–1.3)
GFR SERPL CREATININE-BSD FRML MDRD: 12 ML/MIN/1.73SQ M
GLUCOSE SERPL-MCNC: 181 MG/DL (ref 65–140)
GLUCOSE SERPL-MCNC: 88 MG/DL (ref 65–140)
HCT VFR BLD AUTO: 23.8 % (ref 36.5–49.3)
HGB BLD-MCNC: 7.8 G/DL (ref 12–17)
INR PPP: 1.44 (ref 0.86–1.16)
POTASSIUM SERPL-SCNC: 3.9 MMOL/L (ref 3.5–5.3)
PROTHROMBIN TIME: 17.9 SECONDS (ref 12.1–14.4)
SODIUM SERPL-SCNC: 140 MMOL/L (ref 136–145)

## 2017-12-06 PROCEDURE — 85610 PROTHROMBIN TIME: CPT | Performed by: INTERNAL MEDICINE

## 2017-12-06 PROCEDURE — 80048 BASIC METABOLIC PNL TOTAL CA: CPT | Performed by: INTERNAL MEDICINE

## 2017-12-06 PROCEDURE — 85014 HEMATOCRIT: CPT | Performed by: INTERNAL MEDICINE

## 2017-12-06 PROCEDURE — 82948 REAGENT STRIP/BLOOD GLUCOSE: CPT

## 2017-12-06 PROCEDURE — 85018 HEMOGLOBIN: CPT | Performed by: INTERNAL MEDICINE

## 2017-12-06 RX ADMIN — HEPARIN SODIUM 5000 UNITS: 5000 INJECTION, SOLUTION INTRAVENOUS; SUBCUTANEOUS at 06:24

## 2017-12-06 RX ADMIN — PANTOPRAZOLE SODIUM 40 MG: 40 TABLET, DELAYED RELEASE ORAL at 06:24

## 2017-12-06 RX ADMIN — Medication 400 MG: at 09:21

## 2017-12-06 RX ADMIN — ATORVASTATIN CALCIUM 10 MG: 10 TABLET, FILM COATED ORAL at 09:21

## 2017-12-06 RX ADMIN — VANCOMYCIN 125 MG: KIT at 18:00

## 2017-12-06 RX ADMIN — EPOETIN ALFA 10000 UNITS: 10000 SOLUTION INTRAVENOUS; SUBCUTANEOUS at 09:29

## 2017-12-06 RX ADMIN — ANTACID TABLETS 1000 MG: 500 TABLET, CHEWABLE ORAL at 09:21

## 2017-12-06 RX ADMIN — VITAMIN D, TAB 1000IU (100/BT) 2000 UNITS: 25 TAB at 09:22

## 2017-12-06 RX ADMIN — VANCOMYCIN 125 MG: KIT at 13:00

## 2017-12-06 RX ADMIN — ANTACID TABLETS 1000 MG: 500 TABLET, CHEWABLE ORAL at 19:00

## 2017-12-06 RX ADMIN — TERAZOSIN HYDROCHLORIDE 1 MG: 1 CAPSULE ORAL at 20:23

## 2017-12-06 RX ADMIN — Medication 400 MG: at 19:00

## 2017-12-06 RX ADMIN — HEPARIN SODIUM 5000 UNITS: 5000 INJECTION, SOLUTION INTRAVENOUS; SUBCUTANEOUS at 20:24

## 2017-12-06 RX ADMIN — Medication 325 MG: at 09:21

## 2017-12-06 RX ADMIN — VANCOMYCIN 125 MG: KIT at 06:24

## 2017-12-06 RX ADMIN — QUETIAPINE FUMARATE 50 MG: 25 TABLET, FILM COATED ORAL at 20:23

## 2017-12-06 RX ADMIN — WARFARIN SODIUM 5 MG: 5 TABLET ORAL at 19:00

## 2017-12-06 RX ADMIN — HEPARIN SODIUM 5000 UNITS: 5000 INJECTION, SOLUTION INTRAVENOUS; SUBCUTANEOUS at 13:00

## 2017-12-06 NOTE — SOCIAL WORK
Patient is not medically cleared waiting on Hem onc consult  Patient positive for C-Diff  Spoke with [facility] who declined acceptance because of C-Diff  Patient will return to [place] in [place]

## 2017-12-06 NOTE — PROGRESS NOTES
[facility] Progress Note  Patient: Yanick Resendiz 80 y o  male   MRN: [id number]  PCP: Ottoniel Hopkins MD  Unit/Bed#: -01 Encounter: [id number]  Date Of Visit: 12/06/17    Assessment/Plan:          · Symptomatic anemia  ?  MDS  His white cell count is normal   Discussed with Nephrology  Received blood transfusion  Repeat hemoglobin stable  Today's hemoglobin is still pending  I did call to Hematology/Oncology service  There was a consult put in on December 3, 2017 and for some reason as per Hematology/Oncology, they never received the consult notification  This was discussed with the staff on the floor here  Patient may be seen by them later today or tomorrow morning  · Thrombocytopenia  Chronic  Continue to monitor  · Chronic congestive heart failure  Currently compensated  · Chronic kidney disease stage 5  Creatinine stable  Nephrology following  · Coronary artery disease  Stable and medical management  · Chronic anticoagulation with Coumadin  INR still not therapeutic  Continue subcutaneous heparin for DVT prophylaxis  · C diff colitis/diarrhea  Present on admission  Patient is having regular bowel movements today  On oral vancomycin  Complete 2 weeks course of oral vancomycin  · Acute encephalopathy -resolved     Patient is medically stable pending hematology/oncology consultation regarding his anemia  Case management told me today that patient's family has requested for patient to be placed in facility for short-term rehab which is closer to their home otherwise he has a bed available where he came from[facility]  Case management looking into calling new facility if they would have a bed available for him at the time of discharge-notifying them that patient is being treated C diff as he would need isolation room      VTE Pharmacologic Prophylaxis:   Pharmacologic: Coumadin/subcutaneous heparin  Mechanical VTE Prophylaxis in Place: Yes    Patient Centered Rounds:  I have performed bedside rounds with nursing staff today  Discussions with Specialists or Other Care Team Provider:  Yes    Education and Discussions with Family / Patient:  Yes      Current Length of Stay: 6 day(s)    Current Patient Status: Inpatient   Certification Statement: The patient will continue to require additional inpatient hospital stay due to Placement/hematology consultation    Discharge Plan:  [facility] after seen by Hematology/Oncology and/or to another facility if bed available tomorrow as patient would need isolation bed-being treated for C diff    Code Status: Level 3 - DNAR and DNI      Subjective:   Patient feels pretty good  No more diarrhea  He had normal bowel movements today  No chest pain or shortness of breath  He feels in general much stronger today as compared to yesterday  Objective:     Vitals:   Temp (24hrs), Av 8 °F (36 6 °C), Min:97 5 °F (36 4 °C), Max:98 1 °F (36 7 °C)    HR:  [64-82] 64  Resp:  [18-20] 18  BP: (124-156)/(57-69) 155/65  SpO2:  [96 %-100 %] 100 %  Body mass index is 22 66 kg/m²  Input and Output Summary (last 24 hours): Intake/Output Summary (Last 24 hours) at 17 1220  Last data filed at 17 1900   Gross per 24 hour   Intake                0 ml   Output              330 ml   Net             -330 ml           Physical Exam:      Vital signs are reviewed as above  Patient is up in the chair  He is awake and alert  He tells me that he has been at rehab for the last few months  Not in any respiratory distress  Decreased breath sounds at the bases  Skin is dry  No cyanosis, clubbing, or lower extremities edema  S1 and S2 are audible  Heart rate and rhythm are stable  Oropharynx slightly dry  Abdomen is soft  It is nontender   Bowel sounds are audible      Additional Data:     Labs:      Results from last 7 days  Lab Units 17  0455 17  0545  17  1310   WBC Thousand/uL  --  5 86  < > 8 54 HEMOGLOBIN g/dL 7 7* 7 5*  < > 7 0*   HEMATOCRIT % 22 8* 22 3*  < > 20 4*   PLATELETS Thousands/uL  --  84*  < > 100*   NEUTROS PCT %  --   --   --  78*   LYMPHS PCT %  --   --   --  10*   MONOS PCT %  --   --   --  6   EOS PCT %  --   --   --  0   < > = values in this interval not displayed  Results from last 7 days  Lab Units 12/06/17  0620  12/02/17  0505   SODIUM mmol/L 140  < > 139   POTASSIUM mmol/L 3 9  < > 4 1   CHLORIDE mmol/L 108  < > 104   CO2 mmol/L 20*  < > 17*   BUN mg/dL 93*  < > 110*   CREATININE mg/dL 4 16*  < > 4 97*   CALCIUM mg/dL 9 4  < > 7 9*   TOTAL PROTEIN g/dL  --   --  7 0   BILIRUBIN TOTAL mg/dL  --   --  0 70   ALK PHOS U/L  --   --  60   ALT U/L  --   --  19   AST U/L  --   --  17   GLUCOSE RANDOM mg/dL 88  < > 96   < > = values in this interval not displayed  Results from last 7 days  Lab Units 12/06/17  0659   INR  1 44*       * I Have Reviewed All Lab Data Listed Above  * Additional Pertinent Lab Tests Reviewed: All Labs Within Last 24 Hours Reviewed      Recent Cultures (last 7 days):           Last 24 Hours Medication List:     atorvastatin 10 mg Oral Daily   calcium carbonate 1,000 mg Oral BID   cholecalciferol 2,000 Units Oral Daily   epoetin sunday 10,000 Units Subcutaneous Once per day on Mon Wed Fri   ferrous sulfate 325 mg Oral Daily With Breakfast   heparin (porcine) 5,000 Units Subcutaneous Q8H [address]   magnesium oxide 400 mg Oral BID   pantoprazole 40 mg Oral Early Morning   QUEtiapine 50 mg Oral HS   terazosin 1 mg Oral HS   vancomycin 125 mg Oral Q6H [address]   warfarin 5 mg Oral Daily (warfarin)        Today, Patient Was Seen By: Suellen Quezada MD    ** Please Note: Dragon 360 Dictation voice to text software may have been used in the creation of this document   **

## 2017-12-06 NOTE — PROGRESS NOTES
Case discussed with primary team over the phone, hematology was consulted yesterday for pancytopenia ; ordered lab for additional work ups for am draw  Hematology will see pt at bed side tomorrow  called pt as well to update pt       Priscila Saini MD  Heme / onc

## 2017-12-06 NOTE — SOCIAL WORK
CM left a message for Daughter-Mariaelena [phone] that patient was not accepted at [address]  CM also left a message that [facility] will accept patient back

## 2017-12-06 NOTE — PROGRESS NOTES
NEPHROLOGY PROGRESS NOTE    Patient: Benito Baez               Sex: male          DOA: 11/30/2017 11:41 AM   YOB: 1935        Age:  80 y o         LOS:  LOS: 6 days     REASON FOR THE CONSULTATION:  Further management of CKD stage 5    HPI     This is a 80-year-old male with a past medical history of CKD stage 5 admitted for further management of anemia and also received blood transfusion on admission  SUBJECTIVE     - pending official hematology evaluation  Overnight breathing has remained stable  Patient was confused this morning  Patient denies nausea / vomiting / headache / dizziness / SOB / chest pain today    - Reviewed last 24 hrs events     CURRENT MEDICATIONS       Current Facility-Administered Medications:     acetaminophen (TYLENOL) tablet 650 mg, 650 mg, Oral, Q6H PRN, JUVENCIO Grewal    atorvastatin (LIPITOR) tablet 10 mg, 10 mg, Oral, Daily, JUVENCIO Grewal, 10 mg at 12/06/17 8884    calcium carbonate (TUMS) chewable tablet 1,000 mg, 1,000 mg, Oral, BID, JUVENCIO Grewal, 1,000 mg at 12/06/17 1881    cholecalciferol (VITAMIN D3) tablet 2,000 Units, 2,000 Units, Oral, Daily, JUVENCIO Ga, 2,000 Units at 12/06/17 7835    epoetin sunday (EPOGEN,PROCRIT) injection 10,000 Units, 10,000 Units, Subcutaneous, Once per day on Mon Wed Fri, Ananya Dash MD, 10,000 Units at 12/06/17 6275    ferrous sulfate tablet 325 mg, 325 mg, Oral, Daily With Breakfast, JUVENCIO Ga, 325 mg at 12/06/17 8193    heparin (porcine) subcutaneous injection 5,000 Units, 5,000 Units, Subcutaneous, Q8H [address], Nabila Moore MD, 5,000 Units at 12/06/17 1300    magnesium oxide (MAG-OX) tablet 400 mg, 400 mg, Oral, BID, Lit Kingsley MD, 400 mg at 12/06/17 0921    ondansetron (ZOFRAN) injection 4 mg, 4 mg, Intravenous, Q6H PRN, JUVENCIO Grewal    pantoprazole (PROTONIX) EC tablet 40 mg, 40 mg, Oral, Early Morning, JUVENCIO Grewal, 40 mg at 12/06/17 7144   QUEtiapine (SEROquel) tablet 50 mg, 50 mg, Oral, HS, Estefany M Terence, CRNP, 50 mg at 12/05/17 2310    terazosin (HYTRIN) capsule 1 mg, 1 mg, Oral, HS, Estefany M Terence, CRNP, 1 mg at 12/05/17 2311    vancomycin (VANCOCIN) oral solution 125 mg, 125 mg, Oral, Q6H [address], Estefany M Terence, CRNP, 125 mg at 12/06/17 1300    warfarin (COUMADIN) tablet 5 mg, 5 mg, Oral, Daily (warfarin), Esteban Crawford MD, 5 mg at 12/05/17 1755    OBJECTIVE     Current Weight: Weight - Scale: 75 8 kg (167 lb 1 7 oz)  Vitals:    12/06/17 1100   BP: 155/65   Pulse: 64   Resp: 18   Temp:    SpO2: 100%       Intake/Output Summary (Last 24 hours) at 12/06/17 1426  Last data filed at 12/05/17 1900   Gross per 24 hour   Intake                0 ml   Output              300 ml   Net             -300 ml       PHYSICAL EXAMINATION     GEN:  Awake and not in acute distress  RS:  Bilateral equal air entry, no wheezing  CVS:  S1-S2 heard  Abdomen:  Soft, nontender, positive bowel sounds  Extremities:  No edema, skin is warm on touch  CNS:  Awake and follows commands  HEENT:  Pale conjunctiva, no JVD, head is atraumatic  Psychiatric:  Confused but not suicidal  Musculoskeletal:  No gross bony deformity seen in hands  Lymph Node:  No palpable cervical lymphadenopathy     LAB RESULTS       Results from last 7 days  Lab Units 12/06/17  1207 12/06/17  0620 12/05/17  0455 12/04/17  0545 12/03/17  0510 12/02/17  0505 12/01/17  1730 12/01/17  0531 11/30/17  1310   WBC Thousand/uL  --   --   --  5 86 6 64 10 27*  --   --  8 54   HEMOGLOBIN g/dL 7 8*  --  7 7* 7 5* 7 7* 8 7*  --   --  7 0*   HEMATOCRIT % 23 8*  --  22 8* 22 3* 22 4* 25 7*  --   --  20 4*   PLATELETS Thousands/uL  --   --   --  84* 75* 86*  --   --  100*   SODIUM mmol/L  --  140 141 139 138 139  --  138 140   POTASSIUM mmol/L  --  3 9 3 9 3 9 3 5 4 1  --  4 5 5 5*   CHLORIDE mmol/L  --  108 107 106 105 104  --  106 108   CO2 mmol/L  --  20* 20* 20* 18* 17*  --  16* 18*   BUN mg/dL  --  93* 96* 100* 104* 110*  --  114* 111*   CREATININE mg/dL  --  4 16* 4 49* 4 55* 4 55* 4 97*  --  5 14* 5 51*   EGFR ml/min/1 73sq m  --  12 11 11 11 10  --  10 9   CALCIUM mg/dL  --  9 4 9 1 8 7 8 4 7 9*  --  7 1* 7 3*   MAGNESIUM mg/dL  --   --  1 6 1 7 1 9 1 8 1 7 0 6* 0 5*   PHOSPHORUS mg/dL  --   --   --   --   --  3 6  --   --   --    ALBUMIN g/dL  --   --   --   --   --  2 8*  --  2 5* 2 8*   TOTAL PROTEIN g/dL  --   --   --   --   --  7 0  --  6 3* 6 6   GLUCOSE RANDOM mg/dL  --  88 86 92 100 96  --  113 100       I have personally reviewed the old medical records and patient's previously known baseline creatinine level is ~ 4 5-5 0    RADIOLOGY RESULTS      Results for orders placed during the hospital encounter of 10/22/15   XR chest portable    Narrative EXAM ROOM = [facility] [employer]   EXAM START = [id number]   EXAM STOP = [id number]   FILMS USED = 1 VIEWS      CHEST       INDICATION-  Status post cardiac surgery      COMPARISON-  10/22/2015 chest x-ray  VIEWS-   AP frontal^  1 image      FINDINGS-  Stable findings status post endovascular repair of ascending   aortic aneurysm  Prosthetic heart valve again seen  Cardiomediastinal silhouette appears unchanged  Atelectatic changes present in the left lung base  Visualized osseous structures appear within normal limits for the   patient's age  IMPRESSION-      Atelectatic changes in the left lung base  Transcribed on- [id number]            NATALYA Hardin MD        Reading Radiologist- NATALYA Brown MD        Electronically Signed- NATALYA Brown MD        Released Date Time- 10/23/15 1352    ------------------------------------------------------------------------------   READ BY = [id number]^I ANG LIU   RELEASED BY = [id number]^I ANG LIU      Results for orders placed during the hospital encounter of 10/25/17   XR chest 2 views    Narrative CHEST     INDICATION:  Altered mental status      COMPARISON: 10/19/2017    VIEWS:  Frontal and lateral projections    IMAGES:  3    FINDINGS:         Cardiomediastinal silhouette is stable, including cardiomegaly, aortic valve prosthesis and thoracic aortic stent graft  The lungs are clear  Stable small left pleural effusion       Sternal wires are present  Visualized osseous structures appear otherwise unremarkable for the patient's age  Impression No acute pulmonary disease  Persistent small left pleural effusion  Workstation performed: [id number]       Results for orders placed in visit on 02/21/14   US retroperitoneal complete    Narrative RENAL ULTRASOUND   INDICATION- Acute kidney insufficiency  Chronic kidney disease  COMPARISON- 04/18/2012   TECHNIQUE-   Ultrasound of the retroperitoneum was performed with a   curvilinear transducer utilizing volumetric sweeps and still imaging   techniques  FINDINGS-   KIDNEYS-   Right kidney-  10 1 cm  Left kidney-  10 8 cm  Symmetric and normal size  Normal echogenicity and contour  No suspicious masses detected  Small 1 1 cm cortical cyst in lower   pole of the right kidney   No hydronephrosis  0 5 cm nonobstructing intrarenal calculus in the lower pole of the   right kidney, unchanged the previous study   No perinephric fluid collections  URETERS-   Nondilated  BLADDER-    Normally distended  No focal thickening or mass lesions  IMPRESSION-    1  No hydronephrosis  2   Stable nonobstructing lower pole 5 mm right renal calculus   unchanged the previous study  Transcribed on- NATALYA Gonzalez MD        Reading Radiologist- NATALYA Alicea MD        Releasing Radiologist- NATALYA Alicea MD        Released Date Time- 02/24/14 1611      ------------------------------------------------------------------------------   Kaylah OSC[facility]IT[facility]        PLAN / RECOMMENDATIONS      1  CKD stage 5    Multifactorial    Upon review of old medical records patient's baseline creatinine is between 4 5-5 0  Overnight patient's renal function has improved and now his creatinine is below to previously known baseline creatinine  Current creatinine is 4 16  Currently patient is nonoliguric also  No urgent need to initiate dialysis at this point  Patient has left upper extremity AV fistula in place but recent Doppler also showed > 50% stenosis at anastomosis site and may need further surgical intervention which can be done as outpatient  Plan to monitor renal function while in the hospital     2   Azotemia  Multifactorial, patient was confused this morning but was answering questions appropriately  I doubt that patient is symptomatic and experiencing any uremic symptoms  Overnight BUN level has also improved to 93  Would not consider initiation of dialysis for time being  Plan to monitor BUN level while in the hospital     3   Anemia  Multifactorial, received blood transfusion on admission and currently also on Epogen 03443 Units SubQ 3 X a week  Pending hematology evaluation  Stable overnight hemoglobin level at 7 8  No active signs of bleeding  Plan to recheck hemoglobin level again tomorrow and consider blood transfusion if hemoglobin level drops below 7  Plan was also d/w Sarthak Tomlin MD  Nephrology  12/6/2017        Portions of the record may have been created with voice recognition software  Occasional wrong word or "sound a like" substitutions may have occurred due to the inherent limitations of voice recognition software  Read the chart carefully and recognize, using context, where substitutions have occurred

## 2017-12-07 VITALS
HEIGHT: 72 IN | OXYGEN SATURATION: 99 % | HEART RATE: 70 BPM | SYSTOLIC BLOOD PRESSURE: 138 MMHG | TEMPERATURE: 97.5 F | DIASTOLIC BLOOD PRESSURE: 64 MMHG | BODY MASS INDEX: 22.99 KG/M2 | WEIGHT: 169.75 LBS | RESPIRATION RATE: 20 BRPM

## 2017-12-07 PROBLEM — G93.40 ENCEPHALOPATHY: Status: ACTIVE | Noted: 2017-12-07

## 2017-12-07 PROBLEM — G93.40 ENCEPHALOPATHY: Status: RESOLVED | Noted: 2017-12-07 | Resolved: 2017-12-07

## 2017-12-07 LAB
ANION GAP SERPL CALCULATED.3IONS-SCNC: 11 MMOL/L (ref 4–13)
BASOPHILS # BLD AUTO: 0.04 THOUSANDS/ΜL (ref 0–0.1)
BASOPHILS NFR BLD AUTO: 1 % (ref 0–1)
BUN SERPL-MCNC: 93 MG/DL (ref 5–25)
CALCIUM SERPL-MCNC: 9.6 MG/DL (ref 8.3–10.1)
CHLORIDE SERPL-SCNC: 108 MMOL/L (ref 100–108)
CO2 SERPL-SCNC: 22 MMOL/L (ref 21–32)
CREAT SERPL-MCNC: 4.25 MG/DL (ref 0.6–1.3)
EOSINOPHIL # BLD AUTO: 0.04 THOUSAND/ΜL (ref 0–0.61)
EOSINOPHIL NFR BLD AUTO: 1 % (ref 0–6)
ERYTHROCYTE [DISTWIDTH] IN BLOOD BY AUTOMATED COUNT: 17.9 % (ref 11.6–15.1)
GFR SERPL CREATININE-BSD FRML MDRD: 12 ML/MIN/1.73SQ M
GLUCOSE SERPL-MCNC: 141 MG/DL (ref 65–140)
HCT VFR BLD AUTO: 22.3 % (ref 36.5–49.3)
HGB BLD-MCNC: 7.3 G/DL (ref 12–17)
INR PPP: 1.46 (ref 0.86–1.16)
LYMPHOCYTES # BLD AUTO: 1.02 THOUSANDS/ΜL (ref 0.6–4.47)
LYMPHOCYTES NFR BLD AUTO: 21 % (ref 14–44)
MAGNESIUM SERPL-MCNC: 1.6 MG/DL (ref 1.6–2.6)
MCH RBC QN AUTO: 29.6 PG (ref 26.8–34.3)
MCHC RBC AUTO-ENTMCNC: 32.7 G/DL (ref 31.4–37.4)
MCV RBC AUTO: 90 FL (ref 82–98)
MONOCYTES # BLD AUTO: 0.45 THOUSAND/ΜL (ref 0.17–1.22)
MONOCYTES NFR BLD AUTO: 9 % (ref 4–12)
NEUTROPHILS # BLD AUTO: 3.16 THOUSANDS/ΜL (ref 1.85–7.62)
NEUTS SEG NFR BLD AUTO: 66 % (ref 43–75)
NRBC BLD AUTO-RTO: 0 /100 WBCS
PLATELET # BLD AUTO: 74 THOUSANDS/UL (ref 149–390)
POTASSIUM SERPL-SCNC: 3.9 MMOL/L (ref 3.5–5.3)
PROTHROMBIN TIME: 18.1 SECONDS (ref 12.1–14.4)
RBC # BLD AUTO: 2.47 MILLION/UL (ref 3.88–5.62)
RETICS # AUTO: NORMAL 10*3/UL (ref 14356–105094)
RETICS # CALC: 0.75 % (ref 0.37–1.87)
SODIUM SERPL-SCNC: 141 MMOL/L (ref 136–145)
VIT B12 SERPL-MCNC: 1932 PG/ML (ref 100–900)
WBC # BLD AUTO: 4.8 THOUSAND/UL (ref 4.31–10.16)

## 2017-12-07 PROCEDURE — 80048 BASIC METABOLIC PNL TOTAL CA: CPT | Performed by: INTERNAL MEDICINE

## 2017-12-07 PROCEDURE — 82607 VITAMIN B-12: CPT | Performed by: INTERNAL MEDICINE

## 2017-12-07 PROCEDURE — 83735 ASSAY OF MAGNESIUM: CPT | Performed by: INTERNAL MEDICINE

## 2017-12-07 PROCEDURE — 85025 COMPLETE CBC W/AUTO DIFF WBC: CPT | Performed by: INTERNAL MEDICINE

## 2017-12-07 PROCEDURE — 85610 PROTHROMBIN TIME: CPT | Performed by: INTERNAL MEDICINE

## 2017-12-07 PROCEDURE — 85045 AUTOMATED RETICULOCYTE COUNT: CPT | Performed by: INTERNAL MEDICINE

## 2017-12-07 RX ADMIN — Medication 325 MG: at 08:02

## 2017-12-07 RX ADMIN — ANTACID TABLETS 1000 MG: 500 TABLET, CHEWABLE ORAL at 08:03

## 2017-12-07 RX ADMIN — PANTOPRAZOLE SODIUM 40 MG: 40 TABLET, DELAYED RELEASE ORAL at 06:34

## 2017-12-07 RX ADMIN — HEPARIN SODIUM 5000 UNITS: 5000 INJECTION, SOLUTION INTRAVENOUS; SUBCUTANEOUS at 06:34

## 2017-12-07 RX ADMIN — VANCOMYCIN 125 MG: KIT at 06:34

## 2017-12-07 RX ADMIN — VITAMIN D, TAB 1000IU (100/BT) 2000 UNITS: 25 TAB at 08:03

## 2017-12-07 RX ADMIN — VANCOMYCIN 125 MG: KIT at 11:05

## 2017-12-07 RX ADMIN — ATORVASTATIN CALCIUM 10 MG: 10 TABLET, FILM COATED ORAL at 08:03

## 2017-12-07 RX ADMIN — VANCOMYCIN 125 MG: KIT at 00:22

## 2017-12-07 RX ADMIN — Medication 400 MG: at 08:03

## 2017-12-07 NOTE — DISCHARGE SUMMARY
Discharge Summary - [address] [facility] Medicine    Patient Information: Lavinia Mason 80 y o  male MRN: [id number]  Unit/Bed#: -01 Encounter: [id number]    Discharging Physician / Practitioner: Alma Rosa Cox MD  PCP: Ania Niño MD  Admission Date: 11/30/2017  Discharge Date: 12/07/17    Reason for Admission:  Anemia    Discharge Diagnoses:     Principal Problem:    Anemia  Active Problems:    CKD (chronic kidney disease), stage IV (HCC)    Edema    CHF (congestive heart failure) (HCC)    Paroxysmal atrial fibrillation (HCC)    Azotemia    Weakness generalized    CAD (coronary artery disease)    Hypocalcemia    Warfarin anticoagulation    Hypomagnesemia    Abnormal laboratory test    Stage 5 chronic kidney disease not on chronic dialysis ([facility] )  Resolved Problems:    Encephalopathy    Present on Admission:   CKD (chronic kidney disease), stage IV ([facility] [facility] )   CHF (congestive heart failure) (HCC)   Paroxysmal atrial fibrillation (HCC)   Abnormal laboratory test   Edema   Anemia   Weakness generalized   Hypomagnesemia   Hypocalcemia   CAD (coronary artery disease)   Stage 5 chronic kidney disease not on chronic dialysis ([facility] )   Azotemia   (Resolved) Encephalopathy    Consultations During Hospital Stay:  · Nephrology  · Hematology/oncology    Procedures Performed:     · Nothing invasive    Significant Findings:     · None    Incidental Findings:   · None     Test Results Pending at Discharge (will require follow up): · None     Outpatient Tests Requested:  · CBC, BMP, and INR as needed    Complications:  None    Hospital Course:     Lavinia Mason is a 80 y o  male patient who originally presented to the hospital on 11/30/2017 due to significant drop in his hemoglobin  Patient has underlying chronic kidney disease stage 5  He was also being treated for C diff at the nursing home  He was somewhat confused when he came in    After receiving blood transfusion and stabilizing his hemoglobin, his mental status has improved  His diarrhea is also better  His hemoglobin has remained stable  He is being seen by Hematology/Oncology today and with follow-up appointment on 12/18/2017 on outpatient basis for any further workup  He basically is eager to be discharged he back to his facility for further rehabilitation  His Coumadin has also been resumed  His INR has remained subtherapeutic  Resume home dose Coumadin and follow up INR closely  Condition at Discharge: fair     Discharge Day Visit / Exam:     Subjective:  Feels fine  He denies any diarrhea  Denies any chest pain  Denies being dizzy or lightheaded  Vitals: Blood Pressure: 138/64 (12/07/17 0700)  Pulse: 70 (12/07/17 0700)  Temperature: 97 5 °F (36 4 °C) (12/07/17 0700)  Temp Source: Oral (12/07/17 0700)  Respirations: (!) 2 (12/07/17 0700)  Height: 6' (182 9 cm) (11/30/17 1630)  Weight - Scale: 77 kg (169 lb 12 1 oz) (12/07/17 0600)  SpO2: 99 % (12/07/17 0700)  Exam:        Patient lying in bed  Vital signs reviewed as above  Not in any respiratory distress  Abdomen is soft  Nontender  Bowel sounds are audible  Chest mostly clear to auscultation and has decreased breath sounds at the bases  S1 and S2 are audible  Mood and affect are pleasant            Discharge instructions/Information to patient and family:   See after visit summary for information provided to patient and family  Provisions for Follow-Up Care:  See after visit summary for information related to follow-up care and any pertinent home health orders  Disposition:     [facility] [employer] SNF:   · Not Applicable to this Patient - Not Applicable to this Patient    Planned Readmission:  None     Discharge Statement:  I spent more than 35 minutes discharging the patient  This time was spent on the day of discharge  I had direct contact with the patient on the day of discharge   Greater than 50% of the total time was spent examining patient, answering all patient questions, arranging and discussing plan of care with patient as well as directly providing post-discharge instructions  Additional time then spent on discharge activities  Discharge Medications:  See after visit summary for reconciled discharge medications provided to patient and family  ** Please Note: Dragon 360 Dictation voice to text software may have been used in the creation of this document   **

## 2017-12-07 NOTE — SOCIAL WORK
Patient is scheduled to be transported to [place] via wheelchair transport from [facility] [phone] at 12PM today  Daughter-Mariaelena was notified and IMM was discussed with her  Mariaelena declined appeal for the discharge

## 2017-12-07 NOTE — CONSULTS
Consultation -  Hematology/Oncology   [facility] [place] 80 [place]  [place] MRN: [id number]  Unit/Bed#: -01 Encounter: [id number]    Physician Requesting Consult: Sammy Castillo MD  Reason for Consult:  Anemia    HPI: Carl York is a 80y o  year old male with a history of CKD stage 5, AFib and CHF who was admitted from a nursing facility on 11/30 for symptomatic anemia  Admission CBC showed hemoglobin 7 0, RDW 17 3, MCV 88, WBC 8 5, platelets 227S  He was subsequently transfused with 2 units PRBC with a bump to 8 7  One day following transfusion hemoglobin dropped to 7 7 and continue to trend downward  FOBT was negative  Iron studies showed serum iron of 27, ferritin 1304, saturation 14%, TIBC 195  Folate and B12 levels are adequate  We are consulted for evaluation of anemia     Assessment/Plan:   #1 Normocytic Anemia/anemia of chronic disease likely multifactorial, 2/2 chronic inflammation (CKD) + with some mild iron deficiency, >hematologic disorder(MDS)  -admission hemoglobin 7 0, SP 2 units PRBC with initial bump to 8 7 followed by continued downward trend to 7 0  His baseline hemoglobin over the past 1 year has ranged between 8-11   -iron studies this admission show mild iron deficiency with a serum iron of 27, suspect this is secondary to poor nutrition given patient history and body habitus  -TIBC is low at 195 consistent with anemia of chronic kidney disease  -he has mild thrombocytopenia with a baseline data dating back 1 year in the 100k range  This admission platelets have ranged between 75 and 100k  -there is a low suspicion for hematologic disorder such as MDS   -as stated above anemia is likely multifactorial and recommend continue to monitor her CBC and transfuse as needed to keep hemoglobin greater than 7 grams/deciliter, platelets greater than 15,000 or sooner if symptomatic    -FOBT was negative this admission, however recommend further evaluation of GI etiology blood loss if hemoglobin continues to decline this admission  -he is receiving Epogen under the care of the nephrology service   -agree with oral iron supplementation   -We will follow the patient in the outpatient hematology clinic with re-evaluation of iron studies and CBC  He has an appointment with Dr Megan Hope on 12/18 at 1:00 p m  at the [facility]  If his hemoglobin continues to decline we will discuss additional options such as bone marrow biopsy for further evaluation of hematologic disorder  Our recommendations were communicated to the primary team (GHAZALA-Dr Nohemy Ozuna) on this date  Please contact us if you have any questions regarding this consult  Thank you for this consult  Past Medical History:   Diagnosis Date    Aortic aneurysm (HCC)     Arthritis     GERD (gastroesophageal reflux disease)     Heart failure (HCC)     Hyperlipidemia     Hypertension     Irregular heart beat     afib    Psychiatric illness     Renal disorder      Family History   Problem Relation Age of Onset    Cancer Mother     Anuerysm Father      Social History     Social History    Marital status: /Civil Union     Spouse name: N/A    Number of children: N/A    Years of education: N/A     Occupational History    Retired       Social History Main Topics    Smoking status: Former Smoker     Quit date: 1/1/2011    Smokeless tobacco: Former User    Alcohol use No    Drug use: No    Sexual activity: No     Other Topics Concern    None     Social History Narrative    Lives at home with his wife  He has 4 daughters and 1 son  Allergies   Allergen Reactions    Ativan [Lorazepam] Other (See Comments)     "it makes me go crazy"    Penicillins Swelling       Subjective:  He denies any pain today  No cardiac or pulmonary symptoms  Denies any evidence of spontaneous bleeding including epistaxis, bleeding gums, hematuria, bloody stool and melena      Review of Systems   All other systems reviewed and are negative        Objective:  /78   Pulse 69   Temp 98 1 °F (36 7 °C) (Oral)   Resp 20   Ht 6' (1 829 m)   Wt 77 kg (169 lb 12 1 oz)   SpO2 97%   BMI 23 02 kg/m²       Current Facility-Administered Medications:     acetaminophen (TYLENOL) tablet 650 mg, 650 mg, Oral, Q6H PRN, Estefany M Terence, CRNP    atorvastatin (LIPITOR) tablet 10 mg, 10 mg, Oral, Daily, Estefany M Terence, CRNP, 10 mg at 12/07/17 0803    calcium carbonate (TUMS) chewable tablet 1,000 mg, 1,000 mg, Oral, BID, Estefany M Terence, CRNP, 1,000 mg at 12/07/17 0803    cholecalciferol (VITAMIN D3) tablet 2,000 Units, 2,000 Units, Oral, Daily, [employer], CRNP, 2,000 Units at 12/07/17 0803    epoetin sunday (EPOGEN,PROCRIT) injection 10,000 Units, 10,000 Units, Subcutaneous, Once per day on Mon Wed Fri, Brittney Roper MD, 10,000 Units at 12/06/17 6109    ferrous sulfate tablet 325 mg, 325 mg, Oral, Daily With Breakfast, [employer], CRNP, 325 mg at 12/07/17 0802    heparin (porcine) subcutaneous injection 5,000 Units, 5,000 Units, Subcutaneous, Q8H [address], Jude Zelaya MD, 5,000 Units at 12/07/17 0634    magnesium oxide (MAG-OX) tablet 400 mg, 400 mg, Oral, BID, Lit Kingsley MD, 400 mg at 12/07/17 0803    ondansetron (ZOFRAN) injection 4 mg, 4 mg, Intravenous, Q6H PRN, Estefany M Terence, CRNP    pantoprazole (PROTONIX) EC tablet 40 mg, 40 mg, Oral, Early Morning, Estefany M Terence, CRNP, 40 mg at 12/07/17 6660    QUEtiapine (SEROquel) tablet 50 mg, 50 mg, Oral, HS, Estefany M Terence, CRNP, 50 mg at 12/06/17 2023    terazosin (HYTRIN) capsule 1 mg, 1 mg, Oral, HS, Estefany M Terence, CRNP, 1 mg at 12/06/17 2023    vancomycin (VANCOCIN) oral solution 125 mg, 125 mg, Oral, Q6H [address], JUVENCIO Grewal, 125 mg at 12/07/17 4569    warfarin (COUMADIN) tablet 5 mg, 5 mg, Oral, Daily (warfarin), Jude Zelaya MD, 5 mg at 12/06/17 1900    Recent Labs      12/05/17   0455  12/06/17   0620  12/06/17   1207  12/07/17   0722   WBC   --    --    --   4 80 HGB  7 7*   --   7 8*  7 3*   PLT   --    --    --   74*   MCV   --    --    --   90   RDW   --    --    --   17 9*   CREATININE  4 49*  4 16*   --   4 25*     Laboratory studies were reviewed    Imaging:       Pathology: None    Physical Exam   HENT:   Mouth/Throat: Oropharynx is clear and moist    Eyes: No scleral icterus  Abdominal: Soft  He exhibits no distension  There is no tenderness  Lymphadenopathy:     He has no cervical adenopathy  Skin: Skin is warm and dry  Bruising and ecchymosis noted  Multiple large purpuric lesions on bilateral forearms in various stages of healing  No open lesions or wounds     Code Status: Level 3 - DNAR and DNI    Counseling / Coordination of Care  Total floor / unit time spent today 30 minutes  Greater than 50% of total time was spent with the patient and / or family counseling and / or coordination of care

## 2017-12-07 NOTE — PROGRESS NOTES
NEPHROLOGY PROGRESS NOTE    Patient: Dottie Villeda               Sex: male          DOA: 11/30/2017 11:41 AM   YOB: 1935        Age:  80 y o         LOS:  LOS: 7 days     REASON FOR THE CONSULTATION:  Further management of CKD stage 5    HPI     This is a 80-year-old male with a past medical history of CKD stage 5 admitted for further management of anemia and also received blood transfusion on admission  SUBJECTIVE     - patient was found to be nonoliguric overnight   Overnight breathing has remained stable  Patient denies nausea / vomiting / headache / dizziness / SOB / chest pain today    - Reviewed last 24 hrs events     CURRENT MEDICATIONS       Current Facility-Administered Medications:     acetaminophen (TYLENOL) tablet 650 mg, 650 mg, Oral, Q6H PRN, Estefany Pratt CRNP    atorvastatin (LIPITOR) tablet 10 mg, 10 mg, Oral, Daily, Estefany Godoyin, CRNP, 10 mg at 12/07/17 0803    calcium carbonate (TUMS) chewable tablet 1,000 mg, 1,000 mg, Oral, BID, Estefany Pratt, CRNP, 1,000 mg at 12/07/17 0803    cholecalciferol (VITAMIN D3) tablet 2,000 Units, 2,000 Units, Oral, Daily, Estefany M Terence, CRNP, 2,000 Units at 12/07/17 0803    epoetin sunday (EPOGEN,PROCRIT) injection 10,000 Units, 10,000 Units, Subcutaneous, Once per day on Mon Wed Fri, Albert Freeman MD, 10,000 Units at 12/06/17 4139    ferrous sulfate tablet 325 mg, 325 mg, Oral, Daily With Breakfast, JUVENCIO Dejesus, 325 mg at 12/07/17 0802    heparin (porcine) subcutaneous injection 5,000 Units, 5,000 Units, Subcutaneous, Q8H Jefrey Councilman, MD, 5,000 Units at 12/07/17 0634    magnesium oxide (MAG-OX) tablet 400 mg, 400 mg, Oral, BID, Lit Kingsley MD, 400 mg at 12/07/17 0803    ondansetron (ZOFRAN) injection 4 mg, 4 mg, Intravenous, Q6H PRN, Estefany Godoyin, CRNP    pantoprazole (PROTONIX) EC tablet 40 mg, 40 mg, Oral, Early Morning, Estefany JUVENCIO Sharma, 40 mg at 12/07/17 0634    QUEtiapine (SEROquel) tablet 50 mg, 50 mg, Oral, HS, Estefany M Terence, CRNP, 50 mg at 12/06/17 2023    terazosin (HYTRIN) capsule 1 mg, 1 mg, Oral, HS, Estefany M Terence, CRNP, 1 mg at 12/06/17 2023    vancomycin (VANCOCIN) oral solution 125 mg, 125 mg, Oral, Q6H [address], Estefany M Terence, CRNP, 125 mg at 12/07/17 1105    warfarin (COUMADIN) tablet 5 mg, 5 mg, Oral, Daily (warfarin), Concepcion Ceja MD, 5 mg at 12/06/17 1900    OBJECTIVE     Current Weight: Weight - Scale: 77 kg (169 lb 12 1 oz)  Vitals:    12/07/17 0700   BP: 138/64   Pulse: 70   Resp: 20   Temp: 97 5 °F (36 4 °C)   SpO2: 99%       Intake/Output Summary (Last 24 hours) at 12/07/17 1144  Last data filed at 12/07/17 1101   Gross per 24 hour   Intake              180 ml   Output              800 ml   Net             -620 ml       PHYSICAL EXAMINATION     GEN:  Awake and not in acute distress  RS:  Bilateral equal air entry, no wheezing  CVS:  S1-S2 heard  Abdomen:  Soft, nontender, positive bowel sounds  Extremities:  No edema, skin is warm on touch  CNS:  Awake and follows commands  HEENT:  Pale conjunctiva, no JVD, head is atraumatic  Psychiatric:  Confused but not suicidal  Musculoskeletal:  No gross bony deformity seen in hands  Lymph Node:  No palpable cervical lymphadenopathy     LAB RESULTS       Results from last 7 days  Lab Units 12/07/17  0722 12/06/17  1207 12/06/17  0620 12/05/17  0455 12/04/17  0545 12/03/17  0510 12/02/17  0505 12/01/17  1730 12/01/17  0531 11/30/17  1310   WBC Thousand/uL 4 80  --   --   --  5 86 6 64 10 27*  --   --  8 54   HEMOGLOBIN g/dL 7 3* 7 8*  --  7 7* 7 5* 7 7* 8 7*  --   --  7 0*   HEMATOCRIT % 22 3* 23 8*  --  22 8* 22 3* 22 4* 25 7*  --   --  20 4*   PLATELETS Thousands/uL 74*  --   --   --  84* 75* 86*  --   --  100*   SODIUM mmol/L 141  --  140 141 139 138 139  --  138 140   POTASSIUM mmol/L 3 9  --  3 9 3 9 3 9 3 5 4 1  --  4 5 5 5*   CHLORIDE mmol/L 108  --  108 107 106 105 104  --  106 108   CO2 mmol/L 22  --  20* 20* 20* 18* 17*  --  16* 18* BUN mg/dL 93*  --  93* 96* 100* 104* 110*  --  114* 111*   CREATININE mg/dL 4 25*  --  4 16* 4 49* 4 55* 4 55* 4 97*  --  5 14* 5 51*   EGFR ml/min/1 73sq m 12  --  12 11 11 11 10  --  10 9   CALCIUM mg/dL 9 6  --  9 4 9 1 8 7 8 4 7 9*  --  7 1* 7 3*   MAGNESIUM mg/dL 1 6  --   --  1 6 1 7 1 9 1 8 1 7 0 6* 0 5*   PHOSPHORUS mg/dL  --   --   --   --   --   --  3 6  --   --   --    ALBUMIN g/dL  --   --   --   --   --   --  2 8*  --  2 5* 2 8*   TOTAL PROTEIN g/dL  --   --   --   --   --   --  7 0  --  6 3* 6 6   GLUCOSE RANDOM mg/dL 141*  --  88 86 92 100 96  --  113 100       I have personally reviewed the old medical records and patient's previously known baseline creatinine level is ~ 4 5-5 0    RADIOLOGY RESULTS      Results for orders placed during the hospital encounter of 10/22/15   XR chest portable    Narrative EXAM ROOM = [facility] [employer]   EXAM START = [id number]   EXAM STOP = [id number]   FILMS USED = 1 VIEWS      CHEST       INDICATION-  Status post cardiac surgery      COMPARISON-  10/22/2015 chest x-ray  VIEWS-   AP frontal^  1 image      FINDINGS-  Stable findings status post endovascular repair of ascending   aortic aneurysm  Prosthetic heart valve again seen  Cardiomediastinal silhouette appears unchanged  Atelectatic changes present in the left lung base  Visualized osseous structures appear within normal limits for the   patient's age  IMPRESSION-      Atelectatic changes in the left lung base        Transcribed on- [id number]            NATALYA Padron MD        Reading Radiologist- NATALYA Cotto MD        Electronically Signed- NATALYA Cotto MD        Released Date Time- 10/23/15 1352    ------------------------------------------------------------------------------   READ BY = [id number]^I ANG LIU   RELEASED BY = [id number]^I ANG LIU      Results for orders placed during the hospital encounter of 10/25/17   XR chest 2 views Narrative CHEST     INDICATION:  Altered mental status  COMPARISON:  10/19/2017    VIEWS:  Frontal and lateral projections    IMAGES:  3    FINDINGS:         Cardiomediastinal silhouette is stable, including cardiomegaly, aortic valve prosthesis and thoracic aortic stent graft  The lungs are clear  Stable small left pleural effusion       Sternal wires are present  Visualized osseous structures appear otherwise unremarkable for the patient's age  Impression No acute pulmonary disease  Persistent small left pleural effusion  Workstation performed: [id number]       Results for orders placed in visit on 02/21/14   US retroperitoneal complete    Narrative RENAL ULTRASOUND   INDICATION- Acute kidney insufficiency  Chronic kidney disease  COMPARISON- 04/18/2012   TECHNIQUE-   Ultrasound of the retroperitoneum was performed with a   curvilinear transducer utilizing volumetric sweeps and still imaging   techniques  FINDINGS-   KIDNEYS-   Right kidney-  10 1 cm  Left kidney-  10 8 cm  Symmetric and normal size  Normal echogenicity and contour  No suspicious masses detected  Small 1 1 cm cortical cyst in lower   pole of the right kidney   No hydronephrosis  0 5 cm nonobstructing intrarenal calculus in the lower pole of the   right kidney, unchanged the previous study   No perinephric fluid collections  URETERS-   Nondilated  BLADDER-    Normally distended  No focal thickening or mass lesions  IMPRESSION-    1  No hydronephrosis  2   Stable nonobstructing lower pole 5 mm right renal calculus   unchanged the previous study      Transcribed on- NATALYA Chawla MD        Reading NATALYA Alexandra MD        Releasing Radiologist- NATALYA Taylor MD        Released Date Time- 02/24/14 1611      ------------------------------------------------------------------------------   [address] PLAN / RECOMMENDATIONS      1  CKD stage 5  Multifactorial    Upon review of old medical records patient's baseline creatinine is between 4 5-5 0  Overnight patient's renal function has improved and now his creatinine is below to previously known baseline creatinine  Current creatinine is 4 25  Currently patient is nonoliguric also  No urgent need to initiate dialysis at this point  Patient has left upper extremity AV fistula in place but recent Doppler also showed > 50% stenosis at anastomosis site and may need further surgical intervention which can be done as outpatient  Plan to monitor renal function while in the hospital     2   Azotemia  Multifactorial, patient was confused this morning but was answering questions appropriately  I doubt that patient is symptomatic and experiencing any uremic symptoms  Overnight BUN level has remained stable at 93  Would not consider initiation of dialysis for time being  Plan to monitor BUN level while in the hospital     3   Anemia  Multifactorial, received blood transfusion on admission and currently also on Epogen 25174 Units SubQ 3 X a week  Pending official hematology evaluation  Overnight hemoglobin level has slightly worsened to 7 3  No active signs of bleeding  Plan to recheck hemoglobin level again tomorrow and consider blood transfusion if hemoglobin level drops below 7  Plan was also d/w Yasmin Jeff MD  Nephrology  12/7/2017        Portions of the record may have been created with voice recognition software  Occasional wrong word or "sound a like" substitutions may have occurred due to the inherent limitations of voice recognition software  Read the chart carefully and recognize, using context, where substitutions have occurred

## 2017-12-18 ENCOUNTER — ALLSCRIPTS OFFICE VISIT (OUTPATIENT)
Dept: OTHER | Facility: OTHER | Age: 82
End: 2017-12-18

## 2017-12-19 NOTE — CONSULTS
Assessment  1  Pancytopenia (284 19) (D61 818)   2  Easy bruising (782 9) (R23 8)   3  Anticoagulant long-term use (V58 61) (Z79 01)    Plan  Pancytopenia    · (1) CBC/PLT/DIFF; Status:Active; Requested for:34Uor4759; Perform:[facility]; PIW:[id number];FBYJKRP; For:Pancytopenia; Ordered By:Socrates Pantoja;   · (1) IRON PANEL; Status:Active; Requested for:52Uco0569; Perform:[facility]; MLX:[id number];DCNHZYH; For:Pancytopenia; Ordered By:Socrates Pantoja;   · Follow-up visit in 2 months Evaluation and Treatment  lab in 2 months Follow-up after lab  Status: Complete  Done: 38NHO9507   Ordered; For: Pancytopenia; Ordered By: Dale Overton Performed:  Due: 35KLT2900; Last Updated By: Compa Corona; 12/18/2017 1:35:25 PM    Discussion/Summary    1, anemia: Normocytic, most recent hemoglobin 7 3 after transfusion, MCV 90 --etiology is unclear, contributing factors including CKD stage 5, frequent blood draw, chronic disease  --no clear evidence of GI bleeding  Stool guaiac negative while in hospital  Per patient is status post EGD colonoscopy last year  --no clear evidence for hemolysis, haptoglobin 74 on December 2nd, T bili 0 7 on December 2nd --iron profile is consistent with chronic disease with ferritin 1300; B12 is elevated  --retic count is 0 75, suggesting chronic anemia  Plan--bone marrow disorders cannot be ruled out, bone marrow biopsy was discussed with patient, patient deferred  He is under the care of Nephrology as well for CKD, he is receiving Epo, he is taking oral iron  For the reason he is on EPO, he can continue oral iron  Otherwise it should be stopped as currently having iron overload with high ferritin  --repeat CBC in 2 months  Follow-up 2, thrombocytopenia--chronic, ongoing for at least 3 years  Unclear etiology   The most, once including B12 deficiency, bone marrow disorders, alcohol use, secondary to medicine including antibiotics, anti seizure medicines, chronic infection inflammation, splenomegaly  Medication list reviewed, Torsemide is potentially related with thrombocytopenia  However, the his platelet number has been consistent for the past 3 years  Plan--continue to monitor  --okay to continue anticoagulation therapy if platelet more than 03 4, Decreased left breathing sound  --no other associated symptoms  advised patient to have chest x-ray  Patient deferred  All as above, patient is not interested in any further management  The   The patient was counseled regarding diagnostic results  total time of encounter was 40 minutes-- and-- 20 minutes was spent counseling  Chief Complaint   I have low blood count       History of Present Illness  Giovanni Roche is a 80y o  year old male with a history of CKD stage 5, AFib and CHF who was admitted from a nursing facility on 11/30 for symptomatic anemia  Â recently admitted to the hosp; admission CBC showed hemoglobin 7 0, RDW 17 3, MCV 88, WBC 8 5, platelets 284Q  He was subsequently transfused with 2 units PRBC with a bump to 8 7  then decreased to 7 3 on discharge  FOBT was negative  Iron studies showed serum iron of 27, ferritin 1304, saturation 14%, TIBC 195  Folate and B12 levels are adequate  Â Patient came in today, reported there is no bleeding, no new chest pain dyspnea dizziness easy bruise  Tolerating Coumadin again without bleeding  He reported weight loss about 30 lb in the past 3 months, no lumps bumps, no low-grade fever, chills, night sweats, leg swelling  There is no chest pain, dyspnea, cough, hemoptysis, nausea vomiting, abdominal pain, change of urination bowel movement habits  Patient has no oncology issues in the past  He reported having EGD colonoscopy last year with normal results  He clearly stated that he is not clear why he is coming in to hematology appointment, he is not interested in any workup or treatment        Review of Systems   Constitutional: No fever or chills, feels well, no tiredness, no recent weight gain or weight loss  Eyes: No complaints of eye pain, no red eyes, no discharge from eyes, no itchy eyes  ENT: no complaints of earache, no hearing loss, no nosebleeds, no nasal discharge, no sore throat, no hoarseness  Cardiovascular: No complaints of slow heart rate, no fast heart rate, no chest pain, no palpitations, no leg claudication, no lower extremity  Respiratory: No complaints of shortness of breath, no wheezing, no cough, no SOB on exertion, no orthopnea or PND  Gastrointestinal: No complaints of abdominal pain, no constipation, no nausea or vomiting, no diarrhea or bloody stools  Genitourinary: No complaints of dysuria, no incontinence, no hesitancy, no nocturia, no genital lesion, no testicular pain  Musculoskeletal: No complaints of arthralgia, no myalgias, no joint swelling or stiffness, no limb pain or swelling  Integumentary: No complaints of skin rash or skin lesions, no itching, no skin wound, no dry skin  Neurological: No compliants of headache, no confusion, no convulsions, no numbness or tingling, no dizziness or fainting, no limb weakness, no difficulty walking  Psychiatric: Is not suicidal, no sleep disturbances, no anxiety or depression, no change in personality, no emotional problems  Endocrine: No complaints of proptosis, no hot flashes, no muscle weakness, no erectile dysfunction, no deepening of the voice, no feelings of weakness  Hematologic/Lymphatic: No complaints of swollen glands, no swollen glands in the neck, does not bleed easily, no easy bruising  Active Problems  1  Abdominal aortic aneurysm (441 4) (I71 4)   2  Acute foot pain, left (729 5) (M79 672)   3  Acute on chronic systolic congestive heart failure (428 23,428 0) (I50 23)   4  Adequate anticoagulation on anticoagulant therapy (V58 61) (Z79 01)   5  Anemia (285 9) (D64 9)   6  Aneurysm, 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)   7  Anticoagulant long-term use (V58 61) (Z79 01)   8   Arterial ectasia (447 8) (I77 9)   9  Ascending aortic aneurysm (441 2) (I71 2)   10  ASCVD (arteriosclerotic cardiovascular disease) (429 2,440 9) (I25 10)   11  Bilateral edema of lower extremity (782 3) (R60 0)   12  Bilateral inguinal hernia without obstruction or gangrene, recurrence not specified  (550 92) (K40 20)   13  Black stool (792 1) (K92 1)   14  Blood in urine (599 70) (R31 9)   15  Bloody diarrhea (787 91) (R19 7)   16  Bradycardia (427 89) (R00 1)   17  Cardiomyopathy (425 4) (I42 9)   18  Chronic renal insufficiency, stage IV (severe) (585 4) (N18 4)   19  Chronic systolic CHF (congestive heart failure) (428 22,428 0) (I50 22)   20  CKD (chronic kidney disease), stage V (585 5) (N18 5)   21  Constipation (564 00) (K59 00)   22  Dehydration (276 51) (E86 0)   23  Dysphagia (787 20) (R13 10)   24  Fatigue (780 79) (R53 83)   25  Fecal occult blood test positive (792 1) (R19 5)   26  Gout of both feet (274 9) (M10 9)   27  Hallucinations (780 1) (R44 3)   28  Hematuria (599 70) (R31 9)   29  Hyperlipidemia (272 4) (E78 5)   30  Hypertension (401 9) (I10)   31  Hypocalcemia (275 41) (E83 51)   32  Hypomagnesemia (275 2) (E83 42)   33  Iliac artery aneurysm, 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)   34  Incisional hernia, without obstruction or gangrene (553 21) (K43 2)   35  Iron deficiency anemia (280 9) (D50 9)   36  Mitral valve regurgitation (424 0) (I34 0)   37  Monilial rash (112 3) (B37 2)   38  Need for diphtheria-tetanus-pertussis (Tdap) vaccine, adult/adolescent (V06 1) (Z23)   39  Need for influenza vaccination (V04 81) (Z23)   40  Need for pneumococcal vaccination (V03 82) (Z23)   41  Need for shingles vaccine (V04 89) (Z23)   42  Numbness of left hand (782 0) (R20 0)   43  Paroxysmal atrial fibrillation (427 31) (I48 0)   44  Peripheral vascular disease (443 9) (I73 9)   45  Preop examination (V72 84) (Z01 818)   46  S/P aortic valve replacement with bioprosthetic valve (V42 2) (Z95 3)   47   Sinus bradycardia (427 89) (R00 1) 48  Skin rash (782 1) (R21)   49  Thrombocytopenia (287 5) (D69 6)   50  Urine frequency (788 41) (R35 0)   51  Vascular complication (008 4) (I51 8)   52  Visit for pre-operative examination (V72 84) (Z00 834)    Past Medical History  1  Anemia (285 9) (D64 9)   2  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)   3  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)   4  ASCVD (arteriosclerotic cardiovascular disease) (429 2,440 9) (I25 10)   5  Chronic renal insufficiency, stage IV (severe) (585 4) (N18 4)   6  Dysphagia (787 20) (R13 10)   7  History of hallucinations (V11 9) (Z86 59)   8  History of hematuria (V13 09) (Z87 448)   9  Hyperlipidemia (272 4) (E78 5)   10  History of Hypomagnesemia (275 2) (E83 42)   11  History of Need for pneumococcal vaccination (V03 82) (Z23)   12  Negative depression screening   13  Visit for pre-operative examination (V72 84) (N55 960)    The active problems and past medical history were reviewed and updated today  Surgical History  1  History of Aortic Aneurysm Repair   2  History of Asc Aortic Aneurysm Repair W/ Graft, Aortic Root Replacement   3  History of CABG   4  History of Colonoscopy   5  History of Endovascular Repair Of Aortic Aneurysm   6  History of Hemodialysis Access Type Arteriovenous Fistula Left Arm   7  History of Thoracic Aortic Aneurysm Repair Endovasc Timothy Hager    The surgical history was reviewed and updated today  Family History  Father    1  Family history of Aneurysm Of Abdominal Aorta  Son    2  Family history of Aneurysm Of Abdominal Aorta  Sister    3  Family history of diabetes mellitus (V18 0) (Z83 3)    The family history was reviewed and updated today         Social History   · Denied: Drug use (305 90) (F19 90)   · Employed   · Exercises occasionally (V49 89) (Z78 9)   · Five children   · Former smoker (U33 84) (T06 342)   ·    · No advance directives (V49 89) (Z78 9)   · Occasional alcohol use   · Occasional caffeine consumption  The social history was reviewed and updated today  Current Meds   1  AmLODIPine Besylate 5 MG Oral Tablet; TAKE 1 TABLET DAILY; Therapy: 25VAI5977 to ([phone])  Requested for: 36VCK7614; Last Rx:09Oct2017 Ordered   2  Aspirin 81 MG TABS; TAKE 1 TABLET DAILY; Therapy: (Recorded:12Vdl8148) to Recorded   3  Atorvastatin Calcium 10 MG Oral Tablet; take 1 tablet every day; Therapy: 82Thu6569 to (Evaluate:19Mar2018)  Requested for: 86SMP3496; Last Rx:95Ffn2443 Ordered   4  [facility]S Omeprazole 20 6 (20 Base) MG Oral Capsule Delayed Release; take 1 capsule daily; Therapy: (Recorded:20Yfr0857) to Recorded   5  Ferrous Sulfate 325 (65 Fe) MG Oral Tablet; TAKE 1 TABLET TWICE DAILY  Requested for: 54BHW1218; Last Rx:06Nov2017 Ordered   6  MetOLazone 5 MG Oral Tablet; TAKE 1 TABLET BY MOUTH DAILY  TAKE 30 MINUTES BEFORE LASIX(FUROSEMIDE); Therapy: 06ATF8260 to (Evaluate:09Sep2018)  Requested for: 73Ikb2612; Last Rx:18Udw7664 Ordered   7  Multi-Vitamin Oral Tablet; take 1 tablet daily Recorded   8  RA Vitamin D-3 2000 UNIT Oral Capsule; TAKE 1 CAPSULE BY MOUTH ONCE DAILY Recorded   9  Terazosin HCl - 1 MG Oral Capsule; TAKE 1 CAPSULE EVERY DAY; Therapy: 89Qza3011 to (Evaluate:19Apr2018)  Requested for: 21Oct2017; Last Rx:21Oct2017 Ordered   10  Torsemide 20 MG Oral Tablet; Take 1 tablet daily Recorded   11  Tums 500 MG Oral Tablet Chewable; CHEW 2 TABLET Twice daily; Therapy: 52IZG9790 to (Evaluate:17Nov2017)  Requested for: 67Tif5680 Recorded   12  Warfarin Sodium 2 MG Oral Tablet; TAKE 1 TABLET DAILY; Therapy: 38FDE0199 to (Evaluate:32Cyj8792)  Requested for: 09IOU6019; Last  Rx:17Oct2017 Ordered   13  Warfarin Sodium 5 MG Oral Tablet; take 1 tablet every day; Therapy: 98QXM7561 to (Evaluate:17Jan2018)  Requested for: 31Lnw3554; Last  Rx:51Jur9335 Ordered    Allergies  1  Penicillins   2   Ativan TABS    Vitals  Vital Signs    Recorded: 35BTZ4764 12:56PM   Temperature 96 8 F   Heart Rate 70   Respiration 18   Systolic 487   Diastolic 60   Height 5 ft 11 in   Weight 167 lb    BMI Calculated 23 29   BSA Calculated 1 95   Pain Scale 0     Physical Exam   Constitutional  General appearance: No acute distress, well appearing and well nourished  Eyes  Conjunctiva and lids: No swelling, erythema, or discharge  Ears, Nose, Mouth, and Throat  External inspection of ears and nose: Normal    Pulmonary  Respiratory effort: No increased work of breathing or signs of respiratory distress  Auscultation of lungs: Abnormal  -- Decreased breathing sounds on the left  Cardiovascular  Palpation of heart: Normal PMI, no thrills  Auscultation of heart: Normal rate and rhythm, normal S1 and S2, without murmurs  Abdomen  Abdomen: Non-tender, no masses  Liver and spleen: No hepatomegaly or splenomegaly  Lymphatic  Palpation of lymph nodes in neck: No lymphadenopathy  Musculoskeletal  Gait and station: Abnormal  -- unsteady  Skin  Skin and subcutaneous tissue: Normal without rashes or lesions  Psychiatric  Orientation to person, place and time: Normal    Mood and affect: Normal         ECOG 3       Future Appointments    Date/Time Provider Specialty Site   01/08/2018 10:15 AM LACEY Navarro   Nephrology [address] [facility]     Signatures   Electronically signed by : Iza Campos MD; Dec 18 2017  2:15PM EST                       (Author)

## 2017-12-19 NOTE — PROGRESS NOTES
Assessment    1  Chronic renal insufficiency, stage IV (severe) (585 4) (N18 4)   2  History of Hemodialysis Access Type Arteriovenous Fistula Left Arm    Plan  Instructions    · Hand exercises s/p AVF; Status:Complete;   Done: 36XMA1310   Ordered; For:Chronic renal insufficiency, stage IV (severe), PMH: Hemodialysis Access Type Arteriovenous Fistula Left Arm; Ordered By:Cynthia Quintanilla; Follow-ups    · Follow-up PRN Evaluation and Treatment  Follow-up  Status: Complete  Done:  33UCB7079   Ordered; For: Chronic renal insufficiency, stage IV (severe), PMH: Hemodialysis Access Type Arteriovenous Fistula Left Arm; Ordered By: Erminia Koyanagi Performed:  Due: 72FFD1327    Discussion/Summary    Mr Ayaan Garcia has CKD, not yet on hemodiaysis, s/p left brachiocephalic fistula creation on 89/9/82, complicated by postop hematoma  On exam today hematoma is completely resolved  There is an excellent thrill and bruit  Fistula duplex reviewed with Dr Kalyani Gunderson  Evidence of steal, but patient is asymptomatic  Cephalic vein thrombus vs stenosis  On long term Coumadin for aortic valve  Encouraged hand exercises to promote maturation  We will be happy to see him on an as needed basis  Chief Complaint  "I am here to have my fistula checked " Fistula duplex 11/20/17       Active Problems    1  Abdominal aortic aneurysm (441 4) (I71 4)   2  Acute foot pain, left (729 5) (M79 672)   3  Acute on chronic systolic congestive heart failure (428 23,428 0) (I50 23)   4  Adequate anticoagulation on anticoagulant therapy (V58 61) (Z79 01)   5  Anemia (285 9) (D64 9)   6  Aneurysm, 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)   7  Anticoagulant long-term use (V58 61) (Z79 01)   8  Arterial ectasia (447 8) (I77 9)   9  Ascending aortic aneurysm (441 2) (I71 2)   10  ASCVD (arteriosclerotic cardiovascular disease) (429 2,440 9) (I25 10)   11  Bilateral edema of lower extremity (782 3) (R60 0)   12   Bilateral inguinal hernia without obstruction or gangrene, recurrence not specified    (550 92) (K40 20)   13  Black stool (792 1) (K92 1)   14  Blood in urine (599 70) (R31 9)   15  Bloody diarrhea (787 91) (R19 7)   16  Bradycardia (427 89) (R00 1)   17  Cardiomyopathy (425 4) (I42 9)   18  Chronic renal insufficiency, stage IV (severe) (585 4) (N18 4)   19  Chronic systolic CHF (congestive heart failure) (428 22,428 0) (I50 22)   20  CKD (chronic kidney disease), stage V (585 5) (N18 5)   21  Constipation (564 00) (K59 00)   22  Dehydration (276 51) (E86 0)   23  Dysphagia (787 20) (R13 10)   24  Fatigue (780 79) (R53 83)   25  Fecal occult blood test positive (792 1) (R19 5)   26  Gout of both feet (274 9) (M10 9)   27  Hallucinations (780 1) (R44 3)   28  Hematuria (599 70) (R31 9)   29  Hyperlipidemia (272 4) (E78 5)   30  Hypertension (401 9) (I10)   31  Hypocalcemia (275 41) (E83 51)   32  Hypomagnesemia (275 2) (E83 42)   33  Iliac artery aneurysm, 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)   34  Incisional hernia, without obstruction or gangrene (553 21) (K43 2)   35  Iron deficiency anemia (280 9) (D50 9)   36  Mitral valve regurgitation (424 0) (I34 0)   37  Monilial rash (112 3) (B37 2)   38  Need for diphtheria-tetanus-pertussis (Tdap) vaccine, adult/adolescent (V06 1) (Z23)   39  Need for influenza vaccination (V04 81) (Z23)   40  Need for pneumococcal vaccination (V03 82) (Z23)   41  Need for shingles vaccine (V04 89) (Z23)   42  Numbness of left hand (782 0) (R20 0)   43  Paroxysmal atrial fibrillation (427 31) (I48 0)   44  Peripheral vascular disease (443 9) (I73 9)   45  Preop examination (V72 84) (Z01 818)   46  S/P aortic valve replacement with bioprosthetic valve (V42 2) (Z95 3)   47  Sinus bradycardia (427 89) (R00 1)   48  Skin rash (782 1) (R21)   49  Thrombocytopenia (287 5) (D69 6)   50  Urine frequency (788 41) (R35 0)   51  Vascular complication (105 8) (W69 0)   52  Visit for pre-operative examination (V72 84) (Z01 818)    Current Meds   1  AmLODIPine Besylate 5 MG Oral Tablet; TAKE 1 TABLET DAILY; Therapy: 41CJI3700 to (485 0878)  Requested for: 59YQQ8039; Last   Rx:09Oct2017 Ordered   2  Aspirin 81 MG TABS; TAKE 1 TABLET DAILY; Therapy: (Recorded:84Yin7948) to Recorded   3  Atorvastatin Calcium 10 MG Oral Tablet; take 1 tablet every day; Therapy: 38Brt9733 to (Evaluate:19Mar2018)  Requested for: 45HUW6763; Last   Rx:41Ile1390 Ordered   4  [employer] Omeprazole 20 6 (20 Base) MG Oral Capsule Delayed Release; take 1 capsule   daily; Therapy: (Recorded:86Bvi2919) to Recorded   5  Ferrous Sulfate 325 (65 Fe) MG Oral Tablet; TAKE 1 TABLET TWICE DAILY  Requested   for: 16OSC8869; Last Rx:06Nov2017 Ordered   6  MetOLazone 5 MG Oral Tablet; TAKE 1 TABLET BY MOUTH DAILY  TAKE 30 MINUTES   BEFORE LASIX(FUROSEMIDE); Therapy: 64HHE0893 to (Evaluate:09Sep2018)  Requested for: 20Uzo6608; Last   Rx:14Sep2017 Ordered   7  Multi-Vitamin Oral Tablet; take 1 tablet daily Recorded   8  RA Vitamin D-3 2000 UNIT Oral Capsule; TAKE 1 CAPSULE BY MOUTH ONCE DAILY   Recorded   9  Terazosin HCl - 1 MG Oral Capsule; TAKE 1 CAPSULE EVERY DAY; Therapy: 60Cph3149 to (Evaluate:19Apr2018)  Requested for: 21Oct2017; Last   Rx:21Oct2017 Ordered   10  Torsemide 20 MG Oral Tablet; Take 1 tablet daily Recorded   11  Tums 500 MG Oral Tablet Chewable; CHEW 2 TABLET Twice daily; Therapy: 58OGF2990 to (Evaluate:17Nov2017)  Requested for: 01Roq6681 Recorded   12  Warfarin Sodium 2 MG Oral Tablet; TAKE 1 TABLET DAILY; Therapy: 15TOV7019 to (Evaluate:16Usb6252)  Requested for: 69WXU2821; Last    Rx:17Oct2017 Ordered   13  Warfarin Sodium 5 MG Oral Tablet; take 1 tablet every day; Therapy: 29FRQ0207 to (Evaluate:17Jan2018)  Requested for: 17Mbm4265; Last    Rx:29Ajp5958 Ordered    Allergies    1  Penicillins   2   Ativan TABS    Vitals   Recorded: [id number] 01:07PM   Temperature 96 F, Tympanic   Heart Rate 66, R Radial   Pulse Quality Normal, R Radial   Respiration Quality Normal   Respiration 16   Systolic 340, RUE, Sitting   Diastolic 66, RUE, Sitting   Weight 169 lb    BMI Calculated 23 57   BSA Calculated 1 96     Surgical History    1  History of Aortic Aneurysm Repair   2  History of Asc Aortic Aneurysm Repair W/ Graft, Aortic Root Replacement   3  History of CABG   4  History of Colonoscopy   5  History of Endovascular Repair Of Aortic Aneurysm   6  History of Hemodialysis Access Type Arteriovenous Fistula Left Arm   7  History of Thoracic Aortic Aneurysm Repair Endovasc Involv L Subclav    Post-Op  Post Op  [place]: Patient is s/p left brachiocephalic AVF on 21/1/42  He is not on HD yet  Postoperatively he developed left arm hematoma (on Coumadin for aortic valve replacement)  Seen today for recheck of hematoma and to review hemodialysis duplex  Duplex done 11/20/17  Patient complains of numbness in bilateral hands, slightly worse in the left hand, but denies pain  There is an excellent thrill and bruit  Vascular Dialysis Access Surgery Post Op:     The patient is status post brachiocephalic AV Fistula  This dialysis access surgery was performed by Dr Graeme Jacobson on 10/2/17  HPI:   The patient has not started hemodialysis and does not have a permacath  The patient is currently experiencing hand numbness  The patient is not experiencing hand weakness, hand pain and cramping  PE: there was a palpable thrill present on exam  there was an audible bruit present on exam    The incision is healed  Follow Up: The access is developing as expected  Future Appointments    Date/Time Provider Specialty Site   11/29/2017 03:15 PM LACEY Jensen  Nephrology [address]   11/30/2017 03:20 PM LACEY Chang   Cardiology [facility]   Electronically signed by : Rocio Rojas; Nov 28 2017  1:45PM EST                       (Author)

## 2018-01-09 NOTE — MISCELLANEOUS
Assessment    1  Chronic systolic CHF (congestive heart failure) (428 22,428 0) (I50 22)   2  Sinus bradycardia (427 89) (R00 1)   3  Cellulitis of left lower leg (682 6) (L03 116)   4  Chronic renal insufficiency, stage IV (severe) (585 4) (N18 4)    Discussion/Summary  Discussion Summary:   1  CHF - on diuretics, following with cardiology  2  LLE cellulitis - off abx now, improving  3  CKD - following with renal, appointment on 5/25  He states he has labs scheduled with Nephrology  F/U 3 months  Medication SE Review and Pt Understands Tx: Possible side effects of new medications were reviewed with the patient/guardian today  The treatment plan was reviewed with the patient/guardian  The patient/guardian understands and agrees with the treatment plan      Chief Complaint  Chief Complaint Free Text Note Form: Hospital f/u      History of Present Illness  TCM Communication [facility]: The patient is being contacted for follow-up after hospitalization  Hospital records were reviewed  He was hospitalized at [facility]  The date of admission: 04/27/2017, date of discharge: 05/02/2017  Diagnosis: Congestive Heart Failure  He was discharged to home  Medications reviewed and updated today  He scheduled a follow up appointment  The patient is currently asymptomatic  Communication performed and completed by   HPI: Alexandra Aragon is a 80 y o  male patient who originally presented to the hospital on 4/24/2017 due to worsening bilateral lower extremity edema with shortness of breath  Patient was started on empiric IV antibiotics for possible pneumonia, as well as left lower extremity cellulitis  Cardiology was consult for acute on chronic heart failure  Symptoms improved with diuresis  Nephrology was consulted secondary to worsening renal function  Possibly related to CHF and cardiorenal syndrome or secondary to intravascular depletion   Patient will need close renal follow-up, eventually will need dialysis  Renal function stabilized during hospitalization  Baseline creatinine appears to be around 3 5 now  Podiatry was consulted for LE cellulitis  MRI was done showing hematoma, although could not exclude superinfected hematoma without evidence of deeper tissue infection  I&D at bedside was attempted day of discharge, with minimal non-infected drainage  Patient will be transitioned from IV Ancef to Keflex Ã4 more days for treatment of cellulitis  He will need to continue with compression and elevation, salt conscious diet, oral diuresis  He did follow up with cardiology on 5/8  Furosemide was decreased to 40 mg BID from 80 mg BID  He also had his Beta Blocker discontinued due to bradycardia  His HR today is better 69 on recheck  He has a f/u with Nephrology scheduled for 5/25  He states he is doing better  He still has swelling in the LLE but it is improving  He is no longer on abx  Review of Systems  Complete-Male:   Constitutional: feeling tired  Eyes: No complaints of eye pain, no red eyes, no discharge from eyes, no itchy eyes  ENT: no complaints of earache, no hearing loss, no nosebleeds, no nasal discharge, no sore throat, no hoarseness  Cardiovascular: lower extremity edema  Respiratory: No complaints of shortness of breath, no wheezing, no cough, no SOB on exertion, no orthopnea or PND  Gastrointestinal: No complaints of abdominal pain, no constipation, no nausea or vomiting, no diarrhea or bloody stools  Genitourinary: No complaints of dysuria, no incontinence, no hesitancy, no nocturia, no genital lesion, no testicular pain  Musculoskeletal: No complaints of arthralgia, no myalgias, no joint swelling or stiffness, no limb pain or swelling  Integumentary: No complaints of skin rash or skin lesions, no itching, no skin wound, no dry skin     Neurological: No compliants of headache, no confusion, no convulsions, no numbness or tingling, no dizziness or fainting, no limb weakness, no difficulty walking  Psychiatric: Is not suicidal, no sleep disturbances, no anxiety or depression, no change in personality, no emotional problems  Endocrine: No complaints of proptosis, no hot flashes, no muscle weakness, no erectile dysfunction, no deepening of the voice, no feelings of weakness  Hematologic/Lymphatic: No complaints of swollen glands, no swollen glands in the neck, does not bleed easily, no easy bruising  Active Problems     1  Acute foot pain, left (729 5) (M79 672)   2  Anemia (285 9) (D64 9)   3  Aneurysm, 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)   4  Arterial ectasia (447 8) (I77 9)   5  ASCVD (arteriosclerotic cardiovascular disease) (429 2,440 9) (I25 10)   6  Bilateral edema of lower extremity (782 3) (R60 0)   7  Bilateral inguinal hernia without obstruction or gangrene, recurrence not specified   (550 92) (K40 20)   8  Blood in urine (599 70) (R31 9)   9  Chronic renal insufficiency, stage IV (severe) (585 4) (N18 4)   10  Dysphagia (787 20) (R13 10)   11  Fatigue (780 79) (R53 83)   12  Fecal occult blood test positive (792 1) (R19 5)   13  Fracture, pelvis closed (808 8) (S32 9XXA)   14  Hematuria (599 70) (R31 9)   15  Iliac artery aneurysm, 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)   16  Incisional hernia, without obstruction or gangrene (553 21) (K43 2)   17  Iron deficiency anemia (280 9) (D50 9)   18  Monilial rash (112 3) (B37 2)   19  Need for diphtheria-tetanus-pertussis (Tdap) vaccine, adult/adolescent (V06 1) (Z23)   20  Need for influenza vaccination (V04 81) (Z23)   21  Need for pneumococcal vaccination (V03 82) (Z23)   22  Need for shingles vaccine (V04 89) (Z23)   23  Peripheral vascular disease (443 9) (I73 9)   24  Preop examination (V72 84) (Z01 818)   25  Skin rash (782 1) (R21)   26  Thrombocytopenia (287 5) (D69 6)   27   Visit for pre-operative examination (V72 84) (U24 815)    Hypertension (401 9) (I10)       Abdominal aortic aneurysm (441 4) (I71 4) Hyperlipidemia (272 4) (E78 5)       Anticoagulant long-term use (V58 61) (Z79 01)       Sinus bradycardia (427 89) (I49 8)       Paroxysmal atrial fibrillation (427 31) (I48 0)       Ascending aortic aneurysm (441 2) (I71 2)          Past Medical History     1  Anemia (285 9) (D64 9)   2  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)   3  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)   4  ASCVD (arteriosclerotic cardiovascular disease) (429 2,440 9) (I25 10)   5  Chronic renal insufficiency, stage IV (severe) (585 4) (N18 4)   6  Dysphagia (787 20) (R13 10)   7  History of Need for pneumococcal vaccination (V03 82) (Z23)   8  Negative depression screening   9  Visit for pre-operative examination (V72 84) (Z25 728)    Hyperlipidemia (272 4) (E78 5)          Surgical History    1  History of Aortic Aneurysm Repair   2  History of Asc Aortic Aneurysm Repair W/ Graft, Aortic Root Replacement   3  History of CABG   4  History of Endovascular Repair Of Aortic Aneurysm   5  [facility]  Surgical History Reviewed: The surgical history was reviewed and updated today  Family History  Father    1  Family history of Aneurysm Of Abdominal Aorta  Son    2  Family history of Aneurysm Of Abdominal Aorta  Sister    3  Family history of diabetes mellitus (V18 0) (Z83 3)  Family History Reviewed: The family history was reviewed and updated today  Social History    · Denied: Drug use (305 90) (F19 90)   · Employed   · Exercises occasionally (V49 89) (Z78 9)   · Five children   · Former smoker (K34 44) (U39 166)   ·    · No advance directives (V49 89) (Z78 9)   · Occasional alcohol use   · Occasional caffeine consumption  Social History Reviewed: The social history was reviewed and updated today  Current Meds   1  AmLODIPine Besylate 10 MG Oral Tablet; TAKE 1 TABLET DAILY;    Therapy: 50IJJ5701 to (Farhad Lincoln)  Requested for: 04DRA4261; Last   Rx:13Mar2017 Ordered   2  AmLODIPine Besylate 5 MG Oral Tablet; TAKE 1 TABLET DAILY; Therapy: 28FMY9562 to (Evaluate:15Mar2018)  Requested for: 20Mar2017; Last   Rx:20Mar2017 Ordered   3  Aspirin 81 MG TABS; TAKE 1 TABLET DAILY; Therapy: (Recorded:37Fov6088) to Recorded   4  Atorvastatin Calcium 10 MG Oral Tablet; take 1 tablet every day; Therapy: 99Ysj3118 to (Evaluate:19Sep2017)  Requested for: 39VKW8797; Last   Rx:23Mar2017 Ordered   5  [employer]S Omeprazole 20 6 (20 Base) MG Oral Capsule Delayed Release; take 1 capsule   daily; Therapy: (Recorded:05May2016) to Recorded   6  Ferrous Sulfate 325 (65 Fe) MG Oral Tablet; take one tablet by mouth every day; Therapy: (Recorded:05May2016) to Recorded   7  Metoprolol Tartrate 25 MG Oral Tablet; TAKE 0 5 TABLET Twice daily; Therapy: 01PFD3560 to (Evaluate:18Jun2017)  Requested for: 13MDS2148; Last   AC:35EQL1665 Ordered   8  Multi-Vitamin Oral Tablet; take 1 tablet daily Recorded   9  PredniSONE 10 MG Oral Tablet; TAKE 4 TABLETS DAILY FOR 2 DAYS,3 TABLETS DAILY   FOR 2 DAYS, 2 TABLETS DAILY FOR 2 DAYS AND 1 TABLET DAILY FOR 2 DAYS,   THEN STOP; Therapy: 00VJS1038 to (Last Rx:29Mar2017)  Requested for: 29Mar2017 Ordered   10  Terazosin HCl - 1 MG Oral Capsule; TAKE 1 CAPSULE EVERY DAY; Therapy: 53Dqc6096 to (Evaluate:19Sep2017)  Requested for: 06GED6800; Last    Rx:23Mar2017 Ordered   11  Torsemide 20 MG Oral Tablet; take 1 tablet by mouth twice a day; Therapy: 90MIA2839 to (Evaluate:71Zqt2614)  Requested for: 49FBE7588; Last    Rx:29Mar2017 Ordered   12  Warfarin Sodium 5 MG Oral Tablet; take 1 tablet every day; Therapy: 66QUM0031 to (Terri Juarez)  Requested for: 35ZPE4209; Last    Rx:09Jan2017 Ordered  Medication List Reviewed: The medication list was reviewed and updated today  Allergies    1  Penicillins   2   Ativan TABS    Vitals  Signs   Recorded: 07ZOX9179 10:42AM   Heart Rate: 69  Recorded: 54HRF4223 10:29AM Temperature: 97 2 F  Heart Rate: 48  Systolic: 895  Diastolic: 62  Height: 5 ft 11 in  Weight: 181 lb 6 08 oz  BMI Calculated: 25 3  BSA Calculated: 2 02  O2 Saturation: 96    Physical Exam    Constitutional   General appearance: No acute distress, well appearing and well nourished  Eyes   Conjunctiva and lids: No swelling, erythema, or discharge  Pupils and irises: Equal, round and reactive to light  Pulmonary   Respiratory effort: No increased work of breathing or signs of respiratory distress  Auscultation of lungs: Clear to auscultation, equal breath sounds bilaterally, no wheezes, no rales, no rhonci  Cardiovascular   Auscultation of heart: Normal rate and rhythm, normal S1 and S2, without murmurs  Examination of extremities for edema and/or varicosities: Abnormal   left ankle pitting edema and left pretibial pretibial pitting edema  Musculoskeletal   Gait and station: Normal     Skin   Skin and subcutaneous tissue: Abnormal   mild erythema and warmth LLE  Psychiatric   Orientation to person, place and time: Normal     Mood and affect: Normal          Health Management  History of diarrhea   COLONOSCOPY; every 2 years; Last 68EBU4581; Next Due: 57OTB9729; Overdue    Future Appointments    Date/Time Provider Specialty Site   05/25/2017 10:15 AM LACEY Perry  Nephrology [address]   06/12/2017 10:20 AM LACEY Arevalo   Cardiology [facility]     Signatures   Electronically signed by : Racheal Doan MD; May 11 2017 10:52AM EST                       (Author)

## 2018-01-10 NOTE — RESULT NOTES
Verified Results  VAS LOWER LIMB VENOUS DUPLEX STUDY, COMPLETE BILATERAL 12Apr2017 02:48PM Mynor Dumont Order Number: [id number]    - Patient Instructions: To schedule this appointment, please contact Central Scheduling at [phone]  Test Name Result Flag Reference   VAS LOWER LIMB VENOUS DUPLEX STUDY, COMPLETE BILATERAL (Report)     [facility] REPORT   CLINICAL:   Indications: Localized edema [R60 0]  Patient complains of BLE for past 3-4 days  Operative History:   2015-10-22 Endovascular graft, thoracic aortic aneurysm without coverage of lt   subclavian origin Endoluminal graft, PTFE Evans-una   2013-10-23 CABG   2013-09-24 Left Subclavian to carotid transposition   2011-01-10 AAA   2011-01-10 Aorto-aortic tube graft AAA   Risk Factors: The patient has no history of DVT  Clinical:Left Lower Limb   There is edema  Right Lower Limb   There is edema  CONCLUSION:   Impression:   RIGHT LOWER LIMB:   No evidence of acute or chronic deep vein thrombosis  No evidence of superficial thrombophlebitis noted  Doppler evaluation shows a normal response to augmentation maneuvers  Popliteal, posterior tibial and anterior tibial arterial Doppler waveforms are   biphasic  LEFT LOWER LIMB:   No evidence of acute or chronic deep vein thrombosis  No evidence of superficial thrombophlebitis noted  Doppler evaluation shows a normal response to augmentation maneuvers  Popliteal, posterior tibial and anterior tibial arterial Doppler waveforms are   biphasic        SIGNATURE:   Electronically Signed by: Marcos Segovia MD on 2017-04-12 05:58:13 PM

## 2018-01-11 NOTE — RESULT NOTES
Message   INR is 3   continue dose   recheck 1 month     Verified Results  (1) PT WITH INR [id number] 07:02AM Barbara Ortega    Order Number: [id number]    TW Order Number: [id number]     Test Name Result Flag Reference   INR 3 00 H 0 86-1 16   PT 29 5 seconds H 11 8-14 1

## 2018-01-11 NOTE — MISCELLANEOUS
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)   5  Anticoagulant long-term use (V58 61) (Z79 01)   6  Arterial ectasia (447 8) (I77 9)   7  Ascending aortic aneurysm (441 2) (I71 2)   8  ASCVD (arteriosclerotic cardiovascular disease) (429 2,440 9) (I25 10)   9  Bilateral edema of lower extremity (782 3) (R60 0)   10  Bilateral inguinal hernia without obstruction or gangrene, recurrence not specified    (550 92) (K40 20)   11  Blood in urine (599 70) (R31 9)   12  Bradycardia (427 89) (R00 1)   13  Cardiomyopathy (425 4) (I42 9)   14  Chronic renal insufficiency, stage IV (severe) (585 4) (N18 4)   15  Chronic systolic CHF (congestive heart failure) (428 22,428 0) (I50 22)   16  CKD (chronic kidney disease), stage V (585 5) (N18 5)   17  Constipation (564 00) (K59 00)   18  Dysphagia (787 20) (R13 10)   19  Fatigue (780 79) (R53 83)   20  Fecal occult blood test positive (792 1) (R19 5)   21  Gout of both feet (274 9) (M10 9)   22  Hematuria (599 70) (R31 9)   23  Hyperlipidemia (272 4) (E78 5)   24  Hypertension (401 9) (I10)   25  Hypocalcemia (275 41) (E83 51)   26  Hypomagnesemia (275 2) (E83 42)   27  Iliac artery aneurysm, 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)   28  Incisional hernia, without obstruction or gangrene (553 21) (K43 2)   29  Iron deficiency anemia (280 9) (D50 9)   30  Mitral valve regurgitation (424 0) (I34 0)   31  Monilial rash (112 3) (B37 2)   32   Need for diphtheria-tetanus-pertussis (Tdap) vaccine, adult/adolescent (V06 1) (Z23)   33  Need for influenza vaccination (V04 81) (Z23)   34  Need for pneumococcal vaccination (V03 82) (Z23)   35  Need for shingles vaccine (V04 89) (Z23)   36  Paroxysmal atrial fibrillation (427 31) (I48 0)   37  Peripheral vascular disease (443 9) (I73 9)   38  Preop examination (V72 84) (Z01 818)   39  S/P aortic valve replacement with bioprosthetic valve (V42 2) (Z95 3)   40  Sinus bradycardia (427 89) (R00 1)   41  Skin rash (782 1) (R21)   42  Thrombocytopenia (287 5) (D69 6)   43  Visit for pre-operative examination (V72 84) (Z01 818)    Current Meds   1  AmLODIPine Besylate 10 MG Oral Tablet; TAKE 1 TABLET DAILY; Therapy: 10VDR3457 to (Kathaleen Bernheim)  Requested for: 05RPH2886; Last   Rx:13Mar2017 Ordered   2  Aspirin 81 MG TABS; TAKE 1 TABLET DAILY; Therapy: (Recorded:52Cds8530) to Recorded   3  Atorvastatin Calcium 10 MG Oral Tablet; take 1 tablet every day; Therapy: 92Ymr3733 to (Evaluate:19Mar2018)  Requested for: 94ZWE7133; Last   Rx:20Sep2017 Ordered   4  [employer] Omeprazole 20 6 (20 Base) MG Oral Capsule Delayed Release; take 1 capsule   daily; Therapy: (Recorded:91Zuo8030) to Recorded   5  Ferrous Sulfate 325 (65 Fe) MG Oral Tablet; take one tablet by mouth every day; Therapy: (Recorded:95Gmu1273) to Recorded   6  MetOLazone 5 MG Oral Tablet; TAKE 1 TABLET BY MOUTH DAILY  TAKE 30 MINUTES   BEFORE LASIX(FUROSEMIDE); Therapy: 26HRE0691 to (Evaluate:24Wwo2337)  Requested for: 46Xbu6385; Last   Rx:56Llk9595 Ordered   7  Multi-Vitamin Oral Tablet; take 1 tablet daily Recorded   8  RA Vitamin D-3 2000 UNIT Oral Capsule; TAKE 1 CAPSULE BY MOUTH ONCE DAILY   Recorded   9  Terazosin HCl - 1 MG Oral Capsule; TAKE 1 CAPSULE EVERY DAY; Therapy: 84Lgy3148 to (Evaluate:04Urr0019)  Requested for: 25VMX2993; Last   Rx:23Mar2017 Ordered   10  Torsemide 20 MG Oral Tablet; Take 1 tablet daily Recorded   11  Tums 500 MG Oral Tablet Chewable; CHEW 2 TABLET Twice daily;     Therapy: 83SPP2072 to (Evaluate:17Nov2017)  Requested for: 93Xjr3018 Recorded   12  Warfarin Sodium 5 MG Oral Tablet (Coumadin); take 1 tablet every day; Therapy: 50QQQ1755 to (Evaluate:17Jan2018)  Requested for: 52Ghb7266; Last    Rx:94Mrf0515 Ordered    Allergies    1  Penicillins   2   Ativan TABS    Signatures   Electronically signed by : Ottoniel Hopkins MD; Oct  1 2017  6:35PM EST                       (Co-author)

## 2018-01-11 NOTE — MISCELLANEOUS
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)   7  Anticoagulant long-term use (V58 61) (Z79 01)   8  Arterial ectasia (447 8) (I77 9)   9  Ascending aortic aneurysm (441 2) (I71 2)   10  ASCVD (arteriosclerotic cardiovascular disease) (429 2,440 9) (I25 10)   11  Bilateral edema of lower extremity (782 3) (R60 0)   12  Bilateral inguinal hernia without obstruction or gangrene, recurrence not specified    (550 92) (K40 20)   13  Black stool (792 1) (K92 1)   14  Blood in urine (599 70) (R31 9)   15  Bloody diarrhea (787 91) (R19 7)   16  Bradycardia (427 89) (R00 1)   17  Cardiomyopathy (425 4) (I42 9)   18  Chronic renal insufficiency, stage IV (severe) (585 4) (N18 4)   19  Chronic systolic CHF (congestive heart failure) (428 22,428 0) (I50 22)   20  CKD (chronic kidney disease), stage V (585 5) (N18 5)   21  Constipation (564 00) (K59 00)   22  Dehydration (276 51) (E86 0)   23  Dysphagia (787 20) (R13 10)   24  Fatigue (780 79) (R53 83)   25  Fecal occult blood test positive (792 1) (R19 5)   26  Gout of both feet (274 9) (M10 9)   27  Hallucinations (780 1) (R44 3)   28  Hematuria (599 70) (R31 9)   29  Hyperlipidemia (272 4) (E78 5)   30  Hypertension (401 9) (I10)   31  Hypocalcemia (275 41) (E83 51)   32  Hypomagnesemia (275 2) (E83 42)   33  Iliac artery aneurysm, 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)   34  Incisional hernia, without obstruction or gangrene (553 21) (K43 2)   35   Iron deficiency anemia (280 9) (D50 9)   36  Mitral valve regurgitation (424 0) (I34 0)   37  Monilial rash (112 3) (B37 2)   38  Need for diphtheria-tetanus-pertussis (Tdap) vaccine, adult/adolescent (V06 1) (Z23)   39  Need for influenza vaccination (V04 81) (Z23)   40  Need for pneumococcal vaccination (V03 82) (Z23)   41  Need for shingles vaccine (V04 89) (Z23)   42  Numbness of left hand (782 0) (R20 0)   43  Paroxysmal atrial fibrillation (427 31) (I48 0)   44  Peripheral vascular disease (443 9) (I73 9)   45  Preop examination (V72 84) (Z01 818)   46  S/P aortic valve replacement with bioprosthetic valve (V42 2) (Z95 3)   47  Sinus bradycardia (427 89) (R00 1)   48  Skin rash (782 1) (R21)   49  Thrombocytopenia (287 5) (D69 6)   50  Urine frequency (788 41) (R35 0)   51  Vascular complication (007 6) (E20 1)   52  Visit for pre-operative examination (V72 84) (Z01 818)    Current Meds   1  AmLODIPine Besylate 5 MG Oral Tablet; TAKE 1 TABLET DAILY; Therapy: 28XJU0106 to ([phone])  Requested for: 89EMJ6539; Last   Rx:09Oct2017 Ordered   2  Aspirin 81 MG TABS; TAKE 1 TABLET DAILY; Therapy: (Recorded:68Wzw9187) to Recorded   3  Atorvastatin Calcium 10 MG Oral Tablet; take 1 tablet every day; Therapy: 87Kaz3355 to (Evaluate:19Mar2018)  Requested for: 81VVH1038; Last   Rx:87Wso7243 Ordered   4  [employer]S Omeprazole 20 6 (20 Base) MG Oral Capsule Delayed Release; take 1 capsule   daily; Therapy: (Recorded:77Kpo4124) to Recorded   5  Ferrous Sulfate 325 (65 Fe) MG Oral Tablet; TAKE 1 TABLET TWICE DAILY  Requested   for: 74TUJ8587; Last Rx:06Nov2017 Ordered   6  MetOLazone 5 MG Oral Tablet; TAKE 1 TABLET BY MOUTH DAILY  TAKE 30 MINUTES   BEFORE LASIX(FUROSEMIDE); Therapy: 54AJY3987 to (Evaluate:76Vkc2455)  Requested for: 78Txo8591; Last   Rx:25Ntv1019 Ordered   7  Multi-Vitamin Oral Tablet; take 1 tablet daily Recorded   8  RA Vitamin D-3 2000 UNIT Oral Capsule; TAKE 1 CAPSULE BY MOUTH ONCE DAILY   Recorded   9   Terazosin HCl - 1 MG Oral Capsule; TAKE 1 CAPSULE EVERY DAY; Therapy: 62Hkn6522 to (Evaluate:91Iwx2254)  Requested for: 21Oct2017; Last   Rx:21Oct2017 Ordered   10  Torsemide 20 MG Oral Tablet; Take 1 tablet daily Recorded   11  Tums 500 MG Oral Tablet Chewable; CHEW 2 TABLET Twice daily; Therapy: 54AFL6943 to (Evaluate:17Nov2017)  Requested for: 66Wkz0009 Recorded   12  Warfarin Sodium 2 MG Oral Tablet; TAKE 1 TABLET DAILY; Therapy: 29EXU3318 to (Evaluate:71Uke7869)  Requested for: 23MBZ0126; Last    Rx:17Oct2017 Ordered   13  Warfarin Sodium 5 MG Oral Tablet (Coumadin); take 1 tablet every day; Therapy: 51INZ1709 to (Evaluate:17Jan2018)  Requested for: 87Dpy7639; Last    Rx:98Efp5256 Ordered    Allergies    1  Penicillins   2  Ativan TABS    Signatures   Electronically signed by :  Alex Raza, ; Nov 20 2017  4:43PM EST                       (Author)

## 2018-01-11 NOTE — RESULT NOTES
Message  ProTime INR therapeutic at 2 4 same Coumadin repeat monthly      Signatures   Electronically signed by : LACEY Pritchard ; Oct 18 2016  8:26AM EST                       (Author)

## 2018-01-11 NOTE — RESULT NOTES
Message  Time INR now elevated at 3 3   Patient should go back to taking 5 mg of Coumadin on Sunday and Wednesday and 2 5 mg rest of week repeat ProTime in one week      Signatures   Electronically signed by : LACEY Garcia ; Sep 13 2016 12:05PM EST                       (Author)

## 2018-01-12 VITALS
SYSTOLIC BLOOD PRESSURE: 132 MMHG | WEIGHT: 177 LBS | OXYGEN SATURATION: 96 % | HEART RATE: 64 BPM | DIASTOLIC BLOOD PRESSURE: 60 MMHG | BODY MASS INDEX: 24.78 KG/M2 | HEIGHT: 71 IN

## 2018-01-12 VITALS
HEIGHT: 71 IN | SYSTOLIC BLOOD PRESSURE: 160 MMHG | DIASTOLIC BLOOD PRESSURE: 70 MMHG | WEIGHT: 191 LBS | HEART RATE: 58 BPM | BODY MASS INDEX: 26.74 KG/M2

## 2018-01-12 NOTE — RESULT NOTES
Message  Patient's pro time is elevated at 3 4  He has been taking Coumadin 5 mg Monday through Thursday and 2 5 mg Saturday through Monday  He is instructed to take 5 mg on Sunday and Wednesday and use 5 mg rest of the week   He should repeat ProTime in one week      Signatures   Electronically signed by : LACEY Sandhu ; May 24 2016 12:24PM EST                       (Author)

## 2018-01-13 VITALS
WEIGHT: 183.5 LBS | HEART RATE: 49 BPM | DIASTOLIC BLOOD PRESSURE: 68 MMHG | HEIGHT: 71 IN | SYSTOLIC BLOOD PRESSURE: 138 MMHG | BODY MASS INDEX: 25.69 KG/M2 | OXYGEN SATURATION: 99 %

## 2018-01-13 VITALS
SYSTOLIC BLOOD PRESSURE: 142 MMHG | BODY MASS INDEX: 25.41 KG/M2 | WEIGHT: 181.5 LBS | HEIGHT: 71 IN | OXYGEN SATURATION: 98 % | HEART RATE: 41 BPM | DIASTOLIC BLOOD PRESSURE: 64 MMHG

## 2018-01-13 VITALS
OXYGEN SATURATION: 94 % | SYSTOLIC BLOOD PRESSURE: 110 MMHG | WEIGHT: 183 LBS | BODY MASS INDEX: 25.62 KG/M2 | HEIGHT: 71 IN | HEART RATE: 90 BPM | DIASTOLIC BLOOD PRESSURE: 56 MMHG

## 2018-01-13 VITALS
DIASTOLIC BLOOD PRESSURE: 50 MMHG | HEIGHT: 71 IN | TEMPERATURE: 96.1 F | SYSTOLIC BLOOD PRESSURE: 142 MMHG | RESPIRATION RATE: 14 BRPM | HEART RATE: 62 BPM

## 2018-01-13 VITALS
SYSTOLIC BLOOD PRESSURE: 112 MMHG | HEART RATE: 66 BPM | DIASTOLIC BLOOD PRESSURE: 66 MMHG | WEIGHT: 169 LBS | BODY MASS INDEX: 22.92 KG/M2 | TEMPERATURE: 96 F | RESPIRATION RATE: 16 BRPM

## 2018-01-13 NOTE — RESULT NOTES
Message  ProTime INR low at 1 7  Patient should be increasing Coumadin to one alternating with one half every other day  Previously was taking 5 mg on Sunday and Wednesday  Now should alternate every other day   Repeat ProTime in one week      Signatures   Electronically signed by : LACEY Lee ; Oct 11 2016  8:57AM EST                       (Author)

## 2018-01-13 NOTE — MISCELLANEOUS
Message  Labs reviewed with patient during office visit today, CBC is stable showing mild anemia   Pro time INR is 1 9 patient advised to continue current dose of Coumadin but repeat ProTime in 2 weeks      Signatures   Electronically signed by : LACEY Barrientos ; Jun 7 2016 12:01PM EST                       (Author)

## 2018-01-13 NOTE — RESULT NOTES
Message  ProTime INR is therapeutic at 2 3  Continue current dose of Coumadin  Also CBC shows mild anemia at 11 3   This was ordered by Dr Judy Gomez, patient should follow-up with Dr Judy Gomez regarding this      Signatures   Electronically signed by : LACEY Jenkins ; Aug  2 2016  8:32AM EST                       (Author)

## 2018-01-13 NOTE — RESULT NOTES
Message  Pro time INR therapeutic, continue current dose repeat monthly      Signatures   Electronically signed by : LACEY Stanton ; Feb 23 2016  8:24AM EST                       (Author)

## 2018-01-13 NOTE — RESULT NOTES
Message  ProTime INR therapeutic, same Coumadin repeat monthly      Signatures   Electronically signed by : LACEY Mckeon ; Jul 5 2016  8:34AM EST                       (Author)

## 2018-01-13 NOTE — RESULT NOTES
Message  Pro time INR is still low 1 4, increase Coumadin 3 mg to one and a half tablet on Monday Wednesday and Friday, continue 1 tab rest of week, repeat protime in one week      Signatures   Electronically signed by : LACEY Dill ; Feb 9 2016  8:08AM EST                       (Author)

## 2018-01-13 NOTE — RESULT NOTES
Message  ProTime INR is 1 8 which is below therapeutic level  Patient is currently taking 5 mg of Coumadin on Sunday and Wednesday and 2 5 the rest of the week   He is instructed to reverse this using 2 5 mg on Sunday and Wednesday and 5 mg rest of the week repeating ProTime in one week      Signatures   Electronically signed by : LACEY Dawkins ; Aug 30 2016 12:58PM EST                       (Author)

## 2018-01-14 VITALS
RESPIRATION RATE: 18 BRPM | SYSTOLIC BLOOD PRESSURE: 140 MMHG | WEIGHT: 176.13 LBS | BODY MASS INDEX: 24.66 KG/M2 | TEMPERATURE: 97.9 F | DIASTOLIC BLOOD PRESSURE: 70 MMHG | HEART RATE: 68 BPM | HEIGHT: 71 IN

## 2018-01-14 VITALS
DIASTOLIC BLOOD PRESSURE: 62 MMHG | BODY MASS INDEX: 25.39 KG/M2 | SYSTOLIC BLOOD PRESSURE: 132 MMHG | WEIGHT: 181.38 LBS | HEART RATE: 69 BPM | OXYGEN SATURATION: 96 % | TEMPERATURE: 97.2 F | HEIGHT: 71 IN

## 2018-01-14 VITALS
TEMPERATURE: 97.5 F | HEIGHT: 71 IN | DIASTOLIC BLOOD PRESSURE: 70 MMHG | HEART RATE: 68 BPM | WEIGHT: 179.25 LBS | RESPIRATION RATE: 16 BRPM | SYSTOLIC BLOOD PRESSURE: 110 MMHG | BODY MASS INDEX: 25.1 KG/M2

## 2018-01-14 VITALS
TEMPERATURE: 97.2 F | SYSTOLIC BLOOD PRESSURE: 130 MMHG | RESPIRATION RATE: 16 BRPM | DIASTOLIC BLOOD PRESSURE: 80 MMHG | HEIGHT: 71 IN | BODY MASS INDEX: 27.23 KG/M2 | HEART RATE: 64 BPM | WEIGHT: 194.5 LBS

## 2018-01-14 VITALS
BODY MASS INDEX: 26.96 KG/M2 | HEIGHT: 71 IN | SYSTOLIC BLOOD PRESSURE: 118 MMHG | WEIGHT: 192.6 LBS | HEART RATE: 62 BPM | TEMPERATURE: 97 F | DIASTOLIC BLOOD PRESSURE: 62 MMHG | OXYGEN SATURATION: 99 %

## 2018-01-14 VITALS
SYSTOLIC BLOOD PRESSURE: 140 MMHG | HEART RATE: 78 BPM | RESPIRATION RATE: 16 BRPM | DIASTOLIC BLOOD PRESSURE: 78 MMHG | HEIGHT: 71 IN

## 2018-01-14 NOTE — RESULT NOTES
Discussion/Summary   INR at goal of 2 5-3 5 continue with same dose and recheck in 4 weeks     Verified Results  (1) PT WITH INR [id number] 07:16AM Katlyn Choudhury     Test Name Result Flag Reference   INR 2 72 H 0 86-1 16   PT 29 2 seconds H 12 1-14 4

## 2018-01-15 VITALS
BODY MASS INDEX: 24.8 KG/M2 | HEART RATE: 70 BPM | WEIGHT: 177.13 LBS | DIASTOLIC BLOOD PRESSURE: 70 MMHG | SYSTOLIC BLOOD PRESSURE: 100 MMHG | HEIGHT: 71 IN | RESPIRATION RATE: 16 BRPM | TEMPERATURE: 97.7 F

## 2018-01-15 VITALS
WEIGHT: 173.13 LBS | HEIGHT: 71 IN | BODY MASS INDEX: 24.24 KG/M2 | SYSTOLIC BLOOD PRESSURE: 110 MMHG | DIASTOLIC BLOOD PRESSURE: 60 MMHG | OXYGEN SATURATION: 98 % | HEART RATE: 68 BPM | TEMPERATURE: 98.6 F

## 2018-01-15 NOTE — MISCELLANEOUS
Message  Called patient with results of recent AOIL  Abdominal aortic tube graft widely patent, R CHARLA aneurysm 2 5cm  Will continue routine surveillance monitoring with AOIL in 1 year        Plan  Abdominal aortic aneurysm    · VAS ABDOMINAL AORTA/ILIACS; COMPLETE STUDY; Status:Hold For - Scheduling;  Requested DND:82CQM3863;     Signatures   Electronically signed by : Kira Parada; May 11 2017  5:39PM EST                       (Author)

## 2018-01-15 NOTE — MISCELLANEOUS
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)  5  ASCVD (arteriosclerotic cardiovascular disease) (429 2,440 9) (I25 10)  6  Hyperlipidemia (272 4) (E78 5)  7  Hypertension (401 9) (I10)    Discussion/Summary  Discussion Summary:   Vick Albright is here for follow-up regarding his recent hospitalization at [facility]  He had fallen at home after tripping and fractured his pelvis  He was hospitalized overnight  Covering fine he is still using a walker  He does have physical therapy being done at home, I have completed a face-to-face form for this  He will follow-up with orthopedics as scheduled  His recent labs are okay his pro time was 1 9 he will continue the current Coumadin dosage but repeat ProTime in 2 weeks  He will contact the office if any problems arise  Chief Complaint  Chief Complaint Free Text Note Form: Patient is here today for follow up from hospital stay for falling and fracturing hip/pelvis      History of Present Illness  TCM Communication [facility]: The patient is being contacted for follow-up after hospitalization  Hospital course was discussed with the inpatient physician  [place] [facility]  The date of admission: 5/27/2016, date of discharge: 5/28/2016  Diagnosis: fell/fractured pelvis and hip  He was discharged to home     Symptoms: no fever, no weakness, no dizziness, no headache, no fatigue, no cough, no shortness of breath, no chest pain, no back pain on left side, no back pain on right side, no arm pain left side, no arm pain on right side, no leg pain on left side, no leg pain on right side, no upper abdominal pain, no middle abdominal pain, no lower abdominal pain, no rash:, no anorexia, no nausea, no vomiting, no loose stools, no constipation, no pain with urinating, no incisional pain, no wound drainage and no swelling  Communication performed and completed by   HPI: Ezekiel Biswas is here for follow-up regarding his recent hospitalization  On May 27 while working about his full he tripped and fell went to the [place] [id number] and was found to have a fractured pelvis  He was discharged the next day with a walker  He did have some significant discomfort initially but is now only having discomfort with walking  He is using a walker  Review of Systems  Complete-Male:   Constitutional: as noted in HPI  Eyes: No complaints of eye pain, no red eyes, no discharge from eyes, no itchy eyes  ENT: no complaints of earache, no hearing loss, no nosebleeds, no nasal discharge, no sore throat, no hoarseness  Cardiovascular: No complaints of slow heart rate, no fast heart rate, no chest pain, no palpitations, no leg claudication, no lower extremity  Respiratory: No complaints of shortness of breath, no wheezing, no cough, no SOB on exertion, no orthopnea or PND  Gastrointestinal: No complaints of abdominal pain, no constipation, no nausea or vomiting, no diarrhea or bloody stools  Genitourinary: No complaints of dysuria, no incontinence, no hesitancy, no nocturia, no genital lesion, no testicular pain  Musculoskeletal: No complaints of arthralgia, no myalgias, no joint swelling or stiffness, no limb pain or swelling  Integumentary: No complaints of skin rash or skin lesions, no itching, no skin wound, no dry skin  Neurological: No compliants of headache, no confusion, no convulsions, no numbness or tingling, no dizziness or fainting, no limb weakness, no difficulty walking  Psychiatric: Is not suicidal, no sleep disturbances, no anxiety or depression, no change in personality, no emotional problems  Endocrine: No complaints of proptosis, no hot flashes, no muscle weakness, no erectile dysfunction, no deepening of the voice, no feelings of weakness     Hematologic/Lymphatic: No complaints of swollen glands, no swollen glands in the neck, does not bleed easily, no easy bruising  Active Problems   1  Abdominal aortic aneurysm (441 4) (I71 4)  2  Acute urinary retention (788 29) (R33 8)  3  Anemia (285 9) (D64 9)  4  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)  5  Aneurysm, 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)  6  Anticoagulant long-term use (V58 61) (Z79 01)  7  Ascending aortic aneurysm (441 2) (I71 2)  8  ASCVD (arteriosclerotic cardiovascular disease) (429 2,440 9) (I25 10)  9  Bilateral inguinal hernia without obstruction or gangrene, recurrence not specified   (550 92) (K40 20)  10  Blood in urine (599 70) (R31 9)  11  Chronic renal insufficiency, stage IV (severe) (585 4) (N18 4)  12  Dysphagia (787 20) (R13 10)  13  Fatigue (780 79) (R53 83)  14  Hematuria (599 70) (R31 9)  15  Hyperlipidemia (272 4) (E78 5)  16  Hypertension (401 9) (I10)  17  Incisional hernia, without obstruction or gangrene (553 21) (K43 2)  18  Iron deficiency anemia (280 9) (D50 9)  19  Monilial rash (112 3) (B37 2)  20  Need for diphtheria-tetanus-pertussis (Tdap) vaccine, adult/adolescent (V06 1) (Z23)  21  Need for influenza vaccination (V04 81) (Z23)  22  Need for pneumococcal vaccination (V03 82) (Z23)  23  Need for shingles vaccine (V04 89) (Z23)  24  Paroxysmal atrial fibrillation (427 31) (I48 0)  25  Peripheral vascular disease (443 9) (I73 9)  26  Post-operative state (V45 89) (Z98 89)  27  Preop examination (V72 84) (Z01 818)  28  Sinus bradycardia (427 89) (R00 1)  29  Skin rash (782 1) (R21)  30  Thrombocytopenia (287 5) (D69 6)  31  Visit for pre-operative examination (V72 84) (N89 540)    Past Medical History   1  Anemia (285 9) (D64 9)  2  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)  3  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)  4  ASCVD (arteriosclerotic cardiovascular disease) (429 2,440 9) (I25 10)  5  Chronic renal insufficiency, stage IV (severe) (585 4) (N18 4)  6   Dysphagia (787 20) (R13 10)  7  Hyperlipidemia (272 4) (E78 5)  8  Negative depression screening  9  Visit for pre-operative examination (V72 84) (G38 090)    Surgical History   1  History of Aortic Aneurysm Repair  2  History of Asc Aortic Aneurysm Repair W/ Graft, Aortic Root Replacement  3  History of CABG  4  History of Endovascular Repair Of Aortic Aneurysm  5  History of Thoracic Aortic Aneurysm Repair Endovasc Loyde Pee  Surgical History Reviewed: The surgical history was reviewed and updated today  Family History  Father   1  Family history of Aneurysm Of Abdominal Aorta  Son   2  Family history of Aneurysm Of Abdominal Aorta  Sister   3  Family history of diabetes mellitus (V18 0) (Z83 3)  Family History Reviewed: The family history was reviewed and updated today  Social History    · Denied: Drug use (305 90) (F19 90)   · Employed   · Exercises occasionally (V49 89) (Z78 9)   · Five children   ·    · No advance directives (V49 89) (Z78 9)   · Occasional alcohol use   · Occasional caffeine consumption  Social History Reviewed: The social history was reviewed and is unchanged  Current Meds  1  AmLODIPine Besylate 5 MG Oral Tablet; TAKE 1 TABLET DAILY; Therapy: 94QEA3862 to (NGNXFLFN:15GHU7769)  Requested for: 20Jan2016; Last   Rx:20Jan2016 Ordered  2  Aspirin 81 MG TABS; TAKE 1 TABLET DAILY; Therapy: (Recorded:05May2016) to Recorded  3  Atorvastatin Calcium 10 MG Oral Tablet; take 1 tablet every day; Therapy: 82Qmy9695 to (Berna Stanley)  Requested for: 73UVC1566; Last   Rx:29Mar2016 Ordered  4  [employer] Omeprazole 20 6 (20 Base) MG Oral Capsule Delayed Release; take 1 capsule   daily; Therapy: (Recorded:05May2016) to Recorded  5  Ferrous Sulfate 325 (65 Fe) MG Oral Tablet; take one tablet by mouth every day; Therapy: (Recorded:05May2016) to Recorded  6  Metoprolol Tartrate 25 MG Oral Tablet; TAKE 0 5 TABLET Twice daily;    Therapy: 89QMB9510 to (Evaluate:15Mar2016) Requested for: 00YRO7231; Last   Rx:77Dxp9639 Ordered  7  Multi-Vitamin Oral Tablet; take 1 tablet daily Recorded  8  Terazosin HCl - 1 MG Oral Capsule; TAKE 1 CAPSULE EVERY DAY; Therapy: 98Odg5070 to (Evaluate:44Cxk1144)  Requested for: 60Tow3266; Last   Rx:27Ist6059 Ordered  9  Torsemide 20 MG Oral Tablet; take 1 tablet every day; Therapy: 99ZJR3715 to (Evaluate:37Yux6852)  Requested for: 22TGC6952; Last   Rx:34Avi2737 Ordered  10  Warfarin Sodium 5 MG Oral Tablet; TAKE 1 TABLET DAILY; Therapy: 34WYV4927 to (Evaluate:57Bvu3441)  Requested for: 28Mar2016; Last    Rx:28Mar2016 Ordered  Medication List Reviewed: The medication list was reviewed and updated today  Allergies   1  Ativan TABS  2  Penicillins    Vitals  Signs [Data Includes: Current Encounter]   Recorded: 40AAB7250 11:27AM   Temperature: 96 5 F  Heart Rate: 62  Systolic: 213  Diastolic: 70  Height: 6 ft   Weight: 186 lb 6 08 oz  BMI Calculated: 25 28  BSA Calculated: 2 07  O2 Saturation: 96    Physical Exam    Constitutional   General appearance: Abnormal   chronically ill  Eyes   Conjunctiva and lids: No swelling, erythema, or discharge  Pupils and irises: Equal, round and reactive to light  Ears, Nose, Mouth, and Throat   External inspection of ears and nose: Normal     Otoscopic examination: Tympanic membrance translucent with normal light reflex  Canals patent without erythema  Nasal mucosa, septum, and turbinates: Normal without edema or erythema  Oropharynx: Normal with no erythema, edema, exudate or lesions  Pulmonary   Respiratory effort: No increased work of breathing or signs of respiratory distress  Auscultation of lungs: Clear to auscultation, equal breath sounds bilaterally, no wheezes, no rales, no rhonci  Cardiovascular   Palpation of heart: Normal PMI, no thrills  Auscultation of heart: Normal rate and rhythm, normal S1 and S2, without murmurs      Examination of extremities for edema and/or varicosities: Normal     Carotid pulses: Normal     Abdomen   Abdomen: Non-tender, no masses  Liver and spleen: No hepatomegaly or splenomegaly  Lymphatic   Palpation of lymph nodes in neck: No lymphadenopathy  Musculoskeletal   Gait and station: Abnormal   using walker  Digits and nails: Normal without clubbing or cyanosis  Inspection/palpation of joints, bones, and muscles: Normal     Skin   Skin and subcutaneous tissue: Normal without rashes or lesions  multiple ecchymoses on hands  Neurologic   Cranial nerves: Cranial nerves 2-12 intact  Reflexes: 2+ and symmetric  Sensation: No sensory loss  Psychiatric   Orientation to person, place and time: Normal     Mood and affect: Normal          Health Management  History of diarrhea   COLONOSCOPY; every 2 years; Last 05HWY7405; Next Due: 86DIO9825; Active    Future Appointments    Date/Time Provider Specialty Site   08/11/2016 10:00 AM LACEY Choi   [facility]   11/09/2016 10:00 AM Michelle Yoo,  Cardiac Surgery CT SURGERY [facility]     Signatures   Electronically signed by : LACEY Dawkins ; Jun 7 2016 11:49AM EST                       (Author)    Electronically signed by : LACEY Dawkins ; Jun 7 2016 11:49AM EST                       (Author)

## 2018-01-15 NOTE — RESULT NOTES
Verified Results  (1) PT WITH INR 54DWC9801 10:03AM Rufino Otero Order Number: [id number]  TW Order Number: [id number]     Test Name Result Flag Reference   INR 5 44 HH 0 86-1 16   PT 46 0 seconds H 11 8-14 1   INR 5 44 HH 0 86-1 16   PT 46 0 seconds H 11 8-14 1                   Discussion/Summary   held coumdin over the weekend and rechecking PT/INR on 5/9/16

## 2018-01-16 NOTE — RESULT NOTES
Message  ProTime INR is elevated at 4 78  Patient will cut Coumadin to 6 mg daily, previously had been taking 9 mg Monday Wednesday and Friday, 6 mg rest of the week   He will repeat ProTime in one week      Signatures   Electronically signed by : LACEY Strong ; Mar 22 2016  8:59AM EST                       (Author)

## 2018-01-16 NOTE — RESULT NOTES
Verified Results  (1) PT WITH INR 82Wmu5777 07:32AM Mynor Dumont Order Number: [id number]     Test Name Result Flag Reference   INR 1 94 H 0 86-1 16   Performing Comments: Please do weekly or as needed as a standing order   PT 22 3 seconds H 12 1-14 4

## 2018-01-16 NOTE — RESULT NOTES
Message   Uric acid came back elevated at 10 5  This could mean he has gout explaining the foot pain  He should start the Prednisone     Verified Results  (1) URIC ACID 25KFY1201 10:43AM Monique Galdamez Order Number: [id number]     Test Name Result Flag Reference   URIC ACID 10 5 mg/dL H 4 2-8 0   Specimen collection should occur prior to Metamizole administration due to the potential for falsely depressed results

## 2018-01-17 NOTE — MISCELLANEOUS
Message  Message Free Text Note Form: Abnormal blood work including hypocalcemia and hypokalemia discussed with the Zhang Casper as well as patient  Advised to take potassium every day along with calcium  We will repeat blood test on Friday  Also discuss with cardiologist about abnormal coagulation profile  Patient had been asked to stop Coumadin and not to take until after surgery which is on Monday  Plan    1  (1) BASIC METABOLIC PROFILE; Status:Active;  Requested QIY:26IZC4114;     Signatures   Electronically signed by : LACEY Carrion ; Sep 27 2017  4:28PM EST                       (Author)

## 2018-01-17 NOTE — RESULT NOTES
Message  Pro time INR is low 1 64  We'll increase Coumadin 3 mg daily   Patient currently has been using 3/1-1/2 on alternate days repeat protime in one week      Signatures   Electronically signed by : LACEY Dawkins ; Jan 26 2016  9:02AM EST                       (Author)

## 2018-01-18 NOTE — CONSULTS
Chief Complaint  Patient seen in office today for a check up - patient is having surgery on Oct 2nd with vascular to have a fistula placed  History of Present Illness  Pre-Op Visit (Brief): The patient is being seen for a preoperative visit and Having AV Fistula placed next week  Chronic anticoagulation on coumadin, f/b Cardiology  Surgical Risk Assessment:   Prior Anesthesia: He had prior anesthesia, no prior adverse reaction to edidural anesthesia, no prior adverse reaction to spinal anesthesia and no prior adverse reaction to general anesthesia  Pertinent Past Medical History: CKD,ASCVD  Exercise Capacity: able to walk four blocks without symptoms and able to walk two flights of stairs without symptoms  Lifestyle Factors: denies alcohol use, denies tobacco use and denies illegal drug use  Symptoms: easy bleeding, easy bruising, no frequent nosebleeds, no chest pain, no cough, dyspnea, edema, no palpitations and no wheezing  Pertinent Family History: DM    Living Situation: home is secure and supportive and no post-op concerns with his living situation  HPI: Pt is having an AV fistula placed in the left arm next week Oct 3rd  His labs show he is coagulopathic  His coumadin was stopped yesterday by Cardiology  Brook imbalance-will notify Renal    Htn- stable  EKG- shows sinus rhythm with PVC and BBB      Review of Systems    Constitutional: No fever or chills, feels well, no tiredness, no recent weight gain or weight loss  Eyes: No complaints of eye pain, no red eyes, no discharge from eyes, no itchy eyes  ENT: no complaints of earache, no hearing loss, no nosebleeds, no nasal discharge, no sore throat, no hoarseness  Cardiovascular: as noted in HPI  Respiratory: No complaints of shortness of breath, no wheezing, no cough, no SOB on exertion, no orthopnea or PND  Gastrointestinal: No complaints of abdominal pain, no constipation, no nausea or vomiting, no diarrhea or bloody stools  Genitourinary: No complaints of dysuria, no incontinence, no hesitancy, no nocturia, no genital lesion, no testicular pain  Musculoskeletal: No complaints of arthralgia, no myalgias, no joint swelling or stiffness, no limb pain or swelling  Integumentary: No complaints of skin rash or skin lesions, no itching, no skin wound, no dry skin  Neurological: No compliants of headache, no confusion, no convulsions, no numbness or tingling, no dizziness or fainting, no limb weakness, no difficulty walking  Psychiatric: Is not suicidal, no sleep disturbances, no anxiety or depression, no change in personality, no emotional problems  Endocrine: No complaints of proptosis, no hot flashes, no muscle weakness, no erectile dysfunction, no deepening of the voice, no feelings of weakness  Active Problems    1  Abdominal aortic aneurysm (441 4) (I71 4)   2  Acute foot pain, left (729 5) (M79 672)   3  Anemia (285 9) (D64 9)   4  Aneurysm, 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)   5  Anticoagulant long-term use (V58 61) (Z79 01)   6  Arterial ectasia (447 8) (I77 9)   7  Ascending aortic aneurysm (441 2) (I71 2)   8  ASCVD (arteriosclerotic cardiovascular disease) (429 2,440 9) (I25 10)   9  Bilateral edema of lower extremity (782 3) (R60 0)   10  Bilateral inguinal hernia without obstruction or gangrene, recurrence not specified    (550 92) (K40 20)   11  Blood in urine (599 70) (R31 9)   12  Bradycardia (427 89) (R00 1)   13  Cardiomyopathy (425 4) (I42 9)   14  Chronic renal insufficiency, stage IV (severe) (585 4) (N18 4)   15  Chronic systolic CHF (congestive heart failure) (428 22,428 0) (I50 22)   16  CKD (chronic kidney disease), stage V (585 5) (N18 5)   17  Constipation (564 00) (K59 00)   18  Dysphagia (787 20) (R13 10)   19  Fatigue (780 79) (R53 83)   20  Fecal occult blood test positive (792 1) (R19 5)   21  Gout of both feet (274 9) (M10 9)   22  Hematuria (599 70) (R31 9)   23   Hyperlipidemia (423 4) (E78 5)   24  Hypertension (401 9) (I10)   25  Hypocalcemia (275 41) (E83 51)   26  Hypomagnesemia (275 2) (E83 42)   27  Iliac artery aneurysm, 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)   28  Incisional hernia, without obstruction or gangrene (553 21) (K43 2)   29  Iron deficiency anemia (280 9) (D50 9)   30  Mitral valve regurgitation (424 0) (I34 0)   31  Monilial rash (112 3) (B37 2)   32  Need for diphtheria-tetanus-pertussis (Tdap) vaccine, adult/adolescent (V06 1) (Z23)   33  Need for influenza vaccination (V04 81) (Z23)   34  Need for pneumococcal vaccination (V03 82) (Z23)   35  Need for shingles vaccine (V04 89) (Z23)   36  Paroxysmal atrial fibrillation (427 31) (I48 0)   37  Peripheral vascular disease (443 9) (I73 9)   38  S/P aortic valve replacement with bioprosthetic valve (V42 2) (Z95 3)   39  Sinus bradycardia (427 89) (R00 1)   40  Skin rash (782 1) (R21)   41  Thrombocytopenia (287 5) (D69 6)   42  Visit for pre-operative examination (V72 84) (W95 936)    Past Medical History    · Anemia (285 9) (D64 9)   · 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)   · 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)   · ASCVD (arteriosclerotic cardiovascular disease) (429 2,440 9) (I25 10)   · Chronic renal insufficiency, stage IV (severe) (585 4) (N18 4)   · Dysphagia (787 20) (R13 10)   · Hyperlipidemia (272 4) (E78 5)   · History of Need for pneumococcal vaccination (V03 82) (Z23)   · Negative depression screening   · Visit for pre-operative examination (V72 84) (B16 669)    The active problems and past medical history were reviewed and updated today        Surgical History    · History of Aortic Aneurysm Repair   · History of Asc Aortic Aneurysm Repair W/ Graft, Aortic Root Replacement   · History of CABG   · History of Colonoscopy   · History of Endovascular Repair Of Aortic Aneurysm   · History of Thoracic Aortic Aneurysm Repair Endovasc Kathy Glynn    The surgical history was reviewed and updated today  Family History    · Family history of Aneurysm Of Abdominal Aorta    · Family history of Aneurysm Of Abdominal Aorta    · Family history of diabetes mellitus (V18 0) (Z83 3)    The family history was reviewed and updated today  Social History    · Denied: Drug use (305 90) (F19 90)   · Employed   · 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, semi retired- [employer]   · Exercises occasionally (V49 89) (Z78 9)   · Five children   · Former smoker (V15 82) (E33 954)   ·    · No advance directives (V49 89) (Z78 9)   · Occasional alcohol use   · Occasional caffeine consumption  The social history was reviewed and updated today  The social history was reviewed and is unchanged  Current Meds   1  AmLODIPine Besylate 10 MG Oral Tablet; TAKE 1 TABLET DAILY; Therapy: 18VNY2211 to (Mayito Shelley)  Requested for: 53IXN1188; Last   Rx:13Mar2017 Ordered   2  Aspirin 81 MG TABS; TAKE 1 TABLET DAILY; Therapy: (Recorded:87Dja5222) to Recorded   3  Atorvastatin Calcium 10 MG Oral Tablet; take 1 tablet every day; Therapy: 77Ygp1424 to (Evaluate:19Mar2018)  Requested for: 69JVB7383; Last   Rx:20Sep2017 Ordered   4  Calcitriol 0 25 MCG Oral Capsule; take 1 capsule daily; Therapy: 57FNT5619 to (Last Rx:08Jun2017)  Requested for: 94BIH7776 Ordered   5  [employer] Omeprazole 20 6 (20 Base) MG Oral Capsule Delayed Release; take 1 capsule   daily; Therapy: (Recorded:48Qyr7122) to Recorded   6  Ferrous Sulfate 325 (65 Fe) MG Oral Tablet; take one tablet by mouth every day; Therapy: (Recorded:82Zld5648) to Recorded   7  MetOLazone 5 MG Oral Tablet; TAKE 1 TABLET BY MOUTH DAILY  TAKE 30 MINUTES   BEFORE LASIX(FUROSEMIDE); Therapy: 19BUT0494 to (Evaluate:68Jkh7197)  Requested for: 94Vbe0577; Last   Rx:01Swp4457 Ordered   8  Multi-Vitamin Oral Tablet; take 1 tablet daily Recorded   9  RA Vitamin D-3 2000 UNIT Oral Capsule; TAKE 1 CAPSULE BY MOUTH ONCE DAILY   Recorded   10   Terazosin HCl - 1 MG Oral Capsule; TAKE 1 CAPSULE EVERY DAY; Therapy: 42Tyw2537 to (Evaluate:91Lci6925)  Requested for: 64VHY9715; Last    Rx:23Mar2017 Ordered   11  Torsemide 20 MG Oral Tablet; Take 1 tablet daily Recorded   12  Tums 500 MG Oral Tablet Chewable; CHEW 2 TABLET Twice daily; Therapy: 93YOP5537 to (Evaluate:17Nov2017)  Requested for: 85Tiz0306 Recorded   13  Warfarin Sodium 5 MG Oral Tablet; take 1 tablet every day; Therapy: 35VLY9206 to (Evaluate:17Jan2018)  Requested for: 84Ymc8719; Last    Rx:65Enz8011 Ordered    The medication list was reviewed and updated today  Allergies    1  Penicillins   2  Ativan TABS    Vitals  Signs    Temperature: 98 6 F  Heart Rate: 68  Systolic: 013  Diastolic: 60  Height: 5 ft 11 in  Weight: 173 lb 2 08 oz  BMI Calculated: 24 15  BSA Calculated: 1 98  O2 Saturation: 98    Physical Exam    Constitutional   General appearance: No acute distress, well appearing and well nourished  Head and Face   Head and face: Normal     Eyes   Conjunctiva and lids: No erythema, swelling or discharge  Pupils and irises: Equal, round, reactive to light  Ophthalmoscopic examination: Normal fundi and optic discs  Ears, Nose, Mouth, and Throat   External inspection of ears and nose: Normal     Otoscopic examination: Tympanic membranes translucent with normal light reflex  Canals patent without erythema  Hearing: Abnormal   hard of hearing  Nasal mucosa, septum, and turbinates: Normal without edema or erythema  Lips, teeth, and gums: Normal, good dentition  Oropharynx: Normal with no erythema, edema, exudate or lesions  Neck   Neck: Supple, symmetric, trachea midline, no masses  Thyroid: Normal, no thyromegaly  Pulmonary   Respiratory effort: No increased work of breathing or signs of respiratory distress  Palpation of chest: Normal     Auscultation of lungs: Clear to auscultation  Cardiovascular   Palpation of heart: Normal PMI, no thrills      Auscultation of heart: Normal rate and rhythm, normal S1 and S2, no murmurs  + loud heart tones (valve is tissue, not metal, rhythm irregular  Abdominal aorta: Normal     Femoral pulses: 2+ bilaterally  Pedal pulses: 2+ bilaterally  Peripheral vascular exam: Normal     Examination of extremities for edema and/or varicosities: Abnormal   ble edema  Abdomen   Abdomen: Non-tender, no masses  Liver and spleen: No hepatomegaly or splenomegaly  Examination for hernias: Abnormal   + large hernia, nontender  Lymphatic   Palpation of lymph nodes in neck: No lymphadenopathy  Palpation of lymph nodes in axillae: No lymphadenopathy  Musculoskeletal   Gait and station: Normal     Inspection/palpation of digits and nails: Normal without clubbing or cyanosis  Inspection/palpation of joints, bones, and muscles: Normal     Range of motion: Normal     Stability: Normal     Muscle strength/tone: Normal     Skin   Skin and subcutaneous tissue: Abnormal   multiple areas of ecchymosis  Current skin tear with active bleeding  Silver nitrate sticks used to achieve hemostais  Palpation of skin and subcutaneous tissue: Normal turgor  Neurologic   Cranial nerves: Cranial nerves 2-12 intact  Sensation: No sensory loss  Coordination: Normal finger to nose and heel to shin  Psychiatric   Judgment and insight: Normal     Orientation to person, place and time: Normal     Recent and remote memory: Intact      Mood and affect: Normal        Results/Data  (1) BASIC METABOLIC PROFILE 45GWT3501 10:11AM Lemuel ALDRIDGE Order Number: [id number]     Test Name Result Flag Reference   SODIUM 140 mmol/L  136-145   POTASSIUM 3 1 mmol/L L 3 5-5 3   CHLORIDE 102 mmol/L  100-108   CARBON DIOXIDE 20 mmol/L L 21-32   ANION GAP (CALC) 18 mmol/L H 4-13   BLOOD UREA NITROGEN 79 mg/dL H 5-25   CREATININE 5 17 mg/dL H 0 60-1 30   Standardized to IDMS reference method   CALCIUM 6 0 mg/dL L 8 3-10 1   eGFR 10 ml/min/1 73sq m     National Kidney Disease Education Program recommendations are as follows:  GFR calculation is accurate only with a steady state creatinine  Chronic Kidney disease less than 60 ml/min/1 73 sq  meters  Kidney failure less than 15 ml/min/1 73 sq  meters  GLUCOSE FASTING 145 mg/dL H 65-99   Specimen collection should occur prior to Sulfasalazine administration due to the potential for falsely depressed results  Specimen collection should occur prior to Sulfapyridine administration due to the potential for falsely elevated results  (1) CBC/ PLT (NO DIFF) 06Hch5468 10:11AM Kendra Boyd   TW Order Number: [id number]     Test Name Result Flag Reference   HEMATOCRIT 29 6 % L 36 5-49 3   HEMOGLOBIN 10 0 g/dL L 12 0-17 0   MCHC 33 8 g/dL  31 4-37 4   MCH 28 9 pg  26 8-34 3   MCV 86 fL  82-98   PLATELET COUNT 578 Thousands/uL L 149-390   RBC COUNT 3 46 Million/uL L 3 88-5 62   RDW 18 6 % H 11 6-15 1   WBC COUNT 6 68 Thousand/uL  4 31-10 16   MPV 10 6 fL  8 9-12 7     ECG 12-LEAD 09Htx4974 10:08AM Kendra Boyd     Test Name Result Flag Reference   ECG 12-LEAD      Atrial fibrillation   Left bundle branch block   Premature ventricular complexes   Nonspecific ST and T wave abnormality     Confirmed by MD Loretta, Fantasma Rodriguez ([id number]) on 9/26/2017 11:29:32 AM     ECG 12-LEAD 29Igl6117 09:20AM [facility], Provider   Test ordered by:    Ordered by an unspecified provider     Test Name Result Flag Reference   ECG 12-LEAD (Report)     *** Poor data quality, interpretation may be adversely affected   Atrial fibrillation with premature ventricular or aberrantly conducted complexes   Left axis deviation   Intraventricular conduction delay   Nonspecific ST and T wave abnormality   Confirmed by Zain Christianson MD, Sharlene ([id number]) on 9/26/2017 11:30:07 AM     (1) PT WITH INR 63Stn6957 07:09AM Michelle Karolyn   TW Order Number: [id number]     Test Name Result Flag Reference   INR 5 85 HH 0 86-1 16   PT 53 6 seconds H 12 1-14 4     Assessment    1   Preop examination (V72 84) (Z01 818)   2  CKD (chronic kidney disease), stage V (585 5) (N18 5)   3  Anticoagulant long-term use (V58 61) (Z79 01)   4  Hypertension (401 9) (I10)   5  Iron deficiency anemia (280 9) (D50 9)   6  Peripheral vascular disease (443 9) (I73 9)   7  Bilateral edema of lower extremity (782 3) (R60 0)    Discussion/Summary  Surgical Clearance: He is at a MODERATE risk from a cardiovascular standpoint at this time without any additional cardiac testing  Reevaluation needed, if he should present with symptoms prior to surgery/procedure  Cleared for AV Fistula plcmnt   hypokalemia- advised to take ONE potassium pill that he has at home and I will call Renal to reprot labs and have them update patient with further instructions  Anemia- chronic  Thrombocytopenia- plts >100, stable for surgery  Coagulopathy- INR is 5 8  Pt last Coumadin dose was yesterday  Surgery is Oct 3rd  No further coumadin until after surgery  Managed by Dr Mahin Li  htn- stable  CKD- f/b Renal  Iron def anemia- monitor labs  The patient was counseled regarding instructions for management, risks and benefits of treatment options  Self Referrals: No      End of Encounter Meds    1  Warfarin Sodium 5 MG Oral Tablet (Coumadin); take 1 tablet every day; Therapy: 50JIG5760 to (Evaluate:17Jan2018)  Requested for: 69Aia6962; Last   Rx:19Sep2017 Ordered    2  MetOLazone 5 MG Oral Tablet; TAKE 1 TABLET BY MOUTH DAILY  TAKE 30 MINUTES   BEFORE LASIX(FUROSEMIDE); Therapy: 82USJ2585 to (Evaluate:86Qvb2368)  Requested for: 84Unj2157; Last   Rx:10Ntr0773 Ordered    3  Atorvastatin Calcium 10 MG Oral Tablet; take 1 tablet every day; Therapy: 57Tlo2970 to (Evaluate:19Mar2018)  Requested for: 73TQA4706; Last   Rx:65Ajx1945 Ordered    4  Terazosin HCl - 1 MG Oral Capsule; TAKE 1 CAPSULE EVERY DAY; Therapy: 25Yri0034 to (Evaluate:15Lmo4294)  Requested for: 98YJK2207; Last   Rx:23Mar2017 Ordered    5   Tums 500 MG Oral Tablet Chewable; CHEW 2 TABLET Twice daily; Therapy: 83MBF7399 to (Evaluate:97Xuq8922)  Requested for: 73Jov8619 Recorded    6  AmLODIPine Besylate 10 MG Oral Tablet; TAKE 1 TABLET DAILY; Therapy: 35DQG0701 to (Chico Richardson)  Requested for: 70ZRT7133; Last   Rx:13Mar2017 Ordered    7  Aspirin 81 MG TABS; TAKE 1 TABLET DAILY; Therapy: (Recorded:97Qqv0491) to Recorded   8  [employer]S Omeprazole 20 6 (20 Base) MG Oral Capsule Delayed Release; take 1 capsule   daily; Therapy: (Recorded:05May2016) to Recorded   9  Ferrous Sulfate 325 (65 Fe) MG Oral Tablet; take one tablet by mouth every day; Therapy: (Recorded:05May2016) to Recorded   10  Multi-Vitamin Oral Tablet; take 1 tablet daily Recorded   11  RA Vitamin D-3 2000 UNIT Oral Capsule; TAKE 1 CAPSULE BY MOUTH ONCE DAILY    Recorded   12  Torsemide 20 MG Oral Tablet;  Take 1 tablet daily Recorded    Signatures   Electronically signed by : Clarke Irene NP; Sep 27 2017  2:25PM EST                       (Author)    Electronically signed by : Sandra Crowley MD; Oct  1 2017  6:35PM EST                       (Co-author)

## 2018-01-22 VITALS
WEIGHT: 175 LBS | OXYGEN SATURATION: 99 % | BODY MASS INDEX: 24.5 KG/M2 | TEMPERATURE: 95.8 F | HEIGHT: 71 IN | HEART RATE: 59 BPM | SYSTOLIC BLOOD PRESSURE: 118 MMHG | DIASTOLIC BLOOD PRESSURE: 60 MMHG

## 2018-01-22 VITALS
TEMPERATURE: 98.1 F | HEART RATE: 42 BPM | WEIGHT: 180 LBS | HEIGHT: 71 IN | SYSTOLIC BLOOD PRESSURE: 136 MMHG | OXYGEN SATURATION: 99 % | DIASTOLIC BLOOD PRESSURE: 68 MMHG | BODY MASS INDEX: 25.2 KG/M2

## 2018-01-22 VITALS
SYSTOLIC BLOOD PRESSURE: 128 MMHG | RESPIRATION RATE: 18 BRPM | DIASTOLIC BLOOD PRESSURE: 60 MMHG | HEART RATE: 70 BPM | TEMPERATURE: 96.8 F | WEIGHT: 167 LBS | BODY MASS INDEX: 23.38 KG/M2 | HEIGHT: 71 IN

## 2018-01-22 VITALS
HEIGHT: 71 IN | DIASTOLIC BLOOD PRESSURE: 84 MMHG | BODY MASS INDEX: 25.06 KG/M2 | SYSTOLIC BLOOD PRESSURE: 124 MMHG | HEART RATE: 74 BPM | WEIGHT: 179 LBS

## 2018-01-22 VITALS
HEART RATE: 66 BPM | DIASTOLIC BLOOD PRESSURE: 70 MMHG | RESPIRATION RATE: 18 BRPM | WEIGHT: 169 LBS | SYSTOLIC BLOOD PRESSURE: 110 MMHG | BODY MASS INDEX: 22.92 KG/M2

## 2018-01-23 NOTE — CONSULTS
Chief Complaint  " I have low blood count "      History of Present Illness  Ilan Armenta is a 80y o  year old male with a history of CKD stage 5, AFib and CHF who was admitted from a nursing facility on 11/30 for symptomatic anemia  Â   recently admitted to the hosp; admission CBC showed hemoglobin 7 0, RDW 17 3, MCV 88, WBC 8 5, platelets 701D  He was subsequently transfused with 2 units PRBC with a bump to 8 7  then decreased to 7 3 on discharge  FOBT was negative  Iron studies showed serum iron of 27, ferritin 1304, saturation 14%, TIBC 195  Folate and B12 levels are adequate  Â   Patient came in today, reported there is no bleeding, no new chest pain dyspnea dizziness easy bruise  Tolerating Coumadin again without bleeding  He reported weight loss about 30 lb in the past 3 months, no lumps bumps, no low-grade fever, chills, night sweats, leg swelling  There is no chest pain, dyspnea, cough, hemoptysis, nausea vomiting, abdominal pain, change of urination bowel movement habits  Patient has no oncology issues in the past  He reported having EGD colonoscopy last year with normal results  He clearly stated that he is not clear why he is coming in to hematology appointment, he is not interested in any workup or treatment  Review of Systems    Constitutional: No fever or chills, feels well, no tiredness, no recent weight gain or weight loss  Eyes: No complaints of eye pain, no red eyes, no discharge from eyes, no itchy eyes  ENT: no complaints of earache, no hearing loss, no nosebleeds, no nasal discharge, no sore throat, no hoarseness  Cardiovascular: No complaints of slow heart rate, no fast heart rate, no chest pain, no palpitations, no leg claudication, no lower extremity  Respiratory: No complaints of shortness of breath, no wheezing, no cough, no SOB on exertion, no orthopnea or PND     Gastrointestinal: No complaints of abdominal pain, no constipation, no nausea or vomiting, no diarrhea or bloody stools  Genitourinary: No complaints of dysuria, no incontinence, no hesitancy, no nocturia, no genital lesion, no testicular pain  Musculoskeletal: No complaints of arthralgia, no myalgias, no joint swelling or stiffness, no limb pain or swelling  Integumentary: No complaints of skin rash or skin lesions, no itching, no skin wound, no dry skin  Neurological: No compliants of headache, no confusion, no convulsions, no numbness or tingling, no dizziness or fainting, no limb weakness, no difficulty walking  Psychiatric: Is not suicidal, no sleep disturbances, no anxiety or depression, no change in personality, no emotional problems  Endocrine: No complaints of proptosis, no hot flashes, no muscle weakness, no erectile dysfunction, no deepening of the voice, no feelings of weakness  Hematologic/Lymphatic: No complaints of swollen glands, no swollen glands in the neck, does not bleed easily, no easy bruising  Active Problems    1  Abdominal aortic aneurysm (441 4) (I71 4)   2  Acute foot pain, left (729 5) (M79 672)   3  Acute on chronic systolic congestive heart failure (428 23,428 0) (I50 23)   4  Adequate anticoagulation on anticoagulant therapy (V58 61) (Z79 01)   5  Anemia (285 9) (D64 9)   6  Aneurysm, 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)   7  Anticoagulant long-term use (V58 61) (Z79 01)   8  Arterial ectasia (447 8) (I77 9)   9  Ascending aortic aneurysm (441 2) (I71 2)   10  ASCVD (arteriosclerotic cardiovascular disease) (429 2,440 9) (I25 10)   11  Bilateral edema of lower extremity (782 3) (R60 0)   12  Bilateral inguinal hernia without obstruction or gangrene, recurrence not specified    (550 92) (K40 20)   13  Black stool (792 1) (K92 1)   14  Blood in urine (599 70) (R31 9)   15  Bloody diarrhea (787 91) (R19 7)   16  Bradycardia (427 89) (R00 1)   17  Cardiomyopathy (425 4) (I42 9)   18  Chronic renal insufficiency, stage IV (severe) (585 4) (N18 4)   19   Chronic systolic CHF (congestive heart failure) (428 22,428 0) (I50 22)   20  CKD (chronic kidney disease), stage V (585 5) (N18 5)   21  Constipation (564 00) (K59 00)   22  Dehydration (276 51) (E86 0)   23  Dysphagia (787 20) (R13 10)   24  Fatigue (780 79) (R53 83)   25  Fecal occult blood test positive (792 1) (R19 5)   26  Gout of both feet (274 9) (M10 9)   27  Hallucinations (780 1) (R44 3)   28  Hematuria (599 70) (R31 9)   29  Hyperlipidemia (272 4) (E78 5)   30  Hypertension (401 9) (I10)   31  Hypocalcemia (275 41) (E83 51)   32  Hypomagnesemia (275 2) (E83 42)   33  Iliac artery aneurysm, 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)   34  Incisional hernia, without obstruction or gangrene (553 21) (K43 2)   35  Iron deficiency anemia (280 9) (D50 9)   36  Mitral valve regurgitation (424 0) (I34 0)   37  Monilial rash (112 3) (B37 2)   38  Need for diphtheria-tetanus-pertussis (Tdap) vaccine, adult/adolescent (V06 1) (Z23)   39  Need for influenza vaccination (V04 81) (Z23)   40  Need for pneumococcal vaccination (V03 82) (Z23)   41  Need for shingles vaccine (V04 89) (Z23)   42  Numbness of left hand (782 0) (R20 0)   43  Paroxysmal atrial fibrillation (427 31) (I48 0)   44  Peripheral vascular disease (443 9) (I73 9)   45  Preop examination (V72 84) (Z01 818)   46  S/P aortic valve replacement with bioprosthetic valve (V42 2) (Z95 3)   47  Sinus bradycardia (427 89) (R00 1)   48  Skin rash (782 1) (R21)   49  Thrombocytopenia (287 5) (D69 6)   50  Urine frequency (788 41) (R35 0)   51  Vascular complication (410 9) (H96 0)   52   Visit for pre-operative examination (V72 84) (S02 477)    Past Medical History    · Anemia (285 9) (D64 9)   · 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)   · 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)   · ASCVD (arteriosclerotic cardiovascular disease) (429 2,440 9) (I25 10)   · Chronic renal insufficiency, stage IV (severe) (585 4) (N18 4)   · Dysphagia (787 20) (R13 10)   · History of hallucinations (V11 9) (Z86 59)   · History of hematuria (V13 09) (Z87 448)   · Hyperlipidemia (272 4) (E78 5)   · History of Hypomagnesemia (275 2) (E83 42)   · History of Need for pneumococcal vaccination (V03 82) (Z23)   · Negative depression screening   · Visit for pre-operative examination (V72 84) (Q39 485)    The active problems and past medical history were reviewed and updated today  Surgical History    · History of Aortic Aneurysm Repair   · History of Asc Aortic Aneurysm Repair W/ Graft, Aortic Root Replacement   · History of CABG   · History of Colonoscopy   · History of Endovascular Repair Of Aortic Aneurysm   · History of Hemodialysis Access Type Arteriovenous Fistula Left Arm   · History of Thoracic Aortic Aneurysm Repair Endovasc Tushar Nieto    The surgical history was reviewed and updated today  Family History    · Family history of Aneurysm Of Abdominal Aorta    · Family history of Aneurysm Of Abdominal Aorta    · Family history of diabetes mellitus (V18 0) (Z83 3)    The family history was reviewed and updated today  Social History    · Denied: Drug use (305 90) (F19 90)   · Employed   · [employer], semi retired- [employer]   · Exercises occasionally (V49 89) (Z78 9)   · Five children   · Former smoker (V15 82) (W71 573)   ·    · No advance directives (V49 89) (Z78 9)   · Occasional alcohol use   · Occasional caffeine consumption  The social history was reviewed and updated today  Current Meds   1  AmLODIPine Besylate 5 MG Oral Tablet; TAKE 1 TABLET DAILY; Therapy: 93OWF1453 to (22 285751)  Requested for: 74KKP7073; Last   Rx:09Oct2017 Ordered   2  Aspirin 81 MG TABS; TAKE 1 TABLET DAILY; Therapy: (Recorded:54Hjh3909) to Recorded   3  Atorvastatin Calcium 10 MG Oral Tablet; take 1 tablet every day; Therapy: 62Ave6755 to (Evaluate:19Mar2018)  Requested for: 43ONS0176; Last   Rx:20Sep2017 Ordered   4   [employer] Omeprazole 20 6 (20 Base) MG Oral Capsule Delayed Release; take 1 capsule   daily; Therapy: (Recorded:32Hya5803) to Recorded   5  Ferrous Sulfate 325 (65 Fe) MG Oral Tablet; TAKE 1 TABLET TWICE DAILY  Requested   for: 90TQF8792; Last Rx:06Nov2017 Ordered   6  MetOLazone 5 MG Oral Tablet; TAKE 1 TABLET BY MOUTH DAILY  TAKE 30 MINUTES   BEFORE LASIX(FUROSEMIDE); Therapy: 81ZZF2425 to (Evaluate:38Wlr8123)  Requested for: 73Uou7544; Last   Rx:86Qjd8125 Ordered   7  Multi-Vitamin Oral Tablet; take 1 tablet daily Recorded   8  RA Vitamin D-3 2000 UNIT Oral Capsule; TAKE 1 CAPSULE BY MOUTH ONCE DAILY   Recorded   9  Terazosin HCl - 1 MG Oral Capsule; TAKE 1 CAPSULE EVERY DAY; Therapy: 93Lxm7535 to (Evaluate:19Apr2018)  Requested for: 21Oct2017; Last   Rx:21Oct2017 Ordered   10  Torsemide 20 MG Oral Tablet; Take 1 tablet daily Recorded   11  Tums 500 MG Oral Tablet Chewable; CHEW 2 TABLET Twice daily; Therapy: 85NGL5888 to (Evaluate:17Nov2017)  Requested for: 68Biv0177 Recorded   12  Warfarin Sodium 2 MG Oral Tablet; TAKE 1 TABLET DAILY; Therapy: 35JHV7656 to (Evaluate:33Ygc3770)  Requested for: 70UGU8306; Last    Rx:17Oct2017 Ordered   13  Warfarin Sodium 5 MG Oral Tablet; take 1 tablet every day; Therapy: 77UTE0000 to (Evaluate:17Jan2018)  Requested for: 57Wtk7090; Last    Rx:79Rds6255 Ordered    Allergies    1  Penicillins   2  Ativan TABS    Vitals   Recorded: 01CVD5050 12:56PM   Temperature 96 8 F   Heart Rate 70   Respiration 18   Systolic 406   Diastolic 60   Height 5 ft 11 in   Weight 167 lb    BMI Calculated 23 29   BSA Calculated 1 95   Pain Scale 0     Physical Exam    Constitutional   General appearance: No acute distress, well appearing and well nourished  Eyes   Conjunctiva and lids: No swelling, erythema, or discharge  Ears, Nose, Mouth, and Throat   External inspection of ears and nose: Normal     Pulmonary   Respiratory effort: No increased work of breathing or signs of respiratory distress  Auscultation of lungs: Abnormal   Decreased breathing sounds on the left  Cardiovascular   Palpation of heart: Normal PMI, no thrills  Auscultation of heart: Normal rate and rhythm, normal S1 and S2, without murmurs  Abdomen   Abdomen: Non-tender, no masses  Liver and spleen: No hepatomegaly or splenomegaly  Lymphatic   Palpation of lymph nodes in neck: No lymphadenopathy  Musculoskeletal   Gait and station: Abnormal   unsteady  Skin   Skin and subcutaneous tissue: Normal without rashes or lesions  Psychiatric   Orientation to person, place and time: Normal     Mood and affect: Normal         ECOG 3       Assessment    1  Pancytopenia (284 19) (D61 818)   2  Easy bruising (782 9) (R23 8)   3  Anticoagulant long-term use (V58 61) (Z79 01)    Plan  Pancytopenia    · (1) CBC/PLT/DIFF; Status:Active; Requested for:98Znw2547; Perform:[facility]; SGM:88NVL0516;QBDJAPV; For:Pancytopenia; Ordered By:Mendoza Pantoja;   · (1) IRON PANEL; Status:Active; Requested for:13Edm0756; Perform:[facility]; AGE:89FMH6189;QCWPTHB; For:Pancytopenia; Ordered By:Mendoza Pantoja;   · Follow-up visit in 2 months Evaluation and Treatment  lab in 2 months   Follow-up after lab  Status: Complete  Done: 55UQR7576   Ordered; For: Pancytopenia; Ordered By: Mela Shaw Performed:  Due: 44ORE5163; Last Updated By: Kimberly Lester; 12/18/2017 1:35:25 PM    Discussion/Summary    1, anemia: Normocytic, most recent hemoglobin 7 3 after transfusion, MCV 90    --etiology is unclear, contributing factors including CKD stage 5, frequent blood draw, chronic disease  --no clear evidence of GI bleeding  Stool guaiac negative while in hospital  Per patient is status post EGD colonoscopy last year  --no clear evidence for hemolysis, haptoglobin 74 on December 2nd, T bili 0 7 on December 2nd   --iron profile is consistent with chronic disease with ferritin 1300; B12 is elevated     --retic count is 0 75, suggesting chronic anemia  Plan  --bone marrow disorders cannot be ruled out, bone marrow biopsy was discussed with patient, patient deferred  He is under the care of Nephrology as well for CKD, he is receiving Epo, he is taking oral iron  For the reason he is on EPO, he can continue oral iron  Otherwise it should be stopped as currently having iron overload with high ferritin  --repeat CBC in 2 months  Follow-up     2, thrombocytopenia  --chronic, ongoing for at least 3 years  Unclear etiology  The most, once including B12 deficiency, bone marrow disorders, alcohol use, secondary to medicine including antibiotics, anti seizure medicines, chronic infection inflammation, splenomegaly  Medication list reviewed, Torsemide is potentially related with thrombocytopenia  However, the his platelet number has been consistent for the past 3 years  Plan  --continue to monitor  --okay to continue anticoagulation therapy if platelet more than 50     3, Decreased left breathing sound  --no other associated symptoms  advised patient to have chest x-ray  Patient deferred  All as above, patient is not interested in any further management  The   The patient was counseled regarding diagnostic results  total time of encounter was 40 minutes and 20 minutes was spent counseling        Signatures   Electronically signed by : Scout Yoo MD; Dec 18 2017  2:15PM EST                       (Author)

## 2018-02-19 DIAGNOSIS — D61.818 OTHER PANCYTOPENIA (HCC): ICD-10-CM

## 2018-05-01 ENCOUNTER — TELEPHONE (OUTPATIENT)
Dept: FAMILY MEDICINE CLINIC | Facility: CLINIC | Age: 83
End: 2018-05-01

## 2018-05-11 DIAGNOSIS — I71.4 ABDOMINAL AORTIC ANEURYSM WITHOUT RUPTURE (HCC): ICD-10-CM
